# Patient Record
Sex: FEMALE | Race: WHITE | NOT HISPANIC OR LATINO | Employment: FULL TIME | URBAN - METROPOLITAN AREA
[De-identification: names, ages, dates, MRNs, and addresses within clinical notes are randomized per-mention and may not be internally consistent; named-entity substitution may affect disease eponyms.]

---

## 2017-01-12 ENCOUNTER — ALLSCRIPTS OFFICE VISIT (OUTPATIENT)
Dept: OTHER | Facility: OTHER | Age: 37
End: 2017-01-12

## 2017-02-16 ENCOUNTER — TRANSCRIBE ORDERS (OUTPATIENT)
Dept: SLEEP CENTER | Facility: CLINIC | Age: 37
End: 2017-02-16

## 2017-02-16 DIAGNOSIS — G47.33 OSA (OBSTRUCTIVE SLEEP APNEA): Primary | ICD-10-CM

## 2017-02-17 ENCOUNTER — TRANSCRIBE ORDERS (OUTPATIENT)
Dept: ADMINISTRATIVE | Facility: HOSPITAL | Age: 37
End: 2017-02-17

## 2017-02-17 DIAGNOSIS — J31.0 CHRONIC RHINITIS: Primary | ICD-10-CM

## 2017-02-17 DIAGNOSIS — J32.9 UNSPECIFIED SINUSITIS (CHRONIC): ICD-10-CM

## 2017-02-24 ENCOUNTER — HOSPITAL ENCOUNTER (OUTPATIENT)
Dept: CT IMAGING | Facility: HOSPITAL | Age: 37
Discharge: HOME | End: 2017-02-24
Attending: OTOLARYNGOLOGY
Payer: COMMERCIAL

## 2017-02-24 DIAGNOSIS — J31.0 CHRONIC RHINITIS: ICD-10-CM

## 2017-02-24 DIAGNOSIS — J32.9 UNSPECIFIED SINUSITIS (CHRONIC): ICD-10-CM

## 2017-02-24 PROCEDURE — 70486 CT MAXILLOFACIAL W/O DYE: CPT

## 2017-02-26 ENCOUNTER — ANESTHESIA EVENT (OUTPATIENT)
Dept: GASTROENTEROLOGY | Facility: HOSPITAL | Age: 37
End: 2017-02-26
Payer: COMMERCIAL

## 2017-02-27 ENCOUNTER — ANESTHESIA (OUTPATIENT)
Dept: GASTROENTEROLOGY | Facility: HOSPITAL | Age: 37
End: 2017-02-27
Payer: COMMERCIAL

## 2017-02-27 ENCOUNTER — HOSPITAL ENCOUNTER (OUTPATIENT)
Facility: HOSPITAL | Age: 37
Setting detail: OUTPATIENT SURGERY
Discharge: HOME | End: 2017-02-27
Attending: INTERNAL MEDICINE | Admitting: INTERNAL MEDICINE
Payer: COMMERCIAL

## 2017-02-27 ENCOUNTER — GENERIC CONVERSION - ENCOUNTER (OUTPATIENT)
Dept: OTHER | Facility: OTHER | Age: 37
End: 2017-02-27

## 2017-02-27 VITALS
OXYGEN SATURATION: 99 % | HEIGHT: 61 IN | BODY MASS INDEX: 50.03 KG/M2 | RESPIRATION RATE: 18 BRPM | WEIGHT: 265 LBS | TEMPERATURE: 98.4 F | HEART RATE: 77 BPM | DIASTOLIC BLOOD PRESSURE: 85 MMHG | SYSTOLIC BLOOD PRESSURE: 137 MMHG

## 2017-02-27 DIAGNOSIS — R19.8 ALTERNATING CONSTIPATION AND DIARRHEA: ICD-10-CM

## 2017-02-27 PROCEDURE — 88305 TISSUE EXAM BY PATHOLOGIST: CPT | Performed by: INTERNAL MEDICINE

## 2017-02-27 RX ORDER — PROPOFOL 10 MG/ML
INJECTION, EMULSION INTRAVENOUS AS NEEDED
Status: DISCONTINUED | OUTPATIENT
Start: 2017-02-27 | End: 2017-02-27 | Stop reason: SURG

## 2017-02-27 RX ORDER — PROPOFOL 10 MG/ML
INJECTION, EMULSION INTRAVENOUS CONTINUOUS PRN
Status: DISCONTINUED | OUTPATIENT
Start: 2017-02-27 | End: 2017-02-27 | Stop reason: SURG

## 2017-02-27 RX ORDER — SODIUM CHLORIDE 9 MG/ML
100 INJECTION, SOLUTION INTRAVENOUS CONTINUOUS
Status: DISCONTINUED | OUTPATIENT
Start: 2017-02-27 | End: 2017-02-27 | Stop reason: HOSPADM

## 2017-02-27 RX ORDER — LIDOCAINE HYDROCHLORIDE 10 MG/ML
INJECTION, SOLUTION INFILTRATION; PERINEURAL AS NEEDED
Status: DISCONTINUED | OUTPATIENT
Start: 2017-02-27 | End: 2017-02-27 | Stop reason: SURG

## 2017-02-27 RX ADMIN — LIDOCAINE HYDROCHLORIDE 50 MG: 10 INJECTION, SOLUTION INFILTRATION; PERINEURAL at 11:34

## 2017-02-27 RX ADMIN — SODIUM CHLORIDE 100 ML/HR: 0.9 INJECTION, SOLUTION INTRAVENOUS at 11:09

## 2017-02-27 RX ADMIN — PROPOFOL 150 MCG/KG/MIN: 10 INJECTION, EMULSION INTRAVENOUS at 11:34

## 2017-02-27 RX ADMIN — PROPOFOL 50 MG: 10 INJECTION, EMULSION INTRAVENOUS at 11:36

## 2017-02-27 RX ADMIN — PROPOFOL 100 MG: 10 INJECTION, EMULSION INTRAVENOUS at 11:33

## 2017-03-01 ENCOUNTER — GENERIC CONVERSION - ENCOUNTER (OUTPATIENT)
Dept: OTHER | Facility: OTHER | Age: 37
End: 2017-03-01

## 2017-06-01 ENCOUNTER — ALLSCRIPTS OFFICE VISIT (OUTPATIENT)
Dept: OTHER | Facility: OTHER | Age: 37
End: 2017-06-01

## 2017-06-15 ENCOUNTER — ALLSCRIPTS OFFICE VISIT (OUTPATIENT)
Dept: OTHER | Facility: OTHER | Age: 37
End: 2017-06-15

## 2017-08-14 ENCOUNTER — ALLSCRIPTS OFFICE VISIT (OUTPATIENT)
Dept: OTHER | Facility: OTHER | Age: 37
End: 2017-08-14

## 2017-08-18 ENCOUNTER — TRANSCRIBE ORDERS (OUTPATIENT)
Dept: ADMINISTRATIVE | Facility: HOSPITAL | Age: 37
End: 2017-08-18

## 2017-08-18 ENCOUNTER — ALLSCRIPTS OFFICE VISIT (OUTPATIENT)
Dept: OTHER | Facility: OTHER | Age: 37
End: 2017-08-18

## 2017-08-18 DIAGNOSIS — M25.472 SWOLLEN ANKLES: Primary | ICD-10-CM

## 2017-08-18 DIAGNOSIS — M25.571 PAIN IN RIGHT ANKLE: ICD-10-CM

## 2017-08-18 DIAGNOSIS — M25.471 SWOLLEN ANKLES: Primary | ICD-10-CM

## 2017-08-18 DIAGNOSIS — M25.571 RIGHT ANKLE PAIN, UNSPECIFIED CHRONICITY: ICD-10-CM

## 2017-08-18 DIAGNOSIS — Z98.890 OTHER SPECIFIED POSTPROCEDURAL STATES: ICD-10-CM

## 2017-08-18 DIAGNOSIS — M25.471 EFFUSION OF RIGHT ANKLE: ICD-10-CM

## 2017-08-18 DIAGNOSIS — R53.83 OTHER FATIGUE: ICD-10-CM

## 2017-08-23 ENCOUNTER — HOSPITAL ENCOUNTER (OUTPATIENT)
Dept: MRI IMAGING | Facility: HOSPITAL | Age: 37
Discharge: HOME | End: 2017-08-23
Payer: COMMERCIAL

## 2017-08-23 DIAGNOSIS — M25.571 PAIN IN RIGHT ANKLE: ICD-10-CM

## 2017-08-23 DIAGNOSIS — M25.471 EFFUSION OF RIGHT ANKLE: ICD-10-CM

## 2017-08-23 PROCEDURE — 73721 MRI JNT OF LWR EXTRE W/O DYE: CPT

## 2017-08-24 ENCOUNTER — GENERIC CONVERSION - ENCOUNTER (OUTPATIENT)
Dept: OTHER | Facility: OTHER | Age: 37
End: 2017-08-24

## 2017-08-29 ENCOUNTER — ALLSCRIPTS OFFICE VISIT (OUTPATIENT)
Dept: OTHER | Facility: OTHER | Age: 37
End: 2017-08-29

## 2017-08-29 LAB — HBA1C MFR BLD HPLC: 6.4 %

## 2017-08-31 ENCOUNTER — ANESTHESIA EVENT (OUTPATIENT)
Dept: PERIOP | Facility: HOSPITAL | Age: 37
End: 2017-08-31
Payer: COMMERCIAL

## 2017-09-01 ENCOUNTER — ANESTHESIA (OUTPATIENT)
Dept: PERIOP | Facility: HOSPITAL | Age: 37
End: 2017-09-01
Payer: COMMERCIAL

## 2017-09-01 ENCOUNTER — HOSPITAL ENCOUNTER (OUTPATIENT)
Facility: HOSPITAL | Age: 37
Setting detail: OUTPATIENT SURGERY
Discharge: HOME | End: 2017-09-01
Attending: PODIATRIST | Admitting: PODIATRIST
Payer: COMMERCIAL

## 2017-09-01 ENCOUNTER — HOSPITAL ENCOUNTER (OUTPATIENT)
Dept: RADIOLOGY | Facility: HOSPITAL | Age: 37
Setting detail: OUTPATIENT SURGERY
Discharge: HOME | End: 2017-09-01
Payer: COMMERCIAL

## 2017-09-01 VITALS
SYSTOLIC BLOOD PRESSURE: 122 MMHG | WEIGHT: 248 LBS | OXYGEN SATURATION: 95 % | RESPIRATION RATE: 16 BRPM | HEIGHT: 61 IN | DIASTOLIC BLOOD PRESSURE: 69 MMHG | HEART RATE: 95 BPM | TEMPERATURE: 97.7 F | BODY MASS INDEX: 46.82 KG/M2

## 2017-09-01 DIAGNOSIS — M25.371 OTHER INSTABILITY, RIGHT ANKLE: ICD-10-CM

## 2017-09-01 DIAGNOSIS — R26.2 AMBULATORY DYSFUNCTION: Primary | ICD-10-CM

## 2017-09-01 LAB
EXT PREGNANCY TEST URINE: NEGATIVE
GLUCOSE SERPL-MCNC: 129 MG/DL (ref 65–140)

## 2017-09-01 PROCEDURE — 73600 X-RAY EXAM OF ANKLE: CPT

## 2017-09-01 PROCEDURE — C1713 ANCHOR/SCREW BN/BN,TIS/BN: HCPCS | Performed by: PODIATRIST

## 2017-09-01 PROCEDURE — 81025 URINE PREGNANCY TEST: CPT | Performed by: ANESTHESIOLOGY

## 2017-09-01 PROCEDURE — 82948 REAGENT STRIP/BLOOD GLUCOSE: CPT

## 2017-09-01 DEVICE — KIT REPAIR LIGAMENT AUG INTERNALBRACE: Type: IMPLANTABLE DEVICE | Site: ANKLE | Status: FUNCTIONAL

## 2017-09-01 DEVICE — ANCHOR SUT 4.75 X 19.1MM CLS EYELET SWIVELOCK C: Type: IMPLANTABLE DEVICE | Site: ANKLE | Status: FUNCTIONAL

## 2017-09-01 DEVICE — ANCHOR SUT MINI 2.4 X 8.5MM DISP: Type: IMPLANTABLE DEVICE | Site: ANKLE | Status: FUNCTIONAL

## 2017-09-01 RX ORDER — OXYCODONE HYDROCHLORIDE 5 MG/1
5 TABLET ORAL EVERY 4 HOURS PRN
Status: DISCONTINUED | OUTPATIENT
Start: 2017-09-01 | End: 2017-09-02 | Stop reason: HOSPADM

## 2017-09-01 RX ORDER — CLINDAMYCIN PHOSPHATE 900 MG/50ML
900 INJECTION INTRAVENOUS ONCE
Status: COMPLETED | OUTPATIENT
Start: 2017-09-01 | End: 2017-09-01

## 2017-09-01 RX ORDER — PROPOFOL 10 MG/ML
INJECTION, EMULSION INTRAVENOUS AS NEEDED
Status: DISCONTINUED | OUTPATIENT
Start: 2017-09-01 | End: 2017-09-01 | Stop reason: SURG

## 2017-09-01 RX ORDER — ROCURONIUM BROMIDE 10 MG/ML
INJECTION, SOLUTION INTRAVENOUS AS NEEDED
Status: DISCONTINUED | OUTPATIENT
Start: 2017-09-01 | End: 2017-09-01 | Stop reason: SURG

## 2017-09-01 RX ORDER — SODIUM CHLORIDE 9 MG/ML
125 INJECTION, SOLUTION INTRAVENOUS CONTINUOUS
Status: DISCONTINUED | OUTPATIENT
Start: 2017-09-01 | End: 2017-09-02 | Stop reason: HOSPADM

## 2017-09-01 RX ORDER — MIDAZOLAM HYDROCHLORIDE 1 MG/ML
INJECTION INTRAMUSCULAR; INTRAVENOUS AS NEEDED
Status: DISCONTINUED | OUTPATIENT
Start: 2017-09-01 | End: 2017-09-01 | Stop reason: SURG

## 2017-09-01 RX ORDER — FENTANYL CITRATE 50 UG/ML
INJECTION, SOLUTION INTRAMUSCULAR; INTRAVENOUS AS NEEDED
Status: DISCONTINUED | OUTPATIENT
Start: 2017-09-01 | End: 2017-09-01 | Stop reason: SURG

## 2017-09-01 RX ORDER — WARFARIN SODIUM 2.5 MG/1
2.5 TABLET ORAL
Qty: 7 TABLET | Refills: 0 | Status: SHIPPED | OUTPATIENT
Start: 2017-09-01 | End: 2018-05-24

## 2017-09-01 RX ORDER — ROPIVACAINE HYDROCHLORIDE 5 MG/ML
INJECTION, SOLUTION EPIDURAL; INFILTRATION; PERINEURAL AS NEEDED
Status: DISCONTINUED | OUTPATIENT
Start: 2017-09-01 | End: 2017-09-01 | Stop reason: SURG

## 2017-09-01 RX ORDER — BUPIVACAINE HYDROCHLORIDE 5 MG/ML
INJECTION, SOLUTION PERINEURAL AS NEEDED
Status: DISCONTINUED | OUTPATIENT
Start: 2017-09-01 | End: 2017-09-01 | Stop reason: HOSPADM

## 2017-09-01 RX ORDER — DEXAMETHASONE SODIUM PHOSPHATE 4 MG/ML
INJECTION, SOLUTION INTRA-ARTICULAR; INTRALESIONAL; INTRAMUSCULAR; INTRAVENOUS; SOFT TISSUE AS NEEDED
Status: DISCONTINUED | OUTPATIENT
Start: 2017-09-01 | End: 2017-09-01 | Stop reason: HOSPADM

## 2017-09-01 RX ORDER — ONDANSETRON 2 MG/ML
INJECTION INTRAMUSCULAR; INTRAVENOUS AS NEEDED
Status: DISCONTINUED | OUTPATIENT
Start: 2017-09-01 | End: 2017-09-01 | Stop reason: SURG

## 2017-09-01 RX ORDER — LIDOCAINE HYDROCHLORIDE AND EPINEPHRINE 10; 10 MG/ML; UG/ML
INJECTION, SOLUTION INFILTRATION; PERINEURAL AS NEEDED
Status: DISCONTINUED | OUTPATIENT
Start: 2017-09-01 | End: 2017-09-01 | Stop reason: HOSPADM

## 2017-09-01 RX ORDER — GLYCOPYRROLATE 0.2 MG/ML
INJECTION INTRAMUSCULAR; INTRAVENOUS AS NEEDED
Status: DISCONTINUED | OUTPATIENT
Start: 2017-09-01 | End: 2017-09-01 | Stop reason: SURG

## 2017-09-01 RX ORDER — MAGNESIUM HYDROXIDE 1200 MG/15ML
LIQUID ORAL AS NEEDED
Status: DISCONTINUED | OUTPATIENT
Start: 2017-09-01 | End: 2017-09-01 | Stop reason: HOSPADM

## 2017-09-01 RX ORDER — ONDANSETRON 2 MG/ML
4 INJECTION INTRAMUSCULAR; INTRAVENOUS ONCE AS NEEDED
Status: DISCONTINUED | OUTPATIENT
Start: 2017-09-01 | End: 2017-09-01 | Stop reason: HOSPADM

## 2017-09-01 RX ORDER — ALBUTEROL SULFATE 90 UG/1
2 AEROSOL, METERED RESPIRATORY (INHALATION) EVERY 6 HOURS PRN
COMMUNITY
End: 2018-05-24

## 2017-09-01 RX ORDER — HYDROCODONE BITARTRATE AND ACETAMINOPHEN 5; 325 MG/1; MG/1
1 TABLET ORAL EVERY 6 HOURS PRN
Qty: 18 TABLET | Refills: 0 | Status: SHIPPED | OUTPATIENT
Start: 2017-09-01 | End: 2017-09-11

## 2017-09-01 RX ADMIN — PROPOFOL 200 MG: 10 INJECTION, EMULSION INTRAVENOUS at 14:09

## 2017-09-01 RX ADMIN — FENTANYL CITRATE 100 MCG: 50 INJECTION, SOLUTION INTRAMUSCULAR; INTRAVENOUS at 13:06

## 2017-09-01 RX ADMIN — ROCURONIUM BROMIDE 20 MG: 10 INJECTION, SOLUTION INTRAVENOUS at 15:55

## 2017-09-01 RX ADMIN — OXYCODONE HYDROCHLORIDE 5 MG: 5 TABLET ORAL at 20:09

## 2017-09-01 RX ADMIN — FENTANYL CITRATE 50 MCG: 50 INJECTION, SOLUTION INTRAMUSCULAR; INTRAVENOUS at 15:58

## 2017-09-01 RX ADMIN — LIDOCAINE HYDROCHLORIDE 40 MG: 20 INJECTION, SOLUTION INTRAVENOUS at 14:09

## 2017-09-01 RX ADMIN — HYDROMORPHONE HYDROCHLORIDE 0.5 MG: 1 INJECTION, SOLUTION INTRAMUSCULAR; INTRAVENOUS; SUBCUTANEOUS at 17:28

## 2017-09-01 RX ADMIN — ROCURONIUM BROMIDE 50 MG: 10 INJECTION, SOLUTION INTRAVENOUS at 14:09

## 2017-09-01 RX ADMIN — CLINDAMYCIN PHOSPHATE 900 MG: 18 INJECTION, SOLUTION INTRAMUSCULAR; INTRAVENOUS at 14:16

## 2017-09-01 RX ADMIN — SODIUM CHLORIDE: 0.9 INJECTION, SOLUTION INTRAVENOUS at 14:35

## 2017-09-01 RX ADMIN — HYDROMORPHONE HYDROCHLORIDE 0.5 MG: 1 INJECTION, SOLUTION INTRAMUSCULAR; INTRAVENOUS; SUBCUTANEOUS at 17:39

## 2017-09-01 RX ADMIN — GLYCOPYRROLATE 0.4 MG: 0.2 INJECTION, SOLUTION INTRAMUSCULAR; INTRAVENOUS at 16:15

## 2017-09-01 RX ADMIN — NEOSTIGMINE METHYLSULFATE 3 MG: 1 INJECTION, SOLUTION INTRAMUSCULAR; INTRAVENOUS; SUBCUTANEOUS at 16:15

## 2017-09-01 RX ADMIN — SODIUM CHLORIDE: 0.9 INJECTION, SOLUTION INTRAVENOUS at 16:36

## 2017-09-01 RX ADMIN — IBUPROFEN 800 MG: 200 TABLET, FILM COATED ORAL at 20:21

## 2017-09-01 RX ADMIN — HYDROMORPHONE HYDROCHLORIDE 0.5 MG: 1 INJECTION, SOLUTION INTRAMUSCULAR; INTRAVENOUS; SUBCUTANEOUS at 17:16

## 2017-09-01 RX ADMIN — FENTANYL CITRATE 50 MCG: 50 INJECTION, SOLUTION INTRAMUSCULAR; INTRAVENOUS at 15:28

## 2017-09-01 RX ADMIN — SODIUM CHLORIDE 125 ML/HR: 0.9 INJECTION, SOLUTION INTRAVENOUS at 12:33

## 2017-09-01 RX ADMIN — HYDROMORPHONE HYDROCHLORIDE 0.5 MG: 1 INJECTION, SOLUTION INTRAMUSCULAR; INTRAVENOUS; SUBCUTANEOUS at 16:56

## 2017-09-01 RX ADMIN — HYDROMORPHONE HYDROCHLORIDE 0.5 MG: 1 INJECTION, SOLUTION INTRAMUSCULAR; INTRAVENOUS; SUBCUTANEOUS at 17:53

## 2017-09-01 RX ADMIN — MIDAZOLAM HYDROCHLORIDE 4 MG: 1 INJECTION, SOLUTION INTRAMUSCULAR; INTRAVENOUS at 13:06

## 2017-09-01 RX ADMIN — ONDANSETRON HYDROCHLORIDE 8 MG: 2 INJECTION, SOLUTION INTRAVENOUS at 15:59

## 2017-09-01 RX ADMIN — ROPIVACAINE HYDROCHLORIDE 40 ML: 5 INJECTION, SOLUTION EPIDURAL; INFILTRATION; PERINEURAL at 13:06

## 2017-09-03 ENCOUNTER — HOSPITAL ENCOUNTER (EMERGENCY)
Facility: HOSPITAL | Age: 37
Discharge: HOME | End: 2017-09-03
Admitting: EMERGENCY MEDICINE
Payer: COMMERCIAL

## 2017-09-03 VITALS
WEIGHT: 248 LBS | BODY MASS INDEX: 46.82 KG/M2 | HEIGHT: 61 IN | HEART RATE: 75 BPM | OXYGEN SATURATION: 94 % | RESPIRATION RATE: 18 BRPM | TEMPERATURE: 97.5 F

## 2017-09-03 DIAGNOSIS — G89.18 ACUTE POSTOPERATIVE PAIN: Primary | ICD-10-CM

## 2017-09-03 PROCEDURE — 99283 EMERGENCY DEPT VISIT LOW MDM: CPT

## 2017-09-03 RX ORDER — HYDROCODONE BITARTRATE AND ACETAMINOPHEN 5; 325 MG/1; MG/1
2 TABLET ORAL EVERY 6 HOURS PRN
Status: DISCONTINUED | OUTPATIENT
Start: 2017-09-03 | End: 2017-09-03 | Stop reason: HOSPADM

## 2017-09-03 RX ORDER — OXYCODONE HYDROCHLORIDE 10 MG/1
10 TABLET ORAL EVERY 4 HOURS PRN
Qty: 20 TABLET | Refills: 0 | Status: SHIPPED | OUTPATIENT
Start: 2017-09-03 | End: 2017-09-13

## 2017-09-03 RX ADMIN — HYDROCODONE BITARTRATE AND ACETAMINOPHEN 2 TABLET: 5; 325 TABLET ORAL at 02:22

## 2017-09-05 ENCOUNTER — ALLSCRIPTS OFFICE VISIT (OUTPATIENT)
Dept: OTHER | Facility: OTHER | Age: 37
End: 2017-09-05

## 2017-09-07 ENCOUNTER — GENERIC CONVERSION - ENCOUNTER (OUTPATIENT)
Dept: OTHER | Facility: OTHER | Age: 37
End: 2017-09-07

## 2017-09-11 ENCOUNTER — GENERIC CONVERSION - ENCOUNTER (OUTPATIENT)
Dept: OTHER | Facility: OTHER | Age: 37
End: 2017-09-11

## 2017-09-18 DIAGNOSIS — Z98.890 OTHER SPECIFIED POSTPROCEDURAL STATES: ICD-10-CM

## 2017-09-28 ENCOUNTER — APPOINTMENT (OUTPATIENT)
Dept: PHYSICAL THERAPY | Facility: REHABILITATION | Age: 37
End: 2017-09-28
Payer: COMMERCIAL

## 2017-09-28 PROCEDURE — 97161 PT EVAL LOW COMPLEX 20 MIN: CPT

## 2017-09-28 PROCEDURE — G8978 MOBILITY CURRENT STATUS: HCPCS

## 2017-09-28 PROCEDURE — G8979 MOBILITY GOAL STATUS: HCPCS

## 2017-10-02 ENCOUNTER — GENERIC CONVERSION - ENCOUNTER (OUTPATIENT)
Dept: OTHER | Facility: OTHER | Age: 37
End: 2017-10-02

## 2017-10-03 ENCOUNTER — APPOINTMENT (OUTPATIENT)
Dept: PHYSICAL THERAPY | Facility: REHABILITATION | Age: 37
End: 2017-10-03
Payer: COMMERCIAL

## 2017-10-03 PROCEDURE — 97110 THERAPEUTIC EXERCISES: CPT

## 2017-10-05 ENCOUNTER — APPOINTMENT (OUTPATIENT)
Dept: PHYSICAL THERAPY | Facility: REHABILITATION | Age: 37
End: 2017-10-05
Payer: COMMERCIAL

## 2017-10-09 ENCOUNTER — APPOINTMENT (OUTPATIENT)
Dept: PHYSICAL THERAPY | Facility: REHABILITATION | Age: 37
End: 2017-10-09
Payer: COMMERCIAL

## 2017-10-09 PROCEDURE — 97140 MANUAL THERAPY 1/> REGIONS: CPT

## 2017-10-09 PROCEDURE — 97110 THERAPEUTIC EXERCISES: CPT

## 2017-10-12 ENCOUNTER — APPOINTMENT (OUTPATIENT)
Dept: PHYSICAL THERAPY | Facility: REHABILITATION | Age: 37
End: 2017-10-12
Payer: COMMERCIAL

## 2017-10-12 PROCEDURE — 97110 THERAPEUTIC EXERCISES: CPT

## 2017-10-12 PROCEDURE — 97140 MANUAL THERAPY 1/> REGIONS: CPT

## 2017-10-16 ENCOUNTER — APPOINTMENT (OUTPATIENT)
Dept: PHYSICAL THERAPY | Facility: REHABILITATION | Age: 37
End: 2017-10-16
Payer: COMMERCIAL

## 2017-10-16 PROCEDURE — 97140 MANUAL THERAPY 1/> REGIONS: CPT

## 2017-10-16 PROCEDURE — 97110 THERAPEUTIC EXERCISES: CPT

## 2017-10-19 ENCOUNTER — APPOINTMENT (OUTPATIENT)
Dept: PHYSICAL THERAPY | Facility: REHABILITATION | Age: 37
End: 2017-10-19
Payer: COMMERCIAL

## 2017-10-23 DIAGNOSIS — Z98.890 OTHER SPECIFIED POSTPROCEDURAL STATES: ICD-10-CM

## 2017-10-25 ENCOUNTER — GENERIC CONVERSION - ENCOUNTER (OUTPATIENT)
Dept: OTHER | Facility: OTHER | Age: 37
End: 2017-10-25

## 2017-10-26 ENCOUNTER — APPOINTMENT (OUTPATIENT)
Dept: PHYSICAL THERAPY | Facility: REHABILITATION | Age: 37
End: 2017-10-26
Payer: COMMERCIAL

## 2017-11-09 ENCOUNTER — GENERIC CONVERSION - ENCOUNTER (OUTPATIENT)
Dept: OTHER | Facility: OTHER | Age: 37
End: 2017-11-09

## 2017-11-27 DIAGNOSIS — R19.8 OTHER SPECIFIED SYMPTOMS AND SIGNS INVOLVING THE DIGESTIVE SYSTEM AND ABDOMEN: ICD-10-CM

## 2017-11-27 DIAGNOSIS — Z98.890 OTHER SPECIFIED POSTPROCEDURAL STATES: ICD-10-CM

## 2017-11-27 DIAGNOSIS — K58.2 MIXED IRRITABLE BOWEL SYNDROME: ICD-10-CM

## 2017-12-12 ENCOUNTER — ALLSCRIPTS OFFICE VISIT (OUTPATIENT)
Dept: OTHER | Facility: OTHER | Age: 37
End: 2017-12-12

## 2017-12-13 NOTE — PROGRESS NOTES
Assessment    1  Acute maxillary sinusitis (461 0) (J01 00)   2  Allergy to multiple antibiotics (V14 1) (Z88 1)    Plan  Acute maxillary sinusitis    · DrRx Zithromax Z-Nathan 250 MG #6 pill pack; take as directed  Allergy to multiple antibiotics    · Reid UGALDE  (Allergy/Immunology) Co-Management  *  Status: Hold For -Scheduling  Requested for: 68PHU6247  Care Summary provided  : Yes    Discussion/Summary    Tram Is here with acute maxillary sinusitis  I am going to treat her with a Z-Nathan  Unfortunately she has multiple antibiotic allergies and this is the only 1 that she has not reacted to  I have also recommended use of Mucinex during the day and increased water intake had a discussion about her multiple allergies to various antibiotics, and I have given her a referral to an allergist for further evaluation  She states that her mother told her she was allergic to penicillin originally, and we would like to confirm to allergies versus verbal history of potential allergies  She should call or recheck if symptoms are not resolving  We will plan follow-up visit in January  Possible side effects of new medications were reviewed with the patient/guardian today  The treatment plan was reviewed with the patient/guardian  The patient/guardian understands and agrees with the treatment plan      Chief Complaint  c/o Possible sinus infectionwith Flu shot      History of Present Illness  HPI: Sinus sx began 4 weeks ago and she went to urgent care  got Z-nathan and prednisone  there was some improvement but it did not clear have worsened past 2 weeks  congestion especially over maxillary regions and headache on left side  has right sided chest pain which is randomcoughof nasal congestiontaking anythingBP      Review of Systems   Constitutional: No fever, no chills, feels well, no tiredness, no recent weight gain or loss  ENT: sore throat, but-- as noted in HPI    Cardiovascular: no complaints of slow or fast heart rate, no chest pain, no palpitations, no leg claudication or lower extremity edema  Respiratory: as noted in HPI,-- no shortness of breath-- and-- no wheezing  Gastrointestinal: no complaints of abdominal pain, no constipation, no nausea or diarrhea, no vomiting, no bloody stools  Active Problems  1  Alternating constipation and diarrhea (787 99) (R19 8)   2  Anxiety (300 00) (F41 9)   3  Asthma, exercise induced (493 81) (J45 990)   4  Blood pressure elevated (401 9) (I10)   5  Colon cancer screening (V76 51) (Z12 11)   6  Depression (311) (F32 9)   7  Dizziness (780 4) (R42)   8  Fatigue (780 79) (R53 83)   9  Fibromyalgia (729 1) (M79 7)   10  Flu vaccine need (V04 81) (Z23)   11  GERD (gastroesophageal reflux disease) (530 81) (K21 9)   12  Headache, migraine (346 90) (G43 909)   13  History of allergy (V15 09) (Z88 9)   14  Hyperlipidemia (272 4) (E78 5)   15  Influenza vaccination administered during current admission (V04 81) (Z23)   16  Injury of right ankle, initial encounter (959 7) (S99 911A)   17  Irritable bowel syndrome with both constipation and diarrhea (564 1) (K58 2)   18  Lateral epicondylitis of right elbow (726 32) (M77 11)   19  Lipoma of abdominal wall (214 1) (D17 1)   20  Low back pain (724 2) (M54 5)   21  Lumbar facet arthropathy (721 3) (M46 96)   22  Lumbar radiculopathy (724 4) (M54 16)   23  Malignant tumor of cervix (180 9) (C53 9)   24  Morbid or severe obesity due to excess calories (278 01) (E66 01)   25  Myalgia (729 1) (M79 1)   26  Obstructive sleep apnea (327 23) (G47 33)   27  Paresthesias (782 0) (R20 2)   28  Plantar fasciitis (728 71) (M72 2)   29  Preop examination (V72 84) (Z01 818)   30  Right ankle pain (719 47) (M25 571)   31  Right ankle swelling (719 07) (M25 471)   32  Right knee pain (719 46) (M25 561)   33  S/P hysterectomy (V88 01) (Z90 710)   34  S/P surgical manipulation of ankle joint (V45 89) (Z98 890)   35   Sinusitis (473 9) (J32 9)   36  Sprain of right ankle, unspecified ligament, subsequent encounter (845 00) (S93 401D)   37  Thoracic back pain (724 1) (M54 6)   38  Tinea corporis (110 5) (B35 4)   39  Tobacco use (305 1) (Z72 0)   40  Type 2 diabetes mellitus (250 00) (E11 9)   41  Uncontrolled blood glucose (790 29) (R73 09)   42  Viral bronchitis (466 0) (J20 8)    Past Medical History  1  History of Abdominal pain, acute, left lower quadrant (789 04,338 19) (R10 32)   2  History of Acute diverticulitis of intestine (562 11) (K57 92)   3  History of Acute maxillary sinusitis (461 0) (J01 00)   4  History of Acute UTI (599 0) (N39 0)   5  Epilepsy And Recurrent Seizures (345 90)   6  History of Gestational Diabetes Mellitus (V12 21)   7  History of acute conjunctivitis (V12 49) (Z86 69)   8  History of acute sinusitis (V12 69) (Z87 09)   9  History of tinea corporis (V12 09) (Z86 19)    Family History  Mother    1  Family history of Anxiety (Symptom)   2  Family history of Depression   3  Family history of Ovarian Cancer (V16 41)  Brother    4  Family history of Epilepsy And Recurrent Seizures (V17 2)   5  Family history of Suicide Risk  Maternal Grandmother    6  Family history of Cancer  Other    7  Family history of cardiac disorder (V17 49) (Z82 49)   8  Family history of diabetes mellitus (V18 0) (Z83 3)   9  Family history of hypertension (V17 49) (Z82 49)   10  Family history of neuropathy (V17 2) (Z82 0)   11  Family history of thyroid disease (V18 19) (Z83 49)    Social History   · Being A Social Drinker   · Former smoker (V15 82) (N42 225)   · Tobacco use (305 1) (Z72 0)    Surgical History    1  History of Ankle Repair   2  History of Hysterectomy   3  History of Tonsillectomy   4  History of Tubal Ligation    Current Meds   1  Dicyclomine HCl - 20 MG Oral Tablet; TAKE 1 TABLET Every 8 hours PRN abdominal pain; Therapy: 04XQD5632 to (Evaluate:30Knm4814)  Requested for: 49BOY3631; Last Rx:01Jun2017 Ordered   2   DULoxetine HCl - 30 MG Oral Capsule Delayed Release Particles; take 1 capsule daily; Therapy: 38WLR9470 to (Evaluate:13Jan2018)  Requested for: 55BCN1664; Last Rx:51Wjy6374 Ordered   3  Hydrocodone-Acetaminophen 7 5-325 MG Oral Tablet; TAKE 1 TABLET TWICE DAILY AS NEEDED FOR SEVERE PAIN; Therapy: 70XXS5093 to (Evaluate:05Jan2018); Last Rx:14Wzh7551 Ordered   4  LORazepam 0 5 MG Oral Tablet; Take 1-2 tablets daily as needed for anxiety; Therapy: 38BGG1464 to (Evaluate:05Jan2018); Last Rx:84Cpr8962 Ordered   5  MetFORMIN HCl - 500 MG Oral Tablet; TAKE 1 TABLET EVERY 12 HOURS WITH FOOD; Therapy: 71Nlt7398 to (Evaluate:18Jun2017)  Requested for: 12Wco8665; Last Rx:96Bfn6275 Ordered   6  OneTouch Ultra 2 w/Device Kit; test up to three times daily or as directed by physician; Therapy: 07ZBY5991 to (Gene Kehr)  Requested for: 33XBN7103; Last Rx:18Yyy8355 Ordered   7  OneTouch Ultra Blue In [employer]; USE 1 STRIP 3 times daily; Therapy: 99TNV5828 to (Last Rx:25Mar2016)  Requested for: 25Mar2016 Ordered   8  OxyCODONE HCl - 10 MG Oral Tablet; Therapy: 75BDA1184 to Recorded   9  ProAir  (90 Base) MCG/ACT Inhalation Aerosol Solution; INHALE 2 PUFFS 3 TIMES DAILY AS NEEDED; Therapy: 10NVF0465 to (Last Rx:10Nov2016)  Requested for: 42MNV7749 Ordered   10  SUMAtriptan Succinate 50 MG Oral Tablet; take 1 tablet for migraine relief  may repeat  every 2 hours  max 200mg/day; Therapy: 96OFJ2463 to (KQPNWKXT:76DLU8024)  Requested for: 35UYA5139; Last  Rx:03Oct2017 Ordered   11  Warfarin Sodium 2 5 MG Oral Tablet; TAKE 2 TABLETS DAILY; Therapy: 55NMD4320 to (Irlanda Montano)  Requested for: 23Wqu3260; Last  Rx:27Irg8559 Ordered    Allergies  1  Amoxicillin TABS   2  Aspirin TABS   3  Ceclor CAPS   4  Doxycycline Hyclate CAPS   5  Erythromycin TABS   6   Penicillins    Vitals   Recorded: 43Ucp8238 04:23PM Recorded: 18Kmz0540 03:42PM   Temperature  98 7 F    Heart Rate  80    Respiration  16    Systolic 375 219    Diastolic 90 489    Patient Refused Weight  Yes Yes   O2 Saturation  98, RA        Physical Exam   Constitutional  General appearance: No acute distress, well appearing and well nourished  morbidly obese  Ears, Nose, Mouth, and Throat  External inspection of ears and nose: Normal    Otoscopic examination: Tympanic membranes translucent with normal light reflex  Canals patent without erythema  Nasal mucosa, septum, and turbinates: Abnormal  -- (congested bilaterally)  Oropharynx: Normal with no erythema, edema, exudate or lesions  Pulmonary  Respiratory effort: No increased work of breathing or signs of respiratory distress  Auscultation of lungs: Clear to auscultation  Cardiovascular  Auscultation of heart: Normal rate and rhythm, normal S1 and S2, without murmurs  Lymphatic  Palpation of lymph nodes in neck: No lymphadenopathy  Additional Exam:  tender max sinuses          Future Appointments    Date/Time Provider Specialty Site   12/18/2017 12:30 PM Owen Shelby MD Gastroenterology Adult [facility] 9   01/11/2018 09:00 AM Malena Colunga MD Family Medicine [facility] 19   Electronically signed by : Samuel Puga MD; Dec 12 2017  5:05PM EST                       (Author)

## 2017-12-18 ENCOUNTER — ALLSCRIPTS OFFICE VISIT (OUTPATIENT)
Dept: OTHER | Facility: OTHER | Age: 37
End: 2017-12-18

## 2017-12-19 NOTE — PROGRESS NOTES
Assessment  1  GERD (gastroesophageal reflux disease) (530 81) (K21 9)   2  Alternating constipation and diarrhea (787 99) (R19 8)   3  Irritable bowel syndrome with both constipation and diarrhea (564 1) (K58 2)    Plan  Alternating constipation and diarrhea, GERD (gastroesophageal reflux disease), Irritablebowel syndrome with both constipation and diarrhea    · Follow Up After Tests Complete Evaluation and Treatment  Follow-up  Status: Hold For -Scheduling  Requested for: 55MYW2448   Ordered; For: Alternating constipation and diarrhea, GERD (gastroesophageal reflux disease), Irritable bowel syndrome with both constipation and diarrhea; Ordered By: Benedict Linder Performed:  Due: 58FVI5491   · Follow-up visit in 2 months Evaluation and Treatment  Follow-up  Status: Hold For -Scheduling  Requested for: 41VKP3014   Ordered; For: Alternating constipation and diarrhea, GERD (gastroesophageal reflux disease), Irritable bowel syndrome with both constipation and diarrhea; Ordered By: Benedict Linder Performed:  Due: 69CDR5493   · Follow-up visit in 4 Months Evaluation and Treatment  Follow-up NM  Status: Hold For -Scheduling  Requested for: 78QKV8328   Ordered; For: Alternating constipation and diarrhea, GERD (gastroesophageal reflux disease), Irritable bowel syndrome with both constipation and diarrhea; Ordered By: Benedict Linder Performed:  Due: 22VMY0513  Alternating constipation and diarrhea, Irritable bowel syndrome with both constipationand diarrhea    · * XR ABDOMEN OBSTRUCTION SERIES; Status:Active; Requested for:91Ccm7189;    Perform:[facility]; Due:60Nkx8270; Ordered; For:Alternating constipation and diarrhea, Irritable bowel syndrome with both constipation and diarrhea; Ordered By:Armani Lara;  Irritable bowel syndrome with both constipation and diarrhea    · Hyoscyamine Sulfate 0 125 MG Sublingual Tablet Sublingual; PLACE 1 TABLETUNDER THE TONGUE  3 TIMES DAILY AS NEEDED   Rx By: Benedict Linder; Dispense: 20 Days ; #:60 Tablet Sublingual; Refill: 2;For: Irritable bowel syndrome with both constipation and diarrhea; ELIEZER = N; Verified Transmission to [id number] Last Updated By: System, [employer]; 12/18/2017 1:30:34 PM    Discussion/Summary  Discussion Summary:   She has multiple gastrointestinal complaints including reflux, lower quadrant pain, and alternating diarrhea and constipation  She most likely has constipation predominant IBS with occasional overflow diarrhea and fortunately this has improved  I have given her prescription for levsin to take as needed when she gets these colon spasms  I also suggested a low FODMAP diet and a course of align or VSL#3  I referred her for repeat obstruction series to make sure we are not missing recurrent constipation  If there is no improvement or if she develops increasing bleeding then we will consider repeat colonoscopy or CT Scan  She will continue lifestyle and dietary modification for her reflux symptoms  These may improve after her upcoming bariatric surgery  Chief Complaint  Chief Complaint Free Text Note Form: Pt being seen for follow up Abdominal pain, nausea  patient notices symptoms getting worse last 1-2 months   Chief Complaint Chronic Condition [facility]: Patient is here today for follow up of chronic conditions described in HPI  History of Present Illness  HPI: During her EGD and colonoscopy over the past year, she was found to have a small hiatal hernia and her colon notable for a hyperplastic polyp that was removed  She has continued to complain of intermittent abdominal pains and spasms which she attributes to her intestines  Her constipation is improved since the colonoscopy and after a course of miralax and now she no longer has to take MiraLAX, but she still has the occasional pain and occasional rectal bleeding  She denies any nausea, vomiting, and weight loss  History Reviewed:  The history was obtained today from the patient and I agree with the documented history  Review of Systems  Complete-Female GI Adult:  Constitutional: feeling poorly-- and-- feeling tired, but-- No fever, no chills, feels well, no tiredness, no recent weight gain or weight loss,-- no fever,-- no recent weight gain,-- no chills-- and-- no recent weight loss  Eyes: No complaints of eye pain, no red eyes, no eyesight problems, no discharge, no dry eyes, no itching of eyes  ENT: no complaints of earache, no loss of hearing, no nose bleeds, no nasal discharge, no sore throat, no hoarseness  Cardiovascular: No complaints of slow heart rate, no fast heart rate, no chest pain, no palpitations, no leg claudication, no lower extremity edema  Respiratory: shortness of breath, but-- No complaints of shortness of breath, no wheezing, no cough, no SOB on exertion, no orthopnea, no PND,-- no cough,-- no orthopnea,-- no wheezing,-- no shortness of breath during exertion-- and-- no PND  Gastrointestinal: abdominal pain,-- constipation,-- diarrhea-- and-- hemorrhoids, acid reflux, but-- no nausea,-- no vomiting-- and-- no blood in stools  Genitourinary: No complaints of dysuria, no incontinence, no pelvic pain, no dysmenorrhea, no vaginal discharge or bleeding  Musculoskeletal: No complaints of arthralgias, no myalgias, no joint swelling or stiffness, no limb pain or swelling  Integumentary: No complaints of skin rash or lesions, no itching, no skin wounds, no breast pain or lump  Neurological: No complaints of headache, no confusion, no convulsions, no numbness, no dizziness or fainting, no tingling, no limb weakness, no difficulty walking  Psychiatric: Not suicidal, no sleep disturbance, no anxiety or depression, no change in personality, no emotional problems  Endocrine: No complaints of proptosis, no hot flashes, no muscle weakness, no deepening of the voice, no feelings of weakness    Hematologic/Lymphatic: No complaints of swollen glands, no swollen glands in the neck, does not bleed easily, does not bruise easily  ROS Reviewed:   ROS reviewed  Active Problems  1  Acute maxillary sinusitis (461 0) (J01 00)   2  Allergy to multiple antibiotics (V14 1) (Z88 1)   3  Alternating constipation and diarrhea (787 99) (R19 8)   4  Anxiety (300 00) (F41 9)   5  Asthma, exercise induced (493 81) (J45 990)   6  Blood pressure elevated (401 9) (I10)   7  Colon cancer screening (V76 51) (Z12 11)   8  Depression (311) (F32 9)   9  Dizziness (780 4) (R42)   10  Fatigue (780 79) (R53 83)   11  Fibromyalgia (729 1) (M79 7)   12  Flu vaccine need (V04 81) (Z23)   13  GERD (gastroesophageal reflux disease) (530 81) (K21 9)   14  Headache, migraine (346 90) (G43 909)   15  History of allergy (V15 09) (Z88 9)   16  Hyperlipidemia (272 4) (E78 5)   17  Influenza vaccination administered during current admission (V04 81) (Z23)   18  Injury of right ankle, initial encounter (959 7) (S99 911A)   19  Irritable bowel syndrome with both constipation and diarrhea (564 1) (K58 2)   20  Lateral epicondylitis of right elbow (726 32) (M77 11)   21  Lipoma of abdominal wall (214 1) (D17 1)   22  Low back pain (724 2) (M54 5)   23  Lumbar facet arthropathy (721 3) (M46 96)   24  Lumbar radiculopathy (724 4) (M54 16)   25  Malignant tumor of cervix (180 9) (C53 9)   26  Morbid or severe obesity due to excess calories (278 01) (E66 01)   27  Myalgia (729 1) (M79 1)   28  Obstructive sleep apnea (327 23) (G47 33)   29  Paresthesias (782 0) (R20 2)   30  Plantar fasciitis (728 71) (M72 2)   31  Preop examination (V72 84) (Z01 818)   32  Right ankle pain (719 47) (M25 571)   33  Right ankle swelling (719 07) (M25 471)   34  Right knee pain (719 46) (M25 561)   35  S/P hysterectomy (V88 01) (Z90 710)   36  S/P surgical manipulation of ankle joint (V45 89) (Z98 890)   37  Sinusitis (473 9) (J32 9)   38  Sprain of right ankle, unspecified ligament, subsequent encounter (845 00) (S93 401D)   39   Thoracic back pain (724 1) (M54 6)   40  Tinea corporis (110 5) (B35 4)   41  Tobacco use (305 1) (Z72 0)   42  Type 2 diabetes mellitus (250 00) (E11 9)   43  Uncontrolled blood glucose (790 29) (R73 09)   44  Viral bronchitis (466 0) (J20 8)    Past Medical History  1  History of Abdominal pain, acute, left lower quadrant (789 04,338 19) (R10 32)   2  History of Acute diverticulitis of intestine (562 11) (K57 92)   3  History of Acute maxillary sinusitis (461 0) (J01 00)   4  History of Acute UTI (599 0) (N39 0)   5  Epilepsy And Recurrent Seizures (345 90)   6  History of Gestational Diabetes Mellitus (V12 21)   7  History of acute conjunctivitis (V12 49) (Z86 69)   8  History of acute sinusitis (V12 69) (Z87 09)   9  History of tinea corporis (V12 09) (Z86 19)  Active Problems And Past Medical History Reviewed: The active problems and past medical history were reviewed and updated today  Surgical History  1  History of Ankle Repair   2  History of Hysterectomy   3  History of Tonsillectomy   4  History of Tubal Ligation  Surgical History Reviewed: The surgical history was reviewed and updated today  Family History  Mother    1  Family history of Anxiety (Symptom)   2  Family history of Depression   3  Family history of Ovarian Cancer (V16 41)  Brother    4  Family history of Epilepsy And Recurrent Seizures (V17 2)   5  Family history of Suicide Risk  Maternal Grandmother    6  Family history of Cancer  Other    7  Family history of cardiac disorder (V17 49) (Z82 49)   8  Family history of diabetes mellitus (V18 0) (Z83 3)   9  Family history of hypertension (V17 49) (Z82 49)   10  Family history of neuropathy (V17 2) (Z82 0)   11  Family history of thyroid disease (V18 19) (Z83 49)  Family History Reviewed: The family history was reviewed and updated today  Social History   · Being A Social Drinker   · Former smoker (C70 88) (Y93 919)   · Tobacco use (305 1) (Z72 0)  Social History Reviewed:  The social history was reviewed and updated today  Current Meds   1  Dicyclomine HCl - 20 MG Oral Tablet; TAKE 1 TABLET Every 8 hours PRN abdominal pain; Therapy: 75IAZ0502 to (Evaluate:15Ifo7260)  Requested for: 91GEB6115; Last Rx:01Jun2017 Ordered   2  DULoxetine HCl - 30 MG Oral Capsule Delayed Release Particles; take 1 capsule daily; Therapy: 18KDY7602 to (Evaluate:13Jan2018)  Requested for: 50OIS9744; Last Rx:98Tqs2261 Ordered   3  Hydrocodone-Acetaminophen 7 5-325 MG Oral Tablet; TAKE 1 TABLET TWICE DAILY AS NEEDED FOR SEVERE PAIN; Therapy: 21CTP9253 to (Evaluate:05Jan2018); Last Rx:74Ukf7473 Ordered   4  LORazepam 0 5 MG Oral Tablet; Take 1-2 tablets daily as needed for anxiety; Therapy: 84MZJ0058 to (Evaluate:05Jan2018); Last Rx:37Voj5736 Ordered   5  MetFORMIN HCl - 500 MG Oral Tablet; TAKE 1 TABLET EVERY 12 HOURS WITH FOOD; Therapy: 67Itf2336 to (Evaluate:18Jun2017)  Requested for: 17Mzw1146; Last Rx:20Zld4444 Ordered   6  OneTouch Ultra 2 w/Device Kit; test up to three times daily or as directed by physician; Therapy: 02YKB9771 to [phone])  Requested for: 50YME1541; Last Rx:00Bde8757 Ordered   7  OneTouch Ultra Blue In [employer]; USE 1 STRIP 3 times daily; Therapy: 07FER5866 to (Last Rx:25Mar2016)  Requested for: 25Mar2016 Ordered   8  ProAir  (90 Base) MCG/ACT Inhalation Aerosol Solution; INHALE 2 PUFFS 3 TIMES DAILY AS NEEDED; Therapy: 75XTR2875 to (Last Rx:10Nov2016)  Requested for: 17ELX0316 Ordered   9  SUMAtriptan Succinate 50 MG Oral Tablet; take 1 tablet for migraine relief  may repeat every 2 hours  max 200mg/day; Therapy: 31MSC2486 to )  Requested for: 43DQZ3925; Last Rx:24Pta7350 Ordered  Medication List Reviewed: The medication list was reviewed and updated today  Allergies  1  Amoxicillin TABS   2  Aspirin TABS   3  Ceclor CAPS   4  Doxycycline Hyclate CAPS   5  Erythromycin TABS   6   Penicillins    Vitals  Vital Signs    Recorded: 48HOS2415 12:37PM Temperature 98 8 F   Heart Rate 303   Systolic 217, LUE, Sitting   Diastolic 96, LUE, Sitting   Height 5 ft 1 5 in   Weight 264 lb 6 0 oz   BMI Calculated 49 14   BSA Calculated 2 14   O2 Saturation 97     Physical Exam   Constitutional  General appearance: No acute distress, well appearing and well nourished  Eyes  Conjunctiva and lids: No swelling, erythema or discharge  Pupils and irises: Equal, round and reactive to light  Ears, Nose, Mouth, and Throat  External inspection of ears and nose: Normal    Nasal mucosa, septum, and turbinates: Normal without edema or erythema  Oropharynx: Normal with no erythema, edema, exudate or lesions  Pulmonary  Respiratory effort: No increased work of breathing or signs of respiratory distress  Auscultation of lungs: Clear to auscultation  Cardiovascular  Auscultation of heart: Normal rate and rhythm, normal S1 and S2, without murmurs  Examination of extremities for edema and/or varicosities: Normal    Abdomen  Abdomen: Abnormal  -- mild RLQ tenderness  Liver and spleen: No hepatomegaly or splenomegaly  Lymphatic  Palpation of lymph nodes in neck: No lymphadenopathy  Musculoskeletal  Gait and station: Normal    Digits and nails: Normal without clubbing or cyanosis  Inspection/palpation of joints, bones, and muscles: Normal    Skin  Skin and subcutaneous tissue: Normal without rashes or lesions  Psychiatric  Orientation to person, place, and time: Normal    Mood and affect: Normal          Health Management  Alternating constipation and diarrhea   COLONOSCOPY (GI, SURG); every 5 years; Last 89Glr2169; Next Due: 59Kew4112; Active  Health Maintenance   (1) THIN PREP PAP WITH IMAGING; every 1 year; Last 27Feb2017; Next Due: 21Yub6058;  Overdue    Future Appointments    Date/Time Provider Specialty Site   01/11/2018 09:00 AM Donny Martinez MD Family Medicine [address] FAMILY MEDICINE     Signatures   Electronically signed by : Suzi Leo MD; Dec 18 2017  1:32PM EST                       (Author)

## 2018-01-10 NOTE — RESULT NOTES
Verified Results  * MRI ANKLE/HEEL RIGHT  WO CONTRAST [id number] 06:14AM Agus Trujillo    Order Number: [id number]    - Patient Instructions: To schedule this appointment, please contact Central Scheduling at [phone]  Test Name Result Flag Reference   MRI ANKLE/HEEL RIGHT  WO CONTRAST (Report)     MRI RIGHT ANKLE - WITHOUT CONTRAST     INDICATION: M25 571: Pain in right ankle and joints of right foot   M25 471: Effusion, right ankle  History taken directly from the electronic ordering system  Right ankle pain anteriorly and laterally  Patient states she injured the right ankle on 8/12/2017  COMPARISON: None     TECHNIQUE:  MR sequences were obtained of the right ankle including: Localizers, coronal STIR, coronal T1, axial T2 fat sat, axial PD, sagittal T2 fat sat, sagittal T1 sequences were obtained  Images were acquired on a1 5 Amina Unit  Gadolinium was not   used  FINDINGS:     SUBCUTANEOUS TISSUES: Normal     JOINT EFFUSION: None  TENDONS:   Achilles tendon: Normal    Peroneus brevis and longus: Normal    Posterior tibialis, flexor digitorum longus, flexor hallucis longus: Normal    Anterior tibialis, extensor digitorum longus, extensor hallucis longus: Normal      LIGAMENTS:   Lateral collateral ligament complex: The anterior talofibular and calcaneofibular ligaments are torn, while the posterior talofibular ligament is intact  Distal tibiofibular syndesmosis: Normal    Medial collateral ligament complex: Normal      PLANTAR FASCIA: There is thickening of the plantar fascia origin without edema consistent with mild chronic plantar fasciitis  There is also small plantar calcaneal spur  ARTICULAR SURFACES: Intact  SINUS TARSI: Normal      Tarsal Tunnel: Unremarkable  BONE MARROW SIGNAL: Normal      MUSCULATURE: Normal        IMPRESSION:     Torn anterior talofibular and calcaneofibular ligaments  There are no additional acute injuries       Mild chronic plantar fasciitis         Workstation performed: [id number]     Signed by:   Penny Espitia MD   8/23/17

## 2018-01-10 NOTE — RESULT NOTES
Verified Results  * MRI LUMBAR SPINE WO CONTRAST [id number] 07:46AM [facility] Order Number: [id number]    - Patient Instructions: To schedule this appointment, please contact Central Scheduling at [phone]  Test Name Result Flag Reference   MRI LUMBAR SPINE [address] (Report)     This is a summary report  The complete report is available in the patient's medical record  If you cannot access the medical record, please contact the sending organization for a detailed fax or copy  MRI LUMBAR SPINE WITHOUT CONTRAST     INDICATION: Low back pain     COMPARISON: MR 3/19/2015     TECHNIQUE: Sagittal T1, sagittal T2, sagittal inversion recovery, axial T1 and axial T2, coronal T2       IMAGE QUALITY: Diagnostic     FINDINGS:     ALIGNMENT: Normal alignment of the lumbar spine  No compression fracture  No spondylolysis or spondylolisthesis  No scoliosis  MARROW SIGNAL: Normal marrow signal is identified within the visualized bony structures  No discrete marrow lesion  DISTAL CORD AND CONUS: Normal size and signal within the distal cord and conus  The conus ends at the L1-L2 level  PARASPINAL SOFT TISSUES: Paraspinal soft tissues are unremarkable  SACRUM: Normal signal within the sacrum  No evidence of insufficiency or stress fracture  LOWER THORACIC DISC SPACES: Normal disc height and signal  No disc herniation, canal stenosis or foraminal narrowing  LUMBAR DISC SPACES:        L1-L2: Normal      L2-L3: Normal      L3-L4: Normal      L4-L5: Normal      L5-S1: Minor bulge  IMPRESSION:     No compressive cord or root disease  Disc at the L5-S1 level is less than impressive than on prior study  No indication of nerve root compression  Workstation performed: [id number]     Signed by:    Arsenio Mandel MD   12/27/16

## 2018-01-12 VITALS — HEART RATE: 84 BPM | SYSTOLIC BLOOD PRESSURE: 122 MMHG | DIASTOLIC BLOOD PRESSURE: 84 MMHG

## 2018-01-12 VITALS
HEIGHT: 62 IN | DIASTOLIC BLOOD PRESSURE: 84 MMHG | WEIGHT: 259 LBS | BODY MASS INDEX: 47.66 KG/M2 | SYSTOLIC BLOOD PRESSURE: 132 MMHG

## 2018-01-12 NOTE — RESULT NOTES
Verified Results  * US ABDOMEN COMPLETE [id number] 07:14AM Suzie Garcia   TW Order Number: [id number]     Test Name Result Flag Reference   US ABDOMEN COMPLETE (Report)     ABDOMEN ULTRASOUND, COMPLETE     INDICATION: Intermittent left-sided abdominal pain x2 weeks  COMPARISON: None  TECHNIQUE:  Real-time ultrasound of the abdomen was performed with a curvilinear transducer with both volumetric sweeps and still imaging techniques  FINDINGS:     PANCREAS: Visualized portions of the pancreas are within normal limits  AORTA AND IVC: Visualized portions are normal for patient age  LIVER:   Size: Mildly enlarged  The liver measures 18 cm in the midclavicular line  Contour: Surface contour is smooth  Parenchyma: Increased parenchymal echogenicity compatible with fatty infiltration  No evidence of suspicious mass  The main portal vein is patent and hepatopetal       BILIARY:   The gallbladder is normal in caliber  No wall thickening or pericholecystic fluid  No stones or sludge identified  Sonographic Sree Rily sign is negative  No intrahepatic biliary dilatation  CBD measures 3 mm  No choledocholithiasis  KIDNEY:    Right kidney measures 11 7 cm  Within normal limits  Left kidney measures 12 1 cm  Within normal limits  SPLEEN:    Measures 11 cm  Within normal limits  ASCITES: None  IMPRESSION:     Mild hepatomegaly with fatty infiltration         Workstation performed: [id number]     Signed by:   Prasad Olivarez MD   3/31/16

## 2018-01-13 VITALS
HEIGHT: 62 IN | WEIGHT: 263.13 LBS | RESPIRATION RATE: 16 BRPM | DIASTOLIC BLOOD PRESSURE: 70 MMHG | BODY MASS INDEX: 48.42 KG/M2 | SYSTOLIC BLOOD PRESSURE: 122 MMHG | HEART RATE: 105 BPM | OXYGEN SATURATION: 98 %

## 2018-01-13 VITALS
HEART RATE: 97 BPM | OXYGEN SATURATION: 97 % | BODY MASS INDEX: 48.76 KG/M2 | WEIGHT: 265 LBS | DIASTOLIC BLOOD PRESSURE: 70 MMHG | SYSTOLIC BLOOD PRESSURE: 112 MMHG | HEIGHT: 62 IN | RESPIRATION RATE: 18 BRPM | TEMPERATURE: 97.8 F

## 2018-01-14 VITALS
HEART RATE: 80 BPM | HEIGHT: 62 IN | BODY MASS INDEX: 48.21 KG/M2 | RESPIRATION RATE: 18 BRPM | WEIGHT: 262 LBS | DIASTOLIC BLOOD PRESSURE: 80 MMHG | SYSTOLIC BLOOD PRESSURE: 144 MMHG

## 2018-01-14 VITALS
OXYGEN SATURATION: 97 % | HEIGHT: 62 IN | HEART RATE: 80 BPM | TEMPERATURE: 97.8 F | WEIGHT: 264.25 LBS | BODY MASS INDEX: 48.63 KG/M2

## 2018-01-14 VITALS
HEIGHT: 62 IN | SYSTOLIC BLOOD PRESSURE: 120 MMHG | WEIGHT: 259 LBS | RESPIRATION RATE: 18 BRPM | DIASTOLIC BLOOD PRESSURE: 70 MMHG | HEART RATE: 87 BPM | BODY MASS INDEX: 47.66 KG/M2

## 2018-01-14 NOTE — MISCELLANEOUS
Message  GI Reminder Recall Tiara Learn:   Date: 10/02/2017   Dear Judi Yoon:     Review of our records shows you are due for the following: Follow Up Visit  Please call the following office to schedule your appointment:   [address] [place] [address], [address] [place] [address], [address] [place] [address] [phone]  We look forward to hearing from you!      Sincerely,     [facility]      Signatures   Electronically signed by : Leonidas Carroll, ; Oct  2 2017 11:36AM EST                       (Author)

## 2018-01-14 NOTE — RESULT NOTES
Verified Results  COLONOSCOPY [id number] 04:06PM Carmell Common     Test Name Result Flag Reference   Colonoscopy 09/07/2016        Summary / No summary entered :      No summary entered   Documents attached :      EPIC OP NOTE - Carmell Common; Enc: [id number] - Appointment -      Carmell Common - (Gastroenterology Adult) (Result Document)  (1) TISSUE EXAM [id number] 03:16PM Carmell Common     Test Name Result Flag Reference   LAB AP CASE REPORT (Report)     Surgical Pathology Report             Case: [id number]                   Authorizing Provider: María Patel MD      Collected:      09/07/2016 1516        Ordering Location:   [address] [place] [address]   Received:      09/08/2016 [facility] Endoscopy                               Pathologist:      Cecile Banks MD                             Specimens:  A) - Duodenum, R/o celiac                                       B) - Stomach, R/o H pylori   LAB AP FINAL DIAGNOSIS (Report)     A  Duodenum, biopsy:    - Superficial small intestinal/duodenal mucosa without any histologic   abnormality  B  Gastric biopsy:    - Focal minimal chronic inactive inflammation     - Negative for Helicobacter pylori organisms (immunohistochemical stain   with appropriate positive control)  - Negative for intestinal metaplasia, dysplasia and malignancy  Electronically signed by Cecile Banks MD on 9/9/2016 at 9:09 AM   LAB AP NOTE      Interpretation performed at [facility]   LAB AP SURGICAL ADDITIONAL INFORMATION (Report)     These tests were developed and their performance characteristics   determined by [facility] or [address] [place] [address]  They may not be cleared or approved by the U S  Food and   Drug Administration  The FDA has determined that such clearance or   approval is not necessary  These tests are used for clinical purposes     They should not be regarded as investigational or for research  This   laboratory has been approved by [place] 88, designated as a high-complexity   laboratory and is qualified to perform these tests  LAB AP GROSS DESCRIPTION (Report)     A  The specimen is received in formalin, labeled with the patient's name   and hospital number, and is designated duodenum  The specimen consists   of 2 tan-pink soft tissue fragments measuring 0 3 cm and 0 5 cm in   greatest dimension  Entirely submitted  One cassette  B  The specimen is received in formalin, labeled with the patient's name   and hospital number, and is designated stomach  The specimen consists   of 2 tan-pink soft tissue fragments measuring 0 4 cm and 0 5 cm in   greatest dimension  Entirely submitted  One cassette  Note: The estimated total formalin fixation time based upon information   provided by the submitting clinician and the standard processing schedule   is 22 75 hours    MAS

## 2018-01-15 NOTE — RESULT NOTES
Verified Results  (LC) Vitamin D, 25-Hydroxy, Total [id number] 09:38AM Donny Martinez   A courtesy copy of this report has been sent to  the patient  Test Name Result Flag Reference   Vitamin D, 25-Hydroxy, Serum 27 ng/mL L    Reference Range:   All Ages: Target levels 30 - 100

## 2018-01-15 NOTE — RESULT NOTES
Verified Results  (Q) PAIN MANAGEMENT PROFILE 1 W/ CONFIRMATION, URINE [id number] 12:00AM Suzie Garcia     Test Name Result Flag Reference   Prescribed Drug 1 Hydrocodone     Creatinine 43 6 mg/dL  > or = 20 0   pH 7 18  4 5 - 9 0   Oxidant NEGATIVE mcg/mL  <200   Amphetamines NEGATIVE ng/mL  <500   medMATCH Amphetamines CONSISTENT     Barbiturates NEGATIVE ng/mL  <300   medMATCH Barbiturates CONSISTENT     Benzodiazepines   <100   NEGATIVE CONFIRMED ng/mL   Alphahydroxyalprazolam NEGATIVE ng/mL  <25   medMATCH aOH alprazolam CONSISTENT     Alphahydroxymidazolam NEGATIVE ng/mL  <50   medMATCH aOH midazolam CONSISTENT     Alphahydroxytriazolam NEGATIVE ng/mL  <50   medMATCH aOH triazolam CONSISTENT     Aminoclonazepam NEGATIVE ng/mL  <25   medMATCH Aminoclonazepam CONSISTENT     Hydroxyethylflurazepam NEGATIVE ng/mL  <50   medMATCH OH,Et flurazepam CONSISTENT     Lorazepam NEGATIVE ng/mL  <50   medMATCH Lorazepam CONSISTENT     Nordiazepam NEGATIVE ng/mL  <50   medMATCH Nordiazepam CONSISTENT     Oxazepam NEGATIVE ng/mL  <50   medMATCH Oxazepam CONSISTENT     Temazepam NEGATIVE ng/mL  <50   medMATCH Temazepam CONSISTENT     Marijuana Metabolite NEGATIVE ng/mL  <20   medMATCH Marijuana Metab CONSISTENT     Cocaine Metabolite NEGATIVE ng/mL  <150   medMATCH Cocaine Metab CONSISTENT     Methadone NEGATIVE ng/mL  <100   medMATCH Methadone CONSISTENT     Opiates NEGATIVE ng/mL  <100   medMATCH Opiates INCONSISTENT     Oxycodone NEGATIVE ng/mL  <100   medMATCH Oxycodone CONSISTENT     Phencyclidine NEGATIVE ng/mL  <25   medMATCH Phencyclidine CONSISTENT     medMATCH comments are:   - present when drug test results may be the result of      metabolism of one or more drugs or when results are      inconsistent with prescribed medication(s) listed  - may be blank when drug results are consistent with      prescribed medication(s) listed

## 2018-01-15 NOTE — RESULT NOTES
Message   Polyp was hyperplastic  Repeat colo in 5 years  Verified Results  COLONOSCOPY (GI, SURG) [id number] 01:30PM French Crespogrant     Test Name Result Flag Reference   Colonoscopy 02/27/2017        Summary / No summary entered :      No summary entered   Documents attached :      EPIC OP NOTE - French Norgrant; Enc: [id number] - Appointment -      French Zakgrant - (Gastroenterology Adult) (Additional Information      Document)  (1) TISSUE EXAM [id number] 11:44AM Frenchjacob Crespogrant     Test Name Result Flag Reference   LAB AP CASE REPORT (Report)     Surgical Pathology Report             Case: [id number]                   Authorizing Provider: Kahlil Ceja MD      Collected:      02/27/2017 1144        Ordering Location:   [address] [place] [address]   Received:      02/27/2017 [facility] Endoscopy                               Pathologist:      Bere Wheeler DO                               Specimen:  Polyp, Colorectal, Sigmoid polyp   LAB AP FINAL DIAGNOSIS      A  Colon, sigmoid polyp, biopsy:  - Hyperplastic polyp  Interpretation performed at [facility], [place] [address]  Electronically signed by Bere Wheeler DO on 2/28/2017 at 1:31 PM   LAB AP SURGICAL ADDITIONAL INFORMATION (Report)     These tests were developed and their performance characteristics   determined by [facility] or [employer]  They may not be cleared or approved by the [place]  Food and   Drug Administration  The FDA has determined that such clearance or   approval is not necessary  These tests are used for clinical purposes  They should not be regarded as investigational or for research  This   laboratory has been approved by [place] 88, designated as a high-complexity   laboratory and is qualified to perform these tests  LAB AP GROSS DESCRIPTION (Report)     A   The specimen is received in formalin, labeled with the patient's name   and hospital number, and is designated sigmoid polyp  The specimen   consists of a single, rubbery, tan-brown tissue fragment measuring up to   0 1 cm in greatest dimension  Entirely submitted  One cassette  Note: The estimated total formalin fixation time based upon information   provided by the submitting clinician and the standard processing schedule   is 16 75 hours  SRS       Plan  PMH: Abdominal pain, acute, left lower quadrant, Alternating constipation and diarrhea    · COLONOSCOPY ; every 1 year;  Last [id number]; Status:Active

## 2018-01-16 NOTE — RESULT NOTES
Verified Results  Coumadin Flow Sheet [id number] 12:00AM Fahad Lugo     Test Name Result Flag Reference   INR 1 1     Current Dose 2 5mg

## 2018-01-16 NOTE — RESULT NOTES
Verified Results  * [facility] [id number] 12:54PM José CATALAN Order Number: [id number]     Test Name Result Flag Reference   US PELVIS COMPLETE (TRANSABDOMINAL AND TRANSVAGINAL) (Report)     PELVIC ULTRASOUND, COMPLETE     INDICATION: Left lower quadrant pain  COMPARISON: None  TECHNIQUE:  Transabdominal pelvic ultrasound was performed in sagittal and transverse planes with a curvilinear transducer  Additional transvaginal imaging was performed to better evaluate the endometrium and ovaries  Imaging included volumetric    sweeps as well as traditional still imaging technique  FINDINGS:     UTERUS:   The uterus is retroverted in position, measuring 9 1 x 4 1 x 5 0 cm  Contour and echotexture appear normal      The cervix shows no suspicious abnormality  ENDOMETRIUM:    Normal caliber of 4 mm  Small subcentimeter endometrial cyst is noted, endometrium is otherwise unremarkable  OVARIES/ADNEXA:   Right ovary: 3 6 x 2 5 x 2 9 cm  No suspicious right ovarian abnormality  Doppler flow within normal limits  Left ovary: 3 5 x 2 0 x 2 7 cm  No suspicious left ovarian abnormality  Doppler flow within normal limits  No suspicious adnexal mass or loculated collections  There is no free fluid  IMPRESSION:      Small endometrial cyst potentially related to history of endometrial ablation, otherwise unremarkable study  Workstation performed: [id number]     Signed by:    Grady Matthews MD   3/31/16

## 2018-01-23 VITALS
RESPIRATION RATE: 16 BRPM | DIASTOLIC BLOOD PRESSURE: 90 MMHG | HEART RATE: 80 BPM | TEMPERATURE: 98.7 F | SYSTOLIC BLOOD PRESSURE: 132 MMHG | OXYGEN SATURATION: 98 %

## 2018-01-23 VITALS
HEART RATE: 103 BPM | BODY MASS INDEX: 48.65 KG/M2 | DIASTOLIC BLOOD PRESSURE: 96 MMHG | TEMPERATURE: 98.8 F | WEIGHT: 264.38 LBS | HEIGHT: 62 IN | SYSTOLIC BLOOD PRESSURE: 136 MMHG | OXYGEN SATURATION: 97 %

## 2018-01-24 ENCOUNTER — OFFICE VISIT (OUTPATIENT)
Dept: FAMILY MEDICINE CLINIC | Facility: CLINIC | Age: 38
End: 2018-01-24
Payer: COMMERCIAL

## 2018-01-24 VITALS
WEIGHT: 265.4 LBS | OXYGEN SATURATION: 98 % | BODY MASS INDEX: 49.33 KG/M2 | SYSTOLIC BLOOD PRESSURE: 122 MMHG | HEART RATE: 79 BPM | DIASTOLIC BLOOD PRESSURE: 90 MMHG

## 2018-01-24 DIAGNOSIS — M79.7 FIBROMYALGIA: ICD-10-CM

## 2018-01-24 DIAGNOSIS — R53.82 CHRONIC FATIGUE: ICD-10-CM

## 2018-01-24 DIAGNOSIS — E11.9 TYPE 2 DIABETES MELLITUS WITHOUT COMPLICATION, WITHOUT LONG-TERM CURRENT USE OF INSULIN (HCC): Primary | ICD-10-CM

## 2018-01-24 DIAGNOSIS — E78.01 FAMILIAL HYPERCHOLESTEROLEMIA: ICD-10-CM

## 2018-01-24 PROBLEM — M72.2 PLANTAR FASCIITIS: Status: ACTIVE | Noted: 2018-01-24

## 2018-01-24 PROBLEM — IMO0001 ELEVATED BLOOD PRESSURE: Status: ACTIVE | Noted: 2018-01-24

## 2018-01-24 PROBLEM — D17.1 LIPOMA OF ABDOMINAL WALL: Status: ACTIVE | Noted: 2018-01-24

## 2018-01-24 PROBLEM — J45.990 ASTHMA, EXERCISE INDUCED: Status: ACTIVE | Noted: 2017-09-05

## 2018-01-24 PROBLEM — R19.8 ALTERNATING CONSTIPATION AND DIARRHEA: Status: ACTIVE | Noted: 2018-01-24

## 2018-01-24 PROBLEM — E66.01 MORBID OBESITY (HCC): Status: ACTIVE | Noted: 2018-01-24

## 2018-01-24 PROBLEM — M54.50 LOW BACK PAIN: Status: ACTIVE | Noted: 2018-01-24

## 2018-01-24 PROBLEM — N81.6 RECTOCELE: Status: ACTIVE | Noted: 2017-04-04

## 2018-01-24 PROBLEM — R53.83 FATIGUE: Status: ACTIVE | Noted: 2018-01-24

## 2018-01-24 PROBLEM — S99.911A INJURY OF RIGHT ANKLE: Status: ACTIVE | Noted: 2017-08-29

## 2018-01-24 PROBLEM — R20.2 PARESTHESIA: Status: ACTIVE | Noted: 2018-01-24

## 2018-01-24 PROBLEM — E78.5 HYPERLIPIDEMIA: Status: ACTIVE | Noted: 2018-01-24

## 2018-01-24 PROBLEM — K58.9 IRRITABLE BOWEL SYNDROME: Status: ACTIVE | Noted: 2018-01-24

## 2018-01-24 PROBLEM — F41.9 ANXIETY: Status: ACTIVE | Noted: 2018-01-24

## 2018-01-24 PROBLEM — M47.816 ARTHROPATHY OF LUMBAR FACET JOINT: Status: ACTIVE | Noted: 2018-01-24

## 2018-01-24 PROBLEM — Z90.710 HISTORY OF HYSTERECTOMY: Status: ACTIVE | Noted: 2018-01-24

## 2018-01-24 PROBLEM — J45.909 ASTHMA: Status: ACTIVE | Noted: 2018-01-24

## 2018-01-24 PROBLEM — M25.569 KNEE PAIN: Status: ACTIVE | Noted: 2018-01-24

## 2018-01-24 PROCEDURE — 99214 OFFICE O/P EST MOD 30 MIN: CPT | Performed by: FAMILY MEDICINE

## 2018-01-24 RX ORDER — WARFARIN SODIUM 2.5 MG/1
2 TABLET ORAL DAILY
COMMUNITY
Start: 2017-09-05 | End: 2018-02-26

## 2018-01-24 RX ORDER — LORAZEPAM 0.5 MG/1
TABLET ORAL
COMMUNITY
Start: 2015-11-02 | End: 2018-02-26

## 2018-01-24 RX ORDER — HYDROCODONE BITARTRATE AND ACETAMINOPHEN 7.5; 325 MG/1; MG/1
TABLET ORAL
COMMUNITY
Start: 2014-08-15 | End: 2018-07-24 | Stop reason: SDUPTHER

## 2018-01-24 RX ORDER — BLOOD-GLUCOSE METER
EACH MISCELLANEOUS
COMMUNITY
Start: 2014-12-05 | End: 2019-05-28

## 2018-01-24 RX ORDER — SUMATRIPTAN 50 MG/1
1 TABLET, FILM COATED ORAL
COMMUNITY
Start: 2014-10-30 | End: 2018-02-26

## 2018-01-24 RX ORDER — ALBUTEROL SULFATE 90 UG/1
2 AEROSOL, METERED RESPIRATORY (INHALATION) 3 TIMES DAILY PRN
COMMUNITY
Start: 2016-11-10 | End: 2018-05-24

## 2018-01-24 RX ORDER — DULOXETIN HYDROCHLORIDE 30 MG/1
1 CAPSULE, DELAYED RELEASE ORAL DAILY
COMMUNITY
Start: 2016-02-23 | End: 2018-02-26

## 2018-01-24 NOTE — PROGRESS NOTES
Assessment/Plan:    No problem-specific Assessment & Plan notes found for this encounter  Diagnoses and all orders for this visit:    Type 2 diabetes mellitus without complication, without long-term current use of insulin (HCC)    Fibromyalgia    Chronic fatigue    Familial hypercholesterolemia    Other orders  -     HYDROcodone-acetaminophen (NORCO) 7 5-325 mg per tablet; Take by mouth  -     hyoscyamine (LEVSIN/SL) 0 125 mg SL tablet; Place 1 tablet under the tongue 3 (three) times a day as needed  -     Blood Glucose Monitoring Suppl (ONE TOUCH ULTRA 2) w/Device KIT; by Does not apply route  -     glucose blood test strip; 1 Squirt by In Vitro route 3 (three) times a day  -     DULoxetine (CYMBALTA) 30 mg delayed release capsule; Take 1 capsule by mouth daily  -     LORazepam (ATIVAN) 0 5 mg tablet; Take by mouth  -     metFORMIN (GLUCOPHAGE) 500 mg tablet; Take 1 tablet by mouth every 12 (twelve) hours  -     albuterol (PROAIR HFA) 90 mcg/act inhaler; Inhale 2 puffs 3 (three) times a day as needed  -     SUMAtriptan (IMITREX) 50 mg tablet; Take 1 tablet by mouth  -     warfarin (COUMADIN) 2 5 mg tablet; Take 2 tablets by mouth daily      Tram  Is here for follow-up of diabetes as well as other problems  Her diabetes has been under good control  I have given her a lab slip to check prior to next visit  She has been having increased symptoms from  Fibromyalgia pains as well as increased fatigue  We will check some additional labs  I have also given her a new Rheumatology referral   Will plan follow-up visit in 3 months  Subjective:      Patient ID: Mason Martinez is a 42 y o  female  Tram Is here for follow-up of diabetes and other chronic problems  She has been having increased fatigue and difficulties with fibromyalgia pain    She would like a referral to a new rheumatologist as she has had at terrible time getting in with her rheumatologist   Her gynecologist has been examining her for ongoing pelvic pain, and she recommended checking labs  Dallas Driver had a hysterectomy in December 2016, so she is likely in early menopause although she does have her ovaries  Diabetes   Hypoglycemia symptoms include headaches  Pertinent negatives for hypoglycemia include no dizziness  Associated symptoms include fatigue  Pertinent negatives for diabetes include no chest pain, no polydipsia, no polyuria and no weakness  The following portions of the patient's history were reviewed and updated as appropriate: allergies, current medications, past family history, past medical history, past social history, past surgical history and problem list     Review of Systems   Constitutional: Positive for fatigue  Negative for activity change, chills and fever  HENT: Positive for congestion  Negative for ear pain and sinus pressure  Eyes: Negative for pain and visual disturbance  Respiratory: Negative  Negative for cough, chest tightness, shortness of breath and wheezing  Cardiovascular: Negative  Negative for chest pain, palpitations and leg swelling  Gastrointestinal: Positive for constipation and diarrhea  Negative for abdominal pain, blood in stool, nausea and vomiting  Endocrine: Negative  Negative for polydipsia and polyuria  Genitourinary: Negative  Negative for difficulty urinating, dysuria, frequency and urgency  Musculoskeletal: Positive for arthralgias  Negative for joint swelling and myalgias  Skin: Negative for rash  Neurological: Positive for headaches  Negative for dizziness, weakness and numbness  Hematological: Negative for adenopathy  Does not bruise/bleed easily  Psychiatric/Behavioral: Negative for dysphoric mood  Objective:     Physical Exam   Constitutional: She is oriented to person, place, and time  obese   Neck: Normal range of motion  Neck supple  No tracheal deviation present  No thyromegaly present     Cardiovascular: Normal rate and normal heart sounds  Pulmonary/Chest: Effort normal    Abdominal: Soft  Bowel sounds are normal    Lymphadenopathy:     She has no cervical adenopathy  Neurological: She is alert and oriented to person, place, and time  Skin: Skin is warm and dry  Nursing note and vitals reviewed

## 2018-01-24 NOTE — PROGRESS NOTES
Assessment   1  Encounter for preventive health examination (V70 0) (Z00 00)  2  Fibromyalgia (729 1) (M79 7)  3  Type 2 diabetes mellitus (250 00) (E11 9)  4  Fatigue (780 79) (R53 83)  5  Morbid or severe obesity due to excess calories (278 01) (E66 01)    Plan  Fatigue    · (1) TSH WITH FT4 REFLEX; Status:Active; Requested HQP:[id number];   Flu vaccine need    · Administered: Fluzone Quadrivalent Intramuscular Suspension  S/P surgical manipulation of ankle joint    · (1) PT WITH INR; Status:Active; Requested for:23Kpx2860;   Type 2 diabetes mellitus    · [facility]-Management  *  Status: Hold For - Scheduling   Requested for: 51Tsj3967  () Care Summary provided  : Yes   · *VB - Foot Exam; Status:Complete;   Done: 89NCJ8279 10:41AM   · Administered: Pneumovax 23 25 MCG/0 5ML Injection Injectable  Type 2 diabetes mellitus, Uncontrolled blood glucose    · Renew: OneTouch Ultra 2 w/Device Kit; test up to three times daily or as directed by  physician    Discussion/Summary  health maintenance visit Currently, she eats a healthy diet and has an inadequate exercise regimen  cervical cancer screening is not indicated Colorectal cancer screening: colorectal cancer screening is not indicated  Advice and education were given regarding weight loss  Trudi Storm is here for health maintenance visit  She was here week ago for preop prior to right ankle surgery  Her surgery went well other than some postop swelling and pain  Thankfully her pain has decreased at this point  She was just started on Coumadin and we will monitor her PT  Diabetes has been very stable with hemoglobin A1c of 6 4  She had a normal foot exam and we have given her referral for eye doctor  She was given flu shot and pneumococcal vaccine today  We have recommended Adacel vaccine because of her daughter's baby due in February  She will check with her insurance to see if this is covered    She will continue rheumatology follow-up for fibromyalgia  She has been trying to lose weight but unfortunately has been unable to perform any exercise due to recurrent ankle sprains and now ankle surgery  I urged her to do her best regarding healthy diabetic weight loss diet  We will plan regular diabetes follow-up in 3 or 4 months  Chief Complaint  physical and f/u ankle surgery needs order for pt/inr      History of Present Illness  HM, Adult Female: The patient is being seen for a health maintenance evaluation  The last health maintenance visit was few year(s) ago  General Health: The patient's health since the last visit is described as good  She has regular dental visits  She complains of vision problems  She denies hearing loss  Immunizations status: up to date  Lifestyle:  She consumes a diverse and healthy diet  She has weight concerns  She does not exercise regularly  She does not use tobacco    Screening:   HPI: August 12th she fell and caused severe ankle sprain  She has had multiple severe ankle sprains over the years  Surgery was Friday 9/1 to stabilize ankle  She has hardware in ankle with screws  She went home Friday with rx Vicodin 5/325 and she went to ER next day because she needed more pain control  initial wrapping was too tight so they re-wrapped in ER and gave her a few oxycodone which has helped  pain is better now but wrapping has come undone and it is quite swollen  She was started on Coumadin 2 5 mg daily  Her 25 yr old daughter is pregnant so she wants flu shot  Has IBS on dicyclomine but not working  she plans to return to GI after she is mobile  She has generalized anxiety  She uses lorazepam occasionally  She has needed a little bit more over the past week or 2 because of the ankle surgery and her daugs pregnancy    she has also decreased her medications for fibromyalgia and has discontinued the cyclobenzaprine and meloxicam       Review of Systems    Constitutional: No fever, no chills, feels well, no tiredness, no recent weight gain or weight loss  Eyes: needs new glasses old ones broke  ENT: no complaints of earache, no loss of hearing, no nose bleeds, no nasal discharge, no sore throat, no hoarseness  Cardiovascular: No complaints of slow heart rate, no fast heart rate, no chest pain, no palpitations, no leg claudication, no lower extremity edema  Respiratory: No complaints of shortness of breath, no wheezing, no cough, no SOB on exertion, no orthopnea, no PND  Gastrointestinal: IBS, but as noted in HPI  Genitourinary: No complaints of dysuria, no incontinence, no pelvic pain, no dysmenorrhea, no vaginal discharge or bleeding  Active Problems   1  Alternating constipation and diarrhea (787 99) (R19 8)  2  Anxiety (300 00) (F41 9)  3  Blood pressure elevated (401 9) (I10)  4  Colon cancer screening (V76 51) (Z12 11)  5  Depression (311) (F32 9)  6  Dizziness (780 4) (R42)  7  Fatigue (780 79) (R53 83)  8  Fibromyalgia (729 1) (M79 7)  9  Flu vaccine need (V04 81) (Z23)  10  GERD (gastroesophageal reflux disease) (530 81) (K21 9)  11  Headache, migraine (346 90) (G43 909)  12  History of allergy (V15 09) (Z88 9)  13  Hyperlipidemia (272 4) (E78 5)  14  Influenza vaccination administered during current admission (V04 81) (Z23)  15  Injury of right ankle, initial encounter (959 7) (S99 911A)  16  Irritable bowel syndrome with both constipation and diarrhea (564 1) (K58 2)  17  Lateral epicondylitis of right elbow (726 32) (M77 11)  18  Lipoma of abdominal wall (214 1) (D17 1)  19  Low back pain (724 2) (M54 5)  20  Lumbar facet arthropathy (721 3) (M12 88)  21  Lumbar radiculopathy (724 4) (M54 16)  22  Malignant tumor of cervix (180 9) (C53 9)  23  Morbid or severe obesity due to excess calories (278 01) (E66 01)  24  Myalgia (729 1) (M79 1)  25  Obstructive sleep apnea (327 23) (G47 33)  26  Paresthesias (782 0) (R20 2)  27  Plantar fasciitis (728 71) (M72 2)  28  Preop examination (V72 84) (Z01 818)  29  Right ankle pain (719 47) (M25 571)  30  Right ankle swelling (719 07) (M25 471)  31  Right knee pain (719 46) (M25 561)  32  S/P hysterectomy (V88 01) (Z90 710)  33  Sinusitis (473 9) (J32 9)  34  Sprain of right ankle, unspecified ligament, subsequent encounter (845 00) (S93 401D)  35  Thoracic back pain (724 1) (M54 6)  36  Tinea corporis (110 5) (B35 4)  37  Tobacco use (305 1) (Z72 0)  38  Type 2 diabetes mellitus (250 00) (E11 9)  39  Viral bronchitis (466 0) (J20 8)    Past Medical History    · History of Abdominal pain, acute, left lower quadrant (832 36,334 78) (R10 32)   · History of Acute diverticulitis of intestine (562 11) (K57 92)   · History of Acute maxillary sinusitis (461 0) (J01 00)   · History of Acute UTI (599 0) (N39 0)   · Epilepsy And Recurrent Seizures (345 90)   · History of Gestational Diabetes Mellitus (V12 21)   · History of acute conjunctivitis (V12 49) (Z86 69)   · History of acute sinusitis (V12 69) (Z87 09)   · History of tinea corporis (V12 09) (Z86 19)    Surgical History    · History of Hysterectomy   · History of Tonsillectomy   · History of Tubal Ligation    Family History  Mother    · Family history of Anxiety (Symptom)   · Family history of Depression   · Family history of Ovarian Cancer (V16 41)  Brother    · Family history of Epilepsy And Recurrent Seizures (V17 2)   · Family history of Suicide Risk  Maternal Grandmother    · Family history of Cancer  Other    · Family history of cardiac disorder (V17 49) (Z82 49)   · Family history of diabetes mellitus (V18 0) (Z83 3)   · Family history of hypertension (V17 49) (Z82 49)   · Family history of neuropathy (V17 2) (Z82 0)   · Family history of thyroid disease (V18 19) (Z80 46)    Social History    · Being A Social Drinker   · Former smoker (V15 82) (F28 177)   · Tobacco use (305 1) (Z72 0)    Current Meds  1  Coumadin 2 5 MG Oral Tablet; Take 1 tablet daily; Therapy: 92IDP7668 to Recorded  2   Dicyclomine HCl - 20 MG Oral Tablet; TAKE 1 TABLET Every 8 hours PRN abdominal   pain; Therapy: 47OPC6076 to (Evaluate:30Aug2017)  Requested for: 44AUI3297; Last   Rx:01Jun2017 Ordered  3  DULoxetine HCl - 30 MG Oral Capsule Delayed Release Particles; take 1 capsule daily; Therapy: 94MOA6250 to (Evaluate:13Jan2018)  Requested for: 83QEM9084; Last   Rx:73Tnz9675 Ordered  4  Hydrocodone-Acetaminophen 7 5-325 MG Oral Tablet; TAKE 1 TABLET TWICE DAILY   AS NEEDED FOR SEVERE PAIN;   Therapy: 52YWW3442 to (Evaluate:10Jun2017); Last Rx:24Mbl9267 Ordered  5  LORazepam 0 5 MG Oral Tablet; Take 1-2 tablets daily as needed for anxiety; Therapy: 03UOC0462 to (Evaluate:02Jun2017); Last CV:29EBZ5902 Ordered  6  MetFORMIN HCl - 500 MG Oral Tablet; TAKE 1 TABLET EVERY 12 HOURS WITH FOOD; Therapy: 37Fqe3508 to (Evaluate:18Jun2017)  Requested for: 61Jzl4346; Last   Rx:48Mpe8113 Ordered  7  OneTouch Ultra 2 w/Device Kit; Therapy: 75UWQ3661 to (Evaluate:11Mar2015) Recorded  8  OneTouch Ultra Blue In [employer]; USE 1 STRIP 3 times daily; Therapy: 65VYP3215 to (Last Rx:25Mar2016)  Requested for: 25Mar2016 Ordered  9  OxyCODONE HCl - 10 MG Oral Tablet; Therapy: 30QBI3220 to Recorded  10  ProAir  (90 Base) MCG/ACT Inhalation Aerosol Solution; INHALE 2 PUFFS 3    TIMES DAILY AS NEEDED; Therapy: 34DYO7047 to (Last Rx:10Nov2016)  Requested for: 30WQM1639 Ordered  11  SUMAtriptan Succinate 50 MG Oral Tablet; TAKE 1 TABLET FOR MIGRAINE RELIEF     MAY REPEAT EVERY 2 HOURS  MAX 200MG/DAY; Therapy: 98LWY7309 to (Evaluate:22Jun2016)  Requested for: 56Tfq3616; Last    Rx:88Url8313 Ordered    Allergies   1  Amoxicillin TABS  2  Aspirin TABS  3  Ceclor CAPS  4  Doxycycline Hyclate CAPS  5  Erythromycin TABS  6   Penicillins    Vitals   Recorded: 67HVQ1374 29:51SB   Systolic 656   Diastolic 84   Height 5 ft 1 5 in   Weight 259 lb    BMI Calculated 48 15   BSA Calculated 2 12     Physical Exam    Socks and shoes removed, Right Foot Findings: normal foot, no swelling, no erythema  The right toes were normal    The sensory exam showed normal vibratory sensation at the level of the toes on the right  Normal tactile sensation with monofilament testing throughout the right foot  Socks and shoes removed, Left Foot Findings: normal foot, no swelling, no erythema  The left toes were normal    The sensory exam showed normal vibratory sensation at the level of the toes on the left  Normal tactile sensation with monofilament testing throughout the left foot  Capillary refills findings on the right were1  normal in the toes1   Pulses:1    2+ in the posterior tibialis on the right1    2+ in the dorsalis pedis on the right1   Capillary refills findings on the left were1  normal in the toes1   Pulses:1    2+ in the posterior tibialis on the left1    2+ in the dorsalis pedis on the left1         1 Amended By: Shasha Miller; Sep 05 2017 2:10 PM EST    Results/Data  Coumadin Flow Sheet 94SBF6695 10:58AM Shasha Miller     Test Name Result Flag Reference   Recheck INR 64RME0695     Current Dose 2 5mg qd       *VB - Foot Exam 29SSF8385 10:41AM Shasha Miller     Test Name Result Flag Reference   FOOT EXAM [id number]         Health Management  Alternating constipation and diarrhea   COLONOSCOPY (GI, SURG); every 5 years; Last 27Feb2017; Next Due: 65Kkk1006; Active  Health Maintenance   (1) THIN PREP PAP WITH IMAGING; every 1 year; Last 27Feb2017; Next Due:  52Zfq7348;  Overdue    Future Appointments    Date/Time Provider Specialty Site   09/08/2017 02:30 PM Thony Segovia DO Orthopedic Surgery [address]     Signatures   Electronically signed by : Deedee Schirmer, MD; Sep  5 2017  2:11PM EST                       (Author)

## 2018-01-31 DIAGNOSIS — M79.7 FIBROMYALGIA: Primary | ICD-10-CM

## 2018-02-13 NOTE — MISCELLANEOUS
Provider Comments  Provider Comments:   Dear Gregorio Mccray,    You missed your appointment with Jeramy Parrish PA-C on 11/08/2017; please contact our office to reschedule at [phone]  We understand that many situations arise that occasionally prevents patients from keeping scheduled appointments  It is the policy of [facility] that patients notify us 24 hours in advance if unable to keep a scheduled appointment  Missed appointments jeopardize strong physician-patient relationships  The appointment you missed could have easily been made available to another patient if you had contacted us to cancel  We like to accommodate all of our patients, but when patients miss an appointment it prevents us from being able to help everyone  In the future, we request at least 24 hours' notice of cancellation so we can make your appointment available to someone else in need       Sincerely,    The Physicians and Staff of [facility]        Signatures   Electronically signed by : Cierra Powell, ; Nov 9 2017  9:04AM EST                       (Author)

## 2018-02-26 ENCOUNTER — TRANSCRIBE ORDERS (OUTPATIENT)
Dept: ADMINISTRATIVE | Facility: HOSPITAL | Age: 38
End: 2018-02-26

## 2018-02-26 ENCOUNTER — OFFICE VISIT (OUTPATIENT)
Dept: GASTROENTEROLOGY | Facility: MEDICAL CENTER | Age: 38
End: 2018-02-26
Payer: COMMERCIAL

## 2018-02-26 ENCOUNTER — APPOINTMENT (OUTPATIENT)
Dept: RADIOLOGY | Facility: MEDICAL CENTER | Age: 38
End: 2018-02-26
Attending: INTERNAL MEDICINE
Payer: COMMERCIAL

## 2018-02-26 VITALS
DIASTOLIC BLOOD PRESSURE: 86 MMHG | TEMPERATURE: 98 F | HEART RATE: 102 BPM | BODY MASS INDEX: 50.41 KG/M2 | WEIGHT: 267 LBS | SYSTOLIC BLOOD PRESSURE: 128 MMHG | HEIGHT: 61 IN

## 2018-02-26 DIAGNOSIS — K58.2 IRRITABLE BOWEL SYNDROME WITH BOTH CONSTIPATION AND DIARRHEA: Primary | ICD-10-CM

## 2018-02-26 DIAGNOSIS — K58.2 IRRITABLE BOWEL SYNDROME WITH BOTH CONSTIPATION AND DIARRHEA: ICD-10-CM

## 2018-02-26 DIAGNOSIS — N81.6 RECTOCELE: ICD-10-CM

## 2018-02-26 PROBLEM — R19.8 ALTERNATING CONSTIPATION AND DIARRHEA: Status: RESOLVED | Noted: 2018-01-24 | Resolved: 2018-02-26

## 2018-02-26 PROCEDURE — 99214 OFFICE O/P EST MOD 30 MIN: CPT | Performed by: INTERNAL MEDICINE

## 2018-02-26 PROCEDURE — 74022 RADEX COMPL AQT ABD SERIES: CPT

## 2018-02-26 RX ORDER — NABUMETONE 500 MG/1
500 TABLET, FILM COATED ORAL 2 TIMES DAILY
COMMUNITY
End: 2020-06-19

## 2018-02-26 RX ORDER — PREGABALIN 150 MG/1
50 CAPSULE ORAL 3 TIMES DAILY
COMMUNITY
End: 2018-05-24

## 2018-02-26 RX ORDER — MELATONIN
1000 DAILY
COMMUNITY
End: 2019-05-28

## 2018-02-26 NOTE — LETTER
February 26, 2018     Derick Manzano MD  [id number]   [address] [facility]    Patient: Rio Block   YOB: 1980   Date of Visit: 2/26/2018       Dear Dr Muir Ahr: Thank you for referring Hilary Mathur to me for evaluation  Below are my notes for this consultation  If you have questions, please do not hesitate to call me  I look forward to following your patient along with you  Sincerely,        Jeanine Ramsey MD        CC: MD Jeanine Vargas MD  2/26/2018 11:21 AM  Sign at close encounter  [facility] - Outpatient Follow-up Note  Rio Block 42 y o  female MRN: [id number]  Encounter: [id number]          ASSESSMENT AND PLAN:      1  Irritable bowel syndrome with both constipation and diarrhea  She most likely has irritable bowel syndrome with alternating diarrhea and constipation  I will check an obstruction series to see if her current symptoms are due to constipation with overflow diarrhea or if her colon is empty and she simply has a flare of diarrhea symptoms from her IBS  In the short term I have asked her to take Imodium as needed until we get the obstruction series to confirm her diagnosis  - XR abdomen obstruction series; Future    2  Rectocele  Her rectocele is likely contributing to her altered bowel habits  I will refer her to Dr Dennise Tavares to discuss this with her and discussed the benefits and risks of her different management options  - Ambulatory referral to General Surgery; Future    ______________________________________________________________________    SUBJECTIVE:  She presents for follow-up of her irritable bowel syndrome with alternating diarrhea and constipation, and her rectocele  She was diagnosed with a rectocele by her gynecologist and she was told this could be contributing to her altered bowel habits    She had been in her usual state of health up until about a week ago when she developed worsening of her abdominal pain and diarrhea  She had not followed up for the planned obstruction series as an outpatient  She says that she continues to have abdominal pain that is worse just before she is about to have a bowel movement  She has been having loose stools with about three bowel movements per day and says this feels just like her previous flares  She denies any upper GI symptoms like reflux, vomiting, and denies any bleeding or weight loss  She complains of chronic joint pains due to her fibromyalgia  REVIEW OF SYSTEMS IS OTHERWISE NEGATIVE  Historical Information   Past Medical History:   Diagnosis Date    Acid reflux     Anxiety     Asthma     Depression     [facility]     Pre    Diverticulitis     Epileptic seizures ([facility] )     Medication related    Gestational diabetes mellitus     Lateral epicondylitis     Malignant tumor of cervix ([facility] )     Obstructive sleep apnea     CPAP    Paresthesias     Plantar fasciitis     Tinea corporis      Past Surgical History:   Procedure Laterality Date    ARTHROSCOPY ANKLE Right 9/1/2017    Procedure: ANKLE ARTHOSCOPY WITH EXTENSIVE SYNOVECTOMY, OPEN ARTHOTOY LATERAL ANKLE WITH ANKLE STABILIZATION, BROSTOM REPAIR, INTERNAL BRACE AUGMENTATION, PERONEAL TENDON SYNOVECTOMY;  Surgeon: Jayce Rosales DPM;  Location: [facility];  Service: Podiatry    COLONOSCOPY      HYSTERECTOMY      AK COLONOSCOPY FLX DX W/COLLJ SPEC WHEN PFRMD N/A 2/27/2017    Procedure: COLONOSCOPY;  Surgeon: Christy Mills MD;  [facility]: [facility]; Service: Gastroenterology    AK ESOPHAGOGASTRODUODENOSCOPY TRANSORAL DIAGNOSTIC N/A 9/7/2016    Procedure: EGD AND COLONOSCOPY;  Surgeon: Christy Mills MD;  [facility];   Service: Gastroenterology    TONSILLECTOMY      TUBAL LIGATION       Social History   History   Alcohol Use    Yes     Comment: occasionally     History   Drug Use No     History   Smoking Status    Former Smoker    Quit date: 9/7/2013   Smokeless Tobacco    Never Used     Family History   Problem Relation Age of Onset    Anxiety disorder Mother     Depression Mother     Ovarian cancer Mother     Seizures Brother      Epilepsy    Other Brother      Suicide risk    Cancer Maternal Grandmother     Other Other      cardiac disorder    Diabetes Other     Hypertension Other     Neuropathy Other     Thyroid disease Other        Meds/Allergies       Current Outpatient Prescriptions:     albuterol (PROAIR HFA) 90 mcg/act inhaler    albuterol (PROVENTIL HFA,VENTOLIN HFA) 90 mcg/act inhaler    Blood Glucose Monitoring Suppl (ONE TOUCH ULTRA 2) w/Device KIT    cholecalciferol (VITAMIN D3) 1,000 units tablet    glucose blood test strip    HYDROcodone-acetaminophen (NORCO) 7 5-325 mg per tablet    hyoscyamine (LEVSIN/SL) 0 125 mg SL tablet    LORazepam (ATIVAN) 0 5 mg tablet    nabumetone (RELAFEN) 500 mg tablet    pregabalin (LYRICA) 150 mg capsule    SUMAtriptan (IMITREX) 50 mg tablet    metFORMIN (GLUCOPHAGE) 500 mg tablet    warfarin (COUMADIN) 2 5 mg tablet    Allergies   Allergen Reactions    Aspirin Anaphylaxis    Bee Venom Swelling and Wheezing    Penicillins Anaphylaxis and Rash     " Made asthma worse"    Ceclor [Cefaclor] Itching    Lactose GI Intolerance    Pollen Extract Other (See Comments)     Rhinits,     cockroaches    Amoxicillin Rash    Doxycycline Rash    Erythromycin Rash           Objective     Blood pressure 128/86, pulse 102, temperature 98 °F (36 7 °C), temperature source Tympanic, height 5' 1" (1 549 m), weight 121 kg (267 lb)  PHYSICAL EXAM:      General Appearance:   Alert, cooperative, no distress   HEENT:   Normocephalic, atraumatic, anicteric      Neck:  Supple, symmetrical, trachea midline   Lungs:   Clear to auscultation bilaterally; no rales, rhonchi or wheezing; respirations unlabored    Heart[de-identified]   Regular rate and rhythm; no murmur, rub, or gallop     Abdomen:   Soft, lower abdominal tenderness L>R, non-distended; normal bowel sounds; no masses, no organomegaly    Genitalia:   Deferred    Rectal:   Deferred    Extremities:  No cyanosis, clubbing or edema    Pulses:  2+ and symmetric    Skin:  No jaundice, rashes, or lesions    Lymph nodes:  No palpable cervical lymphadenopathy        Lab Results:   No visits with results within 1 Day(s) from this visit  Latest known visit with results is:   Admission on 09/01/2017, Discharged on 09/01/2017   Component Date Value    EXT Preg Test, Ur 09/01/2017 Negative     POC Glucose 09/01/2017 129          Radiology Results:   No results found

## 2018-02-26 NOTE — PROGRESS NOTES
[facility] - Outpatient Follow-up Note  Taylor Subramanian 42 y o  female MRN: [id number]  Encounter: [id number]          ASSESSMENT AND PLAN:      1  Irritable bowel syndrome with both constipation and diarrhea  She most likely has irritable bowel syndrome with alternating diarrhea and constipation  I will check an obstruction series to see if her current symptoms are due to constipation with overflow diarrhea or if her colon is empty and she simply has a flare of diarrhea symptoms from her IBS  In the short term I have asked her to take Imodium as needed until we get the obstruction series to confirm her diagnosis  - XR abdomen obstruction series; Future    2  Rectocele  Her rectocele is likely contributing to her altered bowel habits  I will refer her to Dr Deedee Schilling to discuss this with her and discussed the benefits and risks of her different management options  - Ambulatory referral to General Surgery; Future    ______________________________________________________________________    SUBJECTIVE:  She presents for follow-up of her irritable bowel syndrome with alternating diarrhea and constipation, and her rectocele  She was diagnosed with a rectocele by her gynecologist and she was told this could be contributing to her altered bowel habits  She had been in her usual state of health up until about a week ago when she developed worsening of her abdominal pain and diarrhea  She had not followed up for the planned obstruction series as an outpatient  She says that she continues to have abdominal pain that is worse just before she is about to have a bowel movement  She has been having loose stools with about three bowel movements per day and says this feels just like her previous flares  She denies any upper GI symptoms like reflux, vomiting, and denies any bleeding or weight loss  She complains of chronic joint pains due to her fibromyalgia        REVIEW OF SYSTEMS IS OTHERWISE NEGATIVE  Historical Information   Past Medical History:   Diagnosis Date    Acid reflux     Anxiety     Asthma     Depression     Diabetes mellitus ([facility] )     Pre    Diverticulitis     Epileptic seizures ([facility] )     Medication related    Gestational diabetes mellitus     Lateral epicondylitis     [facility]     Obstructive sleep apnea     CPAP    Paresthesias     Plantar fasciitis     Tinea corporis      Past Surgical History:   Procedure Laterality Date    ARTHROSCOPY ANKLE Right 9/1/2017    Procedure: ANKLE ARTHOSCOPY WITH EXTENSIVE SYNOVECTOMY, OPEN ARTHOTOY LATERAL ANKLE WITH ANKLE STABILIZATION, BROSTOM REPAIR, INTERNAL BRACE AUGMENTATION, PERONEAL TENDON SYNOVECTOMY;  Surgeon: Fina Abbott DPM;  Location: [facility];  Service: Podiatry    COLONOSCOPY      HYSTERECTOMY      RI COLONOSCOPY FLX DX W/COLLJ SPEC WHEN PFRMD N/A 2/27/2017    Procedure: COLONOSCOPY;  Surgeon: Ambrosio Ward MD;  Location: [facility]; Service: Gastroenterology    RI ESOPHAGOGASTRODUODENOSCOPY TRANSORAL DIAGNOSTIC N/A 9/7/2016    Procedure: EGD AND COLONOSCOPY;  Surgeon: Ambrosio Ward MD;  [facility];   Service: Gastroenterology    TONSILLECTOMY      TUBAL LIGATION       Social History   History   Alcohol Use    Yes     Comment: occasionally     History   Drug Use No     History   Smoking Status    Former Smoker    Quit date: 9/7/2013   Smokeless Tobacco    Never Used     Family History   Problem Relation Age of Onset    Anxiety disorder Mother     Depression Mother     Ovarian cancer Mother     Seizures Brother      Epilepsy    Other Brother      Suicide risk    Cancer Maternal Grandmother     Other Other      cardiac disorder    Diabetes Other     Hypertension Other     Neuropathy Other     Thyroid disease Other        Meds/Allergies       Current Outpatient Prescriptions:     albuterol (PROAIR HFA) 90 mcg/act inhaler    albuterol (PROVENTIL HFA,VENTOLIN HFA) 90 mcg/act inhaler    Blood Glucose Monitoring Suppl (ONE TOUCH ULTRA 2) w/Device KIT    cholecalciferol (VITAMIN D3) 1,000 units tablet    glucose blood test strip    HYDROcodone-acetaminophen (NORCO) 7 5-325 mg per tablet    hyoscyamine (LEVSIN/SL) 0 125 mg SL tablet    LORazepam (ATIVAN) 0 5 mg tablet    nabumetone (RELAFEN) 500 mg tablet    pregabalin (LYRICA) 150 mg capsule    SUMAtriptan (IMITREX) 50 mg tablet    metFORMIN (GLUCOPHAGE) 500 mg tablet    warfarin (COUMADIN) 2 5 mg tablet    Allergies   Allergen Reactions    Aspirin Anaphylaxis    Bee Venom Swelling and Wheezing    Penicillins Anaphylaxis and Rash     " Made asthma worse"    Ceclor [Cefaclor] Itching    Lactose GI Intolerance    Pollen Extract Other (See Comments)     Rhinits,     cockroaches    Amoxicillin Rash    Doxycycline Rash    Erythromycin Rash           Objective     Blood pressure 128/86, pulse 102, temperature 98 °F (36 7 °C), temperature source Tympanic, height 5' 1" (1 549 m), weight 121 kg (267 lb)  PHYSICAL EXAM:      General Appearance:   Alert, cooperative, no distress   HEENT:   Normocephalic, atraumatic, anicteric      Neck:  Supple, symmetrical, trachea midline   Lungs:   Clear to auscultation bilaterally; no rales, rhonchi or wheezing; respirations unlabored    Heart[de-identified]   Regular rate and rhythm; no murmur, rub, or gallop  Abdomen:   Soft, lower abdominal tenderness L>R, non-distended; normal bowel sounds; no masses, no organomegaly    Genitalia:   Deferred    Rectal:   Deferred    Extremities:  No cyanosis, clubbing or edema    Pulses:  2+ and symmetric    Skin:  No jaundice, rashes, or lesions    Lymph nodes:  No palpable cervical lymphadenopathy        Lab Results:   No visits with results within 1 Day(s) from this visit     Latest known visit with results is:   Admission on 09/01/2017, Discharged on 09/01/2017   Component Date Value    EXT Preg Test, Ur 09/01/2017 Negative     POC Glucose 09/01/2017 129          Radiology Results:   No results found

## 2018-02-26 NOTE — LETTER
February 26, 2018     Park Luna MD  [id number]    Patient: Isabel Chowdary   YOB: 1980   Date of Visit: 2/26/2018       Dear Dr Rai Lemon: Thank you for referring Rika Gonzales to me for evaluation  Below are my notes for this consultation  If you have questions, please do not hesitate to call me  I look forward to following your patient along with you           Sincerely,        Sue Kidd MD        CC: No Recipients

## 2018-02-27 ENCOUNTER — TELEPHONE (OUTPATIENT)
Dept: GASTROENTEROLOGY | Facility: CLINIC | Age: 38
End: 2018-02-27

## 2018-02-27 NOTE — TELEPHONE ENCOUNTER
Called and spoke with patient, informed her that once we receive the results we will relay them to her

## 2018-02-27 NOTE — TELEPHONE ENCOUNTER
Dr Huong Spear patient called to check on results for abdomen series done 2-26-18---would lie a call back once they are final

## 2018-03-02 NOTE — PROGRESS NOTES
The results were negative - no obstruction or constipation  My medical assistant will call her to let her know the results

## 2018-04-16 DIAGNOSIS — N81.6 RECTOCELE: Primary | ICD-10-CM

## 2018-05-17 ENCOUNTER — LAB (OUTPATIENT)
Dept: LAB | Facility: MEDICAL CENTER | Age: 38
End: 2018-05-17
Payer: OTHER MISCELLANEOUS

## 2018-05-17 ENCOUNTER — TRANSCRIBE ORDERS (OUTPATIENT)
Dept: ADMINISTRATIVE | Facility: HOSPITAL | Age: 38
End: 2018-05-17

## 2018-05-17 ENCOUNTER — APPOINTMENT (OUTPATIENT)
Dept: URGENT CARE | Facility: MEDICAL CENTER | Age: 38
End: 2018-05-17
Payer: OTHER MISCELLANEOUS

## 2018-05-17 DIAGNOSIS — M79.7 FIBROMYALGIA: ICD-10-CM

## 2018-05-17 DIAGNOSIS — R26.2 AMBULATORY DYSFUNCTION: ICD-10-CM

## 2018-05-17 LAB
ALBUMIN SERPL BCP-MCNC: 4 G/DL (ref 3.5–5)
ALP SERPL-CCNC: 152 U/L (ref 46–116)
ALT SERPL W P-5'-P-CCNC: 62 U/L (ref 12–78)
ANION GAP SERPL CALCULATED.3IONS-SCNC: 8 MMOL/L (ref 4–13)
AST SERPL W P-5'-P-CCNC: 27 U/L (ref 5–45)
BASOPHILS # BLD AUTO: 0.02 THOUSANDS/ΜL (ref 0–0.1)
BASOPHILS NFR BLD AUTO: 0 % (ref 0–1)
BILIRUB SERPL-MCNC: 0.47 MG/DL (ref 0.2–1)
BUN SERPL-MCNC: 13 MG/DL (ref 5–25)
CALCIUM SERPL-MCNC: 8.4 MG/DL (ref 8.3–10.1)
CHLORIDE SERPL-SCNC: 104 MMOL/L (ref 100–108)
CHOLEST SERPL-MCNC: 128 MG/DL (ref 50–200)
CO2 SERPL-SCNC: 26 MMOL/L (ref 21–32)
CREAT SERPL-MCNC: 0.67 MG/DL (ref 0.6–1.3)
CREAT UR-MCNC: 203 MG/DL
EOSINOPHIL # BLD AUTO: 0.1 THOUSAND/ΜL (ref 0–0.61)
EOSINOPHIL NFR BLD AUTO: 1 % (ref 0–6)
ERYTHROCYTE [DISTWIDTH] IN BLOOD BY AUTOMATED COUNT: 12.4 % (ref 11.6–15.1)
ERYTHROCYTE [SEDIMENTATION RATE] IN BLOOD: 4 MM/HOUR (ref 0–20)
EST. AVERAGE GLUCOSE BLD GHB EST-MCNC: 146 MG/DL
GFR SERPL CREATININE-BSD FRML MDRD: 111 ML/MIN/1.73SQ M
GLUCOSE P FAST SERPL-MCNC: 135 MG/DL (ref 65–99)
HBA1C MFR BLD: 6.7 % (ref 4.2–6.3)
HCT VFR BLD AUTO: 43 % (ref 34.8–46.1)
HDLC SERPL-MCNC: 33 MG/DL (ref 40–60)
HGB BLD-MCNC: 14.4 G/DL (ref 11.5–15.4)
IMM GRANULOCYTES # BLD AUTO: 0.04 THOUSAND/UL (ref 0–0.2)
IMM GRANULOCYTES NFR BLD AUTO: 0 % (ref 0–2)
LDLC SERPL CALC-MCNC: 64 MG/DL (ref 0–100)
LYMPHOCYTES # BLD AUTO: 2.43 THOUSANDS/ΜL (ref 0.6–4.47)
LYMPHOCYTES NFR BLD AUTO: 24 % (ref 14–44)
MCH RBC QN AUTO: 30.6 PG (ref 26.8–34.3)
MCHC RBC AUTO-ENTMCNC: 33.5 G/DL (ref 31.4–37.4)
MCV RBC AUTO: 91 FL (ref 82–98)
MICROALBUMIN UR-MCNC: 32 MG/L (ref 0–20)
MICROALBUMIN/CREAT 24H UR: 16 MG/G CREATININE (ref 0–30)
MONOCYTES # BLD AUTO: 0.82 THOUSAND/ΜL (ref 0.17–1.22)
MONOCYTES NFR BLD AUTO: 8 % (ref 4–12)
NEUTROPHILS # BLD AUTO: 6.54 THOUSANDS/ΜL (ref 1.85–7.62)
NEUTS SEG NFR BLD AUTO: 66 % (ref 43–75)
NONHDLC SERPL-MCNC: 95 MG/DL
NRBC BLD AUTO-RTO: 0 /100 WBCS
PLATELET # BLD AUTO: 245 THOUSANDS/UL (ref 149–390)
PMV BLD AUTO: 10.3 FL (ref 8.9–12.7)
POTASSIUM SERPL-SCNC: 4 MMOL/L (ref 3.5–5.3)
PROT SERPL-MCNC: 7.1 G/DL (ref 6.4–8.2)
RBC # BLD AUTO: 4.71 MILLION/UL (ref 3.81–5.12)
SODIUM SERPL-SCNC: 138 MMOL/L (ref 136–145)
TRIGL SERPL-MCNC: 153 MG/DL
TSH SERPL DL<=0.05 MIU/L-ACNC: 1.65 UIU/ML (ref 0.36–3.74)
WBC # BLD AUTO: 9.95 THOUSAND/UL (ref 4.31–10.16)

## 2018-05-17 PROCEDURE — 80053 COMPREHEN METABOLIC PANEL: CPT

## 2018-05-17 PROCEDURE — 86430 RHEUMATOID FACTOR TEST QUAL: CPT

## 2018-05-17 PROCEDURE — 99284 EMERGENCY DEPT VISIT MOD MDM: CPT | Performed by: PHYSICIAN ASSISTANT

## 2018-05-17 PROCEDURE — 85652 RBC SED RATE AUTOMATED: CPT

## 2018-05-17 PROCEDURE — 83036 HEMOGLOBIN GLYCOSYLATED A1C: CPT

## 2018-05-17 PROCEDURE — 82043 UR ALBUMIN QUANTITATIVE: CPT

## 2018-05-17 PROCEDURE — G0383 LEV 4 HOSP TYPE B ED VISIT: HCPCS | Performed by: PHYSICIAN ASSISTANT

## 2018-05-17 PROCEDURE — 85025 COMPLETE CBC W/AUTO DIFF WBC: CPT

## 2018-05-17 PROCEDURE — 36415 COLL VENOUS BLD VENIPUNCTURE: CPT

## 2018-05-17 PROCEDURE — 86038 ANTINUCLEAR ANTIBODIES: CPT

## 2018-05-17 PROCEDURE — 80061 LIPID PANEL: CPT

## 2018-05-17 PROCEDURE — 82570 ASSAY OF URINE CREATININE: CPT

## 2018-05-17 PROCEDURE — 84443 ASSAY THYROID STIM HORMONE: CPT

## 2018-05-17 PROCEDURE — 3061F NEG MICROALBUMINURIA REV: CPT | Performed by: FAMILY MEDICINE

## 2018-05-18 ENCOUNTER — TRANSCRIBE ORDERS (OUTPATIENT)
Dept: ADMINISTRATIVE | Facility: HOSPITAL | Age: 38
End: 2018-05-18

## 2018-05-18 DIAGNOSIS — R10.13 EPIGASTRIC PAIN: Primary | ICD-10-CM

## 2018-05-18 LAB
RHEUMATOID FACT SER QL LA: NEGATIVE
RYE IGE QN: NEGATIVE

## 2018-05-21 ENCOUNTER — APPOINTMENT (OUTPATIENT)
Dept: URGENT CARE | Facility: MEDICAL CENTER | Age: 38
End: 2018-05-21
Payer: OTHER MISCELLANEOUS

## 2018-05-21 PROCEDURE — 99213 OFFICE O/P EST LOW 20 MIN: CPT

## 2018-05-24 ENCOUNTER — OFFICE VISIT (OUTPATIENT)
Dept: FAMILY MEDICINE CLINIC | Facility: CLINIC | Age: 38
End: 2018-05-24
Payer: COMMERCIAL

## 2018-05-24 VITALS
HEIGHT: 61 IN | DIASTOLIC BLOOD PRESSURE: 80 MMHG | SYSTOLIC BLOOD PRESSURE: 140 MMHG | BODY MASS INDEX: 49.24 KG/M2 | WEIGHT: 260.8 LBS

## 2018-05-24 DIAGNOSIS — Z91.030 BEE STING ALLERGY: ICD-10-CM

## 2018-05-24 DIAGNOSIS — F41.9 ANXIETY: ICD-10-CM

## 2018-05-24 DIAGNOSIS — E66.01 MORBID OBESITY (HCC): ICD-10-CM

## 2018-05-24 DIAGNOSIS — E78.01 FAMILIAL HYPERCHOLESTEROLEMIA: ICD-10-CM

## 2018-05-24 DIAGNOSIS — E11.9 TYPE 2 DIABETES MELLITUS WITHOUT COMPLICATION, WITHOUT LONG-TERM CURRENT USE OF INSULIN (HCC): Primary | ICD-10-CM

## 2018-05-24 DIAGNOSIS — J45.20 MILD INTERMITTENT ASTHMA WITHOUT COMPLICATION: ICD-10-CM

## 2018-05-24 PROBLEM — M22.40 CHONDROMALACIA PATELLAE: Status: ACTIVE | Noted: 2018-05-24

## 2018-05-24 PROCEDURE — 99214 OFFICE O/P EST MOD 30 MIN: CPT | Performed by: FAMILY MEDICINE

## 2018-05-24 RX ORDER — NORTRIPTYLINE HYDROCHLORIDE 10 MG/1
CAPSULE ORAL
Refills: 3 | COMMUNITY
Start: 2018-05-12 | End: 2018-09-19 | Stop reason: HOSPADM

## 2018-05-24 RX ORDER — EPINEPHRINE 0.3 MG/.3ML
0.3 INJECTION SUBCUTANEOUS ONCE
Qty: 0.3 ML | Refills: 0 | Status: SHIPPED | OUTPATIENT
Start: 2018-05-24 | End: 2019-07-09 | Stop reason: SDUPTHER

## 2018-05-24 RX ORDER — BUSPIRONE HYDROCHLORIDE 15 MG/1
7.5 TABLET ORAL 2 TIMES DAILY
Refills: 3 | COMMUNITY
Start: 2018-04-30 | End: 2018-05-24 | Stop reason: SDUPTHER

## 2018-05-24 RX ORDER — CARISOPRODOL 350 MG/1
350 TABLET ORAL 3 TIMES DAILY PRN
Refills: 2 | COMMUNITY
Start: 2018-05-08 | End: 2019-03-07

## 2018-05-24 RX ORDER — ATORVASTATIN CALCIUM 20 MG/1
20 TABLET, FILM COATED ORAL DAILY
Qty: 30 TABLET | Refills: 5 | Status: SHIPPED | OUTPATIENT
Start: 2018-05-24 | End: 2019-06-10 | Stop reason: SDUPTHER

## 2018-05-24 RX ORDER — ALBUTEROL SULFATE 90 UG/1
2 AEROSOL, METERED RESPIRATORY (INHALATION) EVERY 6 HOURS PRN
Qty: 1 INHALER | Refills: 0 | Status: SHIPPED | OUTPATIENT
Start: 2018-05-24 | End: 2019-05-28 | Stop reason: SDUPTHER

## 2018-05-24 RX ORDER — BUSPIRONE HYDROCHLORIDE 15 MG/1
TABLET ORAL
Qty: 30 TABLET | Refills: 5 | Status: SHIPPED | OUTPATIENT
Start: 2018-05-24 | End: 2019-05-28

## 2018-05-24 RX ORDER — RIZATRIPTAN BENZOATE 10 MG/1
TABLET ORAL
COMMUNITY
End: 2018-09-19 | Stop reason: HOSPADM

## 2018-05-24 RX ORDER — ATORVASTATIN CALCIUM 20 MG/1
TABLET, FILM COATED ORAL
Refills: 3 | COMMUNITY
Start: 2018-04-30 | End: 2018-05-24 | Stop reason: SDUPTHER

## 2018-05-24 RX ORDER — DULOXETIN HYDROCHLORIDE 30 MG/1
CAPSULE, DELAYED RELEASE ORAL
Refills: 1 | COMMUNITY
Start: 2018-05-08 | End: 2021-02-16

## 2018-05-24 NOTE — PROGRESS NOTES
Assessment/Plan:    Type 2 diabetes mellitus without complication (HCC)    Diabetes has been under good control with recent hemoglobin A1c of 6 7  She will continue her metformin and walking for exercise  She would like to check her sugars more frequently and I will refill her test strips  Anxiety    Symptoms are under excellent control on BuSpar  I have refilled her prescription  Morbid obesity ([address] )    She is considering bariatric surgery  We discussed healthy Mediterranean diet recommendations  Hyperlipidemia    Lipids are under good control on atorvastatin 20 mg daily  Diagnoses and all orders for this visit:    Type 2 diabetes mellitus without complication, without long-term current use of insulin ([facility])    Anxiety  -     busPIRone (BUSPAR) 15 mg tablet; Take 1/2 pill po bid    Morbid obesity (HCC)    Familial hypercholesterolemia  -     atorvastatin (LIPITOR) 20 mg tablet; Take 1 tablet (20 mg total) by mouth daily    Mild intermittent asthma without complication  -     albuterol (PROVENTIL HFA,VENTOLIN HFA) 90 mcg/act inhaler; Inhale 2 puffs every 6 (six) hours as needed for wheezing    Bee sting allergy  -     EPINEPHrine (EPIPEN) 0 3 mg/0 3 mL SOAJ; Inject 0 3 mL (0 3 mg total) into the shoulder, thigh, or buttocks once for 1 dose    Other orders  -     DULoxetine (CYMBALTA) 30 mg delayed release capsule; TAKE 1 CAPSULE (30 MG TOTAL) BY MOUTH EVERY MORNING  -     carisoprodol (SOMA) 350 mg tablet; Take 350 mg by mouth 3 (three) times a day as needed  -     Discontinue: atorvastatin (LIPITOR) 20 mg tablet; TAKE 1 TABLET (20 MG TOTAL) BY MOUTH NIGHTLY  -     Discontinue: busPIRone (BUSPAR) 15 mg tablet; Take 7 5 mg by mouth 2 (two) times a day  -     nortriptyline (PAMELOR) 10 mg capsule; TAKE 2 CAPSULES (20 MG ) AT BEDTIME  -     rizatriptan (MAXALT) 10 MG tablet; rizatriptan 10 mg tablet          Subjective:      Patient ID: Gelacio Lawrence is a 42 y o  female      She is here for follow up   She went to [facility] because her husbands insurance had changed temporarily  She was having major difficulties with anxiety and panic attacks  She was started on BuSpar and symptoms have been under very good control  In fact she has several left over lorazepam from May of 2017  She has struggled with weight loss  She has tried to follow various types of diets including vegan diet  Unfortunately she only loses a few lb and then cannot maintain weight loss  She is exercising in the form of walking  Walking is the only exercise she can tolerate with fibromyalgia  She has IBS and rectocoele  She will be going for studies to see if she qualifies for surgical repair          The following portions of the patient's history were reviewed and updated as appropriate: allergies, current medications, past family history, past medical history, past social history, past surgical history and problem list     Review of Systems   Constitutional: Negative for activity change, chills, fatigue and fever  HENT: Negative for congestion, ear pain, sinus pressure and sore throat  Eyes: Negative for pain and visual disturbance  Respiratory: Negative for cough, chest tightness, shortness of breath and wheezing  Cardiovascular: Negative for chest pain, palpitations and leg swelling  Gastrointestinal: Positive for constipation and diarrhea  Negative for abdominal pain, blood in stool, nausea and vomiting  IBS    Endocrine: Negative for polydipsia and polyuria  Genitourinary: Negative for difficulty urinating, dysuria, frequency and urgency  Musculoskeletal: Positive for arthralgias  Negative for joint swelling and myalgias  Skin: Negative for rash  Neurological: Negative for dizziness, weakness, numbness and headaches  Hematological: Negative for adenopathy  Does not bruise/bleed easily  Psychiatric/Behavioral: Negative for dysphoric mood  The patient is not nervous/anxious  Objective:      /80 (BP Location: Left arm, Patient Position: Sitting, Cuff Size: Large)   Ht 5' 1" (1 549 m)   Wt 118 kg (260 lb 12 8 oz)   BMI 49 28 kg/m²          Physical Exam   Constitutional: She is oriented to person, place, and time  She appears well-developed and well-nourished  Morbidly obese   HENT:   Head: Normocephalic and atraumatic  Right Ear: External ear normal    Left Ear: External ear normal    Neck: Normal range of motion  Neck supple  No thyromegaly present  Cardiovascular: Normal rate, regular rhythm and normal heart sounds  Pulmonary/Chest: Effort normal and breath sounds normal    Musculoskeletal: She exhibits no edema or deformity  Lymphadenopathy:     She has no cervical adenopathy  Neurological: She is alert and oriented to person, place, and time  Psychiatric: She has a normal mood and affect  Her behavior is normal  Judgment and thought content normal    Nursing note and vitals reviewed

## 2018-05-24 NOTE — ASSESSMENT & PLAN NOTE
Diabetes has been under good control with recent hemoglobin A1c of 6 7  She will continue her metformin and walking for exercise  She would like to check her sugars more frequently and I will refill her test strips

## 2018-05-24 NOTE — PATIENT INSTRUCTIONS
Type 2 diabetes mellitus without complication (HCC)    Diabetes has been under good control with recent hemoglobin A1c of 6 7  She will continue her metformin and walking for exercise  She would like to check her sugars more frequently and I will refill her test strips  Anxiety    Symptoms are under excellent control on BuSpar  I have refilled her prescription  Morbid obesity ([address] )    She is considering bariatric surgery  We discussed healthy Mediterranean diet recommendations  Hyperlipidemia    Lipids are under good control on atorvastatin 20 mg daily

## 2018-05-28 DIAGNOSIS — E11.9 TYPE 2 DIABETES MELLITUS WITHOUT COMPLICATION, WITHOUT LONG-TERM CURRENT USE OF INSULIN (HCC): Primary | ICD-10-CM

## 2018-05-30 ENCOUNTER — TELEPHONE (OUTPATIENT)
Dept: FAMILY MEDICINE CLINIC | Facility: CLINIC | Age: 38
End: 2018-05-30

## 2018-05-30 ENCOUNTER — APPOINTMENT (OUTPATIENT)
Dept: URGENT CARE | Facility: MEDICAL CENTER | Age: 38
End: 2018-05-30
Payer: OTHER MISCELLANEOUS

## 2018-05-30 PROCEDURE — 99213 OFFICE O/P EST LOW 20 MIN: CPT | Performed by: PHYSICIAN ASSISTANT

## 2018-05-30 NOTE — TELEPHONE ENCOUNTER
Prior Auth for epi Pen was denied  The generic is covered on her formulary, however when I went to call that one in, they said that they are having an issues with both generic and brand names  They are out of stock and there is a national shortage so they have no idea when they will be able to prescribe this for the patient  How would you like to proceed?

## 2018-05-31 ENCOUNTER — HOSPITAL ENCOUNTER (OUTPATIENT)
Dept: RADIOLOGY | Facility: HOSPITAL | Age: 38
Discharge: HOME | End: 2018-05-31
Attending: COLON & RECTAL SURGERY
Payer: COMMERCIAL

## 2018-05-31 DIAGNOSIS — R10.13 EPIGASTRIC PAIN: ICD-10-CM

## 2018-05-31 PROCEDURE — 74280 X-RAY XM COLON 2CNTRST STD: CPT

## 2018-05-31 RX ADMIN — IOHEXOL 200 ML: 350 INJECTION, SOLUTION INTRAVENOUS at 08:05

## 2018-05-31 RX ADMIN — IOTHALAMATE MEGLUMINE 250 ML: 172 INJECTION URETERAL at 10:37

## 2018-06-15 ENCOUNTER — TRANSITIONAL CARE MANAGEMENT (OUTPATIENT)
Dept: FAMILY MEDICINE CLINIC | Facility: CLINIC | Age: 38
End: 2018-06-15

## 2018-06-19 ENCOUNTER — OFFICE VISIT (OUTPATIENT)
Dept: FAMILY MEDICINE CLINIC | Facility: CLINIC | Age: 38
End: 2018-06-19

## 2018-06-19 VITALS
SYSTOLIC BLOOD PRESSURE: 140 MMHG | WEIGHT: 257.2 LBS | BODY MASS INDEX: 48.56 KG/M2 | HEIGHT: 61 IN | DIASTOLIC BLOOD PRESSURE: 100 MMHG

## 2018-06-19 DIAGNOSIS — T50.902A INTENTIONAL OVERDOSE OF DRUG IN TABLET FORM (HCC): Primary | ICD-10-CM

## 2018-06-19 DIAGNOSIS — R00.2 PALPITATIONS: ICD-10-CM

## 2018-06-19 DIAGNOSIS — F33.1 MODERATE EPISODE OF RECURRENT MAJOR DEPRESSIVE DISORDER (HCC): ICD-10-CM

## 2018-06-19 PROBLEM — E78.2 MIXED HYPERLIPIDEMIA: Status: ACTIVE | Noted: 2018-01-24

## 2018-06-19 PROBLEM — D72.829 LEUKOCYTOSIS: Status: ACTIVE | Noted: 2018-06-12

## 2018-06-19 PROCEDURE — 99496 TRANSJ CARE MGMT HIGH F2F 7D: CPT | Performed by: FAMILY MEDICINE

## 2018-06-19 RX ORDER — DULOXETIN HYDROCHLORIDE 20 MG/1
20 CAPSULE, DELAYED RELEASE ORAL DAILY
COMMUNITY
End: 2020-01-08

## 2018-06-19 NOTE — ASSESSMENT & PLAN NOTE
She is feeling much better since her discharge from the hospital   She will continue with the new dose of Cymbalta and will be seeing a counselor next week  She has very good support systems at home with her  and son
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
## 2018-06-19 NOTE — ASSESSMENT & PLAN NOTE
She repeatedly assures me that her intention was not to kill herself, but just to get some sleep  Ambien was discontinued and she will use hydroxyzine prn sleep

## 2018-06-19 NOTE — ASSESSMENT & PLAN NOTE
In general her blood pressure has been control up until very recently  There is a positive family history of hypertension  We will bring her back for blood pressure recheck in a few weeks and will consider medication at that time

## 2018-06-22 ENCOUNTER — HOSPITAL ENCOUNTER (OUTPATIENT)
Dept: NON INVASIVE DIAGNOSTICS | Facility: HOSPITAL | Age: 38
Discharge: HOME | End: 2018-06-22
Attending: FAMILY MEDICINE
Payer: COMMERCIAL

## 2018-06-22 DIAGNOSIS — R00.2 PALPITATIONS: ICD-10-CM

## 2018-06-22 PROCEDURE — 93225 XTRNL ECG REC<48 HRS REC: CPT

## 2018-06-22 PROCEDURE — 93226 XTRNL ECG REC<48 HR SCAN A/R: CPT

## 2018-06-25 ENCOUNTER — CLINICAL SUPPORT (OUTPATIENT)
Dept: FAMILY MEDICINE CLINIC | Facility: CLINIC | Age: 38
End: 2018-06-25

## 2018-06-25 ENCOUNTER — APPOINTMENT (OUTPATIENT)
Dept: URGENT CARE | Facility: MEDICAL CENTER | Age: 38
End: 2018-06-25
Payer: OTHER MISCELLANEOUS

## 2018-06-25 ENCOUNTER — APPOINTMENT (OUTPATIENT)
Dept: RADIOLOGY | Facility: MEDICAL CENTER | Age: 38
End: 2018-06-25
Payer: OTHER MISCELLANEOUS

## 2018-06-25 VITALS — DIASTOLIC BLOOD PRESSURE: 86 MMHG | SYSTOLIC BLOOD PRESSURE: 140 MMHG

## 2018-06-25 DIAGNOSIS — M54.5 ACUTE LOW BACK PAIN, UNSPECIFIED BACK PAIN LATERALITY, WITH SCIATICA PRESENCE UNSPECIFIED: ICD-10-CM

## 2018-06-25 DIAGNOSIS — I10 HYPERTENSION, UNSPECIFIED TYPE: Primary | ICD-10-CM

## 2018-06-25 DIAGNOSIS — M54.5 ACUTE LOW BACK PAIN, UNSPECIFIED BACK PAIN LATERALITY, WITH SCIATICA PRESENCE UNSPECIFIED: Primary | ICD-10-CM

## 2018-06-25 PROCEDURE — 72110 X-RAY EXAM L-2 SPINE 4/>VWS: CPT

## 2018-06-25 PROCEDURE — 72072 X-RAY EXAM THORAC SPINE 3VWS: CPT

## 2018-06-25 PROCEDURE — 99213 OFFICE O/P EST LOW 20 MIN: CPT

## 2018-06-25 NOTE — PROGRESS NOTES
Pt came to office today for a f/u nurse visit on her bp  I took bp at first and it was 142/88 after letting the patient wait 5-7 minutes I took it again and got 140/86   Once I notified you I let pt know we will be in touch to discuss starting a low dose med if she is possible

## 2018-06-27 NOTE — PROGRESS NOTES
I would like her to start very low dose medication, hydrochlorothiazide 12 5 mg daily if this is OK with her

## 2018-06-28 DIAGNOSIS — R03.0 ELEVATED BLOOD PRESSURE READING: Primary | ICD-10-CM

## 2018-06-28 PROCEDURE — 93227 XTRNL ECG REC<48 HR R&I: CPT | Performed by: INTERNAL MEDICINE

## 2018-06-28 RX ORDER — HYDROCHLOROTHIAZIDE 12.5 MG/1
12.5 TABLET ORAL DAILY
Qty: 30 TABLET | Refills: 0 | Status: SHIPPED | OUTPATIENT
Start: 2018-06-28 | End: 2018-07-25 | Stop reason: SDUPTHER

## 2018-07-02 ENCOUNTER — APPOINTMENT (OUTPATIENT)
Dept: URGENT CARE | Facility: MEDICAL CENTER | Age: 38
End: 2018-07-02
Payer: OTHER MISCELLANEOUS

## 2018-07-02 PROCEDURE — 99213 OFFICE O/P EST LOW 20 MIN: CPT

## 2018-07-09 ENCOUNTER — APPOINTMENT (OUTPATIENT)
Dept: URGENT CARE | Facility: MEDICAL CENTER | Age: 38
End: 2018-07-09
Payer: OTHER MISCELLANEOUS

## 2018-07-09 PROCEDURE — 99213 OFFICE O/P EST LOW 20 MIN: CPT

## 2018-07-19 ENCOUNTER — OFFICE VISIT (OUTPATIENT)
Dept: FAMILY MEDICINE CLINIC | Facility: CLINIC | Age: 38
End: 2018-07-19
Payer: COMMERCIAL

## 2018-07-19 VITALS
HEART RATE: 71 BPM | WEIGHT: 256 LBS | OXYGEN SATURATION: 97 % | HEIGHT: 61 IN | BODY MASS INDEX: 48.33 KG/M2 | SYSTOLIC BLOOD PRESSURE: 130 MMHG | DIASTOLIC BLOOD PRESSURE: 90 MMHG

## 2018-07-19 DIAGNOSIS — Z12.31 ENCOUNTER FOR MAMMOGRAM TO ESTABLISH BASELINE MAMMOGRAM: ICD-10-CM

## 2018-07-19 DIAGNOSIS — G43.009 MIGRAINE WITHOUT AURA AND WITHOUT STATUS MIGRAINOSUS, NOT INTRACTABLE: Primary | ICD-10-CM

## 2018-07-19 PROBLEM — I10 ESSENTIAL HYPERTENSION: Status: ACTIVE | Noted: 2018-01-24

## 2018-07-19 PROCEDURE — 99213 OFFICE O/P EST LOW 20 MIN: CPT | Performed by: FAMILY MEDICINE

## 2018-07-19 RX ORDER — TRAZODONE HYDROCHLORIDE 50 MG/1
TABLET ORAL
COMMUNITY
Start: 2018-07-16 | End: 2019-05-28

## 2018-07-19 NOTE — ASSESSMENT & PLAN NOTE
I recommended she continue with hydrochlorothiazide 12 5 mg daily  She will be coming off of the nortriptyline and blood pressure may be decreasing as a result  She will monitor it at home and if numbers dropped to low, she may stop the hydrochlorothiazide  I recommended that she bring her home blood pressure cuff in to the office so we can correlated with ours at her next visit

## 2018-07-19 NOTE — PROGRESS NOTES
Assessment/Plan:    Essential hypertension  I recommended she continue with hydrochlorothiazide 12 5 mg daily  She will be coming off of the nortriptyline and blood pressure may be decreasing as a result  She will monitor it at home and if numbers dropped to low, she may stop the hydrochlorothiazide  I recommended that she bring her home blood pressure cuff in to the office so we can correlated with ours at her next visit  Migraine without aura  She will be scheduling with coordinator for Botox injections as discussed above  Diagnoses and all orders for this visit:    Migraine without aura and without status migrainosus, not intractable    Encounter for mammogram to establish baseline mammogram  -     Mammo screening bilateral w 3d & cad; Future    Other orders  -     traZODone (DESYREL) 50 mg tablet;           Subjective:      Patient ID: Viktoria Whitman is a 42 y o  female  She is here for follow-up on depression, elevated blood pressure and heart palpitations  She saw her neurologist yesterday for follow-up on migraine headaches  Since her neurologist felt the nortriptyline could be causing her elevated blood pressure and palpitations, she is tapering off of this  She will take 10 mg daily for the next week and then discontinue it  She will start taking trazodone  mg at bedtime instead  She will be seeing a headache specialist with the plan to get Botox injections for her migraines  She still gets 20-25 migraine headaches per month  Her diet is very limited because of IBS symptoms plus diabetes plus the rectocele  Also she tends to get more increased fibromyalgia symptoms with certain foods    Generalized hyperhidrosis past 6 months  Thyroid has been normal          The following portions of the patient's history were reviewed and updated as appropriate: allergies, current medications, past family history, past medical history, past social history, past surgical history and problem list     Review of Systems   Constitutional: Negative for chills and fever  Respiratory: Negative for cough, chest tightness and shortness of breath  Cardiovascular: Positive for palpitations  Gastrointestinal: Positive for abdominal pain and constipation  Genitourinary: Negative for difficulty urinating, dysuria and frequency  Neurological: Positive for headaches  Psychiatric/Behavioral: The patient is not nervous/anxious  Objective:      /90 (BP Location: Left arm, Patient Position: Sitting, Cuff Size: Standard)   Pulse 71   Ht 5' 1" (1 549 m)   Wt 116 kg (256 lb)   SpO2 97%   BMI 48 37 kg/m²          Physical Exam   Constitutional: She appears well-developed and well-nourished  Morbid obesity   Cardiovascular: Normal rate, regular rhythm and normal heart sounds  Pulmonary/Chest: Effort normal and breath sounds normal    Abdominal: Soft  Bowel sounds are normal  There is no tenderness  There is no guarding  Nursing note and vitals reviewed

## 2018-07-24 ENCOUNTER — TELEPHONE (OUTPATIENT)
Dept: FAMILY MEDICINE CLINIC | Facility: CLINIC | Age: 38
End: 2018-07-24

## 2018-07-24 DIAGNOSIS — R51.9 NONINTRACTABLE HEADACHE, UNSPECIFIED CHRONICITY PATTERN, UNSPECIFIED HEADACHE TYPE: Primary | ICD-10-CM

## 2018-07-24 DIAGNOSIS — E11.9 DIABETES MELLITUS WITHOUT COMPLICATION (HCC): ICD-10-CM

## 2018-07-24 RX ORDER — HYDROCODONE BITARTRATE AND ACETAMINOPHEN 5; 325 MG/1; MG/1
1 TABLET ORAL EVERY 6 HOURS PRN
Qty: 30 TABLET | Refills: 0 | Status: SHIPPED | OUTPATIENT
Start: 2018-07-24

## 2018-07-24 NOTE — TELEPHONE ENCOUNTER
Carito checked PDMP  PDMP Failed! Carito left copy on your desk regarding pts medication request as noted at end of message below

## 2018-07-24 NOTE — TELEPHONE ENCOUNTER
----- Message from Ernesto Hedrick sent at 7/24/2018 12:39 AM EDT -----  Regarding: Referral Request  Contact: [phone]  Morning Dr Dori Meeks  I was wondering if I could get referred to Dr Perlita Jones the endocrinologist for my diabetes? I he will have me see his nutritionist which would be great to pin point a diet I would be able to follow being diabetic, have milk allergy, having the FODMAP diet for gastric reasons, and Fibro needs as well  It's frustrating what I can and can't eat or shouldn't eat  Also would like an eye exam from a diabetes specialist too  So could I have a referral to Dr Allison Murphy? The neurologist suggested seeing a specialist since I have diabetes  Finally may I have a refill on Hydrocodone 7 5/325 please  I realize after our visit I have 2 pills left (which I don't use often but when I need them I don't want to be out)     Thank you     David Burroughs

## 2018-07-25 DIAGNOSIS — R03.0 ELEVATED BLOOD PRESSURE READING: ICD-10-CM

## 2018-07-25 NOTE — TELEPHONE ENCOUNTER
Aníbal Ortega MD   you at (6:13 PM)      I will fill her Vicodin   Could you please do referrals for [facility] endocine and eye doctor thx (Routing comment)

## 2018-07-26 RX ORDER — HYDROCHLOROTHIAZIDE 12.5 MG/1
TABLET ORAL
Qty: 30 TABLET | Refills: 0 | Status: SHIPPED | OUTPATIENT
Start: 2018-07-26 | End: 2018-08-23 | Stop reason: SDUPTHER

## 2018-08-22 DIAGNOSIS — E11.9 TYPE 2 DIABETES MELLITUS WITHOUT COMPLICATION, WITHOUT LONG-TERM CURRENT USE OF INSULIN (HCC): ICD-10-CM

## 2018-08-23 DIAGNOSIS — R03.0 ELEVATED BLOOD PRESSURE READING: ICD-10-CM

## 2018-08-23 RX ORDER — HYDROCHLOROTHIAZIDE 12.5 MG/1
TABLET ORAL
Qty: 30 TABLET | Refills: 0 | Status: SHIPPED | OUTPATIENT
Start: 2018-08-23 | End: 2018-09-28 | Stop reason: SDUPTHER

## 2018-09-19 ENCOUNTER — OFFICE VISIT (OUTPATIENT)
Dept: URGENT CARE | Facility: MEDICAL CENTER | Age: 38
End: 2018-09-19
Payer: COMMERCIAL

## 2018-09-19 VITALS
HEIGHT: 61 IN | HEART RATE: 100 BPM | BODY MASS INDEX: 48.71 KG/M2 | OXYGEN SATURATION: 97 % | DIASTOLIC BLOOD PRESSURE: 84 MMHG | RESPIRATION RATE: 18 BRPM | TEMPERATURE: 99.6 F | SYSTOLIC BLOOD PRESSURE: 137 MMHG | WEIGHT: 258 LBS

## 2018-09-19 DIAGNOSIS — M26.609 TMJ DYSFUNCTION: Primary | ICD-10-CM

## 2018-09-19 PROCEDURE — S9083 URGENT CARE CENTER GLOBAL: HCPCS | Performed by: PHYSICIAN ASSISTANT

## 2018-09-19 PROCEDURE — G0383 LEV 4 HOSP TYPE B ED VISIT: HCPCS | Performed by: PHYSICIAN ASSISTANT

## 2018-09-19 RX ORDER — CEFUROXIME AXETIL 250 MG/1
6 TABLET ORAL AS NEEDED
COMMUNITY
Start: 2018-09-06 | End: 2020-04-09 | Stop reason: SDUPTHER

## 2018-09-19 NOTE — PATIENT INSTRUCTIONS
TMJ pain:  Advised patient to continue with her her anti-inflammatories, heat, soft foods if her symptoms persist follow up with her dentist    Temporomandibular Disorder   WHAT YOU NEED TO KNOW:   Temporomandibular disorder is a condition that causes pain in your jaw  The disorder affects the joint between your temporal bone and your mandible (jawbone)  The muscles and nerves around the joint are also affected  DISCHARGE INSTRUCTIONS:   Medicines:   · Pain medicine: You may be given a prescription medicine to decrease pain  Do not wait until the pain is severe before you take this medicine  · NSAIDs:  These medicines decrease swelling and pain  You can buy NSAIDs without a doctor's order  Ask your healthcare provider which medicine is right for you, and how much to take  Take as directed  NSAIDs can cause stomach bleeding or kidney problems if not taken correctly  · Muscle relaxers  help decrease pain and muscle spasms  · Take your medicine as directed  Contact your healthcare provider if you think your medicine is not helping or if you have side effects  Tell him of her if you are allergic to any medicine  Keep a list of the medicines, vitamins, and herbs you take  Include the amounts, and when and why you take them  Bring the list or the pill bottles to follow-up visits  Carry your medicine list with you in case of an emergency  Follow up with your healthcare provider as directed:  Write down your questions so you remember to ask them during your visits  Manage your symptoms:   · Eat soft foods: Your healthcare provider may suggest that you eat only soft foods for several days  A dietitian may work with you to find foods that are easier to bite, chew, or swallow  Examples are soup, applesauce, cottage cheese, pudding, yogurt, and soft fruits  · Use jaw supporting devices:  Splints may be used to support your jaw or keep your jaw from moving   You may need to wear a mouth guard to keep you from clenching or grinding your teeth while you are sleeping  · Use ice and heat:  Ice helps decrease swelling and pain  Ice may also help prevent tissue damage  Use an ice pack, or put crushed ice in a plastic bag  Cover it with a towel and place it on your jaw for 15 to 20 minutes every hour or as directed  After the first 24 to 48 hours, use heat to decrease pain, swelling, and muscle spasms  Apply heat on the area for 20 to 30 minutes every 2 hours for as many days as directed  Use a heating pad, moist warm compress, or a hot water bottle  · Go to physical therapy:  A physical therapist teaches you exercises to help improve movement and strength, and to decrease pain in your jaw  A speech therapist may help you with swallowing and speech exercises  Contact your healthcare provider if:   · You have a fever  · Your splint or mouth guard is loose  · You have questions or concerns about your condition or care  Return to the emergency department if:   · You have nausea, are vomiting, or cannot keep liquids down  · You have pain that does not go away even after you take your pain medicine  · You have problems breathing, talking, drinking, eating, or swallowing  · Your splint or mouth guard gets damaged or broken  © 2017 [facility] Information is for End User's use only and may not be sold, redistributed or otherwise used for commercial purposes  All illustrations and images included in CareNotes® are the copyrighted property of [employer] D [employer] M [employer]  or Dariel Bernard  The above information is an  only  It is not intended as medical advice for individual conditions or treatments  Talk to your doctor, nurse or pharmacist before following any medical regimen to see if it is safe and effective for you

## 2018-09-19 NOTE — PROGRESS NOTES
[facility]        NAME: Na Murrell is a 34 y o  female  : 1980    MRN: [id number]  DATE: 2018  TIME: 3:31 PM    Assessment and Plan   TMJ dysfunction [M26 609]  1  TMJ dysfunction           Patient Instructions     Follow up with PCP in 3-5 days  Proceed to  ER if symptoms worsen  Chief Complaint     Chief Complaint   Patient presents with    Jaw Pain     Patient relates with left jaw pain since last week  She is unsure if she has a left ear infection  Denies cough  Denies congestion  + post nasal drip  Felt hot  Denies chest pain  Denies SOB  History of Present Illness       15-year-old female presents with left-sided ear and jaw pain  She states approximately a week ago she was eating kettle chips and developed pain in her left jaw and ear  She states it subsided until last night she was eating beef stew and again developed the same pain  She states now the pain is constant she is feeling into her left ear and jaw area  Patient does take anti-inflammatories and muscle relaxers on a regular basis  She feels this may have helped her symptoms somewhat  She denies any fever, chills, sweats or recent upper respiratory symptoms  She denies any dental problems or pain  Review of Systems   Review of Systems   Constitutional: Negative  HENT: Positive for ear pain  Negative for congestion, dental problem, ear discharge, facial swelling, hearing loss, mouth sores, nosebleeds, postnasal drip, rhinorrhea, sinus pain, sinus pressure, sneezing, sore throat, tinnitus, trouble swallowing and voice change  Eyes: Negative  Respiratory: Negative  Gastrointestinal: Negative  Neurological: Negative for headaches           Current Medications       Current Outpatient Prescriptions:     albuterol (PROVENTIL HFA,VENTOLIN HFA) 90 mcg/act inhaler, Inhale 2 puffs every 6 (six) hours as needed for wheezing, Disp: 1 Inhaler, Rfl: 0    atorvastatin (LIPITOR) 20 mg tablet, Take 1 tablet (20 mg total) by mouth daily, Disp: 30 tablet, Rfl: 5    Blood Glucose Monitoring Suppl (ONE TOUCH ULTRA 2) w/Device KIT, by Does not apply route, Disp: , Rfl:     busPIRone (BUSPAR) 15 mg tablet, Take 1/2 pill po bid, Disp: 30 tablet, Rfl: 5    carisoprodol (SOMA) 350 mg tablet, Take 350 mg by mouth 3 (three) times a day as needed, Disp: , Rfl: 2    cholecalciferol (VITAMIN D3) 1,000 units tablet, Take 1,000 Units by mouth daily, Disp: , Rfl:     DULoxetine (CYMBALTA) 20 mg capsule, Take 20 mg by mouth daily, Disp: , Rfl:     DULoxetine (CYMBALTA) 30 mg delayed release capsule, TAKE 1 CAPSULE (30 MG TOTAL) BY MOUTH EVERY MORNING , Disp: , Rfl: 1    glucose blood (ONETOUCH VERIO) test strip, Test 3 times daily for uncontrolled blood sugar, Disp: 100 each, Rfl: 5    hydrochlorothiazide (HYDRODIURIL) 12 5 mg tablet, TAKE 1 TABLET BY MOUTH EVERY DAY, Disp: 30 tablet, Rfl: 0    HYDROcodone-acetaminophen (NORCO) 5-325 mg per tablet, Take 1 tablet by mouth every 6 (six) hours as needed for pain Max Daily Amount: 4 tablets, Disp: 30 tablet, Rfl: 0    LORazepam (ATIVAN) 0 5 mg tablet, Take 0 5 mg by mouth every 6 (six) hours as needed for anxiety  , Disp: , Rfl:     metFORMIN (GLUCOPHAGE) 500 mg tablet, TAKE 1 TABLET EVERY 12 HOURS WITH FOOD , Disp: 60 tablet, Rfl: 2    nabumetone (RELAFEN) 500 mg tablet, Take 500 mg by mouth 2 (two) times a day, Disp: , Rfl:     SUMAtriptan Succinate 6 MG/0 5ML SOAJ, Inject 6 mg under the skin, Disp: , Rfl:     traZODone (DESYREL) 50 mg tablet, , Disp: , Rfl:     EPINEPHrine (EPIPEN) 0 3 mg/0 3 mL SOAJ, Inject 0 3 mL (0 3 mg total) into the shoulder, thigh, or buttocks once for 1 dose, Disp: 0 3 mL, Rfl: 0    Current Allergies     Allergies as of 09/19/2018 - Reviewed 09/19/2018   Allergen Reaction Noted    Aspirin Anaphylaxis 05/02/2013    Bee venom Swelling and Wheezing 01/12/2015    Penicillins Anaphylaxis and Rash 05/02/2013    Ceclor [cefaclor] Itching 11/01/2012    Lactose GI Intolerance 01/15/2016    Pollen extract Other (See Comments) 01/15/2016    Amoxicillin Rash 05/02/2013    Doxycycline Rash 05/02/2013    Erythromycin Rash 05/02/2013            The following portions of the patient's history were reviewed and updated as appropriate: allergies, current medications, past family history, past medical history, past social history, past surgical history and problem list     Objective   /84   Pulse 100   Temp 99 6 °F (37 6 °C) (Tympanic)   Resp 18   Ht 5' 1" (1 549 m)   Wt 117 kg (258 lb)   SpO2 97%   BMI 48 75 kg/m²        Physical Exam     Physical Exam   Constitutional: Vital signs are normal  She appears well-developed and well-nourished  No distress  HENT:   Head: Normocephalic and atraumatic  Right Ear: Hearing, tympanic membrane, external ear and ear canal normal    Left Ear: Hearing, tympanic membrane, external ear and ear canal normal    Nose: Nose normal  No mucosal edema or rhinorrhea  Right sinus exhibits no maxillary sinus tenderness and no frontal sinus tenderness  Left sinus exhibits no maxillary sinus tenderness and no frontal sinus tenderness  Mouth/Throat: Uvula is midline, oropharynx is clear and moist and mucous membranes are normal  No oropharyngeal exudate, posterior oropharyngeal edema, posterior oropharyngeal erythema or tonsillar abscesses  Patient has tenderness over her left TMJ joint no clicking or grinding is noted  Eyes: Conjunctivae and lids are normal    Cardiovascular: Normal rate, regular rhythm and normal heart sounds  No murmur heard  Pulmonary/Chest: Effort normal and breath sounds normal  No respiratory distress  She has no decreased breath sounds  She has no wheezes  She has no rhonchi  She has no rales  Lymphadenopathy:        Head (right side): No submandibular and no tonsillar adenopathy present  Head (left side): No submandibular and no tonsillar adenopathy present  Skin: No rash noted  Nursing note and vitals reviewed

## 2018-09-20 ENCOUNTER — OFFICE VISIT (OUTPATIENT)
Dept: ENDOCRINOLOGY | Facility: CLINIC | Age: 38
End: 2018-09-20
Payer: COMMERCIAL

## 2018-09-20 VITALS
HEIGHT: 61 IN | DIASTOLIC BLOOD PRESSURE: 92 MMHG | HEART RATE: 100 BPM | WEIGHT: 259.4 LBS | SYSTOLIC BLOOD PRESSURE: 130 MMHG | BODY MASS INDEX: 48.97 KG/M2

## 2018-09-20 DIAGNOSIS — I10 ESSENTIAL HYPERTENSION: ICD-10-CM

## 2018-09-20 DIAGNOSIS — E11.9 TYPE 2 DIABETES MELLITUS WITHOUT COMPLICATION, WITHOUT LONG-TERM CURRENT USE OF INSULIN (HCC): Primary | ICD-10-CM

## 2018-09-20 DIAGNOSIS — E55.9 VITAMIN D DEFICIENCY: ICD-10-CM

## 2018-09-20 DIAGNOSIS — G47.33 OBSTRUCTIVE SLEEP APNEA SYNDROME: ICD-10-CM

## 2018-09-20 DIAGNOSIS — E78.2 MIXED HYPERLIPIDEMIA: ICD-10-CM

## 2018-09-20 DIAGNOSIS — E66.01 MORBID OBESITY (HCC): ICD-10-CM

## 2018-09-20 DIAGNOSIS — E11.9 DIABETES MELLITUS WITHOUT COMPLICATION (HCC): ICD-10-CM

## 2018-09-20 DIAGNOSIS — R23.2 FLUSHING: ICD-10-CM

## 2018-09-20 LAB — 25(OH)D3 SERPL-MCNC: 29.9 NG/ML (ref 30–100)

## 2018-09-20 PROCEDURE — 99244 OFF/OP CNSLTJ NEW/EST MOD 40: CPT | Performed by: INTERNAL MEDICINE

## 2018-09-20 PROCEDURE — 83036 HEMOGLOBIN GLYCOSYLATED A1C: CPT | Performed by: INTERNAL MEDICINE

## 2018-09-20 PROCEDURE — 82306 VITAMIN D 25 HYDROXY: CPT | Performed by: INTERNAL MEDICINE

## 2018-09-20 PROCEDURE — 36415 COLL VENOUS BLD VENIPUNCTURE: CPT | Performed by: INTERNAL MEDICINE

## 2018-09-20 RX ORDER — FLASH GLUCOSE SENSOR
KIT MISCELLANEOUS
Qty: 9 EACH | Refills: 0 | Status: SHIPPED | OUTPATIENT
Start: 2018-09-20 | End: 2019-05-28 | Stop reason: SDUPTHER

## 2018-09-20 RX ORDER — FLASH GLUCOSE SENSOR
1 KIT MISCELLANEOUS DAILY
Qty: 1 DEVICE | Refills: 0 | Status: SHIPPED | OUTPATIENT
Start: 2018-09-20 | End: 2019-05-28 | Stop reason: SDUPTHER

## 2018-09-20 NOTE — PROGRESS NOTES
Janette Daley 34 y o  female MRN: [id number]    Encounter: [id number]      Assessment/Plan     Assessment: This is a 34y o -year-old female with diabetes with hyperglycemia, vitamin D deficiency, hypertension and hyperlipidemia  She has obstructive sleep apnea and flushing  Plan:  1  Type 2 diabetes appears to be well controlled based on hemoglobin A1c  Have asked her to start checking her blood sugars at least daily  She would like to use Luque system which I have prescribed  I referred her to diabetes self-management training and medical nutrition therapy  Check hemoglobin A1c and CMP  I have prescribed 30 min of exercise per day  She will continue on metformin although this may be causing a side effect of sweating after eating  I referred her to Optometry for an eye exam     2   Vitamin-D deficiency-she is on replacement  I have asked her to bring me her previous vitamin-D level  Check vitamin-D level now  3   Hypertension-the blood pressure is slightly elevated  We will need to monitor this and make adjustments to her regimen if necessary  4   Hyperlipidemia-continue statin  5   Obstructive sleep apnea-she was referred to Dr Kiana Denise for evaluation and treatment  6   For the flushing, I have ordered five HIAA  If this is normal, we will consider stopping the metformin to see if the sweating improved  7   Morbid obesity-I have referred her to [facility] nutrition [facility] a weight management program here in the office  CC: Diabetes    History of Present Illness     HPI:  42-year-old woman with type 2 diabetes for about two years who is currently on metformin  She does complain of sweating while eating  She denies any hypoglycemia  She does not check her blood sugar  She denies any polyuria, polydipsia or nocturia  She denies any neuropathy, nephropathy, heart attack, stroke, claudication and retinopathy  Her mother has diabetes      She states that she has flushing along with sweating when she is eating  She is known to have vitamin-D deficiency for which she was on prescription replacement for six weeks  She has been tested for sleep apnea in the past and has tried CPAP but has not used it on a consistent basis  Review of Systems   Constitutional: Positive for fatigue  Negative for chills and fever  Respiratory: Negative for shortness of breath  Cardiovascular: Negative for chest pain  Gastrointestinal: Negative for constipation, diarrhea, nausea and vomiting  Endocrine: Negative for polydipsia and polyuria  Neurological: Negative for numbness  All other systems reviewed and are negative  Historical Information   Past Medical History:   Diagnosis Date    Acid reflux     Anxiety     Asthma     Depression     Diabetes mellitus ([facility] )     Pre    Diverticulitis     Epileptic seizures ([facility] )     Medication related    Gestational diabetes mellitus     Lateral epicondylitis     Malignant tumor of cervix ([facility] )     Obstructive sleep apnea     CPAP    Paresthesias     Plantar fasciitis     Tinea corporis      Past Surgical History:   Procedure Laterality Date    ARTHROSCOPY ANKLE Right 9/1/2017    Procedure: ANKLE ARTHOSCOPY WITH EXTENSIVE SYNOVECTOMY, OPEN ARTHOTOY LATERAL ANKLE WITH ANKLE STABILIZATION, BROSTOM REPAIR, INTERNAL BRACE AUGMENTATION, PERONEAL TENDON SYNOVECTOMY;  Surgeon: Edemlira Vieyra DPM;  Location: [facility];  Service: Podiatry    COLONOSCOPY      HYSTERECTOMY      IN COLONOSCOPY FLX DX W/COLLJ SPEC WHEN PFRMD N/A 2/27/2017    Procedure: COLONOSCOPY;  Surgeon: Heather Blanco MD;  Location: [facility]; Service: Gastroenterology    IN ESOPHAGOGASTRODUODENOSCOPY TRANSORAL DIAGNOSTIC N/A 9/7/2016    Procedure: EGD AND COLONOSCOPY;  Surgeon: Heather Blanco MD;  [facility];   Service: Gastroenterology    TONSILLECTOMY      TUBAL LIGATION       Social History   History   Alcohol Use    Yes     Comment: occasionally History   Drug Use No     History   Smoking Status    Former Smoker    Quit date: 9/7/2013   Smokeless Tobacco    Never Used     Family History:   Family History   Problem Relation Age of Onset    Anxiety disorder Mother     Depression Mother     Ovarian cancer Mother     Seizures Brother         Epilepsy    Other Brother         Suicide risk    Cancer Maternal Grandmother     Other Other         cardiac disorder    Diabetes Other     Hypertension Other     Neuropathy Other     Thyroid disease Other        Meds/Allergies   Current Outpatient Prescriptions   Medication Sig Dispense Refill    albuterol (PROVENTIL HFA,VENTOLIN HFA) 90 mcg/act inhaler Inhale 2 puffs every 6 (six) hours as needed for wheezing 1 Inhaler 0    atorvastatin (LIPITOR) 20 mg tablet Take 1 tablet (20 mg total) by mouth daily 30 tablet 5    Blood Glucose Monitoring Suppl (ONE TOUCH ULTRA 2) w/Device KIT by Does not apply route      busPIRone (BUSPAR) 15 mg tablet Take 1/2 pill po bid 30 tablet 5    carisoprodol (SOMA) 350 mg tablet Take 350 mg by mouth 3 (three) times a day as needed  2    cholecalciferol (VITAMIN D3) 1,000 units tablet Take 1,000 Units by mouth daily      DULoxetine (CYMBALTA) 20 mg capsule Take 20 mg by mouth daily        DULoxetine (CYMBALTA) 30 mg delayed release capsule TAKE 1 CAPSULE (30 MG TOTAL) BY MOUTH EVERY MORNING  1    glucose blood (ONETOUCH VERIO) test strip Test 3 times daily for uncontrolled blood sugar 100 each 5    hydrochlorothiazide (HYDRODIURIL) 12 5 mg tablet TAKE 1 TABLET BY MOUTH EVERY DAY 30 tablet 0    HYDROcodone-acetaminophen (NORCO) 5-325 mg per tablet Take 1 tablet by mouth every 6 (six) hours as needed for pain Max Daily Amount: 4 tablets 30 tablet 0    LORazepam (ATIVAN) 0 5 mg tablet Take 0 5 mg by mouth every 6 (six) hours as needed for anxiety   metFORMIN (GLUCOPHAGE) 500 mg tablet TAKE 1 TABLET EVERY 12 HOURS WITH FOOD   60 tablet 2    nabumetone (RELAFEN) 500 mg tablet Take 500 mg by mouth 2 (two) times a day      SUMAtriptan Succinate 6 MG/0 5ML SOAJ Inject 6 mg under the skin      traZODone (DESYREL) 50 mg tablet        EPINEPHrine (EPIPEN) 0 3 mg/0 3 mL SOAJ Inject 0 3 mL (0 3 mg total) into the shoulder, thigh, or buttocks once for 1 dose 0 3 mL 0     No current facility-administered medications for this visit  Allergies   Allergen Reactions    Aspirin Anaphylaxis    Bee Venom Swelling and Wheezing    Penicillins Anaphylaxis and Rash     " Made asthma worse"    Ceclor [Cefaclor] Itching    Lactose GI Intolerance    Pollen Extract Other (See Comments)     Rhinits,     cockroaches    Amoxicillin Rash    Doxycycline Rash    Erythromycin Rash       Objective   Vitals: Blood pressure 130/92, pulse 100, height 5' 1" (1 549 m), weight 118 kg (259 lb 6 4 oz)  Physical Exam   Constitutional: She is oriented to person, place, and time  She appears well-developed and well-nourished  No distress  HENT:   Head: Normocephalic and atraumatic  Mouth/Throat: Oropharynx is clear and moist and mucous membranes are normal  No oropharyngeal exudate  Eyes: Conjunctivae, EOM and lids are normal  Right eye exhibits no discharge  Left eye exhibits no discharge  No scleral icterus  Neck: Neck supple  No thyromegaly present  Neck circumference is 18 in  Cardiovascular: Normal rate, regular rhythm and normal heart sounds  Exam reveals no gallop and no friction rub  Pulses are no weak pulses  No murmur heard  Pulses:       Dorsalis pedis pulses are 1+ on the right side, and 1+ on the left side  Pulmonary/Chest: Effort normal and breath sounds normal  No respiratory distress  She has no wheezes  Abdominal: Soft  Bowel sounds are normal  She exhibits no distension  There is no tenderness  Musculoskeletal: Normal range of motion  She exhibits no edema, tenderness or deformity     Feet:   Right Foot:   Skin Integrity: Negative for ulcer, skin breakdown, erythema, warmth, callus or dry skin  Lymphadenopathy:        Head (right side): No occipital adenopathy present  Head (left side): No occipital adenopathy present  Right: No supraclavicular adenopathy present  Left: No supraclavicular adenopathy present  Neurological: She is alert and oriented to person, place, and time  No cranial nerve deficit  Skin: Skin is warm and intact  No rash noted  She is not diaphoretic  No erythema  Psychiatric: She has a normal mood and affect  Vitals reviewed  Patient's shoes and socks removed  Right Foot/Ankle   Right Foot Inspection  Skin Exam: skin normal and skin intact no dry skin, no warmth, no callus, no erythema, no maceration, no abnormal color, no pre-ulcer, no ulcer and no callus                            Sensory   Vibration: intact    Monofilament testing: intact  Vascular    The right DP pulse is 1+  Left Foot/Ankle  Left Foot Inspection                                           Sensory   Vibration: intact    Monofilament: intact  Vascular    The left DP pulse is 1+  Assign Risk Category:  No deformity present; No loss of protective sensation; No weak pulses       Risk: 0      The history was obtained from the review of the chart, patient      Lab Results:   Lab Results   Component Value Date/Time    Hemoglobin A1C 6 7 (H) 05/17/2018 11:40 AM    WBC 9 95 05/17/2018 11:40 AM    Hemoglobin 14 4 05/17/2018 11:40 AM    Hematocrit 43 0 05/17/2018 11:40 AM    MCV 91 05/17/2018 11:40 AM    Platelets 895 90/48/9533 11:40 AM    BUN 13 05/17/2018 11:40 AM    Sodium 138 05/17/2018 11:40 AM    Potassium 4 0 05/17/2018 11:40 AM    Chloride 104 05/17/2018 11:40 AM    CO2 26 05/17/2018 11:40 AM    Creatinine 0 67 05/17/2018 11:40 AM    AST 27 05/17/2018 11:40 AM    ALT 62 05/17/2018 11:40 AM    Albumin 4 0 05/17/2018 11:40 AM    HDL, Direct 33 (L) 05/17/2018 11:40 AM    Triglycerides 153 (H) 05/17/2018 11:40 AM           Imaging Studies: I have personally reviewed pertinent reports  Portions of the record may have been created with voice recognition software  Occasional wrong word or "sound a like" substitutions may have occurred due to the inherent limitations of voice recognition software  Read the chart carefully and recognize, using context, where substitutions have occurred

## 2018-09-20 NOTE — LETTER
September 20, 2018     Araseli Fregoso MD  [id number]   [address] [facility]    Patient: David Burroughs   YOB: 1980   Date of Visit: 9/20/2018       Dear Dr Dori Meeks: Thank you for referring David Burroughs to me for evaluation  Below are my notes for this consultation  If you have questions, please do not hesitate to call me  I look forward to following your patient along with you  Sincerely,        Carlota Lama MD        CC: No Recipients  Carlota Lama MD  9/20/2018  1:40 PM  Sign at close encounter   David Burroughs 34 y o  female MRN: [id number]    Encounter: [id number]      Assessment/Plan     Assessment: This is a 34y o -year-old female with diabetes with hyperglycemia, vitamin D deficiency, hypertension and hyperlipidemia  She has obstructive sleep apnea and flushing  Plan:  1  Type 2 diabetes appears to be well controlled based on hemoglobin A1c  Have asked her to start checking her blood sugars at least daily  She would like to use Luque system which I have prescribed  I referred her to diabetes self-management training and medical nutrition therapy  Check hemoglobin A1c and CMP  I have prescribed 30 min of exercise per day  She will continue on metformin although this may be causing a side effect of sweating after eating  I referred her to Optometry for an eye exam     2   Vitamin-D deficiency-she is on replacement  I have asked her to bring me her previous vitamin-D level  Check vitamin-D level now  3   Hypertension-the blood pressure is slightly elevated  We will need to monitor this and make adjustments to her regimen if necessary  4   Hyperlipidemia-continue statin  5   Obstructive sleep apnea-she was referred to Dr Mignon Hopson for evaluation and treatment  6   For the flushing, I have ordered five HIAA  If this is normal, we will consider stopping the metformin to see if the sweating improved      7   Morbid obesity-I have referred her to health and nutrition technology a weight management program here in the office  CC: Diabetes    History of Present Illness     HPI:  68-year-old woman with type 2 diabetes for about two years who is currently on metformin  She does complain of sweating while eating  She denies any hypoglycemia  She does not check her blood sugar  She denies any polyuria, polydipsia or nocturia  She denies any neuropathy, nephropathy, heart attack, stroke, claudication and retinopathy  Her mother has diabetes  She states that she has flushing along with sweating when she is eating  She is known to have vitamin-D deficiency for which she was on prescription replacement for six weeks  She has been tested for sleep apnea in the past and has tried CPAP but has not used it on a consistent basis  Review of Systems   Constitutional: Positive for fatigue  Negative for chills and fever  Respiratory: Negative for shortness of breath  Cardiovascular: Negative for chest pain  Gastrointestinal: Negative for constipation, diarrhea, nausea and vomiting  Endocrine: Negative for polydipsia and polyuria  Neurological: Negative for numbness  All other systems reviewed and are negative        Historical Information   Past Medical History:   Diagnosis Date    Acid reflux     Anxiety     Asthma     Depression     Diabetes mellitus [facility]     Pre    Diverticulitis     Epileptic seizures ([facility]     Medication related    Gestational diabetes mellitus     Lateral epicondylitis     Malignant tumor of cervix ([facility] [facility]     Obstructive sleep apnea     CPAP    Paresthesias     Plantar fasciitis     Tinea corporis      Past Surgical History:   Procedure Laterality Date    ARTHROSCOPY ANKLE Right 9/1/2017    Procedure: ANKLE ARTHOSCOPY WITH EXTENSIVE SYNOVECTOMY, OPEN ARTHOTOY LATERAL ANKLE WITH ANKLE STABILIZATION, BROSTOM REPAIR, INTERNAL BRACE AUGMENTATION, PERONEAL TENDON SYNOVECTOMY;  Surgeon: Abisai Esquivel DPM; [facility]: [facility];  Service: Podiatry    COLONOSCOPY      HYSTERECTOMY      ID COLONOSCOPY FLX DX W/COLLJ SPEC WHEN PFRMD N/A 2/27/2017    Procedure: COLONOSCOPY;  Surgeon: Brandon Wolff MD;  [facility]; Service: Gastroenterology    ID ESOPHAGOGASTRODUODENOSCOPY TRANSORAL DIAGNOSTIC N/A 9/7/2016    Procedure: EGD AND COLONOSCOPY;  Surgeon: Branodn Wolff MD;  [facility]; Service: Gastroenterology    TONSILLECTOMY      TUBAL LIGATION       Social History   History   Alcohol Use    Yes     Comment: occasionally     History   Drug Use No     History   Smoking Status    Former Smoker    Quit date: 9/7/2013   Smokeless Tobacco    Never Used     Family History:   Family History   Problem Relation Age of Onset    Anxiety disorder Mother     Depression Mother     Ovarian cancer Mother     Seizures Brother         Epilepsy    Other Brother         Suicide risk    Cancer Maternal Grandmother     Other Other         cardiac disorder    Diabetes Other     Hypertension Other     Neuropathy Other     Thyroid disease Other        Meds/Allergies   Current Outpatient Prescriptions   Medication Sig Dispense Refill    albuterol (PROVENTIL HFA,VENTOLIN HFA) 90 mcg/act inhaler Inhale 2 puffs every 6 (six) hours as needed for wheezing 1 Inhaler 0    atorvastatin (LIPITOR) 20 mg tablet Take 1 tablet (20 mg total) by mouth daily 30 tablet 5    Blood Glucose Monitoring Suppl (ONE TOUCH ULTRA 2) w/Device KIT by Does not apply route      busPIRone (BUSPAR) 15 mg tablet Take 1/2 pill po bid 30 tablet 5    carisoprodol (SOMA) 350 mg tablet Take 350 mg by mouth 3 (three) times a day as needed  2    cholecalciferol (VITAMIN D3) 1,000 units tablet Take 1,000 Units by mouth daily      DULoxetine (CYMBALTA) 20 mg capsule Take 20 mg by mouth daily        DULoxetine (CYMBALTA) 30 mg delayed release capsule TAKE 1 CAPSULE (30 MG TOTAL) BY MOUTH EVERY MORNING    1    glucose blood (ONETOUCH VERIO) test strip Test 3 times daily for uncontrolled blood sugar 100 each 5    hydrochlorothiazide (HYDRODIURIL) 12 5 mg tablet TAKE 1 TABLET BY MOUTH EVERY DAY 30 tablet 0    HYDROcodone-acetaminophen (NORCO) 5-325 mg per tablet Take 1 tablet by mouth every 6 (six) hours as needed for pain Max Daily Amount: 4 tablets 30 tablet 0    LORazepam (ATIVAN) 0 5 mg tablet Take 0 5 mg by mouth every 6 (six) hours as needed for anxiety   metFORMIN (GLUCOPHAGE) 500 mg tablet TAKE 1 TABLET EVERY 12 HOURS WITH FOOD  60 tablet 2    nabumetone (RELAFEN) 500 mg tablet Take 500 mg by mouth 2 (two) times a day      SUMAtriptan Succinate 6 MG/0 5ML SOAJ Inject 6 mg under the skin      traZODone (DESYREL) 50 mg tablet        EPINEPHrine (EPIPEN) 0 3 mg/0 3 mL SOAJ Inject 0 3 mL (0 3 mg total) into the shoulder, thigh, or buttocks once for 1 dose 0 3 mL 0     No current facility-administered medications for this visit  Allergies   Allergen Reactions    Aspirin Anaphylaxis    Bee Venom Swelling and Wheezing    Penicillins Anaphylaxis and Rash     " Made asthma worse"    Ceclor [Cefaclor] Itching    Lactose GI Intolerance    Pollen Extract Other (See Comments)     Rhinits,     cockroaches    Amoxicillin Rash    Doxycycline Rash    Erythromycin Rash       Objective   Vitals: Blood pressure 130/92, pulse 100, height 5' 1" (1 549 m), weight 118 kg (259 lb 6 4 oz)  Physical Exam   Constitutional: She is oriented to person, place, and time  She appears well-developed and well-nourished  No distress  HENT:   Head: Normocephalic and atraumatic  Mouth/Throat: Oropharynx is clear and moist and mucous membranes are normal  No oropharyngeal exudate  Eyes: Conjunctivae, EOM and lids are normal  Right eye exhibits no discharge  Left eye exhibits no discharge  No scleral icterus  Neck: Neck supple  No thyromegaly present  Neck circumference is 18 in  Cardiovascular: Normal rate, regular rhythm and normal heart sounds  Exam reveals no gallop and no friction rub  Pulses are no weak pulses  No murmur heard  Pulses:       Dorsalis pedis pulses are 1+ on the right side, and 1+ on the left side  Pulmonary/Chest: Effort normal and breath sounds normal  No respiratory distress  She has no wheezes  Abdominal: Soft  Bowel sounds are normal  She exhibits no distension  There is no tenderness  Musculoskeletal: Normal range of motion  She exhibits no edema, tenderness or deformity  Feet:   Right Foot:   Skin Integrity: Negative for ulcer, skin breakdown, erythema, warmth, callus or dry skin  Lymphadenopathy:        Head (right side): No occipital adenopathy present  Head (left side): No occipital adenopathy present  Right: No supraclavicular adenopathy present  Left: No supraclavicular adenopathy present  Neurological: She is alert and oriented to person, place, and time  No cranial nerve deficit  Skin: Skin is warm and intact  No rash noted  She is not diaphoretic  No erythema  Psychiatric: She has a normal mood and affect  Vitals reviewed  Patient's shoes and socks removed  Right Foot/Ankle   Right Foot Inspection  Skin Exam: skin normal and skin intact no dry skin, no warmth, no callus, no erythema, no maceration, no abnormal color, no pre-ulcer, no ulcer and no callus                            Sensory   Vibration: intact    Monofilament testing: intact  Vascular    The right DP pulse is 1+  Left Foot/Ankle  Left Foot Inspection                                           Sensory   Vibration: intact    Monofilament: intact  Vascular    The left DP pulse is 1+  Assign Risk Category:  No deformity present; No loss of protective sensation; No weak pulses       Risk: 0      The history was obtained from the review of the chart, patient      Lab Results:   Lab Results   Component Value Date/Time    Hemoglobin A1C 6 7 (H) 05/17/2018 11:40 AM    WBC 9 95 05/17/2018 11:40 AM    Hemoglobin 14 4 05/17/2018 11:40 AM    Hematocrit 43 0 05/17/2018 11:40 AM    MCV 91 05/17/2018 11:40 AM    Platelets 906 25/78/8534 11:40 AM    BUN 13 05/17/2018 11:40 AM    Sodium 138 05/17/2018 11:40 AM    Potassium 4 0 05/17/2018 11:40 AM    Chloride 104 05/17/2018 11:40 AM    CO2 26 05/17/2018 11:40 AM    Creatinine 0 67 05/17/2018 11:40 AM    AST 27 05/17/2018 11:40 AM    ALT 62 05/17/2018 11:40 AM    Albumin 4 0 05/17/2018 11:40 AM    HDL, Direct 33 (L) 05/17/2018 11:40 AM    Triglycerides 153 (H) 05/17/2018 11:40 AM           Imaging Studies: I have personally reviewed pertinent reports  Portions of the record may have been created with voice recognition software  Occasional wrong word or "sound a like" substitutions may have occurred due to the inherent limitations of voice recognition software  Read the chart carefully and recognize, using context, where substitutions have occurred

## 2018-09-21 LAB
EST. AVERAGE GLUCOSE BLD GHB EST-MCNC: 146 MG/DL
HBA1C MFR BLD: 6.7 % (ref 4.2–6.3)

## 2018-09-24 ENCOUNTER — TELEPHONE (OUTPATIENT)
Dept: ENDOCRINOLOGY | Facility: CLINIC | Age: 38
End: 2018-09-24

## 2018-09-24 NOTE — TELEPHONE ENCOUNTER
----- Message from Cary Zamora MD sent at 9/24/2018  7:43 AM EDT -----  Diabetes remains under good control  Vitamin-D level is still low  Increase vitamin-D replacement to 5000 units per day      Sent to my chart

## 2018-09-24 NOTE — PROGRESS NOTES
Diabetes remains under good control  Vitamin-D level is still low  Increase vitamin-D replacement to 5000 units per day      Sent to my chart

## 2018-09-24 NOTE — TELEPHONE ENCOUNTER
----- Message from Vanda Morgan MD sent at 9/24/2018  7:43 AM EDT -----  Diabetes remains under good control  Vitamin-D level is still low  Increase vitamin-D replacement to 5000 units per day      Sent to my chart

## 2018-09-28 DIAGNOSIS — R03.0 ELEVATED BLOOD PRESSURE READING: ICD-10-CM

## 2018-09-28 RX ORDER — HYDROCHLOROTHIAZIDE 12.5 MG/1
TABLET ORAL
Qty: 30 TABLET | Refills: 0 | Status: SHIPPED | OUTPATIENT
Start: 2018-09-28 | End: 2018-10-26 | Stop reason: SDUPTHER

## 2018-10-26 DIAGNOSIS — R03.0 ELEVATED BLOOD PRESSURE READING: ICD-10-CM

## 2018-10-26 RX ORDER — HYDROCHLOROTHIAZIDE 12.5 MG/1
TABLET ORAL
Qty: 30 TABLET | Refills: 0 | Status: SHIPPED | OUTPATIENT
Start: 2018-10-26 | End: 2018-11-26 | Stop reason: SDUPTHER

## 2018-10-26 NOTE — TELEPHONE ENCOUNTER
[facility] pharmacy contacted us to inform us there is a backorder on buspirone HCL 15 mg  They are requesting a substitute      Please advise on how to proceed

## 2018-11-01 ENCOUNTER — VBI (OUTPATIENT)
Dept: FAMILY MEDICINE CLINIC | Facility: CLINIC | Age: 38
End: 2018-11-01

## 2018-11-05 NOTE — TELEPHONE ENCOUNTER
Kenisha Chacko    ED Visit Information     Ed visit date:10/20/2018  Diagnosis Description: NONINFECTIVE GASTROENTERITIS AND COLITIS, UNSPECIFIED  In Network? No  Discharge status: Home  Discharged with meds ?  NA  Number of ED visits to date: 1  ED Severity:4     Outreach Information    Outreach successful: Yes 2  Date letter mailed:11/5/18  Date Finalized:11/5/18        Value Base Outreach    Outreach type:  3 Day Outreach  Emergent necessity warranted by diagnosis:  No  [facility] PCP:  Yes  Practice Contacted Patient ?:  No  Pt had ED follow up with pcp/staff ?:  No    11/01/2018 02:10 PM Phone (Libby Peterson (Self) [phone] (H)   Left Message - Att x1     11/02/2018 10:16 AM Phone (Libby Peterson (Self) [phone] (H)   Left Message - att x2

## 2018-11-26 DIAGNOSIS — E11.9 TYPE 2 DIABETES MELLITUS WITHOUT COMPLICATION, WITHOUT LONG-TERM CURRENT USE OF INSULIN (HCC): ICD-10-CM

## 2018-11-26 DIAGNOSIS — R03.0 ELEVATED BLOOD PRESSURE READING: ICD-10-CM

## 2018-11-26 RX ORDER — HYDROCHLOROTHIAZIDE 12.5 MG/1
12.5 TABLET ORAL DAILY
Qty: 30 TABLET | Refills: 3 | Status: SHIPPED | OUTPATIENT
Start: 2018-11-26 | End: 2019-03-07

## 2018-12-05 DIAGNOSIS — E11.9 TYPE 2 DIABETES MELLITUS WITHOUT COMPLICATION, WITHOUT LONG-TERM CURRENT USE OF INSULIN (HCC): ICD-10-CM

## 2018-12-13 DIAGNOSIS — F41.9 ANXIETY: Primary | ICD-10-CM

## 2018-12-13 NOTE — TELEPHONE ENCOUNTER
She could have a prescription for low-dose Xanax 0 25 milligrams  Unfortunately Xanax is quite different from buspirone  She could use Xanax 1 tablet up to b i d  p r n  anxiety

## 2018-12-14 RX ORDER — ALPRAZOLAM 0.25 MG/1
0.25 TABLET ORAL 2 TIMES DAILY PRN
Qty: 20 TABLET | Refills: 0 | Status: SHIPPED | OUTPATIENT
Start: 2018-12-14 | End: 2019-05-28

## 2019-02-26 ENCOUNTER — LAB REQUISITION (OUTPATIENT)
Dept: LAB | Facility: HOSPITAL | Age: 39
End: 2019-02-26
Payer: COMMERCIAL

## 2019-02-26 DIAGNOSIS — B34.9 VIRAL INFECTION: ICD-10-CM

## 2019-02-26 PROCEDURE — 87631 RESP VIRUS 3-5 TARGETS: CPT | Performed by: FAMILY MEDICINE

## 2019-02-27 LAB
FLUAV AG SPEC QL: NOT DETECTED
FLUBV AG SPEC QL: NOT DETECTED
RSV B RNA SPEC QL NAA+PROBE: NOT DETECTED

## 2019-03-07 ENCOUNTER — OFFICE VISIT (OUTPATIENT)
Dept: GASTROENTEROLOGY | Facility: MEDICAL CENTER | Age: 39
End: 2019-03-07
Payer: COMMERCIAL

## 2019-03-07 VITALS
TEMPERATURE: 98.6 F | HEIGHT: 61 IN | BODY MASS INDEX: 47.95 KG/M2 | WEIGHT: 254 LBS | SYSTOLIC BLOOD PRESSURE: 122 MMHG | DIASTOLIC BLOOD PRESSURE: 72 MMHG | HEART RATE: 96 BPM

## 2019-03-07 DIAGNOSIS — N81.6 RECTOCELE: Primary | ICD-10-CM

## 2019-03-07 DIAGNOSIS — K58.2 IRRITABLE BOWEL SYNDROME WITH BOTH CONSTIPATION AND DIARRHEA: ICD-10-CM

## 2019-03-07 PROCEDURE — 99214 OFFICE O/P EST MOD 30 MIN: CPT | Performed by: PHYSICIAN ASSISTANT

## 2019-03-07 RX ORDER — BACLOFEN 10 MG/1
10 TABLET ORAL DAILY
COMMUNITY
Start: 2019-02-08 | End: 2020-06-19

## 2019-03-07 RX ORDER — CLONIDINE HYDROCHLORIDE 0.1 MG/1
TABLET ORAL
COMMUNITY
Start: 2019-02-18 | End: 2019-05-28

## 2019-03-07 RX ORDER — METOPROLOL SUCCINATE 50 MG/1
50 TABLET, EXTENDED RELEASE ORAL DAILY
COMMUNITY
Start: 2018-12-05 | End: 2021-02-16 | Stop reason: ALTCHOICE

## 2019-03-07 NOTE — PROGRESS NOTES
Assessment/Plan:     Diagnoses and all orders for this visit:        Irritable bowel syndrome with both constipation and diarrhea  She continues with abdominal cramping, bloating and alternating bowel habits  She has tried fiber, probiotic and MiraLax in the past without good control of her symptoms  She would like to get checked her celiac which is reasonable  I told her she needed to be on gluten for the test   We also had a long conversation about the low FODMAP diet and even if she did not have celiac that avoiding gluten may help control her symptoms  I did recommend a food journal as well to identify specific triggers  I am hoping once these triggers are identified this will also help improve her bowel habits   -     IgA; Future  -     Tissue transglutaminase, IgA; Future    Rectocele  She has a history of a rectocele and previously saw Dr Bibi Greer however she states that she would like a referral to another provider for 2nd opinion  I recommended [facility] and we will help her set up an appointment  -     Ambulatory referral to Colorectal Surgery; Future    Will see her back in 4 months or sooner if necessary  Subjective:      Patient ID: Delmon Closs is a 34 y o  female  HPI     This is a follow-up for IBS with alternating diarrhea and constipation  Patient states she continues to have symptoms  She believes that gluten and dairy or potential triggers as these cause bloating, cramping and diarrhea  She is concerned about celiac and has never been tested  She states her bowels alternate from day-to-day  She will typically have 3-4 bowel movements in a week and 1 can be very hard and she has to strain where is the next time she goes she could have watery diarrhea  She was ordered an obstruction series in the past to rule out overflow as a source but this did not show an excessive amount of stool    She has tried multiple medications including MiraLax, fiber, probiotics but states in of these have seem to help  This is further complicated by or rectocele as well as hemorrhoids  She previously saw a colorectal surgeon but nothing was done  Her last colonoscopy was in February 2017 which showed a hyperplastic polyp in the sigmoid  She had endoscopy in September 2016 which showed a small sliding hiatal hernia but was otherwise normal  Biopsy negative for H pylori      Patient Active Problem List   Diagnosis    Anxiety    Asthma    Asthma, exercise induced    Essential hypertension    Fatigue    Fibromyalgia    Mixed hyperlipidemia    Injury of right ankle    Irritable bowel syndrome    Lipoma of abdominal wall    Low back pain    Arthropathy of lumbar facet joint    Mild intermittent asthma    Morbid obesity ([address] )    Other convulsions    Paresthesia    Plantar fasciitis    Rectocele    Knee pain    History of hysterectomy    Seasonal allergies    Type 2 diabetes mellitus without complication (HCC)    Chondromalacia patellae    Obstructive sleep apnea syndrome    Intentional overdose of drug in tablet form (HCC)    Leukocytosis    Moderate episode of recurrent major depressive disorder (HCC)    Palpitations    Migraine without aura     Allergies   Allergen Reactions    Aspirin Anaphylaxis    Bee Venom Swelling and Wheezing    Penicillins Anaphylaxis and Rash     " Made asthma worse"    Ceclor [Cefaclor] Itching    Lactose GI Intolerance    Pollen Extract Other (See Comments)     Rhinits,     cockroaches    Amoxicillin Rash    Doxycycline Rash    Erythromycin Rash     Current Outpatient Medications on File Prior to Visit   Medication Sig    albuterol (PROVENTIL HFA,VENTOLIN HFA) 90 mcg/act inhaler Inhale 2 puffs every 6 (six) hours as needed for wheezing    ALPRAZolam (XANAX) 0 25 mg tablet Take 1 tablet (0 25 mg total) by mouth 2 (two) times a day as needed for anxiety    atorvastatin (LIPITOR) 20 mg tablet Take 1 tablet (20 mg total) by mouth daily  Blood Glucose Monitoring Suppl (ONE TOUCH ULTRA 2) w/Device KIT by Does not apply route    busPIRone (BUSPAR) 15 mg tablet Take 1/2 pill po bid    cholecalciferol (VITAMIN D3) 1,000 units tablet Take 1,000 Units by mouth daily    Continuous Blood Gluc  (FREESTYLE SRINIVASAN READER) JUDY 1 kit by Does not apply route daily    Continuous Blood Gluc Sensor (FREESTYLE SRINIVASAN SENSOR SYSTEM) MISC Change every 10 days    DULoxetine (CYMBALTA) 20 mg capsule Take 20 mg by mouth daily      DULoxetine (CYMBALTA) 30 mg delayed release capsule TAKE 1 CAPSULE (30 MG TOTAL) BY MOUTH EVERY MORNING   glucose blood (ONETOUCH VERIO) test strip Test 3 times daily for uncontrolled blood sugar    HYDROcodone-acetaminophen (NORCO) 5-325 mg per tablet Take 1 tablet by mouth every 6 (six) hours as needed for pain Max Daily Amount: 4 tablets    LORazepam (ATIVAN) 0 5 mg tablet Take 0 5 mg by mouth every 6 (six) hours as needed for anxiety   metFORMIN (GLUCOPHAGE) 500 mg tablet Take 1 tablet (500 mg total) by mouth every 12 (twelve) hours Take with food    metoprolol succinate (TOPROL-XL) 50 mg 24 hr tablet Take 50 mg by mouth daily    nabumetone (RELAFEN) 500 mg tablet Take 500 mg by mouth 2 (two) times a day    SUMAtriptan Succinate 6 MG/0 5ML SOAJ Inject 6 mg under the skin    traZODone (DESYREL) 50 mg tablet      baclofen 10 mg tablet 3 (three) times a day    cloNIDine (CATAPRES) 0 1 mg tablet     EPINEPHrine (EPIPEN) 0 3 mg/0 3 mL SOAJ Inject 0 3 mL (0 3 mg total) into the shoulder, thigh, or buttocks once for 1 dose    [DISCONTINUED] carisoprodol (SOMA) 350 mg tablet Take 350 mg by mouth 3 (three) times a day as needed    [DISCONTINUED] hydrochlorothiazide (HYDRODIURIL) 12 5 mg tablet Take 1 tablet (12 5 mg total) by mouth daily (Patient not taking: Reported on 3/7/2019)     No current facility-administered medications on file prior to visit        Family History   Problem Relation Age of Onset    Anxiety disorder Mother     Depression Mother     Ovarian cancer Mother     Seizures Brother         Epilepsy    Other Brother         Suicide risk    Cancer Maternal Grandmother     Other Other         cardiac disorder    Diabetes Other     Hypertension Other     Neuropathy Other     Thyroid disease Other      Past Medical History:   Diagnosis Date    Acid reflux     Anxiety     Asthma     Depression     Diabetes mellitus ([facility]     Pre    Diverticulitis     Epileptic seizures ([facility] )     Medication related    Gestational diabetes mellitus     Lateral epicondylitis     Malignant tumor of cervix ([facility] )     Obstructive sleep apnea     CPAP    Paresthesias     Plantar fasciitis     Tinea corporis      Social History     Socioeconomic History    Marital status: /Civil Union     Spouse name: None    Number of children: None    Years of education: None    Highest education level: None   Occupational History    None   Social Needs    Financial resource strain: None    Food insecurity:     Worry: None     Inability: None    Transportation needs:     Medical: None     Non-medical: None   Tobacco Use    Smoking status: Former Smoker     Last attempt to quit: 2013     Years since quittin 4    Smokeless tobacco: Never Used   Substance and Sexual Activity    Alcohol use: Yes     Comment: occasionally    Drug use: No    Sexual activity: None   Lifestyle    Physical activity:     Days per week: None     Minutes per session: None    Stress: None   Relationships    Social connections:     Talks on phone: None     Gets together: None     Attends Spiritism service: None     Active member of club or organization: None     Attends meetings of clubs or organizations: None     Relationship status: None    Intimate partner violence:     Fear of current or ex partner: None     Emotionally abused: None     Physically abused: None     Forced sexual activity: None   Other Topics Concern    None Social History Narrative    None     Past Surgical History:   Procedure Laterality Date    ARTHROSCOPY ANKLE Right 9/1/2017    Procedure: ANKLE ARTHOSCOPY WITH EXTENSIVE SYNOVECTOMY, OPEN ARTHOTOY LATERAL ANKLE WITH ANKLE STABILIZATION, BROSTOM REPAIR, INTERNAL BRACE AUGMENTATION, PERONEAL TENDON SYNOVECTOMY;  Surgeon: Vivian Novoa DPM;  Location[facility];  Service: Podiatry    COLONOSCOPY      HYSTERECTOMY      [place] COLONOSCOPY FLX DX W/COLLJ SPEC WHEN PFRMD N/A 2/27/2017    Procedure: COLONOSCOPY;  Surgeon: Lynette Obrien MD;  [facility]; Service: Gastroenterology    NJ ESOPHAGOGASTRODUODENOSCOPY TRANSORAL DIAGNOSTIC N/A 9/7/2016    Procedure: EGD AND COLONOSCOPY;  Surgeon: Lynette Obrien MD;  [facility]; Service: Gastroenterology    TONSILLECTOMY      TUBAL LIGATION           Review of Systems   All other systems reviewed and are negative  Objective:      /72 (BP Location: Left arm, Patient Position: Sitting, Cuff Size: Adult)   Pulse 96   Temp 98 6 °F (37 °C) (Tympanic)   Ht 5' 1" (1 549 m)   Wt 115 kg (254 lb)   BMI 47 99 kg/m²          Physical Exam   Constitutional: She is oriented to person, place, and time  She appears well-developed and well-nourished  Obese   HENT:   Head: Normocephalic and atraumatic  Eyes: Conjunctivae and EOM are normal    Neck: Normal range of motion  Cardiovascular: Normal rate and regular rhythm  Pulmonary/Chest: Effort normal and breath sounds normal    Abdominal: Soft  Bowel sounds are normal  She exhibits no distension  There is no tenderness  Musculoskeletal: Normal range of motion  Neurological: She is alert and oriented to person, place, and time  Skin: Skin is warm and dry  Psychiatric: She has a normal mood and affect   Her behavior is normal

## 2019-03-07 NOTE — PATIENT INSTRUCTIONS
The patient is scheduled with Dr Sweetie Corona on March 19, in the [place] office   Clarice Wright )

## 2019-05-21 ENCOUNTER — TELEPHONE (OUTPATIENT)
Dept: GASTROENTEROLOGY | Facility: CLINIC | Age: 39
End: 2019-05-21

## 2019-05-28 ENCOUNTER — OFFICE VISIT (OUTPATIENT)
Dept: FAMILY MEDICINE CLINIC | Facility: CLINIC | Age: 39
End: 2019-05-28
Payer: COMMERCIAL

## 2019-05-28 VITALS
BODY MASS INDEX: 50.98 KG/M2 | HEIGHT: 61 IN | OXYGEN SATURATION: 97 % | TEMPERATURE: 98.8 F | WEIGHT: 270 LBS | HEART RATE: 81 BPM | DIASTOLIC BLOOD PRESSURE: 88 MMHG | SYSTOLIC BLOOD PRESSURE: 122 MMHG

## 2019-05-28 DIAGNOSIS — I10 ESSENTIAL HYPERTENSION: Primary | ICD-10-CM

## 2019-05-28 DIAGNOSIS — R10.13 EPIGASTRIC PAIN: ICD-10-CM

## 2019-05-28 DIAGNOSIS — E11.9 TYPE 2 DIABETES MELLITUS WITHOUT COMPLICATION, WITHOUT LONG-TERM CURRENT USE OF INSULIN (HCC): ICD-10-CM

## 2019-05-28 DIAGNOSIS — G43.009 MIGRAINE WITHOUT AURA AND WITHOUT STATUS MIGRAINOSUS, NOT INTRACTABLE: ICD-10-CM

## 2019-05-28 DIAGNOSIS — E66.01 MORBID OBESITY (HCC): ICD-10-CM

## 2019-05-28 DIAGNOSIS — J45.20 MILD INTERMITTENT ASTHMA WITHOUT COMPLICATION: ICD-10-CM

## 2019-05-28 DIAGNOSIS — G47.33 OBSTRUCTIVE SLEEP APNEA SYNDROME: ICD-10-CM

## 2019-05-28 LAB — SL AMB POCT HEMOGLOBIN AIC: 7.4 (ref ?–6.5)

## 2019-05-28 PROCEDURE — 3074F SYST BP LT 130 MM HG: CPT | Performed by: FAMILY MEDICINE

## 2019-05-28 PROCEDURE — 3008F BODY MASS INDEX DOCD: CPT | Performed by: FAMILY MEDICINE

## 2019-05-28 PROCEDURE — 99214 OFFICE O/P EST MOD 30 MIN: CPT | Performed by: FAMILY MEDICINE

## 2019-05-28 PROCEDURE — 3045F PR MOST RECENT HEMOGLOBIN A1C LEVEL 7.0-9.0%: CPT | Performed by: FAMILY MEDICINE

## 2019-05-28 PROCEDURE — 3079F DIAST BP 80-89 MM HG: CPT | Performed by: FAMILY MEDICINE

## 2019-05-28 PROCEDURE — 83036 HEMOGLOBIN GLYCOSYLATED A1C: CPT | Performed by: FAMILY MEDICINE

## 2019-05-28 RX ORDER — ALBUTEROL SULFATE 90 UG/1
AEROSOL, METERED RESPIRATORY (INHALATION)
COMMUNITY
Start: 2018-05-24 | End: 2019-05-28

## 2019-05-28 RX ORDER — METHYLPHENIDATE HYDROCHLORIDE 10 MG/1
10 TABLET ORAL DAILY PRN
Refills: 0 | COMMUNITY
Start: 2019-05-24

## 2019-05-28 RX ORDER — METHOCARBAMOL 100 MG/ML
INJECTION, SOLUTION INTRAMUSCULAR; INTRAVENOUS
COMMUNITY
End: 2019-08-29

## 2019-05-28 RX ORDER — FLASH GLUCOSE SENSOR
1 KIT MISCELLANEOUS DAILY
Qty: 1 DEVICE | Refills: 0 | Status: SHIPPED | OUTPATIENT
Start: 2019-05-28 | End: 2019-07-24 | Stop reason: DRUGHIGH

## 2019-05-28 RX ORDER — CLONIDINE HYDROCHLORIDE 0.2 MG/1
TABLET ORAL
Refills: 0 | COMMUNITY
Start: 2019-04-25 | End: 2020-01-08

## 2019-05-28 RX ORDER — ALBUTEROL SULFATE 90 UG/1
2 AEROSOL, METERED RESPIRATORY (INHALATION) EVERY 6 HOURS PRN
Qty: 1 INHALER | Refills: 0 | Status: SHIPPED | OUTPATIENT
Start: 2019-05-28 | End: 2019-06-22 | Stop reason: SDUPTHER

## 2019-05-28 RX ORDER — METHYLPHENIDATE HYDROCHLORIDE 20 MG/1
20 TABLET ORAL 2 TIMES DAILY
Refills: 0 | COMMUNITY
Start: 2019-05-14

## 2019-05-28 RX ORDER — BUPROPION HYDROCHLORIDE 100 MG/1
100 TABLET, EXTENDED RELEASE ORAL 2 TIMES DAILY
COMMUNITY
Start: 2019-03-14 | End: 2019-07-09

## 2019-05-28 RX ORDER — RIZATRIPTAN BENZOATE 10 MG/1
TABLET ORAL ONCE AS NEEDED
COMMUNITY
End: 2020-04-09 | Stop reason: SDUPTHER

## 2019-05-28 RX ORDER — PREGABALIN 150 MG/1
150 CAPSULE ORAL 2 TIMES DAILY
COMMUNITY
Start: 2019-02-28 | End: 2022-02-17 | Stop reason: SDUPTHER

## 2019-05-28 RX ORDER — FLASH GLUCOSE SENSOR
KIT MISCELLANEOUS
Qty: 9 EACH | Refills: 0 | Status: SHIPPED | OUTPATIENT
Start: 2019-05-28 | End: 2019-07-09

## 2019-05-29 DIAGNOSIS — J45.20 MILD INTERMITTENT ASTHMA WITHOUT COMPLICATION: Primary | ICD-10-CM

## 2019-06-06 ENCOUNTER — OFFICE VISIT (OUTPATIENT)
Dept: URGENT CARE | Facility: MEDICAL CENTER | Age: 39
End: 2019-06-06
Payer: COMMERCIAL

## 2019-06-06 VITALS
HEART RATE: 74 BPM | RESPIRATION RATE: 18 BRPM | OXYGEN SATURATION: 96 % | HEIGHT: 61 IN | BODY MASS INDEX: 50.98 KG/M2 | DIASTOLIC BLOOD PRESSURE: 50 MMHG | SYSTOLIC BLOOD PRESSURE: 122 MMHG | TEMPERATURE: 99.8 F | WEIGHT: 270 LBS

## 2019-06-06 DIAGNOSIS — N39.0 URINARY TRACT INFECTION WITH HEMATURIA, SITE UNSPECIFIED: ICD-10-CM

## 2019-06-06 DIAGNOSIS — R30.0 DYSURIA: Primary | ICD-10-CM

## 2019-06-06 DIAGNOSIS — R31.9 URINARY TRACT INFECTION WITH HEMATURIA, SITE UNSPECIFIED: ICD-10-CM

## 2019-06-06 LAB
SL AMB  POCT GLUCOSE, UA: ABNORMAL
SL AMB LEUKOCYTE ESTERASE,UA: ABNORMAL
SL AMB POCT BILIRUBIN,UA: ABNORMAL
SL AMB POCT BLOOD,UA: ABNORMAL
SL AMB POCT CLARITY,UA: CLEAR
SL AMB POCT COLOR,UA: ABNORMAL
SL AMB POCT KETONES,UA: ABNORMAL
SL AMB POCT NITRITE,UA: POSITIVE
SL AMB POCT PH,UA: 5
SL AMB POCT SPECIFIC GRAVITY,UA: 1.01
SL AMB POCT URINE PROTEIN: ABNORMAL
SL AMB POCT UROBILINOGEN: 0.2

## 2019-06-06 PROCEDURE — 87186 SC STD MICRODIL/AGAR DIL: CPT | Performed by: NURSE PRACTITIONER

## 2019-06-06 PROCEDURE — 81002 URINALYSIS NONAUTO W/O SCOPE: CPT | Performed by: NURSE PRACTITIONER

## 2019-06-06 PROCEDURE — G0383 LEV 4 HOSP TYPE B ED VISIT: HCPCS | Performed by: NURSE PRACTITIONER

## 2019-06-06 PROCEDURE — S9083 URGENT CARE CENTER GLOBAL: HCPCS | Performed by: NURSE PRACTITIONER

## 2019-06-06 PROCEDURE — 87077 CULTURE AEROBIC IDENTIFY: CPT | Performed by: NURSE PRACTITIONER

## 2019-06-06 PROCEDURE — 87086 URINE CULTURE/COLONY COUNT: CPT | Performed by: NURSE PRACTITIONER

## 2019-06-06 RX ORDER — NITROFURANTOIN 25; 75 MG/1; MG/1
100 CAPSULE ORAL 2 TIMES DAILY
Qty: 14 CAPSULE | Refills: 0 | Status: SHIPPED | OUTPATIENT
Start: 2019-06-06 | End: 2019-06-13

## 2019-06-07 ENCOUNTER — TELEPHONE (OUTPATIENT)
Dept: URGENT CARE | Facility: MEDICAL CENTER | Age: 39
End: 2019-06-07

## 2019-06-08 LAB — BACTERIA UR CULT: ABNORMAL

## 2019-06-10 DIAGNOSIS — E78.01 FAMILIAL HYPERCHOLESTEROLEMIA: ICD-10-CM

## 2019-06-10 RX ORDER — ATORVASTATIN CALCIUM 20 MG/1
TABLET, FILM COATED ORAL
Qty: 30 TABLET | Refills: 5 | Status: SHIPPED | OUTPATIENT
Start: 2019-06-10 | End: 2019-12-01 | Stop reason: SDUPTHER

## 2019-06-11 ENCOUNTER — TELEPHONE (OUTPATIENT)
Dept: FAMILY MEDICINE CLINIC | Facility: CLINIC | Age: 39
End: 2019-06-11

## 2019-06-19 DIAGNOSIS — E11.9 TYPE 2 DIABETES MELLITUS WITHOUT COMPLICATION, WITHOUT LONG-TERM CURRENT USE OF INSULIN (HCC): ICD-10-CM

## 2019-06-22 DIAGNOSIS — J45.20 MILD INTERMITTENT ASTHMA WITHOUT COMPLICATION: ICD-10-CM

## 2019-06-23 RX ORDER — ALBUTEROL SULFATE 90 UG/1
AEROSOL, METERED RESPIRATORY (INHALATION)
Qty: 18 INHALER | Refills: 0 | Status: SHIPPED | OUTPATIENT
Start: 2019-06-23 | End: 2019-07-09 | Stop reason: SDUPTHER

## 2019-07-09 ENCOUNTER — OFFICE VISIT (OUTPATIENT)
Dept: FAMILY MEDICINE CLINIC | Facility: CLINIC | Age: 39
End: 2019-07-09
Payer: COMMERCIAL

## 2019-07-09 VITALS
HEART RATE: 91 BPM | TEMPERATURE: 98 F | HEIGHT: 62 IN | BODY MASS INDEX: 48.91 KG/M2 | WEIGHT: 265.8 LBS | DIASTOLIC BLOOD PRESSURE: 90 MMHG | SYSTOLIC BLOOD PRESSURE: 118 MMHG | OXYGEN SATURATION: 94 %

## 2019-07-09 DIAGNOSIS — E11.9 TYPE 2 DIABETES MELLITUS WITHOUT COMPLICATION, WITHOUT LONG-TERM CURRENT USE OF INSULIN (HCC): ICD-10-CM

## 2019-07-09 DIAGNOSIS — K58.2 IRRITABLE BOWEL SYNDROME WITH BOTH CONSTIPATION AND DIARRHEA: ICD-10-CM

## 2019-07-09 DIAGNOSIS — E66.01 MORBID OBESITY (HCC): ICD-10-CM

## 2019-07-09 DIAGNOSIS — Z00.00 ANNUAL PHYSICAL EXAM: ICD-10-CM

## 2019-07-09 DIAGNOSIS — Z00.00 ENCOUNTER FOR HEALTH MAINTENANCE EXAMINATION: Primary | ICD-10-CM

## 2019-07-09 DIAGNOSIS — Z12.39 SCREENING FOR BREAST CANCER: ICD-10-CM

## 2019-07-09 DIAGNOSIS — M47.816 ARTHROPATHY OF LUMBAR FACET JOINT: ICD-10-CM

## 2019-07-09 DIAGNOSIS — J45.20 MILD INTERMITTENT ASTHMA WITHOUT COMPLICATION: ICD-10-CM

## 2019-07-09 DIAGNOSIS — Z91.030 BEE STING ALLERGY: ICD-10-CM

## 2019-07-09 DIAGNOSIS — G43.009 MIGRAINE WITHOUT AURA AND WITHOUT STATUS MIGRAINOSUS, NOT INTRACTABLE: ICD-10-CM

## 2019-07-09 DIAGNOSIS — Z12.4 CERVICAL CANCER SCREENING: ICD-10-CM

## 2019-07-09 DIAGNOSIS — E78.2 MIXED HYPERLIPIDEMIA: ICD-10-CM

## 2019-07-09 PROCEDURE — 99396 PREV VISIT EST AGE 40-64: CPT | Performed by: FAMILY MEDICINE

## 2019-07-09 RX ORDER — ALBUTEROL SULFATE 90 UG/1
2 AEROSOL, METERED RESPIRATORY (INHALATION) 4 TIMES DAILY
Qty: 18 INHALER | Refills: 2 | Status: SHIPPED | OUTPATIENT
Start: 2019-07-09 | End: 2019-10-02 | Stop reason: SDUPTHER

## 2019-07-09 RX ORDER — BUPROPION HYDROCHLORIDE 150 MG/1
150 TABLET, EXTENDED RELEASE ORAL 2 TIMES DAILY
Refills: 0 | COMMUNITY
Start: 2019-06-17 | End: 2022-02-17 | Stop reason: SDUPTHER

## 2019-07-09 RX ORDER — EPINEPHRINE 0.3 MG/.3ML
0.3 INJECTION SUBCUTANEOUS ONCE
Qty: 0.3 ML | Refills: 0 | Status: SHIPPED | OUTPATIENT
Start: 2019-07-09 | End: 2021-02-16 | Stop reason: SDUPTHER

## 2019-07-09 RX ORDER — TRAZODONE HYDROCHLORIDE 100 MG/1
100 TABLET ORAL
Refills: 2 | COMMUNITY
Start: 2019-06-10 | End: 2019-08-29

## 2019-07-09 NOTE — ASSESSMENT & PLAN NOTE
She had epidural at pain management which was very helpful  She will repeat turning later this week for follow-up

## 2019-07-09 NOTE — ASSESSMENT & PLAN NOTE
Thankfully her migraines have been much less frequent recently  She gets a true migraine about every 2 or 3 weeks  She gets tension headaches frequently but she does not take any medication for these

## 2019-07-09 NOTE — PROGRESS NOTES
ADULT ANNUAL PHYSICAL  [facility] - [facility]    NAME: Lois Núñez  AGE: 34 y o  SEX: female  : 1980     DATE: 2019     Assessment and Plan:     Problem List Items Addressed This Visit        Digestive    Irritable bowel syndrome     She has had lots of troubles with IBS symptoms and diarrhea alternating with constipation  She also gets abdominal cramping and bloating  She thinks she may have gluten sensitivity as well  She had seen GI few months ago and they did not order celiac testing because she had avoided wheat products  Recently she started eating gluten containing foods again so we will order the celiac testing  Endocrine    Type 2 diabetes mellitus without complication ([facility] )     Lab Results   Component Value Date    HGBA1C 7 4 (A) 2019      Recently she obtained the freestyle Luque system which she will use to manage diabetes  No results for input(s): POCGLU in the last 72 hours  Blood Sugar Average: Last 72 hrs:              Cardiovascular and Mediastinum    Migraine without aura     Thankfully her migraines have been much less frequent recently  She gets a true migraine about every 2 or 3 weeks  She gets tension headaches frequently but she does not take any medication for these  Relevant Medications    buPROPion (WELLBUTRIN SR) 150 mg 12 hr tablet    traZODone (DESYREL) 100 mg tablet       Musculoskeletal and Integument    Arthropathy of lumbar facet joint     She had epidural at pain management which was very helpful  She will repeat turning later this week for follow-up  Other    Morbid obesity ([facility] )     She has applied for a different healthcare insurance and ideally would like to have gastric bypass surgery    If she cannot get this covered, she would like to meet with the dietitian and the medical weight management team            Other Visit Diagnoses     Cervical cancer screening    - Primary          Immunizations and preventive care screenings were discussed with patient today  Appropriate education was printed on patient's after visit summary  Counseling:  · BMI Counseling: Body mass index is 48 63 kg/m²  Discussed the patient's BMI with her  The BMI is above average  BMI counseling and education was provided to the patient  Nutrition recommendations include reducing portion sizes and decreasing overall calorie intake  Exercise recommendations include exercising 3-5 times per week  No follow-ups on file  Chief Complaint:     Chief Complaint   Patient presents with    Annual Exam      History of Present Illness:     Adult Annual Physical   Patient here for a comprehensive physical exam  The patient reports problems - abd cramping possible gluten intolerance  Diet and Physical Activity  · Diet/Nutrition: poor diet and limited junk food  · Exercise: no formal exercise  Depression Screening  PHQ-9 Depression Screening    PHQ-9:    Frequency of the following problems over the past two weeks:       Little interest or pleasure in doing things:  0 - not at all  Feeling down, depressed, or hopeless:  1 - several days  PHQ-2 Score:  1       General Health  · Sleep: sleeps well and gets 7-8 hours of sleep on average  · Hearing: normal - bilateral   · Vision: goes for regular eye exams and wears glasses  · Dental: regular dental visits and brushes teeth twice daily  /GYN Health  · Patient is: premenopausal  · Last menstrual period: hysterectomy 2016  · Contraceptive method: none  Review of Systems:     Review of Systems   Constitutional: Positive for fatigue  Negative for activity change, chills and fever  HENT: Negative for congestion, ear pain, sinus pressure and sore throat  Eyes: Negative for pain and visual disturbance  Respiratory: Negative for cough, chest tightness, shortness of breath and wheezing      Cardiovascular: Negative for chest pain, palpitations and leg swelling  Gastrointestinal: Negative for abdominal pain, blood in stool, constipation, diarrhea, nausea and vomiting  Endocrine: Negative for polydipsia and polyuria  Genitourinary: Negative for difficulty urinating, dysuria, frequency and urgency  Musculoskeletal: Positive for back pain  Negative for arthralgias, joint swelling and myalgias  Skin: Negative for rash  Neurological: Negative for dizziness, weakness, numbness and headaches  Hematological: Negative for adenopathy  Does not bruise/bleed easily  Psychiatric/Behavioral: Negative for dysphoric mood  The patient is not nervous/anxious  Past Medical History:     Past Medical History:   Diagnosis Date    Acid reflux     Anxiety     Asthma     Depression     Diabetes mellitus ([facility] )     Pre    Diverticulitis     Epileptic seizures ([place] [address] )     Medication related    Gestational diabetes mellitus     Hypertension     Lateral epicondylitis     Malignant tumor of cervix ([facility] )     Obstructive sleep apnea     CPAP    Paresthesias     Plantar fasciitis     Rectocele     Tinea corporis       Past Surgical History:     Past Surgical History:   Procedure Laterality Date    ARTHROSCOPY ANKLE Right 9/1/2017    Procedure: ANKLE ARTHOSCOPY WITH EXTENSIVE SYNOVECTOMY, OPEN ARTHOTOY LATERAL ANKLE WITH ANKLE STABILIZATION, BROSTOM REPAIR, INTERNAL BRACE AUGMENTATION, PERONEAL TENDON SYNOVECTOMY;  Surgeon: Baldomero Cruz DPM;  Location: [facility];  Service: Podiatry    COLONOSCOPY      HYSTERECTOMY      CA COLONOSCOPY FLX DX W/COLLJ SPEC WHEN PFRMD N/A 2/27/2017    Procedure: COLONOSCOPY;  Surgeon: Jeff Jimenez MD;  Location: [facility]; Service: Gastroenterology    CA ESOPHAGOGASTRODUODENOSCOPY TRANSORAL DIAGNOSTIC N/A 9/7/2016    Procedure: EGD AND COLONOSCOPY;  Surgeon: Jeff Jimenez MD;  [facility];   Service: Gastroenterology    TONSILLECTOMY      TUBAL LIGATION        Social History:     Social History Socioeconomic History    Marital status: /Civil Union     Spouse name: None    Number of children: None    Years of education: None    Highest education level: None   Occupational History    None   Social Needs    Financial resource strain: None    Food insecurity:     Worry: None     Inability: None    Transportation needs:     Medical: None     Non-medical: None   Tobacco Use    Smoking status: Former Smoker     Last attempt to quit: 2013     Years since quittin 8    Smokeless tobacco: Never Used   Substance and Sexual Activity    Alcohol use: Yes     Frequency: Monthly or less     Comment: occasionally, maybe 1-2 times a month    Drug use: No    Sexual activity: None   Lifestyle    Physical activity:     Days per week: None     Minutes per session: None    Stress: None   Relationships    Social connections:     Talks on phone: None     Gets together: None     Attends Christian service: None     Active member of club or organization: None     Attends meetings of clubs or organizations: None     Relationship status: None    Intimate partner violence:     Fear of current or ex partner: None     Emotionally abused: None     Physically abused: None     Forced sexual activity: None   Other Topics Concern    None   Social History Narrative    None      Family History:     Family History   Problem Relation Age of Onset    Anxiety disorder Mother     Depression Mother     Ovarian cancer Mother     Cancer Mother     Seizures Brother         Epilepsy    Other Brother         Suicide risk    Cancer Maternal Grandmother     Breast cancer Maternal Grandmother     Other Other         cardiac disorder    Diabetes Other     Hypertension Other     Neuropathy Other     Thyroid disease Other     Breast cancer Paternal Grandmother       Current Medications:     Current Outpatient Medications   Medication Sig Dispense Refill    albuterol (PROVENTIL HFA,VENTOLIN HFA) 90 mcg/act inhaler TAKE 2 PUFFS BY MOUTH EVERY 6 HOURS AS NEEDED FOR WHEEZE 18 Inhaler 0    atorvastatin (LIPITOR) 20 mg tablet TAKE 1 TABLET BY MOUTH EVERY DAY 30 tablet 5    baclofen 10 mg tablet 3 (three) times a day      Botulinum Toxin Type A (BOTOX) 200 units SOLR Inject 155 units into face and neck IM every 90 days      buPROPion (WELLBUTRIN SR) 150 mg 12 hr tablet Take 150 mg by mouth 2 (two) times a day  0    cloNIDine (CATAPRES) 0 2 mg tablet TAKE 1 TABLET BY MOUTH EVERY DAY AT NIGHT  0    Continuous Blood Gluc  (FREESTYLE SRINIVASAN READER) JUDY 1 kit by Does not apply route daily 1 Device 0    DULoxetine (CYMBALTA) 20 mg capsule Take 20 mg by mouth daily        DULoxetine (CYMBALTA) 30 mg delayed release capsule TAKE 1 CAPSULE (30 MG TOTAL) BY MOUTH EVERY MORNING  1    fluticasone-salmeterol (ADVAIR DISKUS) 250-50 mcg/dose inhaler Inhale 1 puff 2 (two) times a day Rinse mouth after use   1 each 5    HYDROcodone-acetaminophen (NORCO) 5-325 mg per tablet Take 1 tablet by mouth every 6 (six) hours as needed for pain Max Daily Amount: 4 tablets 30 tablet 0    LORazepam (ATIVAN) 0 5 mg tablet Take 0 5 mg by mouth 2 (two) times a day as needed for anxiety       metFORMIN (GLUCOPHAGE) 500 mg tablet TAKE 1 TABLET (500 MG TOTAL) BY MOUTH EVERY 12 (TWELVE) HOURS TAKE WITH FOOD 60 tablet 5    methocarbamol (ROBAXIN) 1000 mg/10 mL Robaxin      methylphenidate (RITALIN) 10 mg tablet Take 10 mg by mouth 2 (two) times a day   0    methylphenidate (RITALIN) 20 MG tablet Take 20 mg by mouth every morning   0    metoprolol succinate (TOPROL-XL) 50 mg 24 hr tablet Take 50 mg by mouth daily      nabumetone (RELAFEN) 500 mg tablet Take 500 mg by mouth 2 (two) times a day      pregabalin (LYRICA) 150 mg capsule Take 150 mg by mouth 2 (two) times a day      rizatriptan (MAXALT) 10 MG tablet rizatriptan 10 mg tablet      SUMAtriptan Succinate 6 MG/0 5ML SOAJ Inject 6 mg under the skin      EPINEPHrine (EPIPEN) 0 3 mg/0 3 mL SOAJ Inject 0 3 mL (0 3 mg total) into the shoulder, thigh, or buttocks once for 1 dose 0 3 mL 0    traZODone (DESYREL) 100 mg tablet Take 100 mg by mouth daily at bedtime  2     No current facility-administered medications for this visit  Allergies: Allergies   Allergen Reactions    Aspirin Anaphylaxis    Bee Venom Swelling and Wheezing    Penicillins Anaphylaxis and Rash     " Made asthma worse"    Ceclor [Cefaclor] Itching    Lactose GI Intolerance    Pollen Extract Other (See Comments)     Rhinits,     cockroaches    Amoxicillin Rash    Doxycycline Rash    Erythromycin Rash      Physical Exam:     /90 (BP Location: Right arm, Patient Position: Sitting, Cuff Size: Standard)   Pulse 91   Temp 98 °F (36 7 °C) (Temporal)   Ht 5' 1 99" (1 575 m)   Wt 121 kg (265 lb 12 8 oz)   SpO2 94%   BMI 48 63 kg/m²     Physical Exam   Constitutional: She is oriented to person, place, and time  She appears well-developed and well-nourished  No distress  Morbidly obese   HENT:   Head: Normocephalic and atraumatic  Right Ear: External ear normal    Left Ear: External ear normal    Nose: Nose normal    Mouth/Throat: Oropharynx is clear and moist    Eyes: Pupils are equal, round, and reactive to light  Conjunctivae and EOM are normal  No scleral icterus  Neck: Normal range of motion  Neck supple  No thyromegaly present  Cardiovascular: Normal rate, regular rhythm, normal heart sounds and intact distal pulses  No murmur heard  Pulmonary/Chest: Effort normal and breath sounds normal  No respiratory distress  She has no wheezes  She has no rales  Abdominal: Soft  Bowel sounds are normal  She exhibits no mass  There is no tenderness  Musculoskeletal: She exhibits no tenderness or deformity  Lymphadenopathy:     She has no cervical adenopathy  Neurological: She is alert and oriented to person, place, and time  She has normal reflexes  She displays normal reflexes   No cranial nerve deficit or sensory deficit  She exhibits normal muscle tone  Skin: Skin is warm and dry  No rash noted  No erythema  Psychiatric: She has a normal mood and affect  Her behavior is normal    Nursing note and vitals reviewed        MD Joaquin Molina [id number]

## 2019-07-09 NOTE — PATIENT INSTRUCTIONS

## 2019-07-09 NOTE — ASSESSMENT & PLAN NOTE
She has had lots of troubles with IBS symptoms and diarrhea alternating with constipation  She also gets abdominal cramping and bloating  She thinks she may have gluten sensitivity as well  She had seen GI few months ago and they did not order celiac testing because she had avoided wheat products  Recently she started eating gluten containing foods again so we will order the celiac testing

## 2019-07-09 NOTE — ASSESSMENT & PLAN NOTE
She has applied for a different healthcare insurance and ideally would like to have gastric bypass surgery    If she cannot get this covered, she would like to meet with the dietitian and the medical weight management team

## 2019-07-09 NOTE — ASSESSMENT & PLAN NOTE
Lab Results   Component Value Date    HGBA1C 7 4 (A) 05/28/2019      Recently she obtained the freestyle Luque system which she will use to manage diabetes  No results for input(s): POCGLU in the last 72 hours      Blood Sugar Average: Last 72 hrs:

## 2019-07-11 ENCOUNTER — APPOINTMENT (OUTPATIENT)
Dept: LAB | Facility: MEDICAL CENTER | Age: 39
End: 2019-07-11
Attending: FAMILY MEDICINE
Payer: COMMERCIAL

## 2019-07-11 DIAGNOSIS — E11.9 TYPE 2 DIABETES MELLITUS WITHOUT COMPLICATION, WITHOUT LONG-TERM CURRENT USE OF INSULIN (HCC): ICD-10-CM

## 2019-07-11 DIAGNOSIS — K58.2 IRRITABLE BOWEL SYNDROME WITH BOTH CONSTIPATION AND DIARRHEA: ICD-10-CM

## 2019-07-11 DIAGNOSIS — E78.2 MIXED HYPERLIPIDEMIA: ICD-10-CM

## 2019-07-11 LAB
ALBUMIN SERPL BCP-MCNC: 4.2 G/DL (ref 3.5–5)
ALP SERPL-CCNC: 145 U/L (ref 46–116)
ALT SERPL W P-5'-P-CCNC: 82 U/L (ref 12–78)
ANION GAP SERPL CALCULATED.3IONS-SCNC: 7 MMOL/L (ref 4–13)
AST SERPL W P-5'-P-CCNC: 36 U/L (ref 5–45)
BILIRUB SERPL-MCNC: 0.49 MG/DL (ref 0.2–1)
BUN SERPL-MCNC: 12 MG/DL (ref 5–25)
CALCIUM SERPL-MCNC: 9.3 MG/DL (ref 8.3–10.1)
CHLORIDE SERPL-SCNC: 104 MMOL/L (ref 100–108)
CHOLEST SERPL-MCNC: 148 MG/DL (ref 50–200)
CO2 SERPL-SCNC: 26 MMOL/L (ref 21–32)
CREAT SERPL-MCNC: 0.71 MG/DL (ref 0.6–1.3)
GFR SERPL CREATININE-BSD FRML MDRD: 107 ML/MIN/1.73SQ M
GLUCOSE SERPL-MCNC: 167 MG/DL (ref 65–140)
HDLC SERPL-MCNC: 28 MG/DL (ref 40–60)
LDLC SERPL CALC-MCNC: 51 MG/DL (ref 0–100)
NONHDLC SERPL-MCNC: 120 MG/DL
POTASSIUM SERPL-SCNC: 4 MMOL/L (ref 3.5–5.3)
PROT SERPL-MCNC: 7.3 G/DL (ref 6.4–8.2)
SODIUM SERPL-SCNC: 137 MMOL/L (ref 136–145)
TRIGL SERPL-MCNC: 346 MG/DL

## 2019-07-11 PROCEDURE — 86255 FLUORESCENT ANTIBODY SCREEN: CPT

## 2019-07-11 PROCEDURE — 36415 COLL VENOUS BLD VENIPUNCTURE: CPT

## 2019-07-11 PROCEDURE — 82784 ASSAY IGA/IGD/IGG/IGM EACH: CPT

## 2019-07-11 PROCEDURE — 83516 IMMUNOASSAY NONANTIBODY: CPT

## 2019-07-11 PROCEDURE — 80053 COMPREHEN METABOLIC PANEL: CPT

## 2019-07-11 PROCEDURE — 80061 LIPID PANEL: CPT

## 2019-07-13 LAB
ENDOMYSIUM IGA SER QL: NEGATIVE
GLIADIN PEPTIDE IGA SER-ACNC: 2 UNITS (ref 0–19)
GLIADIN PEPTIDE IGG SER-ACNC: 2 UNITS (ref 0–19)
IGA SERPL-MCNC: 119 MG/DL (ref 87–352)
TTG IGA SER-ACNC: <2 U/ML (ref 0–3)
TTG IGG SER-ACNC: <2 U/ML (ref 0–5)

## 2019-07-24 DIAGNOSIS — E11.9 TYPE 2 DIABETES MELLITUS WITHOUT COMPLICATION, WITHOUT LONG-TERM CURRENT USE OF INSULIN (HCC): Primary | ICD-10-CM

## 2019-07-24 RX ORDER — FLASH GLUCOSE SCANNING READER
1 EACH MISCELLANEOUS
Qty: 2 DEVICE | Refills: 3 | Status: SHIPPED | OUTPATIENT
Start: 2019-07-24 | End: 2020-01-08

## 2019-08-29 ENCOUNTER — OFFICE VISIT (OUTPATIENT)
Dept: FAMILY MEDICINE CLINIC | Facility: CLINIC | Age: 39
End: 2019-08-29
Payer: COMMERCIAL

## 2019-08-29 VITALS
BODY MASS INDEX: 49.03 KG/M2 | WEIGHT: 268 LBS | SYSTOLIC BLOOD PRESSURE: 110 MMHG | HEART RATE: 73 BPM | TEMPERATURE: 97.5 F | DIASTOLIC BLOOD PRESSURE: 76 MMHG | OXYGEN SATURATION: 98 %

## 2019-08-29 DIAGNOSIS — N64.4 BREAST TENDERNESS IN FEMALE: Primary | ICD-10-CM

## 2019-08-29 PROCEDURE — 99213 OFFICE O/P EST LOW 20 MIN: CPT | Performed by: FAMILY MEDICINE

## 2019-08-29 NOTE — PROGRESS NOTES
Assessment/Plan:    Tram developed burning and pain in the left breast a couple weeks ago  Just this morning she noticed a lump  I was unable to detect this on my exam today  I am going to have her schedule a diagnostic mammogram instead of screening mammogram      Diagnoses and all orders for this visit:    Breast tenderness in female  -     Mammo diagnostic bilateral w cad; Future          Subjective:      Patient ID: Wilson Singh is a 34 y o  female  She developed a burning pain sensation in the left breast few weeks ago  This has persisted but has been intermittent  Earlier today she was doing self exam and discovered a lump at the same area  The lump is firm and nontender  She is scheduled for mammogram but not for a few weeks  She has not had a mammogram in the past   She had hysterectomy a few years ago  She denies any cyclical breast tenderness  The following portions of the patient's history were reviewed and updated as appropriate: allergies, current medications, past family history, past medical history, past social history, past surgical history and problem list     Review of Systems   Constitutional: Negative for activity change, chills and fever  Respiratory: Negative for shortness of breath  Cardiovascular: Negative for chest pain  Psychiatric/Behavioral: The patient is nervous/anxious  Objective:      /76 (BP Location: Left arm, Patient Position: Sitting, Cuff Size: Large)   Pulse 73   Temp 97 5 °F (36 4 °C) (Tympanic)   Wt 122 kg (268 lb)   SpO2 98%   BMI 49 03 kg/m²          Physical Exam   Constitutional: She appears well-developed and well-nourished  HENT:   Head: Normocephalic and atraumatic  Cardiovascular: Normal rate and regular rhythm  Pulmonary/Chest: Effort normal and breath sounds normal    Normal breast exam with no palpable masses, tenderness or nipple discharge  No axillary adenopathy  Skin: Skin is warm and dry     Nursing note and vitals reviewed

## 2019-08-30 ENCOUNTER — HOSPITAL ENCOUNTER (OUTPATIENT)
Dept: MAMMOGRAPHY | Facility: CLINIC | Age: 39
Discharge: HOME | End: 2019-08-30
Payer: COMMERCIAL

## 2019-08-30 DIAGNOSIS — N64.4 BREAST TENDERNESS IN FEMALE: ICD-10-CM

## 2019-08-30 DIAGNOSIS — N64.4 BREAST PAIN: Primary | ICD-10-CM

## 2019-08-30 PROCEDURE — 77066 DX MAMMO INCL CAD BI: CPT

## 2019-09-11 ENCOUNTER — CLINICAL SUPPORT (OUTPATIENT)
Dept: BARIATRICS | Facility: CLINIC | Age: 39
End: 2019-09-11

## 2019-09-11 VITALS — WEIGHT: 267.5 LBS | BODY MASS INDEX: 49.23 KG/M2 | HEIGHT: 62 IN

## 2019-09-11 DIAGNOSIS — E66.01 MORBID OBESITY (HCC): ICD-10-CM

## 2019-09-11 PROCEDURE — RECHECK: Performed by: DIETITIAN, REGISTERED

## 2019-09-11 NOTE — PROGRESS NOTES
SW met with patient for initial behavioral health evaluation  SW discussed requirements for the surgery program with patient and her ineligibility for the surgery program at this time due to her recent mental health inpatient admission in June 2018  TESSIE discussed with the patient the possibility of eligibility of evaluation for the surgery program in June of 2020  Patient was provided with information about [facility] and SW encouraged patient to make an appointment to meet with [facility]  Patient made and appointment to meet with the [facility] physician      Suresh Hardin LCSW  Reviewed by PATIENCE Sears, LSW

## 2019-09-17 ENCOUNTER — OFFICE VISIT (OUTPATIENT)
Dept: BARIATRICS | Facility: CLINIC | Age: 39
End: 2019-09-17
Payer: COMMERCIAL

## 2019-09-17 VITALS
HEIGHT: 62 IN | WEIGHT: 266.2 LBS | DIASTOLIC BLOOD PRESSURE: 80 MMHG | RESPIRATION RATE: 16 BRPM | SYSTOLIC BLOOD PRESSURE: 130 MMHG | TEMPERATURE: 98.2 F | BODY MASS INDEX: 48.99 KG/M2 | HEART RATE: 77 BPM

## 2019-09-17 DIAGNOSIS — M79.7 FIBROMYALGIA: ICD-10-CM

## 2019-09-17 DIAGNOSIS — E66.01 MORBID OBESITY (HCC): Primary | ICD-10-CM

## 2019-09-17 DIAGNOSIS — E11.9 TYPE 2 DIABETES MELLITUS WITHOUT COMPLICATION, WITHOUT LONG-TERM CURRENT USE OF INSULIN (HCC): ICD-10-CM

## 2019-09-17 DIAGNOSIS — J45.20 MILD INTERMITTENT ASTHMA WITHOUT COMPLICATION: ICD-10-CM

## 2019-09-17 DIAGNOSIS — G43.009 MIGRAINE WITHOUT AURA AND WITHOUT STATUS MIGRAINOSUS, NOT INTRACTABLE: ICD-10-CM

## 2019-09-17 DIAGNOSIS — I10 ESSENTIAL HYPERTENSION: ICD-10-CM

## 2019-09-17 DIAGNOSIS — G47.33 OBSTRUCTIVE SLEEP APNEA SYNDROME: ICD-10-CM

## 2019-09-17 DIAGNOSIS — F33.1 MODERATE EPISODE OF RECURRENT MAJOR DEPRESSIVE DISORDER (HCC): ICD-10-CM

## 2019-09-17 PROBLEM — R00.2 PALPITATIONS: Status: RESOLVED | Noted: 2018-06-19 | Resolved: 2019-09-17

## 2019-09-17 PROCEDURE — 99243 OFF/OP CNSLTJ NEW/EST LOW 30: CPT | Performed by: FAMILY MEDICINE

## 2019-09-17 RX ORDER — TRAZODONE HYDROCHLORIDE 100 MG/1
150 TABLET ORAL
Refills: 3 | COMMUNITY
Start: 2019-09-09 | End: 2021-02-16

## 2019-09-17 NOTE — PROGRESS NOTES
Assessment/Plan:      Diagnoses and all orders for this visit:    Morbid obesity ([facility] )  -     Ambulatory referral to Sleep Medicine; Future    Moderate episode of recurrent major depressive disorder [facility] )  -     Ambulatory referral to Sleep Medicine; Future    Fibromyalgia  -     Ambulatory referral to Sleep Medicine; Future    Essential hypertension  -     Ambulatory referral to Sleep Medicine; Future    Obstructive sleep apnea syndrome  -     Ambulatory referral to Sleep Medicine; Future    Type 2 diabetes mellitus without complication, without long-term current use of insulin ([facility] )  -     Ambulatory referral to Sleep Medicine; Future    Migraine without aura and without status migrainosus, not intractable  -     Ambulatory referral to Sleep Medicine; Future    Mild intermittent asthma without complication  -     Ambulatory referral to Sleep Medicine; Future    Other orders  -     traZODone (DESYREL) 100 mg tablet; TAKE 1 TABLET BY MOUTH EVERY DAY AT NIGHT      -Discussed options of HealthyCORE-Intensive Lifestyle Intervention Program, Very Low Calorie Diet-VLCD and Conservative Program and the role of weight loss medications   -Initial weight loss goal of 5-10% weight loss for improved health  -Screening labs  -Patient is considering in pursuing [facility]-Intensive Lifestyle Intervention Program vs  Very Low Calorie Diet-VLCD  - we discussed briefly weight loss medicines, at the moment she plans to change insurances int he next month or so  This might change meds coverage  Will assess again in the future  Possible good options are [facility]        Products provided, if she tolerates the products (she has hx of IBS) she would proceed with VLCD    Labs ordered: no Stable CMP on 7/11/19  HTN meds addressed: yes, she can cont with metoprolol (takes for migraines)  DM2 meds addressed: yes, can cont with metformin  VLCD time restriction based on BMI: no    Goals:  Food log (ie ) www myfitnesspal com,sparkpeople  com,loseit com,calorieking  com,etc  baritastic  No sugary beverages  At least 64oz of water daily  Increase physical activity by 10 minutes daily  Gradually increase physical activity to a goal of 5 days per week for 30 minutes of MODERATE intensity PLUS 2 days per week of FULL BODY resistance training  45 minute visit, >50% face-to-face time spent counseling patient on nonsurgical interventions for the treatment of excess weight  Discussed in detail nonsurgical options including intensive lifestyle intervention program, very low-calorie diet program and conservative program   Discussed the role of weight loss medications  Counseled patient on diet behavior and exercise modification for weight loss  Follow up in approximately 2 weeks with Non-Surgical Dietician  Subjective:   Chief Complaint   Patient presents with    Consult     mw consult        Patient ID: John Montenegro  is a 37 y o  female with excess weight/obesity here to pursue weight management  Past Medical History:   Diagnosis Date    Acid reflux     Anxiety     Asthma     Depression     Diabetes mellitus ([facility] )     Pre    Diverticulitis     Epileptic seizures ([facility] )     Medication related    Gestational diabetes mellitus     Hypertension     Lateral epicondylitis     Malignant tumor of cervix ([facility] )     between [id number]    Obstructive sleep apnea     CPAP    Paresthesias     Plantar fasciitis     Rectocele     Tinea corporis        HPI:  Pt was interested in surgery but due to a previous psychiatric hospitalization June 2018 she cant pursue surgery until June 2020  She was transferred into out [facility] in the meantime for weight loss  Obesity/Excess Weight:  Severity: class 3  Onset:  Over 15 years ago   Modifiers: Diet and Exercise  Contributing factors:  Insufficient Physical Activity and Medications  Associated symptoms: comorbid conditions, decreased exercise capacity, decreased self esteem, depression and inability to do certain activities    Goals:  150lbs  Hydration:  Alcohol: once or twice a month, smoke- no  Exercise: no  Sleep:   STOP bang:+LISA but not using CPAP due to issues with mask    A  full time  Lives with  and youngest son (13 y/o)    The following portions of the patient's history were reviewed and updated as appropriate: allergies, current medications, past family history, past medical history, past social history, past surgical history and problem list     Review of Systems   Constitutional: Negative for activity change and appetite change  Respiratory: Negative  Cardiovascular: Negative  Gastrointestinal: Negative  Genitourinary: Negative  Musculoskeletal: Positive for myalgias  Negative for arthralgias  Skin: Negative for rash  Psychiatric/Behavioral: Negative  Objective:    /80 (BP Location: Left arm, Patient Position: Sitting, Cuff Size: Adult)   Pulse 77   Temp 98 2 °F (36 8 °C) (Tympanic)   Resp 16   Ht 5' 2 1" (1 577 m)   Wt 121 kg (266 lb 3 2 oz)   BMI 48 53 kg/m²     Physical Exam     Constitutional   General appearance: Abnormal   well developed and morbidly obese  Eyes No conjunctival pallor  Ears, Nose, Mouth, and Throat Oral mucosa moist    Pulmonary   Respiratory effort: No increased work of breathing or signs of respiratory distress  Auscultation of lungs: Clear to auscultation, equal breath sounds bilaterally, no wheezes, no rales, no rhonci  Cardiovascular   Auscultation of heart: Normal rate and rhythm, normal S1 and S2, without murmurs  Examination of extremities for edema and/or varicosities: Normal   no edema  Abdomen   Abdomen: Abnormal   The abdomen was obese  Bowel sounds were normal  The abdomen was soft and nontender     Musculoskeletal   Gait and station: Normal     Psychiatric   Orientation to person, place and time: Normal     Affect: appropriate

## 2019-09-19 ENCOUNTER — OFFICE VISIT (OUTPATIENT)
Dept: GASTROENTEROLOGY | Facility: MEDICAL CENTER | Age: 39
End: 2019-09-19
Payer: COMMERCIAL

## 2019-09-19 VITALS
TEMPERATURE: 97.7 F | HEART RATE: 80 BPM | HEIGHT: 62 IN | DIASTOLIC BLOOD PRESSURE: 60 MMHG | SYSTOLIC BLOOD PRESSURE: 120 MMHG | WEIGHT: 267 LBS | BODY MASS INDEX: 49.13 KG/M2

## 2019-09-19 DIAGNOSIS — K58.2 IRRITABLE BOWEL SYNDROME WITH BOTH CONSTIPATION AND DIARRHEA: ICD-10-CM

## 2019-09-19 DIAGNOSIS — K21.9 GASTROESOPHAGEAL REFLUX DISEASE WITHOUT ESOPHAGITIS: Primary | ICD-10-CM

## 2019-09-19 PROCEDURE — 99214 OFFICE O/P EST MOD 30 MIN: CPT | Performed by: PHYSICIAN ASSISTANT

## 2019-09-19 RX ORDER — DICYCLOMINE HCL 20 MG
20 TABLET ORAL EVERY 6 HOURS
Qty: 120 TABLET | Refills: 3 | Status: SHIPPED | OUTPATIENT
Start: 2019-09-19 | End: 2020-01-09

## 2019-09-19 RX ORDER — OMEPRAZOLE 40 MG/1
40 CAPSULE, DELAYED RELEASE ORAL DAILY
Qty: 30 CAPSULE | Refills: 3 | Status: SHIPPED | OUTPATIENT
Start: 2019-09-19 | End: 2020-01-09

## 2019-09-19 NOTE — PROGRESS NOTES
Assessment/Plan:     Diagnoses and all orders for this visit:    Gastroesophageal reflux disease without esophagitis  She continues with his GERD symptoms  She is taking omeprazole 20 milligrams daily without good relief  Would recommend increasing to 40 milligrams per day  I recommend to continue with dietary modification and weight loss  She is seeing a nutritionist shortly is ultimately going to have a gastric bypass surgery  -     omeprazole (PriLOSEC) 40 MG capsule; Take 1 capsule (40 mg total) by mouth daily    Irritable bowel syndrome with both constipation and diarrhea  She continues with abdominal cramping however her bowels have normalized  Would recommend Bentyl on an as-needed basis to help was spasming  Again would recommend following the low FODMAP diet and identifying specific trigger foods, as well as reducing stress  -     dicyclomine (BENTYL) 20 mg tablet; Take 1 tablet (20 mg total) by mouth every 6 (six) hours    Will see her back in 3 months or sooner if necessary  Subjective:      Patient ID: Na Murrell is a 37 y o  female  HPI     This is a follow-up for GERD as well as irritable bowel syndrome  She states she takes omeprazole over-the-counter and continues to have heartburn and reflux symptoms  She states the food comes up into the back of her throat but she does not vomit  She complains of intermittent abdominal pain  She describes this as a spasm  She has not tried any medication for this  She she states she typically has 1 formed bowel movement per day  Her pain does not seem to resolve after the bowel movement  She states she has been trying to follow the low FODMAP diet and has definitely been able to identify specific triggers that bother her including gluten and dairy  Her last endoscopy was in September 2016 which showed a small sliding hiatal hernia was otherwise normal   Biopsies negative for H pylori    Her last colonoscopy was in February 2017 which showed a hyperplastic polyp in the sigmoid  Patient Active Problem List   Diagnosis    Anxiety    Asthma    Essential hypertension    Fatigue    Fibromyalgia    Mixed hyperlipidemia    Injury of right ankle    Irritable bowel syndrome    Lipoma of abdominal wall    Low back pain    Arthropathy of lumbar facet joint    Morbid obesity ([address] )    Other convulsions    Paresthesia    Plantar fasciitis    Rectocele    Knee pain    History of hysterectomy    Seasonal allergies    Type 2 diabetes mellitus without complication (HCC)    Chondromalacia patellae    Obstructive sleep apnea syndrome    Intentional overdose of drug in tablet form (HCC)    Leukocytosis    Moderate episode of recurrent major depressive disorder (HCC)    Migraine without aura    GERD (gastroesophageal reflux disease)     Allergies   Allergen Reactions    Aspirin Anaphylaxis    Bee Venom Swelling and Wheezing    Penicillins Anaphylaxis and Rash    Ceclor [Cefaclor] Itching    Lactose GI Intolerance    Pollen Extract     Amoxicillin Rash    Doxycycline Rash    Erythromycin Rash     Current Outpatient Medications on File Prior to Visit   Medication Sig    albuterol (PROVENTIL HFA,VENTOLIN HFA) 90 mcg/act inhaler Inhale 2 puffs 4 (four) times a day    atorvastatin (LIPITOR) 20 mg tablet TAKE 1 TABLET BY MOUTH EVERY DAY    baclofen 10 mg tablet 3 (three) times a day    buPROPion (WELLBUTRIN SR) 150 mg 12 hr tablet Take 150 mg by mouth 2 (two) times a day    Continuous Blood Gluc  (LED Light SenseE 14 DAY READER) JUDY 1 Device by Does not apply route every 14 (fourteen) days    DULoxetine (CYMBALTA) 20 mg capsule Take 20 mg by mouth daily      DULoxetine (CYMBALTA) 30 mg delayed release capsule TAKE 1 CAPSULE (30 MG TOTAL) BY MOUTH EVERY MORNING   fluticasone-salmeterol (ADVAIR DISKUS) 250-50 mcg/dose inhaler Inhale 1 puff 2 (two) times a day Rinse mouth after use      HYDROcodone-acetaminophen ([facility]) 5-325 mg per tablet Take 1 tablet by mouth every 6 (six) hours as needed for pain Max Daily Amount: 4 tablets    LORazepam (ATIVAN) 0 5 mg tablet Take 0 5 mg by mouth 2 (two) times a day as needed for anxiety     metFORMIN (GLUCOPHAGE) 500 mg tablet TAKE 1 TABLET (500 MG TOTAL) BY MOUTH EVERY 12 (TWELVE) HOURS TAKE WITH FOOD    methylphenidate (RITALIN) 10 mg tablet Take 10 mg by mouth 2 (two) times a day     methylphenidate (RITALIN) 20 MG tablet Take 20 mg by mouth every morning     metoprolol succinate (TOPROL-XL) 50 mg 24 hr tablet Take 50 mg by mouth daily    nabumetone (RELAFEN) 500 mg tablet Take 500 mg by mouth 2 (two) times a day    pregabalin (LYRICA) 150 mg capsule Take 150 mg by mouth 2 (two) times a day    rizatriptan (MAXALT) 10 MG tablet rizatriptan 10 mg tablet    SUMAtriptan Succinate 6 MG/0 5ML SOAJ Inject 6 mg under the skin    traZODone (DESYREL) 100 mg tablet 150 mg daily at bedtime     Botulinum Toxin Type A (BOTOX) 200 units SOLR Inject 155 units into face and neck IM every 90 days    cloNIDine (CATAPRES) 0 2 mg tablet TAKE 1 TABLET BY MOUTH EVERY DAY AT NIGHT    EPINEPHrine (EPIPEN) 0 3 mg/0 3 mL SOAJ Inject 0 3 mL (0 3 mg total) into a muscle once for 1 dose     No current facility-administered medications on file prior to visit        Family History   Problem Relation Age of Onset    Anxiety disorder Mother     Depression Mother     Cancer Mother     Endometrial cancer Mother         under afe 48    Seizures Brother         Epilepsy    Other Brother         Suicide risk    Cancer Maternal Grandmother 48        lymphatic cancer    Other Other         cardiac disorder    Diabetes Other     Hypertension Other     Neuropathy Other     Thyroid disease Other     Breast cancer Paternal Grandmother 80    No Known Problems Father     No Known Problems Daughter     Cancer Maternal Grandfather     No Known Problems Paternal Grandfather     No Known Problems Daughter    Kenzie Caraballo No Known Problems Son     No Known Problems Brother     Leukemia Cousin     Cancer Cousin         lympatic     Past Medical History:   Diagnosis Date    Acid reflux     Anxiety     Asthma     Depression     Diabetes mellitus ([facility] )     Pre    Diverticulitis     Epileptic seizures ([facility] )     Medication related    Gestational diabetes mellitus     Hypertension     Lateral epicondylitis     Malignant tumor of cervix ([facility] )     between 2860-0420    Obstructive sleep apnea     CPAP    Paresthesias     Plantar fasciitis     Rectocele     Tinea corporis      Social History     Socioeconomic History    Marital status: /Civil Union     Spouse name: None    Number of children: None    Years of education: None    Highest education level: None   Occupational History    None   Social Needs    Financial resource strain: None    Food insecurity:     Worry: None     Inability: None    Transportation needs:     Medical: None     Non-medical: None   Tobacco Use    Smoking status: Former Smoker     Last attempt to quit: 2013     Years since quittin 0    Smokeless tobacco: Never Used   Substance and Sexual Activity    Alcohol use: Yes     Frequency: Monthly or less     Comment: occasionally, maybe 1-2 times a month    Drug use: No    Sexual activity: None   Lifestyle    Physical activity:     Days per week: None     Minutes per session: None    Stress: None   Relationships    Social connections:     Talks on phone: None     Gets together: None     Attends Mandaen service: None     Active member of club or organization: None     Attends meetings of clubs or organizations: None     Relationship status: None    Intimate partner violence:     Fear of current or ex partner: None     Emotionally abused: None     Physically abused: None     Forced sexual activity: None   Other Topics Concern    None   Social History Narrative    None     Past Surgical History:   Procedure Laterality Date    ARTHROSCOPY ANKLE Right 9/1/2017    Procedure: ANKLE ARTHOSCOPY WITH EXTENSIVE SYNOVECTOMY, OPEN ARTHOTOY LATERAL ANKLE WITH ANKLE STABILIZATION, BROSTOM REPAIR, INTERNAL BRACE AUGMENTATION, PERONEAL TENDON SYNOVECTOMY;  Surgeon: Concha Sampson DPM;  Location: [facility];  Service: Podiatry    COLONOSCOPY      HYSTERECTOMY  2015    WA COLONOSCOPY FLX DX W/COLLJ SPEC WHEN PFRMD N/A 2/27/2017    Procedure: COLONOSCOPY;  Surgeon: Felipa Augustine MD;  [facility]: [facility]; Service: Gastroenterology    WA ESOPHAGOGASTRODUODENOSCOPY TRANSORAL DIAGNOSTIC N/A 9/7/2016    Procedure: EGD AND COLONOSCOPY;  Surgeon: Felipa Augustine MD;  [facility]; Service: Gastroenterology    TONSILLECTOMY      TUBAL LIGATION           Review of Systems   All other systems reviewed and are negative  Objective:      /60 (BP Location: Left arm, Patient Position: Sitting, Cuff Size: Adult)   Pulse 80   Temp 97 7 °F (36 5 °C) (Tympanic)   Ht 5' 2" (1 575 m)   Wt 121 kg (267 lb)   BMI 48 83 kg/m²          Physical Exam   Constitutional: She is oriented to person, place, and time  She appears well-developed and well-nourished  HENT:   Head: Normocephalic and atraumatic  Eyes: Conjunctivae and EOM are normal    Neck: Normal range of motion  Cardiovascular: Normal rate and regular rhythm  Pulmonary/Chest: Effort normal and breath sounds normal    Abdominal: Soft  Bowel sounds are normal  She exhibits no distension  There is no tenderness  Musculoskeletal: Normal range of motion  Neurological: She is alert and oriented to person, place, and time  Skin: Skin is warm and dry  Psychiatric: She has a normal mood and affect   Her behavior is normal

## 2019-10-02 DIAGNOSIS — J45.20 MILD INTERMITTENT ASTHMA WITHOUT COMPLICATION: ICD-10-CM

## 2019-10-02 RX ORDER — ALBUTEROL SULFATE 90 UG/1
AEROSOL, METERED RESPIRATORY (INHALATION)
Qty: 18 INHALER | Refills: 2 | Status: SHIPPED | OUTPATIENT
Start: 2019-10-02 | End: 2019-12-12 | Stop reason: SDUPTHER

## 2019-11-19 ENCOUNTER — TELEPHONE (OUTPATIENT)
Dept: FAMILY MEDICINE CLINIC | Facility: CLINIC | Age: 39
End: 2019-11-19

## 2019-11-19 NOTE — TELEPHONE ENCOUNTER
Pt called asking for a letter of recommendation from you for her bariatric surgery program  It is one of the requirements

## 2019-11-19 NOTE — TELEPHONE ENCOUNTER
Please provide the following letter of recommendation for Mathew Dey in support of bariatric surgery  Mathew Dey has been under my care for the past 3 and a half years  She suffers from morbid obesity, type 2 diabetes, sleep apnea, hypertension, low back pain and GERD  Unfortunately attempts at non-surgical weight loss have been unsuccessful  I recommend her for bariatric surgery as weight loss would provide tremendous health benefits

## 2019-11-29 ENCOUNTER — TELEPHONE (OUTPATIENT)
Dept: FAMILY MEDICINE CLINIC | Facility: CLINIC | Age: 39
End: 2019-11-29

## 2019-11-29 DIAGNOSIS — G47.33 OBSTRUCTIVE SLEEP APNEA SYNDROME: Primary | ICD-10-CM

## 2019-11-29 DIAGNOSIS — I10 ESSENTIAL HYPERTENSION: ICD-10-CM

## 2019-11-29 NOTE — TELEPHONE ENCOUNTER
Pt was referred to Sleep Medicine by Weight Management  Need referral entered by PCP for her [facility] ins    Order entered

## 2019-12-01 DIAGNOSIS — J45.20 MILD INTERMITTENT ASTHMA WITHOUT COMPLICATION: ICD-10-CM

## 2019-12-01 DIAGNOSIS — E78.01 FAMILIAL HYPERCHOLESTEROLEMIA: ICD-10-CM

## 2019-12-01 RX ORDER — ATORVASTATIN CALCIUM 20 MG/1
TABLET, FILM COATED ORAL
Qty: 30 TABLET | Refills: 5 | Status: SHIPPED | OUTPATIENT
Start: 2019-12-01 | End: 2020-04-03

## 2019-12-12 DIAGNOSIS — J45.20 MILD INTERMITTENT ASTHMA WITHOUT COMPLICATION: ICD-10-CM

## 2019-12-12 RX ORDER — ALBUTEROL SULFATE 90 UG/1
AEROSOL, METERED RESPIRATORY (INHALATION)
Qty: 18 INHALER | Refills: 2 | Status: SHIPPED | OUTPATIENT
Start: 2019-12-12 | End: 2020-03-20

## 2019-12-31 DIAGNOSIS — E11.9 TYPE 2 DIABETES MELLITUS WITHOUT COMPLICATION, WITHOUT LONG-TERM CURRENT USE OF INSULIN (HCC): ICD-10-CM

## 2020-01-08 ENCOUNTER — OFFICE VISIT (OUTPATIENT)
Dept: FAMILY MEDICINE CLINIC | Facility: CLINIC | Age: 40
End: 2020-01-08
Payer: COMMERCIAL

## 2020-01-08 VITALS
HEART RATE: 101 BPM | BODY MASS INDEX: 49.06 KG/M2 | HEIGHT: 62 IN | TEMPERATURE: 98.3 F | SYSTOLIC BLOOD PRESSURE: 120 MMHG | OXYGEN SATURATION: 96 % | WEIGHT: 266.6 LBS | DIASTOLIC BLOOD PRESSURE: 78 MMHG

## 2020-01-08 DIAGNOSIS — E11.9 TYPE 2 DIABETES MELLITUS WITHOUT COMPLICATION, WITHOUT LONG-TERM CURRENT USE OF INSULIN (HCC): Primary | ICD-10-CM

## 2020-01-08 DIAGNOSIS — Z11.4 SCREENING FOR HIV (HUMAN IMMUNODEFICIENCY VIRUS): ICD-10-CM

## 2020-01-08 DIAGNOSIS — E66.01 MORBID OBESITY (HCC): ICD-10-CM

## 2020-01-08 DIAGNOSIS — K21.9 GASTROESOPHAGEAL REFLUX DISEASE WITHOUT ESOPHAGITIS: ICD-10-CM

## 2020-01-08 DIAGNOSIS — J45.20 MILD INTERMITTENT ASTHMA WITHOUT COMPLICATION: ICD-10-CM

## 2020-01-08 DIAGNOSIS — E78.2 MIXED HYPERLIPIDEMIA: ICD-10-CM

## 2020-01-08 DIAGNOSIS — R00.1 BRADYCARDIA: ICD-10-CM

## 2020-01-08 LAB — SL AMB POCT HEMOGLOBIN AIC: 7.2 (ref ?–6.5)

## 2020-01-08 PROCEDURE — 83036 HEMOGLOBIN GLYCOSYLATED A1C: CPT | Performed by: FAMILY MEDICINE

## 2020-01-08 PROCEDURE — 99214 OFFICE O/P EST MOD 30 MIN: CPT | Performed by: FAMILY MEDICINE

## 2020-01-08 NOTE — PROGRESS NOTES
Diabetic Foot Exam    Patient's shoes and socks removed  Right Foot/Ankle   Right Foot Inspection  Skin Exam: skin normal and skin intact no dry skin, no warmth, no callus, no erythema, no maceration, no abnormal color, no pre-ulcer, no ulcer and no callus                            Sensory       Monofilament testing: intact  Vascular  Capillary refills: < 3 seconds  The right DP pulse is 2+  The right PT pulse is 1+  Right Toe  - Comprehensive Exam  Ecchymosis: none  Arch: normal  Hammertoes: absent  Claw Toes: absent  Swelling: none   Tenderness: none         Left Foot/Ankle  Left Foot Inspection  Skin Exam: skin normal and skin intactno dry skin, no warmth, no erythema, no maceration, normal color, no pre-ulcer, no ulcer and no callus                                         Sensory       Monofilament: intact  Vascular  Capillary refills: < 3 seconds  The left DP pulse is 2+  The left PT pulse is 1+  Left Toe  - Comprehensive Exam  Ecchymosis: none  Arch: normal  Hammertoes: absent  Claw toes: absent  Swelling: none   Tenderness: none       Assign Risk Category:  No deformity present; No loss of protective sensation;  No weak pulses       Risk: 0

## 2020-01-08 NOTE — ASSESSMENT & PLAN NOTE
She is noted episodes of low heart rate in the 50s associated with a brief feeling of dizziness  I recommended keeping a little more detailed symptom diary and making sure she stays hydrated with water  It is unclear if this symptom started when she started her SGLT2 inhibitor Steglatro    This is being changed to Invokana by endocrine due to cost

## 2020-01-08 NOTE — PROGRESS NOTES
Assessment/Plan:    Type 2 diabetes mellitus without complication (HCC)    Lab Results   Component Value Date    HGBA1C 7 4 (A) 05/28/2019      Today's hemoglobin A1c is improved at 7 2  She is enrolled in 3 months program for bariatric surgery and she is really trying to follow a healthy diet  GERD (gastroesophageal reflux disease)  Stable on omeprazole    Bradycardia     She is noted episodes of low heart rate in the 50s associated with a brief feeling of dizziness  I recommended keeping a little more detailed symptom diary and making sure she stays hydrated with water  It is unclear if this symptom started when she started her SGLT2 inhibitor Steglatro  This is being changed to Invokana by endocrine due to cost    Morbid obesity ([facility] )   She is enrolled in the bariatric program and anticipate surgery later in the spring  Diagnoses and all orders for this visit:    Type 2 diabetes mellitus without complication, without long-term current use of insulin (HCC)    Gastroesophageal reflux disease without esophagitis    Mild intermittent asthma without complication    Bradycardia    Morbid obesity (HCC)          Subjective:      Patient ID: Sehila Maldonado is a 37 y o  female  She is here for follow-up and notes low HR  She has episodes of dizziness and notes HR is under 55  Usually the symptom passes quickly  She denies any risk of dehydration and states she drinks plenty of water  These episodes have been occurring over the past 2 months  This is also when she started [facility] from her endocrine  Formulary changed and she will be starting Invokana instead  No CP, SOB or palp  No  Headaches, but brief dizziness when these episodes occur  No fainting      She recently started the 3 month program for bariatric surgery  She is hoping to have the surgery in late April        She states her asthma is pretty stable but she does notice left  Thoracic back pain after she smokes medical marijuana  The following portions of the patient's history were reviewed and updated as appropriate: allergies, current medications, past family history, past medical history, past social history, past surgical history and problem list     Review of Systems   Constitutional: Negative for activity change, chills, fatigue and fever  HENT: Negative for congestion, ear pain, sinus pressure and sore throat  Eyes: Negative for pain and visual disturbance  Respiratory: Negative for cough, chest tightness, shortness of breath and wheezing  Cardiovascular: Negative for chest pain, palpitations and leg swelling  Gastrointestinal: Negative for abdominal pain, blood in stool, constipation, diarrhea, nausea and vomiting  Endocrine: Negative for polydipsia and polyuria  Genitourinary: Negative for difficulty urinating, dysuria, frequency and urgency  Musculoskeletal: Positive for back pain  Negative for arthralgias, joint swelling and myalgias  Skin: Negative for rash  Neurological: Positive for dizziness  Negative for weakness, numbness and headaches  Hematological: Negative for adenopathy  Does not bruise/bleed easily  Psychiatric/Behavioral: Negative for dysphoric mood  The patient is not nervous/anxious  Objective:      /78   Pulse 101   Temp 98 3 °F (36 8 °C) (Tympanic)   Ht 5' 1 9" (1 572 m)   Wt 121 kg (266 lb 9 6 oz)   SpO2 96%   BMI 48 92 kg/m²          Physical Exam   Constitutional: She is oriented to person, place, and time  She appears well-developed and well-nourished  obesity   HENT:   Head: Normocephalic and atraumatic  Right Ear: External ear normal    Left Ear: External ear normal    Neck: Normal range of motion  Neck supple  Cardiovascular: Normal rate, regular rhythm, normal heart sounds and intact distal pulses  Pulmonary/Chest: Effort normal and breath sounds normal    Neurological: She is alert and oriented to person, place, and time     Skin: Skin is warm and dry  Capillary refill takes less than 2 seconds  Nursing note and vitals reviewed

## 2020-01-08 NOTE — LETTER
January 8, 2020     Patient: Sheila Maldonado   YOB: 1980   Date of Visit: 1/8/2020       To Whom it May Concern:    Sheila Maldonado is under my professional care  She was seen in my office on 1/8/2020  I highly recommend Sheila Maldonado for the bariatric surgery program due to her current health issues with type 2 diabetes, asthma, and hypertension  If you have any questions or concerns, please don't hesitate to call           Sincerely,          Aicha Lim MD

## 2020-01-08 NOTE — ASSESSMENT & PLAN NOTE
Lab Results   Component Value Date    HGBA1C 7 4 (A) 05/28/2019      Today's hemoglobin A1c is improved at 7 2  She is enrolled in 3 months program for bariatric surgery and she is really trying to follow a healthy diet

## 2020-01-09 DIAGNOSIS — K21.9 GASTROESOPHAGEAL REFLUX DISEASE WITHOUT ESOPHAGITIS: ICD-10-CM

## 2020-01-09 DIAGNOSIS — K58.2 IRRITABLE BOWEL SYNDROME WITH BOTH CONSTIPATION AND DIARRHEA: ICD-10-CM

## 2020-01-09 RX ORDER — OMEPRAZOLE 40 MG/1
CAPSULE, DELAYED RELEASE ORAL
Qty: 30 CAPSULE | Refills: 5 | Status: SHIPPED | OUTPATIENT
Start: 2020-01-09 | End: 2021-02-16

## 2020-01-09 RX ORDER — DICYCLOMINE HCL 20 MG
20 TABLET ORAL EVERY 6 HOURS
Qty: 120 TABLET | Refills: 5 | Status: SHIPPED | OUTPATIENT
Start: 2020-01-09 | End: 2020-07-17 | Stop reason: SDUPTHER

## 2020-01-16 ENCOUNTER — OFFICE VISIT (OUTPATIENT)
Dept: FAMILY MEDICINE CLINIC | Facility: CLINIC | Age: 40
End: 2020-01-16
Payer: COMMERCIAL

## 2020-01-16 VITALS
HEART RATE: 78 BPM | RESPIRATION RATE: 24 BRPM | BODY MASS INDEX: 49.3 KG/M2 | SYSTOLIC BLOOD PRESSURE: 124 MMHG | DIASTOLIC BLOOD PRESSURE: 82 MMHG | HEIGHT: 62 IN | TEMPERATURE: 98.2 F | OXYGEN SATURATION: 96 % | WEIGHT: 267.9 LBS

## 2020-01-16 DIAGNOSIS — E11.9 TYPE 2 DIABETES MELLITUS WITHOUT COMPLICATION, WITHOUT LONG-TERM CURRENT USE OF INSULIN (HCC): Primary | ICD-10-CM

## 2020-01-16 DIAGNOSIS — J01.00 ACUTE MAXILLARY SINUSITIS, RECURRENCE NOT SPECIFIED: ICD-10-CM

## 2020-01-16 DIAGNOSIS — I10 ESSENTIAL HYPERTENSION: ICD-10-CM

## 2020-01-16 DIAGNOSIS — E66.01 MORBID OBESITY (HCC): ICD-10-CM

## 2020-01-16 PROCEDURE — 3008F BODY MASS INDEX DOCD: CPT | Performed by: INTERNAL MEDICINE

## 2020-01-16 PROCEDURE — 3079F DIAST BP 80-89 MM HG: CPT | Performed by: INTERNAL MEDICINE

## 2020-01-16 PROCEDURE — 99214 OFFICE O/P EST MOD 30 MIN: CPT | Performed by: INTERNAL MEDICINE

## 2020-01-16 PROCEDURE — 3074F SYST BP LT 130 MM HG: CPT | Performed by: INTERNAL MEDICINE

## 2020-01-16 PROCEDURE — 1036F TOBACCO NON-USER: CPT | Performed by: INTERNAL MEDICINE

## 2020-01-16 RX ORDER — AZITHROMYCIN 250 MG/1
TABLET, FILM COATED ORAL
Qty: 6 TABLET | Refills: 0 | Status: SHIPPED | OUTPATIENT
Start: 2020-01-16 | End: 2020-01-21

## 2020-01-16 NOTE — PROGRESS NOTES
Assessment/Plan:       Diagnoses and all orders for this visit:    Type 2 diabetes mellitus without complication, without long-term current use of insulin (HCC)  -     Ertugliflozin L-PyroglutamicAc (STEGLATRO) 5 MG TABS; Take 5 mg by mouth daily    Essential hypertension  -     azithromycin (ZITHROMAX) 250 mg tablet; Take 2 tablets (500 mg total) by mouth daily for 1 day, THEN 1 tablet (250 mg total) daily for 4 days  Morbid obesity ([facility] )  BMI Counseling: Body mass index is 49 16 kg/m²  The BMI is above normal  Nutrition recommendations include moderation in carbohydrate intake   -She was following with weight management as well  Acute maxillary sinusitis, recurrence not specified    -Discussed symptoms started on Tuesday and this could be viral in nature  I would like her to continue symptomatic treatment and monitor  Has a follow up scheduled with Dr Peterson Closs in April  Subjective:      Patient ID: Alysa Banks is a 37 y o  female  Halina Bound is here today for a sick visit  Symptoms started on Tuesday  Sick contacts include her son and  with similar symptoms  See below  URI    This is a new problem  The current episode started in the past 7 days  The problem has been unchanged  There has been no fever  Associated symptoms include congestion, coughing, ear pain, headaches, rhinorrhea, sinus pain and a sore throat  Pertinent negatives include no abdominal pain, diarrhea, nausea, plugged ear sensation or vomiting  She has tried decongestant and sleep (Dayquil ) for the symptoms  The treatment provided no relief  The following portions of the patient's history were reviewed and updated as appropriate: allergies, current medications, past family history, past medical history, past social history, past surgical history and problem list     Review of Systems   HENT: Positive for congestion, ear pain, rhinorrhea, sinus pain and sore throat  Respiratory: Positive for cough  Gastrointestinal: Negative for abdominal pain, diarrhea, nausea and vomiting  Neurological: Positive for headaches  Objective:      /82   Pulse 78   Temp 98 2 °F (36 8 °C)   Resp (!) 24   Ht 5' 1 9" (1 572 m)   Wt 122 kg (267 lb 14 4 oz)   SpO2 96%   BMI 49 16 kg/m²          Physical Exam   Constitutional: She is oriented to person, place, and time  She appears well-developed and well-nourished  No distress  HENT:   Head: Normocephalic and atraumatic  Nose: Rhinorrhea present  Right sinus exhibits maxillary sinus tenderness  Mouth/Throat: Oropharynx is clear and moist    Middle ear effusion   Eyes: Conjunctivae and EOM are normal  Right eye exhibits no discharge  Left eye exhibits no discharge  No scleral icterus  Neck: Normal range of motion  Cardiovascular: Normal rate, regular rhythm and normal heart sounds  No murmur heard  Pulmonary/Chest: Effort normal and breath sounds normal  No respiratory distress  She has no wheezes  Musculoskeletal: Normal range of motion  She exhibits no edema  Lymphadenopathy:     She has cervical adenopathy  Neurological: She is alert and oriented to person, place, and time  Skin: Skin is warm and dry  She is not diaphoretic  No erythema  Psychiatric: She has a normal mood and affect  Her speech is normal and behavior is normal  Judgment and thought content normal    Vitals reviewed

## 2020-01-22 LAB
CREAT ?TM UR-SCNC: 127 UMOL/L
EXT MICROALBUMIN URINE RANDOM: 5.7
HBA1C MFR BLD HPLC: 7.1 %
MICROALBUMIN/CREAT UR: 44.9 MG/G{CREAT}

## 2020-01-22 PROCEDURE — 3060F POS MICROALBUMINURIA REV: CPT | Performed by: INTERNAL MEDICINE

## 2020-01-28 DIAGNOSIS — E11.9 TYPE 2 DIABETES MELLITUS WITHOUT COMPLICATION, WITHOUT LONG-TERM CURRENT USE OF INSULIN (HCC): ICD-10-CM

## 2020-03-08 DIAGNOSIS — E11.9 TYPE 2 DIABETES MELLITUS WITHOUT COMPLICATION, WITHOUT LONG-TERM CURRENT USE OF INSULIN (HCC): ICD-10-CM

## 2020-03-20 DIAGNOSIS — J45.20 MILD INTERMITTENT ASTHMA WITHOUT COMPLICATION: ICD-10-CM

## 2020-03-20 RX ORDER — ALBUTEROL SULFATE 90 UG/1
AEROSOL, METERED RESPIRATORY (INHALATION)
Qty: 18 INHALER | Refills: 2 | Status: SHIPPED | OUTPATIENT
Start: 2020-03-20 | End: 2021-02-16 | Stop reason: SDUPTHER

## 2020-04-03 DIAGNOSIS — E11.9 TYPE 2 DIABETES MELLITUS WITHOUT COMPLICATION, WITHOUT LONG-TERM CURRENT USE OF INSULIN (HCC): ICD-10-CM

## 2020-04-03 DIAGNOSIS — E78.01 FAMILIAL HYPERCHOLESTEROLEMIA: ICD-10-CM

## 2020-04-03 RX ORDER — ATORVASTATIN CALCIUM 20 MG/1
TABLET, FILM COATED ORAL
Qty: 30 TABLET | Refills: 5 | Status: SHIPPED | OUTPATIENT
Start: 2020-04-03 | End: 2020-08-21

## 2020-04-09 ENCOUNTER — TELEMEDICINE (OUTPATIENT)
Dept: FAMILY MEDICINE CLINIC | Facility: CLINIC | Age: 40
End: 2020-04-09
Payer: COMMERCIAL

## 2020-04-09 VITALS — BODY MASS INDEX: 47.34 KG/M2 | HEART RATE: 78 BPM | WEIGHT: 258 LBS

## 2020-04-09 DIAGNOSIS — E66.01 MORBID OBESITY (HCC): Primary | ICD-10-CM

## 2020-04-09 DIAGNOSIS — E11.9 TYPE 2 DIABETES MELLITUS WITHOUT COMPLICATION, WITHOUT LONG-TERM CURRENT USE OF INSULIN (HCC): ICD-10-CM

## 2020-04-09 DIAGNOSIS — J45.20 MILD INTERMITTENT ASTHMA WITHOUT COMPLICATION: ICD-10-CM

## 2020-04-09 DIAGNOSIS — G43.009 MIGRAINE WITHOUT AURA AND WITHOUT STATUS MIGRAINOSUS, NOT INTRACTABLE: ICD-10-CM

## 2020-04-09 DIAGNOSIS — M79.7 FIBROMYALGIA: ICD-10-CM

## 2020-04-09 DIAGNOSIS — F41.9 ANXIETY: ICD-10-CM

## 2020-04-09 PROCEDURE — 99214 OFFICE O/P EST MOD 30 MIN: CPT | Performed by: FAMILY MEDICINE

## 2020-04-09 RX ORDER — RIZATRIPTAN BENZOATE 10 MG/1
10 TABLET ORAL ONCE AS NEEDED
Qty: 9 TABLET | Refills: 1 | Status: SHIPPED | OUTPATIENT
Start: 2020-04-09 | End: 2020-09-28

## 2020-04-09 RX ORDER — CEFUROXIME AXETIL 250 MG/1
6 TABLET ORAL AS NEEDED
Qty: 9 CARTRIDGE | Refills: 1 | Status: SHIPPED | OUTPATIENT
Start: 2020-04-09

## 2020-06-19 ENCOUNTER — OFFICE VISIT (OUTPATIENT)
Dept: FAMILY MEDICINE CLINIC | Facility: CLINIC | Age: 40
End: 2020-06-19
Payer: COMMERCIAL

## 2020-06-19 VITALS
DIASTOLIC BLOOD PRESSURE: 78 MMHG | HEART RATE: 78 BPM | SYSTOLIC BLOOD PRESSURE: 120 MMHG | WEIGHT: 255.8 LBS | HEIGHT: 62 IN | TEMPERATURE: 97.5 F | OXYGEN SATURATION: 97 % | BODY MASS INDEX: 47.07 KG/M2

## 2020-06-19 DIAGNOSIS — G44.209 MUSCLE TENSION HEADACHE: Primary | ICD-10-CM

## 2020-06-19 PROCEDURE — 3074F SYST BP LT 130 MM HG: CPT | Performed by: NURSE PRACTITIONER

## 2020-06-19 PROCEDURE — 1036F TOBACCO NON-USER: CPT | Performed by: NURSE PRACTITIONER

## 2020-06-19 PROCEDURE — 99214 OFFICE O/P EST MOD 30 MIN: CPT | Performed by: NURSE PRACTITIONER

## 2020-06-19 PROCEDURE — 3051F HG A1C>EQUAL 7.0%<8.0%: CPT | Performed by: NURSE PRACTITIONER

## 2020-06-19 PROCEDURE — 3078F DIAST BP <80 MM HG: CPT | Performed by: NURSE PRACTITIONER

## 2020-06-19 PROCEDURE — 96372 THER/PROPH/DIAG INJ SC/IM: CPT | Performed by: NURSE PRACTITIONER

## 2020-06-19 PROCEDURE — 3008F BODY MASS INDEX DOCD: CPT | Performed by: NURSE PRACTITIONER

## 2020-06-19 RX ORDER — KETOROLAC TROMETHAMINE 30 MG/ML
30 INJECTION, SOLUTION INTRAMUSCULAR; INTRAVENOUS ONCE
Status: COMPLETED | OUTPATIENT
Start: 2020-06-19 | End: 2020-06-19

## 2020-06-19 RX ORDER — METHOCARBAMOL 500 MG/1
500 TABLET, FILM COATED ORAL 3 TIMES DAILY PRN
Qty: 21 TABLET | Refills: 0 | Status: SHIPPED | OUTPATIENT
Start: 2020-06-19

## 2020-06-19 RX ADMIN — KETOROLAC TROMETHAMINE 30 MG: 30 INJECTION, SOLUTION INTRAMUSCULAR; INTRAVENOUS at 11:34

## 2020-06-25 DIAGNOSIS — E11.9 TYPE 2 DIABETES MELLITUS WITHOUT COMPLICATION, WITHOUT LONG-TERM CURRENT USE OF INSULIN (HCC): ICD-10-CM

## 2020-07-17 DIAGNOSIS — K58.2 IRRITABLE BOWEL SYNDROME WITH BOTH CONSTIPATION AND DIARRHEA: ICD-10-CM

## 2020-07-17 RX ORDER — DICYCLOMINE HCL 20 MG
20 TABLET ORAL EVERY 6 HOURS
Qty: 120 TABLET | Refills: 5 | Status: SHIPPED | OUTPATIENT
Start: 2020-07-17 | End: 2021-01-08

## 2020-08-15 ENCOUNTER — TRANSCRIBE ORDERS (OUTPATIENT)
Dept: ADMINISTRATIVE | Facility: HOSPITAL | Age: 40
End: 2020-08-15

## 2020-08-15 DIAGNOSIS — E66.01 MORBID OBESITY (HCC): Primary | ICD-10-CM

## 2020-08-15 DIAGNOSIS — Z98.84 STATUS POST BARIATRIC SURGERY: ICD-10-CM

## 2020-08-18 LAB — HBA1C MFR BLD HPLC: 6 %

## 2020-08-18 PROCEDURE — 3044F HG A1C LEVEL LT 7.0%: CPT | Performed by: NURSE PRACTITIONER

## 2020-08-21 DIAGNOSIS — E78.01 FAMILIAL HYPERCHOLESTEROLEMIA: ICD-10-CM

## 2020-08-21 RX ORDER — ATORVASTATIN CALCIUM 20 MG/1
TABLET, FILM COATED ORAL
Qty: 90 TABLET | Refills: 1 | Status: SHIPPED | OUTPATIENT
Start: 2020-08-21 | End: 2020-10-15 | Stop reason: ALTCHOICE

## 2020-09-25 DIAGNOSIS — G43.009 MIGRAINE WITHOUT AURA AND WITHOUT STATUS MIGRAINOSUS, NOT INTRACTABLE: ICD-10-CM

## 2020-09-28 RX ORDER — RIZATRIPTAN BENZOATE 10 MG/1
10 TABLET ORAL ONCE AS NEEDED
Qty: 9 TABLET | Refills: 1 | Status: SHIPPED | OUTPATIENT
Start: 2020-09-28

## 2020-10-14 DIAGNOSIS — E11.9 TYPE 2 DIABETES MELLITUS WITHOUT COMPLICATION, WITHOUT LONG-TERM CURRENT USE OF INSULIN (HCC): ICD-10-CM

## 2020-10-15 ENCOUNTER — OFFICE VISIT (OUTPATIENT)
Dept: FAMILY MEDICINE CLINIC | Facility: CLINIC | Age: 40
End: 2020-10-15
Payer: COMMERCIAL

## 2020-10-15 VITALS
HEART RATE: 89 BPM | SYSTOLIC BLOOD PRESSURE: 122 MMHG | HEIGHT: 62 IN | BODY MASS INDEX: 37.13 KG/M2 | RESPIRATION RATE: 20 BRPM | WEIGHT: 201.8 LBS | TEMPERATURE: 97.6 F | DIASTOLIC BLOOD PRESSURE: 76 MMHG | OXYGEN SATURATION: 98 %

## 2020-10-15 DIAGNOSIS — E78.2 MIXED HYPERLIPIDEMIA: ICD-10-CM

## 2020-10-15 DIAGNOSIS — F41.9 ANXIETY: ICD-10-CM

## 2020-10-15 DIAGNOSIS — Z23 NEED FOR VACCINATION: ICD-10-CM

## 2020-10-15 DIAGNOSIS — J45.20 MILD INTERMITTENT ASTHMA WITHOUT COMPLICATION: ICD-10-CM

## 2020-10-15 DIAGNOSIS — Z12.31 ENCOUNTER FOR SCREENING MAMMOGRAM FOR BREAST CANCER: ICD-10-CM

## 2020-10-15 DIAGNOSIS — E11.9 TYPE 2 DIABETES MELLITUS WITHOUT COMPLICATION, WITHOUT LONG-TERM CURRENT USE OF INSULIN (HCC): Primary | ICD-10-CM

## 2020-10-15 LAB — SL AMB POCT HEMOGLOBIN AIC: 5.7 (ref ?–6.5)

## 2020-10-15 PROCEDURE — 3725F SCREEN DEPRESSION PERFORMED: CPT | Performed by: FAMILY MEDICINE

## 2020-10-15 PROCEDURE — 3074F SYST BP LT 130 MM HG: CPT | Performed by: FAMILY MEDICINE

## 2020-10-15 PROCEDURE — 1036F TOBACCO NON-USER: CPT | Performed by: FAMILY MEDICINE

## 2020-10-15 PROCEDURE — 99214 OFFICE O/P EST MOD 30 MIN: CPT | Performed by: FAMILY MEDICINE

## 2020-10-15 PROCEDURE — 3044F HG A1C LEVEL LT 7.0%: CPT | Performed by: FAMILY MEDICINE

## 2020-10-15 PROCEDURE — 3078F DIAST BP <80 MM HG: CPT | Performed by: FAMILY MEDICINE

## 2020-10-15 PROCEDURE — 90686 IIV4 VACC NO PRSV 0.5 ML IM: CPT

## 2020-10-15 PROCEDURE — 83036 HEMOGLOBIN GLYCOSYLATED A1C: CPT | Performed by: FAMILY MEDICINE

## 2020-10-15 PROCEDURE — 90471 IMMUNIZATION ADMIN: CPT

## 2020-10-15 RX ORDER — PANTOPRAZOLE SODIUM 40 MG/1
TABLET, DELAYED RELEASE ORAL
COMMUNITY
Start: 2020-09-28

## 2020-10-15 RX ORDER — LANCETS 30 GAUGE
EACH MISCELLANEOUS
COMMUNITY
End: 2021-02-16

## 2020-10-15 RX ORDER — ROSUVASTATIN CALCIUM 10 MG/1
10 TABLET, COATED ORAL DAILY
Qty: 90 TABLET | Refills: 1 | Status: SHIPPED | OUTPATIENT
Start: 2020-10-15 | End: 2021-02-16 | Stop reason: ALTCHOICE

## 2020-10-15 RX ORDER — DICYCLOMINE HCL 20 MG
TABLET ORAL
COMMUNITY
End: 2020-10-15 | Stop reason: CLARIF

## 2020-10-15 RX ORDER — OXYCODONE HYDROCHLORIDE AND ACETAMINOPHEN 5; 325 MG/1; MG/1
1 TABLET ORAL EVERY 6 HOURS PRN
COMMUNITY
End: 2021-03-19 | Stop reason: ALTCHOICE

## 2020-10-15 RX ORDER — UBIDECARENONE 75 MG
CAPSULE ORAL
COMMUNITY

## 2020-10-28 LAB — HBA1C MFR BLD HPLC: 6.1 %

## 2020-11-23 ENCOUNTER — OFFICE VISIT (OUTPATIENT)
Dept: FAMILY MEDICINE CLINIC | Facility: CLINIC | Age: 40
End: 2020-11-23
Payer: COMMERCIAL

## 2020-11-23 VITALS
WEIGHT: 188.6 LBS | HEIGHT: 62 IN | OXYGEN SATURATION: 98 % | BODY MASS INDEX: 34.71 KG/M2 | RESPIRATION RATE: 20 BRPM | SYSTOLIC BLOOD PRESSURE: 122 MMHG | DIASTOLIC BLOOD PRESSURE: 74 MMHG | TEMPERATURE: 97.3 F | HEART RATE: 84 BPM

## 2020-11-23 DIAGNOSIS — K43.9 ABDOMINAL WALL HERNIA: Primary | ICD-10-CM

## 2020-11-23 PROCEDURE — 99213 OFFICE O/P EST LOW 20 MIN: CPT | Performed by: INTERNAL MEDICINE

## 2020-11-23 PROCEDURE — 3074F SYST BP LT 130 MM HG: CPT | Performed by: INTERNAL MEDICINE

## 2020-11-23 PROCEDURE — 3008F BODY MASS INDEX DOCD: CPT | Performed by: INTERNAL MEDICINE

## 2020-11-23 PROCEDURE — 1036F TOBACCO NON-USER: CPT | Performed by: INTERNAL MEDICINE

## 2020-11-23 PROCEDURE — 3078F DIAST BP <80 MM HG: CPT | Performed by: INTERNAL MEDICINE

## 2020-12-30 ENCOUNTER — TELEMEDICINE (OUTPATIENT)
Dept: FAMILY MEDICINE CLINIC | Facility: CLINIC | Age: 40
End: 2020-12-30
Payer: COMMERCIAL

## 2020-12-30 DIAGNOSIS — B34.9 VIRAL INFECTION, UNSPECIFIED: ICD-10-CM

## 2020-12-30 DIAGNOSIS — J01.01 ACUTE RECURRENT MAXILLARY SINUSITIS: Primary | ICD-10-CM

## 2020-12-30 PROCEDURE — G2012 BRIEF CHECK IN BY MD/QHP: HCPCS | Performed by: INTERNAL MEDICINE

## 2020-12-30 RX ORDER — AZITHROMYCIN 250 MG/1
TABLET, FILM COATED ORAL
Qty: 6 TABLET | Refills: 0 | Status: SHIPPED | OUTPATIENT
Start: 2020-12-30 | End: 2021-01-04

## 2021-01-08 ENCOUNTER — OFFICE VISIT (OUTPATIENT)
Dept: GASTROENTEROLOGY | Facility: MEDICAL CENTER | Age: 41
End: 2021-01-08
Payer: COMMERCIAL

## 2021-01-08 VITALS
HEART RATE: 80 BPM | DIASTOLIC BLOOD PRESSURE: 74 MMHG | BODY MASS INDEX: 33.49 KG/M2 | HEIGHT: 62 IN | SYSTOLIC BLOOD PRESSURE: 112 MMHG | WEIGHT: 182 LBS

## 2021-01-08 DIAGNOSIS — R10.32 LEFT LOWER QUADRANT ABDOMINAL PAIN: Primary | ICD-10-CM

## 2021-01-08 DIAGNOSIS — K58.2 IRRITABLE BOWEL SYNDROME WITH BOTH CONSTIPATION AND DIARRHEA: ICD-10-CM

## 2021-01-08 PROCEDURE — 3074F SYST BP LT 130 MM HG: CPT | Performed by: PHYSICIAN ASSISTANT

## 2021-01-08 PROCEDURE — 99214 OFFICE O/P EST MOD 30 MIN: CPT | Performed by: PHYSICIAN ASSISTANT

## 2021-01-08 PROCEDURE — 3078F DIAST BP <80 MM HG: CPT | Performed by: PHYSICIAN ASSISTANT

## 2021-01-08 RX ORDER — HYOSCYAMINE SULFATE 0.125 MG
0.12 TABLET ORAL EVERY 4 HOURS PRN
Qty: 50 TABLET | Refills: 2 | Status: SHIPPED | OUTPATIENT
Start: 2021-01-08 | End: 2021-02-16 | Stop reason: ALTCHOICE

## 2021-01-08 RX ORDER — HYOSCYAMINE SULFATE 0.125 MG
0.12 TABLET ORAL EVERY 4 HOURS PRN
Qty: 30 TABLET | Refills: 0 | Status: SHIPPED | OUTPATIENT
Start: 2021-01-08 | End: 2021-01-08 | Stop reason: SDUPTHER

## 2021-01-08 NOTE — PROGRESS NOTES
[facility] - Outpatient Follow-up Note  Tennille Amaya 41 y o  female MRN: [id number]  Encounter: [id number]          ASSESSMENT AND PLAN:      1  Left lower quadrant abdominal pain  2  Irritable bowel syndrome with both constipation and diarrhea:  She continues to complain of intermittent sharp left lower quadrant abdominal pain as well as alternating bowel habits between diarrhea and constipation     She has tried Bentyl without relief  This is most likely secondary to irritable bowel syndrome however could consider inguinal hernia or scar tissue from previous gyn surgeries  - hyoscyamine (Levsin) 0 125 MG tablet; Take 1 tablet (0 125 mg total) by mouth every 4 (four) hours as needed for cramping  Dispense: 50 tablet; Refill: 2  - US groin/inguinal area; Future  -try incorporating flaxseed into her diet for increased fiber  -recommended daily probiotic and fiber supplementation  -follow-up in 3 months      ______________________________________________________________________    SUBJECTIVE:  Tennille Amaya is a 33-year-old female who is here for follow-up of irritable bowel syndrome with alternating bowel habits as well as left lower quadrant abdominal pain  She states that she has lower abdominal cramping bilaterally but sometimes gets a sharp left lower quadrant pain when she has to have a bowel movement  And also reports that sometimes it comes randomly  She denies any associated symptoms such as diarrhea, melena, hematochezia, nausea or vomiting  She also admits to alternating bowel habits  She recently had a gastric bypass and is restricting her sugar and trying to eat high-protein but has difficulty with multiple food suggestions  She has tried probiotics and fiber supplementation in the past and does not think that these have helped  Her last colonoscopy was in February of 2017 with hyperplastic polyp in the sigmoid colon    Her last EGD was in September of 2016 with small sliding hiatal hernia but otherwise normal   Biopsies negative for H pylori  REVIEW OF SYSTEMS IS OTHERWISE NEGATIVE  Historical Information   Past Medical History:   Diagnosis Date    Acid reflux     Anxiety     Asthma     Depression     [facility] mellitus ([facility] )     Pre    Diverticulitis     Epileptic seizures ([facility] )     Medication related    Gestational diabetes mellitus     Hypertension     Lateral epicondylitis     Malignant tumor of cervix ([facility] )     between 8961-2435    Obstructive sleep apnea     CPAP    Paresthesias     Plantar fasciitis     Rectocele     Tinea corporis      Past Surgical History:   Procedure Laterality Date    ARTHROSCOPY ANKLE Right 2017    Procedure: ANKLE ARTHOSCOPY WITH EXTENSIVE SYNOVECTOMY, OPEN ARTHOTOY LATERAL ANKLE WITH ANKLE STABILIZATION, BROSTOM REPAIR, INTERNAL BRACE AUGMENTATION, PERONEAL TENDON SYNOVECTOMY;  Surgeon: Heather Francisco DPM;  [facility];  Service: Podiatry    COLONOSCOPY  2017    HYSTERECTOMY      PA COLONOSCOPY FLX DX W/COLLJ SPEC WHEN PFRMD N/A 2017    Procedure: COLONOSCOPY;  Surgeon: Julien Hardy MD;  [facility]; Service: Gastroenterology    PA ESOPHAGOGASTRODUODENOSCOPY TRANSORAL DIAGNOSTIC N/A 2016    Procedure: EGD AND COLONOSCOPY;  Surgeon: Julien Hardy MD;  [facility];   Service: Gastroenterology    TONSILLECTOMY      TUBAL LIGATION      UPPER GASTROINTESTINAL ENDOSCOPY       Social History   Social History     Substance and Sexual Activity   Alcohol Use Yes    Frequency: Monthly or less    Comment: occasionally, maybe 1-2 times a month     Social History     Substance and Sexual Activity   Drug Use No     Social History     Tobacco Use   Smoking Status Former Smoker    Quit date: 2013    Years since quittin 3   Smokeless Tobacco Never Used     Family History   Problem Relation Age of Onset    Anxiety disorder Mother     Depression Mother     Cancer Mother     Endometrial cancer Mother         under afe 48    Seizures Brother         Epilepsy    Other Brother         Suicide risk    Cancer Maternal Grandmother 48        lymphatic cancer    Other Other         cardiac disorder    Diabetes Other     Hypertension Other     Neuropathy Other     Thyroid disease Other     Breast cancer Paternal Grandmother 80    No Known Problems Father     No Known Problems Daughter     Cancer Maternal Grandfather     No Known Problems Paternal Grandfather     No Known Problems Daughter     No Known Problems Son     No Known Problems Brother     Leukemia Cousin     Cancer Cousin         lympatic       Meds/Allergies       Current Outpatient Medications:     albuterol (PROVENTIL HFA,VENTOLIN HFA) 90 mcg/act inhaler    Ascorbic Acid, Vitamin C, (VITAMIN C) 100 MG tablet    Botulinum Toxin Type A (BOTOX) 200 units SOLR    buPROPion (WELLBUTRIN SR) 150 mg 12 hr tablet    cyanocobalamin (VITAMIN B-12) 100 mcg tablet    DULoxetine (CYMBALTA) 30 mg delayed release capsule    FERROUS SULFATE-VITAMIN C PO    Galcanezumab-gnlm 120 MG/ML SOAJ    HYDROcodone-acetaminophen (NORCO) 5-325 mg per tablet    Lancets MISC    LORazepam (ATIVAN) 0 5 mg tablet    metFORMIN (GLUCOPHAGE) 500 mg tablet    methocarbamol (ROBAXIN) 500 mg tablet    methylphenidate (RITALIN) 10 mg tablet    methylphenidate (RITALIN) 20 MG tablet    omeprazole (PriLOSEC) 40 MG capsule    oxyCODONE-acetaminophen (PERCOCET) 5-325 mg per tablet    pantoprazole (PROTONIX) 40 mg tablet    pregabalin (LYRICA) 150 mg capsule    rizatriptan (MAXALT) 10 MG tablet    rosuvastatin (CRESTOR) 10 MG tablet    SUMAtriptan Succinate 6 MG/0 5ML SOAJ    traZODone (DESYREL) 100 mg tablet    WIXELA INHUB 100-50 MCG/DOSE inhaler    EPINEPHrine (EPIPEN) 0 3 mg/0 3 mL SOAJ    hyoscyamine (Levsin) 0 125 MG tablet    metoprolol succinate (TOPROL-XL) 50 mg 24 hr tablet    Allergies   Allergen Reactions    Aspirin Anaphylaxis    Bee Venom Swelling and Wheezing    Penicillins Anaphylaxis and Rash    Bee Pollen     Ceclor [Cefaclor] Itching    Lactose GI Intolerance    Pollen Extract     Amoxicillin Rash    Doxycycline Rash    Erythromycin Rash           Objective     Blood pressure 112/74, pulse 80, height 5' 2" (1 575 m), weight 82 6 kg (182 lb)  Body mass index is 33 29 kg/m²  PHYSICAL EXAM:      General Appearance:   Alert, cooperative, no distress   HEENT:   Normocephalic, atraumatic, anicteric      Neck:  Supple, symmetrical, trachea midline   Lungs:   Clear to auscultation bilaterally; no rales, rhonchi or wheezing; respirations unlabored    Heart[de-identified]   Regular rate and rhythm; no murmur, rub, or gallop  Abdomen:   Soft, non-tender, non-distended; normal bowel sounds; no masses, no organomegaly    Genitalia:   Deferred    Rectal:   Deferred    Extremities:  No cyanosis, clubbing or edema    Pulses:  2+ and symmetric    Skin:  No jaundice, rashes, or lesions    Lymph nodes:  No palpable cervical lymphadenopathy        Lab Results:   No visits with results within 1 Day(s) from this visit  Latest known visit with results is:   Orders Only on 10/28/2020   Component Date Value    Hemoglobin A1C 10/28/2020 6 1          Radiology Results:   No results found

## 2021-01-12 ENCOUNTER — DOCUMENTATION (OUTPATIENT)
Dept: GASTROENTEROLOGY | Facility: MEDICAL CENTER | Age: 41
End: 2021-01-12

## 2021-01-20 ENCOUNTER — TELEMEDICINE (OUTPATIENT)
Dept: FAMILY MEDICINE CLINIC | Facility: CLINIC | Age: 41
End: 2021-01-20
Payer: COMMERCIAL

## 2021-01-20 VITALS — HEIGHT: 62 IN | BODY MASS INDEX: 34.6 KG/M2 | WEIGHT: 188 LBS

## 2021-01-20 DIAGNOSIS — J01.00 ACUTE MAXILLARY SINUSITIS, RECURRENCE NOT SPECIFIED: Primary | ICD-10-CM

## 2021-01-20 PROCEDURE — 3008F BODY MASS INDEX DOCD: CPT | Performed by: FAMILY MEDICINE

## 2021-01-20 PROCEDURE — 1036F TOBACCO NON-USER: CPT | Performed by: FAMILY MEDICINE

## 2021-01-20 PROCEDURE — 99213 OFFICE O/P EST LOW 20 MIN: CPT | Performed by: FAMILY MEDICINE

## 2021-01-20 RX ORDER — SULFAMETHOXAZOLE AND TRIMETHOPRIM 800; 160 MG/1; MG/1
1 TABLET ORAL EVERY 12 HOURS SCHEDULED
Qty: 20 TABLET | Refills: 0 | Status: SHIPPED | OUTPATIENT
Start: 2021-01-20 | End: 2021-01-30

## 2021-01-20 NOTE — PROGRESS NOTES
Virtual Regular Visit      Assessment/Plan:    She appears to have a right maxillary sinusitis  Will treat with Bactrim DS b i d  For a 10 day course  Also recommended increased hydration with water and use of plain Mucinex  She should contact us if symptoms are not improving  Problem List Items Addressed This Visit     None      Visit Diagnoses     Acute maxillary sinusitis, recurrence not specified    -  Primary               Reason for visit is   Chief Complaint   Patient presents with    Nasal Congestion        Encounter provider Rigo Puckett MD    Provider located at [address] [place] [address] Dr Brenner [id number]      Recent Visits  No visits were found meeting these conditions  Showing recent visits within past 7 days and meeting all other requirements     Today's Visits  Date Type Provider Dept   01/20/21 Rosana Uribe MD [facility]   Showing today's visits and meeting all other requirements     Future Appointments  No visits were found meeting these conditions  Showing future appointments within next 150 days and meeting all other requirements        The patient was identified by name and date of birth  Shantel Ruth was informed that this is a telemedicine visit and that the visit is being conducted through [employer] and patient was informed that this is not a secure, HIPAA-compliant platform  She agrees to proceed     My office door was closed  No one else was in the room  She acknowledged consent and understanding of privacy and security of the video platform  The patient has agreed to participate and understands they can discontinue the visit at any time  Patient is aware this is a billable service  Subjective  Adam Ferreira is a 41 y o  female    She c/o sinus congestion for 1 week  She notes pain over right face and temple  Facial headache not helped by Tylenol  No fever or chills    No ST or cough  No diarrhea, N or V  No viral symptoms  No one else at home is suffering from viral symptoms  Past Medical History:   Diagnosis Date    Acid reflux     Anxiety     Asthma     Depression     Diabetes mellitus ([facility] )     Pre    Diverticulitis     Epileptic seizures ([facility] )     Medication related    Gestational diabetes mellitus     Hypertension     Lateral epicondylitis     Malignant tumor of cervix ([facility] )     between 6617-3075    Obstructive sleep apnea     CPAP    Paresthesias     Plantar fasciitis     Rectocele     Tinea corporis        Past Surgical History:   Procedure Laterality Date    ARTHROSCOPY ANKLE Right 9/1/2017    Procedure: ANKLE ARTHOSCOPY WITH EXTENSIVE SYNOVECTOMY, OPEN ARTHOTOY LATERAL ANKLE WITH ANKLE STABILIZATION, BROSTOM REPAIR, INTERNAL BRACE AUGMENTATION, PERONEAL TENDON SYNOVECTOMY;  Surgeon: Fransisco Sever, DPM;  [facility];  Service: Podiatry    COLONOSCOPY  02/27/2017    HYSTERECTOMY  2015    DE COLONOSCOPY FLX DX W/COLLJ SPEC WHEN PFRMD N/A 2/27/2017    Procedure: COLONOSCOPY;  Surgeon: Trev Vaz MD;  [facility]; Service: Gastroenterology    DE ESOPHAGOGASTRODUODENOSCOPY TRANSORAL DIAGNOSTIC N/A 9/7/2016    Procedure: EGD AND COLONOSCOPY;  Surgeon: Trev Vaz MD;  [facility];   Service: Gastroenterology    TONSILLECTOMY      TUBAL LIGATION      UPPER GASTROINTESTINAL ENDOSCOPY  2016       Current Outpatient Medications   Medication Sig Dispense Refill    albuterol (PROVENTIL HFA,VENTOLIN HFA) 90 mcg/act inhaler TAKE 2 PUFFS BY MOUTH 4 TIMES A DAY 18 Inhaler 2    Ascorbic Acid, Vitamin C, (VITAMIN C) 100 MG tablet Take 1 capsule daily      Botulinum Toxin Type A (BOTOX) 200 units SOLR Inject 155 units into face and neck IM every 90 days      buPROPion (WELLBUTRIN SR) 150 mg 12 hr tablet Take 150 mg by mouth 2 (two) times a day  0    cyanocobalamin (VITAMIN B-12) 100 mcg tablet Take 1 tablet daily      DULoxetine (CYMBALTA) 30 mg delayed release capsule TAKE 1 CAPSULE (30 MG TOTAL) BY MOUTH EVERY MORNING  1    EPINEPHrine (EPIPEN) 0 3 mg/0 3 mL SOAJ Inject 0 3 mL (0 3 mg total) into a muscle once for 1 dose 0 3 mL 0    FERROUS SULFATE-VITAMIN C PO Take 1 tablet once daily      Galcanezumab-gnlm 120 MG/ML SOAJ Inject 2 injector pens (240 milligrams) month one as a loading dose then, inject 1 injector pen (120 milligrams) monthly thereafter        HYDROcodone-acetaminophen (NORCO) 5-325 mg per tablet Take 1 tablet by mouth every 6 (six) hours as needed for pain Max Daily Amount: 4 tablets 30 tablet 0    hyoscyamine (Levsin) 0 125 MG tablet Take 1 tablet (0 125 mg total) by mouth every 4 (four) hours as needed for cramping 50 tablet 2    Lancets MISC Use 1 daily to check POC glucose      LORazepam (ATIVAN) 0 5 mg tablet Take 0 5 mg by mouth 2 (two) times a day as needed for anxiety       metFORMIN (GLUCOPHAGE) 500 mg tablet TAKE 1 TABLET (500 MG TOTAL) BY MOUTH EVERY 12 (TWELVE) HOURS (TAKE WITH FOOD) 180 tablet 0    methocarbamol (ROBAXIN) 500 mg tablet Take 1 tablet (500 mg total) by mouth 3 (three) times a day as needed for muscle spasms 21 tablet 0    methylphenidate (RITALIN) 10 mg tablet Take 10 mg by mouth daily as needed   0    methylphenidate (RITALIN) 20 MG tablet Take 20 mg by mouth 2 (two) times a day   0    metoprolol succinate (TOPROL-XL) 50 mg 24 hr tablet Take 50 mg by mouth daily      omeprazole (PriLOSEC) 40 MG capsule TAKE 1 CAPSULE BY MOUTH EVERY DAY 30 capsule 5    oxyCODONE-acetaminophen (PERCOCET) 5-325 mg per tablet Take 1 tablet by mouth every 6 (six) hours as needed      pantoprazole (PROTONIX) 40 mg tablet TAKE 1 TABLET BY MOUTH TWICE A DAY 30 MINUTES BEFORE BREAKFAST & DINNER      pregabalin (LYRICA) 150 mg capsule Take 150 mg by mouth 2 (two) times a day      rizatriptan (MAXALT) 10 MG tablet TAKE 1 TABLET (10 MG TOTAL) BY MOUTH ONCE AS NEEDED FOR MIGRAINE 9 tablet 1    rosuvastatin (CRESTOR) 10 MG tablet Take 1 tablet (10 mg total) by mouth daily 90 tablet 1    SUMAtriptan Succinate 6 MG/0 5ML SOAJ Inject 0 5 mL (6 mg total) under the skin as needed (headache) 9 Cartridge 1    traZODone (DESYREL) 100 mg tablet 150 mg daily at bedtime   3    WIXELA INHUB 100-50 MCG/DOSE inhaler INHALE 1 PUFF 2 (TWO) TIMES A DAY RINSE MOUTH AFTER USE  1 Inhaler 5     No current facility-administered medications for this visit  Allergies   Allergen Reactions    Aspirin Anaphylaxis    Bee Venom Swelling and Wheezing    Penicillins Anaphylaxis and Rash    Bee Pollen     Ceclor [Cefaclor] Itching    Lactose GI Intolerance    Pollen Extract     Amoxicillin Rash    Doxycycline Rash    Erythromycin Rash       Review of Systems   Constitutional: Positive for fatigue  Negative for chills and fever  HENT: Positive for congestion and sinus pressure  Negative for ear pain and sore throat  Respiratory: Negative for cough  Cardiovascular: Negative for chest pain  Gastrointestinal: Negative for abdominal pain, diarrhea, nausea and vomiting  Neurological: Positive for headaches  Video Exam    Vitals:    01/20/21 1543   Weight: 85 3 kg (188 lb)   Height: 5' 2" (1 575 m)       Physical Exam  Vitals signs and nursing note reviewed  Constitutional:       Appearance: She is obese  HENT:      Head: Normocephalic and atraumatic  Pulmonary:      Effort: No respiratory distress  Neurological:      Mental Status: She is alert  Psychiatric:         Mood and Affect: Mood normal          Behavior: Behavior normal           I spent 12 minutes directly with the patient during this visit      Joaquin Mccoy 794 acknowledges that she has consented to an online visit or consultation   She understands that the online visit is based solely on information provided by her, and that, in the absence of a face-to-face physical evaluation by the physician, the diagnosis she receives is both limited and provisional in terms of accuracy and completeness  This is not intended to replace a full medical face-to-face evaluation by the physician  Radha Cee understands and accepts these terms

## 2021-01-21 ENCOUNTER — TELEPHONE (OUTPATIENT)
Dept: GASTROENTEROLOGY | Facility: AMBULARY SURGERY CENTER | Age: 41
End: 2021-01-21

## 2021-01-21 DIAGNOSIS — R10.32 LEFT LOWER QUADRANT ABDOMINAL PAIN: Primary | ICD-10-CM

## 2021-01-21 NOTE — TELEPHONE ENCOUNTER
Patients GI provider:  Dr Wale Bailon    Number to return call: [phone]    Reason for call: Pt calling stated she has an ultrasound scheduled tomorrow  1/22/21 but script was ordered for wrong area  Patient would like a new corrected script for the right area to be scanned       Scheduled procedure/appointment date if applicable: Apt/procedure 4/30/21

## 2021-01-21 NOTE — TELEPHONE ENCOUNTER
Klever Napoles,    Yes you are correct  I wanted to rule out an inguinal hernia which is the test I ordered  We could do an entire u/s of the abdomen, however, this will not show if she has a hernia or not   It is a specific ultrasound that they do

## 2021-01-21 NOTE — TELEPHONE ENCOUNTER
I placed an order for complete abdominal U/S and the patient can also obtain this in addition to the U/S Garett ordered to rule out inguinal hernia, this needs to be scheduled so she should check with radiology if they can accomidate both tomorrow as scheduled or if she needs to reschedule

## 2021-01-21 NOTE — TELEPHONE ENCOUNTER
Can you order the abdomen us in addition to the inguinal one?  She was very adamant that she wanted abdominal us done

## 2021-01-21 NOTE — TELEPHONE ENCOUNTER
Patient of Juanjo Henderson, [place], last seen 1/8    History of LLQ pain, IBS    Called and spoke with patient  She states that the [address] we ordered for groin/inguinal area is incorrect and it should be ordered as limited abdomen US  I explained that Zelda Pace wanted a US of the groin/inguinal area to rule out inguinal hernia but patient insists that she she needs ultrasound of her whole abdomen   Please advise

## 2021-01-22 ENCOUNTER — HOSPITAL ENCOUNTER (OUTPATIENT)
Dept: ULTRASOUND IMAGING | Facility: HOSPITAL | Age: 41
Discharge: HOME | End: 2021-01-22
Attending: PHYSICIAN ASSISTANT
Payer: COMMERCIAL

## 2021-01-22 ENCOUNTER — HOSPITAL ENCOUNTER (OUTPATIENT)
Dept: MAMMOGRAPHY | Facility: CLINIC | Age: 41
Discharge: HOME | End: 2021-01-22
Payer: COMMERCIAL

## 2021-01-22 VITALS — WEIGHT: 188 LBS | HEIGHT: 62 IN | BODY MASS INDEX: 34.6 KG/M2

## 2021-01-22 DIAGNOSIS — Z88.9 MULTIPLE DRUG ALLERGIES: Primary | ICD-10-CM

## 2021-01-22 DIAGNOSIS — R10.32 LEFT LOWER QUADRANT ABDOMINAL PAIN: ICD-10-CM

## 2021-01-22 DIAGNOSIS — Z12.31 ENCOUNTER FOR SCREENING MAMMOGRAM FOR BREAST CANCER: ICD-10-CM

## 2021-01-22 PROCEDURE — 76705 ECHO EXAM OF ABDOMEN: CPT

## 2021-01-22 PROCEDURE — 77067 SCR MAMMO BI INCL CAD: CPT

## 2021-01-22 PROCEDURE — 77063 BREAST TOMOSYNTHESIS BI: CPT

## 2021-01-24 ENCOUNTER — HOSPITAL ENCOUNTER (OUTPATIENT)
Dept: ULTRASOUND IMAGING | Facility: HOSPITAL | Age: 41
Discharge: HOME | End: 2021-01-24
Payer: COMMERCIAL

## 2021-01-24 DIAGNOSIS — R10.32 LEFT LOWER QUADRANT ABDOMINAL PAIN: ICD-10-CM

## 2021-01-24 PROCEDURE — 76700 US EXAM ABDOM COMPLETE: CPT

## 2021-01-28 DIAGNOSIS — Z23 ENCOUNTER FOR IMMUNIZATION: ICD-10-CM

## 2021-02-04 ENCOUNTER — IMMUNIZATIONS (OUTPATIENT)
Dept: FAMILY MEDICINE CLINIC | Facility: HOSPITAL | Age: 41
End: 2021-02-04

## 2021-02-04 DIAGNOSIS — Z23 ENCOUNTER FOR IMMUNIZATION: Primary | ICD-10-CM

## 2021-02-04 PROCEDURE — 91301 SARS-COV-2 / COVID-19 MRNA VACCINE (MODERNA) 100 MCG: CPT

## 2021-02-04 PROCEDURE — 0011A SARS-COV-2 / COVID-19 MRNA VACCINE (MODERNA) 100 MCG: CPT

## 2021-02-16 ENCOUNTER — OFFICE VISIT (OUTPATIENT)
Dept: FAMILY MEDICINE CLINIC | Facility: CLINIC | Age: 41
End: 2021-02-16
Payer: COMMERCIAL

## 2021-02-16 VITALS
SYSTOLIC BLOOD PRESSURE: 122 MMHG | OXYGEN SATURATION: 97 % | DIASTOLIC BLOOD PRESSURE: 80 MMHG | HEART RATE: 65 BPM | RESPIRATION RATE: 12 BRPM | WEIGHT: 177 LBS | BODY MASS INDEX: 32.57 KG/M2 | HEIGHT: 62 IN

## 2021-02-16 DIAGNOSIS — Z00.00 ANNUAL PHYSICAL EXAM: Primary | ICD-10-CM

## 2021-02-16 DIAGNOSIS — E66.09 CLASS 1 OBESITY DUE TO EXCESS CALORIES WITHOUT SERIOUS COMORBIDITY WITH BODY MASS INDEX (BMI) OF 32.0 TO 32.9 IN ADULT: ICD-10-CM

## 2021-02-16 DIAGNOSIS — G43.009 MIGRAINE WITHOUT AURA AND WITHOUT STATUS MIGRAINOSUS, NOT INTRACTABLE: ICD-10-CM

## 2021-02-16 DIAGNOSIS — Z91.030 BEE STING ALLERGY: ICD-10-CM

## 2021-02-16 DIAGNOSIS — J45.20 MILD INTERMITTENT ASTHMA WITHOUT COMPLICATION: ICD-10-CM

## 2021-02-16 PROBLEM — I10 ESSENTIAL HYPERTENSION: Status: RESOLVED | Noted: 2018-01-24 | Resolved: 2021-02-16

## 2021-02-16 PROBLEM — E66.811 CLASS 1 OBESITY DUE TO EXCESS CALORIES WITHOUT SERIOUS COMORBIDITY WITH BODY MASS INDEX (BMI) OF 32.0 TO 32.9 IN ADULT: Status: ACTIVE | Noted: 2018-01-24

## 2021-02-16 PROBLEM — E66.01 MORBID OBESITY (HCC): Status: RESOLVED | Noted: 2018-01-24 | Resolved: 2021-02-16

## 2021-02-16 PROBLEM — E11.9 TYPE 2 DIABETES MELLITUS WITHOUT COMPLICATION (HCC): Status: RESOLVED | Noted: 2018-01-24 | Resolved: 2021-02-16

## 2021-02-16 PROCEDURE — 99396 PREV VISIT EST AGE 40-64: CPT | Performed by: FAMILY MEDICINE

## 2021-02-16 RX ORDER — EPINEPHRINE 0.3 MG/.3ML
0.3 INJECTION SUBCUTANEOUS ONCE
Qty: 0.3 ML | Refills: 0 | Status: SHIPPED | OUTPATIENT
Start: 2021-02-16 | End: 2022-07-13

## 2021-02-16 RX ORDER — OXYCODONE HYDROCHLORIDE AND ACETAMINOPHEN 325; 5 MG/5ML; MG/5ML
SOLUTION ORAL
COMMUNITY
Start: 2020-12-02

## 2021-02-16 RX ORDER — HYDROXYCHLOROQUINE SULFATE 200 MG/1
200 TABLET, FILM COATED ORAL 2 TIMES DAILY
COMMUNITY
Start: 2020-12-17 | End: 2022-02-17 | Stop reason: ALTCHOICE

## 2021-02-16 RX ORDER — NYSTATIN 100000 [USP'U]/G
POWDER TOPICAL
COMMUNITY
Start: 2021-01-26

## 2021-02-16 RX ORDER — ALBUTEROL SULFATE 90 UG/1
AEROSOL, METERED RESPIRATORY (INHALATION)
Qty: 18 INHALER | Refills: 1 | Status: SHIPPED | OUTPATIENT
Start: 2021-02-16 | End: 2022-04-19

## 2021-02-16 RX ORDER — BACLOFEN 10 MG/1
10 TABLET ORAL 2 TIMES DAILY PRN
COMMUNITY
Start: 2020-12-29

## 2021-02-16 RX ORDER — FLASH GLUCOSE SENSOR
KIT MISCELLANEOUS
COMMUNITY
Start: 2021-01-26 | End: 2022-01-27 | Stop reason: SDUPTHER

## 2021-02-16 NOTE — PATIENT INSTRUCTIONS

## 2021-02-16 NOTE — PROGRESS NOTES
ADULT ANNUAL [facility]    NAME: Veronica Lam  AGE: 41 y o  SEX: female  : 1980     DATE: 2021     Assessment and Plan:     Problem List Items Addressed This Visit        Respiratory    Asthma     Asthma has been very stable  She has not needed albuterol rescue for a long time  Gave new one because hers          Relevant Medications    albuterol (PROVENTIL HFA,VENTOLIN HFA) 90 mcg/act inhaler       Cardiovascular and Mediastinum    Migraine without aura     She will continue Neurology follow-up  I did recommend supplementing with magnesium, vitamin B2 and melatonin as headache prevention  Relevant Medications    oxyCODONE-acetaminophen (ROXICET) 5-325 mg/5 mL solution    baclofen 10 mg tablet       Other    Class 1 obesity due to excess calories without serious comorbidity with body mass index (BMI) of 32 0 to 32 9 in adult       She has lost about 100 lb since her bariatric surgery in July  Urged her to keep up with healthy diet and increased exercise  Other Visit Diagnoses     Annual physical exam    -  Primary    BMI 32 0-32 9,adult        Bee sting allergy        Relevant Medications    EPINEPHrine (EPIPEN) 0 3 mg/0 3 mL SOAJ          Immunizations and preventive care screenings were discussed with patient today  Appropriate education was printed on patient's after visit summary  Counseling:  Dental Health: discussed importance of regular tooth brushing, flossing, and dental visits  · Exercise: the importance of regular exercise/physical activity was discussed  Recommend exercise 3-5 times per week for at least 30 minutes  BMI Counseling: Body mass index is 32 37 kg/m²  The BMI is above normal  Nutrition recommendations include decreasing portion sizes and encouraging healthy choices of fruits and vegetables  Exercise recommendations include moderate physical activity 150 minutes/week   No pharmacotherapy was ordered  No follow-ups on file  Chief Complaint:     Chief Complaint   Patient presents with    Physical Exam      History of Present Illness:     Adult Annual Physical   Patient here for a comprehensive physical exam  The patient reports problems - headaches  Sinusitis cleared but still with right forehead headaches different from migraines  Diet and Physical Activity  · Diet/Nutrition: well balanced diet, limited junk food, consuming 3-5 servings of fruits/vegetables daily and she has lost over 100 lbs since bariatric surgery  · Exercise: no formal exercise  Depression Screening  PHQ-9 Depression Screening    PHQ-9:   Frequency of the following problems over the past two weeks:           General Health  · Sleep: sleeps well and gets 7-8 hours of sleep on average  · Hearing: normal - bilateral   · Vision: no vision problems, most recent eye exam <1 year ago and wears contacts  · Dental: regular dental visits, no dental visits for >1 year, brushes teeth twice daily and flosses teeth occasionally  /GYN Health  · Patient is: postmenopausal  · Last menstrual period: 2016  · Contraceptive method: none  Review of Systems:     Review of Systems   Constitutional: Negative for activity change, chills, fatigue and fever  HENT: Negative for congestion, ear pain, sinus pressure and sore throat  Eyes: Negative for pain and visual disturbance  Respiratory: Negative for cough, chest tightness, shortness of breath and wheezing  Cardiovascular: Negative for chest pain, palpitations and leg swelling  Gastrointestinal: Negative for abdominal pain, blood in stool, constipation, diarrhea, nausea and vomiting  Endocrine: Negative for polydipsia and polyuria  Genitourinary: Negative for difficulty urinating, dysuria, frequency and urgency  Musculoskeletal: Negative for arthralgias, joint swelling and myalgias  Skin: Negative for rash     Neurological: Positive for light-headedness and headaches  Negative for dizziness, weakness and numbness  Hematological: Negative for adenopathy  Does not bruise/bleed easily  Psychiatric/Behavioral: Negative for dysphoric mood  The patient is not nervous/anxious  Past Medical History:     Past Medical History:   Diagnosis Date    Acid reflux     Anxiety     Asthma     Depression     Diabetes mellitus ([facility] )     Pre    Diverticulitis     Epileptic seizures ([facility] )     Medication related    Gestational diabetes mellitus     Hypertension     Lateral epicondylitis     Malignant tumor of cervix ([facility] )     between 9880-8674    Obstructive sleep apnea     CPAP    Paresthesias     Plantar fasciitis     Rectocele     Tinea corporis       Past Surgical History:     Past Surgical History:   Procedure Laterality Date    ARTHROSCOPY ANKLE Right 9/1/2017    Procedure: ANKLE ARTHOSCOPY WITH EXTENSIVE SYNOVECTOMY, OPEN ARTHOTOY LATERAL ANKLE WITH ANKLE STABILIZATION, BROSTOM REPAIR, INTERNAL BRACE AUGMENTATION, PERONEAL TENDON SYNOVECTOMY;  Surgeon: Derek Solorzano DPM;  Location: [facility];  Service: Podiatry    COLONOSCOPY  02/27/2017    HYSTERECTOMY  2015    RI COLONOSCOPY FLX DX W/COLLJ SPEC WHEN PFRMD N/A 2/27/2017    Procedure: COLONOSCOPY;  Surgeon: Domonique Mosqueda MD;  [facility]; Service: Gastroenterology    RI ESOPHAGOGASTRODUODENOSCOPY TRANSORAL DIAGNOSTIC N/A 9/7/2016    Procedure: EGD AND COLONOSCOPY;  Surgeon: Domonique Mosqueda MD;  [facility];   Service: Gastroenterology    TONSILLECTOMY      TUBAL LIGATION      UPPER GASTROINTESTINAL ENDOSCOPY  2016      Social History:        Social History     Socioeconomic History    Marital status: /Civil Union     Spouse name: None    Number of children: None    Years of education: None    Highest education level: None   Occupational History    None   Social Needs    Financial resource strain: None    Food insecurity     Worry: None     Inability: None    Transportation needs     Medical: None     Non-medical: None   Tobacco Use    Smoking status: Former Smoker     Quit date: 2013     Years since quittin 4    Smokeless tobacco: Never Used   Substance and Sexual Activity    Alcohol use: Yes     Frequency: Monthly or less     Comment: occasionally, maybe 1-2 times a month    Drug use: No    Sexual activity: Yes     Partners: Male   Lifestyle    Physical activity     Days per week: None     Minutes per session: None    Stress: None   Relationships    Social connections     Talks on phone: None     Gets together: None     Attends Sikh service: None     Active member of club or organization: None     Attends meetings of clubs or organizations: None     Relationship status: None    Intimate partner violence     Fear of current or ex partner: None     Emotionally abused: None     Physically abused: None     Forced sexual activity: None   Other Topics Concern    None   Social History Narrative    None      Family History:     Family History   Problem Relation Age of Onset    Anxiety disorder Mother     Depression Mother     Cancer Mother     Endometrial cancer Mother         under afe 48    Seizures Brother         Epilepsy    Other Brother         Suicide risk    Cancer Maternal Grandmother 50        lymphatic cancer    Other Other         cardiac disorder    Diabetes Other     Hypertension Other     Neuropathy Other     Thyroid disease Other     Breast cancer Paternal Grandmother 80    No Known Problems Father     No Known Problems Daughter     Cancer Maternal Grandfather     No Known Problems Paternal Grandfather     No Known Problems Daughter     No Known Problems Son     No Known Problems Brother     Leukemia Cousin     Cancer Cousin         lympatic      Current Medications:     Current Outpatient Medications   Medication Sig Dispense Refill    albuterol (PROVENTIL HFA,VENTOLIN HFA) 90 mcg/act inhaler 2 puffs up to qid prn wheezing 18 Inhaler 1    Ascorbic Acid, Vitamin C, (VITAMIN C) 100 MG tablet Take 1 capsule daily      baclofen 10 mg tablet Take 10 mg by mouth 2 (two) times a day as needed      Botulinum Toxin Type A (BOTOX) 200 units SOLR Inject 155 units into face and neck IM every 90 days      buPROPion (WELLBUTRIN SR) 150 mg 12 hr tablet Take 150 mg by mouth 2 (two) times a day  0    Continuous Blood Gluc Sensor (FreeStyle Larry 14 Day Sensor) MISC USE 1 SENSOR EVERY 14 DAYS TO CHECK SUGAR LEVELS      cyanocobalamin (VITAMIN B-12) 100 mcg tablet Take 1 tablet daily      EPINEPHrine (EPIPEN) 0 3 mg/0 3 mL SOAJ Inject 0 3 mL (0 3 mg total) into a muscle once for 1 dose 0 3 mL 0    FERROUS SULFATE-VITAMIN C PO Take 1 tablet once daily      HYDROcodone-acetaminophen (NORCO) 5-325 mg per tablet Take 1 tablet by mouth every 6 (six) hours as needed for pain Max Daily Amount: 4 tablets 30 tablet 0    hydroxychloroquine (PLAQUENIL) 200 mg tablet Take 200 mg by mouth 2 (two) times a day      LORazepam (ATIVAN) 0 5 mg tablet Take 0 5 mg by mouth 2 (two) times a day as needed for anxiety       methocarbamol (ROBAXIN) 500 mg tablet Take 1 tablet (500 mg total) by mouth 3 (three) times a day as needed for muscle spasms 21 tablet 0    methylphenidate (RITALIN) 10 mg tablet Take 10 mg by mouth daily as needed   0    methylphenidate (RITALIN) 20 MG tablet Take 20 mg by mouth 2 (two) times a day   0    nystatin (MYCOSTATIN) powder APPLY TOPICALLY TO AFFECTED AREAS TWICE A DAY      oxyCODONE-acetaminophen (PERCOCET) 5-325 mg per tablet Take 1 tablet by mouth every 6 (six) hours as needed      oxyCODONE-acetaminophen (ROXICET) 5-325 mg/5 mL solution TAKE 1 TABLET BY MOUTH EVERY 6 HOURS AS NEEDED FOR SEVERE PAIN (PAIN SCORE 7 10)   MAX DAILY  4 TABS      pantoprazole (PROTONIX) 40 mg tablet TAKE 1 TABLET BY MOUTH TWICE A DAY 30 MINUTES BEFORE BREAKFAST & DINNER      pregabalin (LYRICA) 150 mg capsule Take 150 mg by mouth 2 (two) times a day      rizatriptan (MAXALT) 10 MG tablet TAKE 1 TABLET (10 MG TOTAL) BY MOUTH ONCE AS NEEDED FOR MIGRAINE 9 tablet 1    SUMAtriptan Succinate 6 MG/0 5ML SOAJ Inject 0 5 mL (6 mg total) under the skin as needed (headache) 9 Cartridge 1    Galcanezumab-gnlm 120 MG/ML SOAJ Inject 2 injector pens (240 milligrams) month one as a loading dose then, inject 1 injector pen (120 milligrams) monthly thereafter  No current facility-administered medications for this visit  Allergies: Allergies   Allergen Reactions    Aspirin Anaphylaxis    Bee Venom Swelling and Wheezing    Penicillins Anaphylaxis and Rash    Bee Pollen     Ceclor [Cefaclor] Itching    Lactose GI Intolerance    Pollen Extract     Amoxicillin Rash    Doxycycline Rash    Erythromycin Rash      Physical Exam:     /80 (BP Location: Left arm, Patient Position: Sitting, Cuff Size: Standard)   Pulse 65   Resp 12   Ht 5' 2" (1 575 m)   Wt 80 3 kg (177 lb)   SpO2 97%   BMI 32 37 kg/m²     Physical Exam  Vitals signs and nursing note reviewed  Constitutional:       Appearance: She is well-developed  She is obese  HENT:      Head: Normocephalic and atraumatic  Right Ear: Tympanic membrane, ear canal and external ear normal       Left Ear: Tympanic membrane, ear canal and external ear normal       Mouth/Throat:      Mouth: Mucous membranes are moist    Eyes:      Extraocular Movements: Extraocular movements intact  Pupils: Pupils are equal, round, and reactive to light  Neck:      Musculoskeletal: Normal range of motion and neck supple  Thyroid: No thyromegaly  Vascular: No carotid bruit  Cardiovascular:      Rate and Rhythm: Normal rate and regular rhythm  Heart sounds: Normal heart sounds  No murmur  Pulmonary:      Effort: Pulmonary effort is normal  No respiratory distress  Breath sounds: Normal breath sounds     Abdominal:      General: Bowel sounds are normal  There is no distension  Palpations: Abdomen is soft  There is no mass  Musculoskeletal: Normal range of motion  Right lower leg: No edema  Left lower leg: No edema  Lymphadenopathy:      Cervical: No cervical adenopathy  Skin:     General: Skin is warm and dry  Findings: No erythema or rash  Neurological:      General: No focal deficit present  Mental Status: She is alert and oriented to person, place, and time  Cranial Nerves: No cranial nerve deficit        Deep Tendon Reflexes: Reflexes normal    Psychiatric:         Mood and Affect: Mood normal          Behavior: Behavior normal           MD Joaquin Doherty [facility] [id number]

## 2021-02-16 NOTE — ASSESSMENT & PLAN NOTE
Asthma has been very stable  She has not needed albuterol rescue for a long time    Gave new one because hers

## 2021-02-16 NOTE — ASSESSMENT & PLAN NOTE
She will continue Neurology follow-up  I did recommend supplementing with magnesium, vitamin B2 and melatonin as headache prevention

## 2021-02-16 NOTE — ASSESSMENT & PLAN NOTE
She has lost about 100 lb since her bariatric surgery in July  Urged her to keep up with healthy diet and increased exercise

## 2021-02-18 ENCOUNTER — TELEPHONE (OUTPATIENT)
Dept: FAMILY MEDICINE CLINIC | Facility: CLINIC | Age: 41
End: 2021-02-18

## 2021-03-01 ENCOUNTER — IMMUNIZATIONS (OUTPATIENT)
Dept: FAMILY MEDICINE CLINIC | Facility: HOSPITAL | Age: 41
End: 2021-03-01

## 2021-03-01 DIAGNOSIS — Z23 ENCOUNTER FOR IMMUNIZATION: Primary | ICD-10-CM

## 2021-03-01 PROCEDURE — 0012A SARS-COV-2 / COVID-19 MRNA VACCINE (MODERNA) 100 MCG: CPT

## 2021-03-01 PROCEDURE — 91301 SARS-COV-2 / COVID-19 MRNA VACCINE (MODERNA) 100 MCG: CPT

## 2021-03-19 ENCOUNTER — OFFICE VISIT (OUTPATIENT)
Dept: FAMILY MEDICINE CLINIC | Facility: CLINIC | Age: 41
End: 2021-03-19
Payer: COMMERCIAL

## 2021-03-19 VITALS
SYSTOLIC BLOOD PRESSURE: 110 MMHG | WEIGHT: 172 LBS | RESPIRATION RATE: 18 BRPM | BODY MASS INDEX: 31.65 KG/M2 | DIASTOLIC BLOOD PRESSURE: 80 MMHG | HEIGHT: 62 IN | HEART RATE: 96 BPM | OXYGEN SATURATION: 96 %

## 2021-03-19 DIAGNOSIS — H10.33 ACUTE CONJUNCTIVITIS OF BOTH EYES, UNSPECIFIED ACUTE CONJUNCTIVITIS TYPE: Primary | ICD-10-CM

## 2021-03-19 DIAGNOSIS — T78.40XS ALLERGY, SEQUELA: ICD-10-CM

## 2021-03-19 PROCEDURE — 99212 OFFICE O/P EST SF 10 MIN: CPT | Performed by: INTERNAL MEDICINE

## 2021-03-19 PROCEDURE — 3008F BODY MASS INDEX DOCD: CPT | Performed by: INTERNAL MEDICINE

## 2021-03-19 PROCEDURE — 1036F TOBACCO NON-USER: CPT | Performed by: INTERNAL MEDICINE

## 2021-03-19 RX ORDER — LORATADINE 10 MG/1
10 TABLET ORAL DAILY
Start: 2021-03-19

## 2021-03-19 RX ORDER — OFLOXACIN 3 MG/ML
1 SOLUTION/ DROPS OPHTHALMIC 4 TIMES DAILY
Qty: 5 ML | Refills: 0 | Status: SHIPPED | OUTPATIENT
Start: 2021-03-19 | End: 2021-03-24

## 2021-03-19 NOTE — PROGRESS NOTES
Assessment/Plan:      Diagnoses and all orders for this visit:    Acute conjunctivitis of both eyes, unspecified acute conjunctivitis type  -     ofloxacin (OCUFLOX) 0 3 % ophthalmic solution; Administer 1 drop to both eyes 4 (four) times a day for 5 days    Allergy, sequela  -     loratadine (CLARITIN) 10 mg tablet; Take 1 tablet (10 mg total) by mouth daily        Clinically looks like conjunctivitis  Will start on ofloxacin drops  Subjective:     Patient ID: Meño Natarajan is a 41 y o  female  Patient reported symptoms consistent with conjunctivitis, she said that she woke up today with the closed eyes, it was some purulent discharge from there  She denies any changes in her vision  Review of Systems   Eyes: Positive for discharge and itching  Negative for photophobia, pain, redness and visual disturbance  Objective:     Physical Exam  Constitutional:       General: She is not in acute distress  Appearance: She is not toxic-appearing  Eyes:      General: No scleral icterus  Right eye: No discharge  Left eye: No discharge  Extraocular Movements: Extraocular movements intact  Conjunctiva/sclera: Conjunctivae normal       Pupils: Pupils are equal, round, and reactive to light  Neurological:      Mental Status: She is alert

## 2021-11-23 ENCOUNTER — OFFICE VISIT (OUTPATIENT)
Dept: FAMILY MEDICINE CLINIC | Facility: CLINIC | Age: 41
End: 2021-11-23
Payer: COMMERCIAL

## 2021-11-23 VITALS
SYSTOLIC BLOOD PRESSURE: 126 MMHG | HEART RATE: 90 BPM | OXYGEN SATURATION: 98 % | DIASTOLIC BLOOD PRESSURE: 74 MMHG | TEMPERATURE: 99.4 F | WEIGHT: 168 LBS | HEIGHT: 62 IN | BODY MASS INDEX: 30.91 KG/M2 | RESPIRATION RATE: 20 BRPM

## 2021-11-23 DIAGNOSIS — L30.4 INTERTRIGO: ICD-10-CM

## 2021-11-23 DIAGNOSIS — H60.541 ECZEMA OF RIGHT EXTERNAL EAR: ICD-10-CM

## 2021-11-23 DIAGNOSIS — Z12.31 ENCOUNTER FOR SCREENING MAMMOGRAM FOR BREAST CANCER: Primary | ICD-10-CM

## 2021-11-23 DIAGNOSIS — Z23 NEED FOR INFLUENZA VACCINATION: ICD-10-CM

## 2021-11-23 PROCEDURE — 99213 OFFICE O/P EST LOW 20 MIN: CPT | Performed by: FAMILY MEDICINE

## 2021-11-23 PROCEDURE — 90686 IIV4 VACC NO PRSV 0.5 ML IM: CPT | Performed by: FAMILY MEDICINE

## 2021-11-23 RX ORDER — TRAZODONE HYDROCHLORIDE 100 MG/1
TABLET ORAL
COMMUNITY
Start: 2021-09-09

## 2021-11-23 RX ORDER — DULOXETIN HYDROCHLORIDE 30 MG/1
30 CAPSULE, DELAYED RELEASE ORAL DAILY
COMMUNITY
Start: 2021-07-20 | End: 2022-02-17 | Stop reason: DRUGHIGH

## 2021-11-23 RX ORDER — ASPIRIN 81 MG
100 TABLET, DELAYED RELEASE (ENTERIC COATED) ORAL 2 TIMES DAILY
COMMUNITY

## 2021-11-23 RX ORDER — SUCRALFATE 1 G/1
TABLET ORAL
COMMUNITY
Start: 2021-11-22

## 2021-11-23 RX ORDER — BIOTIN 1 MG
10000 TABLET ORAL
COMMUNITY

## 2021-11-23 RX ORDER — MOMETASONE FUROATE 1 MG/ML
SOLUTION TOPICAL DAILY
Qty: 60 ML | Refills: 0 | Status: SHIPPED | OUTPATIENT
Start: 2021-11-23 | End: 2022-01-07

## 2021-11-23 RX ORDER — FOLIC ACID 0.8 MG
400 TABLET ORAL
COMMUNITY

## 2021-12-08 ENCOUNTER — CLINICAL SUPPORT (OUTPATIENT)
Dept: FAMILY MEDICINE CLINIC | Facility: CLINIC | Age: 41
End: 2021-12-08
Payer: COMMERCIAL

## 2021-12-08 DIAGNOSIS — Z11.1 ENCOUNTER FOR PPD TEST: Primary | ICD-10-CM

## 2021-12-08 PROCEDURE — 86580 TB INTRADERMAL TEST: CPT | Performed by: FAMILY MEDICINE

## 2021-12-17 ENCOUNTER — CLINICAL SUPPORT (OUTPATIENT)
Dept: FAMILY MEDICINE CLINIC | Facility: CLINIC | Age: 41
End: 2021-12-17
Payer: COMMERCIAL

## 2021-12-17 VITALS — TEMPERATURE: 97.9 F

## 2021-12-17 DIAGNOSIS — Z11.1 ENCOUNTER FOR PPD TEST: Primary | ICD-10-CM

## 2021-12-17 PROCEDURE — 86580 TB INTRADERMAL TEST: CPT

## 2021-12-20 ENCOUNTER — CLINICAL SUPPORT (OUTPATIENT)
Dept: FAMILY MEDICINE CLINIC | Facility: CLINIC | Age: 41
End: 2021-12-20

## 2021-12-20 DIAGNOSIS — Z11.1 ENCOUNTER FOR PPD SKIN TEST READING: Primary | ICD-10-CM

## 2021-12-20 LAB
INDURATION: 0 MM
INDURATION: 0 MM
TB SKIN TEST: NEGATIVE
TB SKIN TEST: NEGATIVE

## 2021-12-22 ENCOUNTER — OFFICE VISIT (OUTPATIENT)
Dept: FAMILY MEDICINE CLINIC | Facility: CLINIC | Age: 41
End: 2021-12-22
Payer: COMMERCIAL

## 2021-12-22 VITALS
OXYGEN SATURATION: 97 % | DIASTOLIC BLOOD PRESSURE: 82 MMHG | WEIGHT: 166.5 LBS | SYSTOLIC BLOOD PRESSURE: 124 MMHG | TEMPERATURE: 98.1 F | HEART RATE: 100 BPM | RESPIRATION RATE: 20 BRPM | HEIGHT: 62 IN | BODY MASS INDEX: 30.64 KG/M2
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
## 2021-12-22 DIAGNOSIS — R21 RASH: ICD-10-CM

## 2021-12-22 PROCEDURE — 99213 OFFICE O/P EST LOW 20 MIN: CPT | Performed by: FAMILY MEDICINE

## 2021-12-22 RX ORDER — TRIAMCINOLONE ACETONIDE 1 MG/G
CREAM TOPICAL 2 TIMES DAILY
Qty: 30 G | Refills: 0 | Status: SHIPPED | OUTPATIENT
Start: 2021-12-22 | End: 2022-01-07

## 2021-12-30 ENCOUNTER — TELEMEDICINE (OUTPATIENT)
Dept: FAMILY MEDICINE CLINIC | Facility: CLINIC | Age: 41
End: 2021-12-30
Payer: COMMERCIAL

## 2021-12-30 DIAGNOSIS — U07.1 COVID-19 VIRUS INFECTION: Primary | ICD-10-CM

## 2021-12-30 PROCEDURE — G2012 BRIEF CHECK IN BY MD/QHP: HCPCS | Performed by: FAMILY MEDICINE

## 2022-01-03 ENCOUNTER — TELEPHONE (OUTPATIENT)
Dept: FAMILY MEDICINE CLINIC | Facility: CLINIC | Age: 42
End: 2022-01-03

## 2022-01-03 ENCOUNTER — PATIENT MESSAGE (OUTPATIENT)
Dept: FAMILY MEDICINE CLINIC | Facility: CLINIC | Age: 42
End: 2022-01-03

## 2022-01-03 DIAGNOSIS — Z86.16 PERSONAL HISTORY OF COVID-19: Primary | ICD-10-CM

## 2022-01-03 NOTE — TELEPHONE ENCOUNTER
----- Message from Leon Liu sent at 1/3/2022  6:00 PM EST -----  Regarding: Covid test  Hi Dr Mone Sullivan  My job knows that it might come back positive for months but I think their thinking isI took another home test the other day which came back negative  They said because the home tests arent as reliable as a professional test provided by a dr, they want a professional test to make sure if the results  I really dont know what else to say after this because I cant work without it       [facility] chart

## 2022-01-03 NOTE — TELEPHONE ENCOUNTER
I'm wondering if this is something we can use the rapid antigen tests that we have in office for  Please advise

## 2022-01-04 NOTE — TELEPHONE ENCOUNTER
From: Jaylen Boggs  To: Glendy Simon MD  Sent: 1/3/2022 10:31 AM EST  Subject: Covid test    Good morning all  I hope everyone had had a wonderful holiday season  Im writing this message to follow up on my phone call visit with Dr Giselle Silva, DO  I requested that visit with her to get a covid test to go back to work  She explained that the test may come back positive for months so we decided to write a return to work note instead  My 1 employer accepted that and the other still wants a professional covid test since I did the take home covid test which showed positive  I have explained what Dr Cheryl Rebollar explained and they said that was fine  They would just rather a professional test done with the return to work note  Would you please order me a test please? I appreciate your time and energy in reading and doing this for me  Have a wonderful week       Jaylen Boggs

## 2022-01-06 DIAGNOSIS — H60.541 ECZEMA OF RIGHT EXTERNAL EAR: ICD-10-CM
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
## 2022-01-07 RX ORDER — TRIAMCINOLONE ACETONIDE 1 MG/G
CREAM TOPICAL
Qty: 30 G | Refills: 0 | Status: SHIPPED | OUTPATIENT
Start: 2022-01-07 | End: 2022-05-11

## 2022-01-07 RX ORDER — MOMETASONE FUROATE 1 MG/ML
SOLUTION TOPICAL
Qty: 60 ML | Refills: 0 | Status: SHIPPED | OUTPATIENT
Start: 2022-01-07

## 2022-01-27 ENCOUNTER — TELEMEDICINE (OUTPATIENT)
Dept: FAMILY MEDICINE CLINIC | Facility: CLINIC | Age: 42
End: 2022-01-27
Payer: COMMERCIAL

## 2022-01-27 DIAGNOSIS — J01.90 ACUTE NON-RECURRENT SINUSITIS, UNSPECIFIED LOCATION: Primary | ICD-10-CM

## 2022-01-27 DIAGNOSIS — E16.2 HYPOGLYCEMIA: ICD-10-CM

## 2022-01-27 DIAGNOSIS — R56.9 SEIZURE (HCC): ICD-10-CM

## 2022-01-27 PROCEDURE — 99214 OFFICE O/P EST MOD 30 MIN: CPT | Performed by: FAMILY MEDICINE

## 2022-01-27 RX ORDER — FLASH GLUCOSE SENSOR
KIT MISCELLANEOUS
Qty: 1 EACH | Refills: 11 | Status: SHIPPED | OUTPATIENT
Start: 2022-01-27

## 2022-01-27 RX ORDER — SULFAMETHOXAZOLE AND TRIMETHOPRIM 800; 160 MG/1; MG/1
1 TABLET ORAL EVERY 12 HOURS SCHEDULED
Qty: 20 TABLET | Refills: 0 | Status: SHIPPED | OUTPATIENT
Start: 2022-01-27 | End: 2022-02-06

## 2022-01-27 NOTE — ASSESSMENT & PLAN NOTE
I have ordered her hemoglobin A1c to determine average blood sugar over the past 3 months  In addition I will order her freestyle sensor see if she can monitor blood sugar

## 2022-01-27 NOTE — PROGRESS NOTES
Virtual Regular Visit    Verification of patient location:    Patient is located in the following state in which I hold an active license [place]      Assessment/Plan:    Will treat sinusitis with Bactrim DS b i d  For 10 day course  She may continue with over-the-counter DayQuil along with increased hydration with water  She should contact me if her symptoms are not improving  Problem List Items Addressed This Visit        Endocrine    Hypoglycemia     I have ordered her hemoglobin A1c to determine average blood sugar over the past 3 months  In addition I will order her freestyle sensor see if she can monitor blood sugar  Relevant Medications    Continuous Blood Gluc Sensor (FreeStyle Larry 14 Day Sensor) MISC    Other Relevant Orders    HEMOGLOBIN A1C W/ EAG ESTIMATION       Other    Seizure ([facility] )     She had an ER visit last night for a seizure which was likely due to hypoglycemia after taking Ozempic  No more Ozempic and recommended discussion with her neurologist next week at her scheduled appointment  Other Visit Diagnoses     Acute non-recurrent sinusitis, unspecified location    -  Primary    Relevant Medications    sulfamethoxazole-trimethoprim (BACTRIM DS) 800-160 mg per tablet               Reason for visit is   Chief Complaint   Patient presents with    Virtual Regular Visit        Encounter provider Halina Park MD    Provider located at [address] [facility] [place] [id number] [phone]      Recent Visits  No visits were found meeting these conditions  Showing recent visits within past 7 days and meeting all other requirements  Today's Visits  Date Type Provider Dept   01/27/22 Ada Almaguer MD [place] [address] Primary Care   Showing today's visits and meeting all other requirements  Future Appointments  No visits were found meeting these conditions    Showing future appointments within next 150 days and meeting all other requirements       The patient was identified by name and date of birth  lBake Murray was informed that this is a telemedicine visit and that the visit is being conducted through Telephone  My office door was closed  No one else was in the room  She acknowledged consent and understanding of privacy and security of the video platform  The patient has agreed to participate and understands they can discontinue the visit at any time  Patient is aware this is a billable service  It was my intent to perform this visit via video technology but the patient was not able to do a video connection so the visit was completed via audio telephone only  Subjective  Gissel Maydalizeth Sanders is a 35 y o  female    She is having a virtual visit to discuss sinus symptoms which began just over a week ago  She complains of sinus congestion and postnasal drainage and cough  She has pressure across the forehead and under both eyes but worse on the left side  She is getting a little relief from [address], NyQuil  She had COVID to infection in December and her son tested positive recently that she has been negative  In addition she had a seizure yesterday which was witnessed by her   She was taken to [facility]    She thinks this was related to very low blood sugar  After her bariatric surgery she has been continuing to follow-up with weight management  Because she still is in the obese range, they gave her prescription for aseptic to help with weight loss  When she was seen at the hospital, her blood sugar was 63 she is concerned that aseptic make her hypoglycemic which brought on the seizure  The ER doctor is reporting her to the state and she is very concerned about losing her 's license  She is scheduled to see Neurology early next week  In addition she is interested in getting a [employer] to monitor blood sugars    She will pay for this out-of-pocket  Past Medical History:   Diagnosis Date    Acid reflux     Anxiety     Asthma     Depression     Diabetes mellitus ([facility] )     Pre    Diverticulitis     Epileptic seizures ([facility] )     Medication related    Gestational diabetes mellitus     Hypertension     Lateral epicondylitis     Malignant tumor of cervix ([facility] )     between 2745-4980    Obstructive sleep apnea     CPAP    Paresthesias     Plantar fasciitis     Rectocele     Tinea corporis        Past Surgical History:   Procedure Laterality Date    ARTHROSCOPY ANKLE Right 9/1/2017    Procedure: ANKLE ARTHOSCOPY WITH EXTENSIVE SYNOVECTOMY, OPEN ARTHOTOY LATERAL ANKLE WITH ANKLE STABILIZATION, BROSTOM REPAIR, INTERNAL BRACE AUGMENTATION, PERONEAL TENDON SYNOVECTOMY;  Surgeon: Farhana Ferrari DPM;  [facility];  Service: Podiatry    COLONOSCOPY  02/27/2017    GASTRIC BYPASS  07/2020    HEMORRHOID SURGERY  08/2021    HYSTERECTOMY  2015    [place] COLONOSCOPY FLX DX W/COLLJ SPEC WHEN PFRMD N/A 2/27/2017    Procedure: COLONOSCOPY;  Surgeon: Mel Dudley MD;  [facility]; Service: Gastroenterology    [place] ESOPHAGOGASTRODUODENOSCOPY TRANSORAL DIAGNOSTIC N/A 9/7/2016    Procedure: EGD AND COLONOSCOPY;  Surgeon: Mel Dudley MD;  [facility];   Service: Gastroenterology    TONSILLECTOMY      TUBAL LIGATION      UPPER GASTROINTESTINAL ENDOSCOPY  2016       Current Outpatient Medications   Medication Sig Dispense Refill    albuterol (PROVENTIL HFA,VENTOLIN HFA) 90 mcg/act inhaler 2 puffs up to qid prn wheezing 18 Inhaler 1    Ascorbic Acid 500 MG CAPS Take 500 mg by mouth (Patient not taking: Reported on 11/23/2021 )      Ascorbic Acid, Vitamin C, (VITAMIN C) 100 MG tablet Take 1 capsule daily      baclofen 10 mg tablet Take 10 mg by mouth 2 (two) times a day as needed      Biotin 1000 MCG tablet Take 10,000 mcg by mouth      Botulinum Toxin Type A (BOTOX) 200 units SOLR Inject 155 units into face and neck IM every 90 days      buPROPion (WELLBUTRIN SR) 150 mg 12 hr tablet Take 150 mg by mouth 2 (two) times a day  0    Continuous Blood Gluc Sensor (FreeStyle Larry 14 Day Sensor) MISC Use 1 sensor q 14 days to check sugar levels 1 each 11    cyanocobalamin (CVS Vitamin B-12) 1000 MCG tablet Take 1,000 mcg by mouth      cyanocobalamin (VITAMIN B-12) 100 mcg tablet Take 1 tablet daily      Docusate Sodium 100 MG capsule Take 100 mg by mouth 2 (two) times a day      DULoxetine (CYMBALTA) 30 mg delayed release capsule Take 30 mg by mouth daily      EPINEPHrine (EPIPEN) 0 3 mg/0 3 mL SOAJ Inject 0 3 mL (0 3 mg total) into a muscle once for 1 dose 0 3 mL 0    ERGOCALCIFEROL PO Take 2,400 mcg by mouth      FERROUS SULFATE-VITAMIN C PO Take 1 tablet once daily      Galcanezumab-gnlm 120 MG/ML SOAJ Inject 2 injector pens (240 milligrams) month one as a loading dose then, inject 1 injector pen (120 milligrams) monthly thereafter        HYDROcodone-acetaminophen (NORCO) 5-325 mg per tablet Take 1 tablet by mouth every 6 (six) hours as needed for pain Max Daily Amount: 4 tablets 30 tablet 0    hydroxychloroquine (PLAQUENIL) 200 mg tablet Take 200 mg by mouth 2 (two) times a day        loratadine (CLARITIN) 10 mg tablet Take 1 tablet (10 mg total) by mouth daily      LORazepam (ATIVAN) 0 5 mg tablet Take 0 5 mg by mouth 2 (two) times a day as needed for anxiety       Magnesium 500 MG CAPS Take 400 mg by mouth      methocarbamol (ROBAXIN) 500 mg tablet Take 1 tablet (500 mg total) by mouth 3 (three) times a day as needed for muscle spasms 21 tablet 0    methylphenidate (RITALIN) 10 mg tablet Take 10 mg by mouth daily as needed    0    methylphenidate (RITALIN) 20 MG tablet Take 20 mg by mouth 2 (two) times a day Pt reports taking tid   0    mometasone (ELOCON) 0 1 % lotion APPLY TO AFFECTED AREA TOPICALLY EVERY DAY 60 mL 0    Multiple Vitamin (MULTIVITAMIN ADULT PO) Take 1 tablet by mouth every morning      nystatin (MYCOSTATIN) powder APPLY TOPICALLY TO AFFECTED AREAS TWICE A DAY      oxyCODONE-acetaminophen (ROXICET) 5-325 mg/5 mL solution TAKE 1 TABLET BY MOUTH EVERY 6 HOURS AS NEEDED FOR SEVERE PAIN (PAIN SCORE 7 10)  MAX DAILY  4 TABS      pantoprazole (PROTONIX) 40 mg tablet TAKE 1 TABLET BY MOUTH TWICE A DAY 30 MINUTES BEFORE BREAKFAST & DINNER      pregabalin (LYRICA) 150 mg capsule Take 150 mg by mouth 2 (two) times a day      RIBOFLAVIN PO Take 400 mg by mouth      rizatriptan (MAXALT) 10 MG tablet TAKE 1 TABLET (10 MG TOTAL) BY MOUTH ONCE AS NEEDED FOR MIGRAINE 9 tablet 1    sucralfate (CARAFATE) 1 g tablet       sulfamethoxazole-trimethoprim (BACTRIM DS) 800-160 mg per tablet Take 1 tablet by mouth every 12 (twelve) hours for 10 days 20 tablet 0    SUMAtriptan Succinate 6 MG/0 5ML SOAJ Inject 0 5 mL (6 mg total) under the skin as needed (headache) 9 Cartridge 1    traZODone (DESYREL) 100 mg tablet       triamcinolone (KENALOG) 0 1 % cream APPLY TO AFFECTED AREA TWICE A DAY 30 g 0     No current facility-administered medications for this visit  Allergies   Allergen Reactions    Aspirin Anaphylaxis    Bee Venom Swelling and Wheezing    Penicillins Anaphylaxis and Rash    Bee Pollen     Ceclor [Cefaclor] Itching    Dust Mite Extract Other (See Comments)     congestion    Grass Pollen(K-O-R-T-Swt Osito) Other (See Comments)     congestion    Lactose - Food Allergy GI Intolerance    Other Other (See Comments)     congestion    Pollen Extract     Amoxicillin Rash    Doxycycline Rash    Erythromycin Rash    Nickel Rash       Review of Systems   Constitutional: Positive for chills and fatigue  Negative for fever  HENT: Positive for congestion and sinus pain  Respiratory: Positive for cough  Neurological: Positive for seizures and headaches  Negative for dizziness  Video Exam    There were no vitals filed for this visit      Physical Exam     I spent 26 minutes with patient today in which greater than 50% of the time was spent in counseling/coordination of care regarding management    VIRTUAL VISIT DISCLAIMER      Kenisha Chacko verbally agrees to participate in [employer]  Pt is aware that [employer] could be limited without vital signs or the ability to perform a full hands-on physical exam  Tram Garza understands she or the provider may request at any time to terminate the video visit and request the patient to seek care or treatment in person

## 2022-01-27 NOTE — ASSESSMENT & PLAN NOTE
She had an ER visit last night for a seizure which was likely due to hypoglycemia after taking Ozempic  No more Ozempic and recommended discussion with her neurologist next week at her scheduled appointment

## 2022-02-17 ENCOUNTER — OFFICE VISIT (OUTPATIENT)
Dept: FAMILY MEDICINE CLINIC | Facility: CLINIC | Age: 42
End: 2022-02-17
Payer: COMMERCIAL

## 2022-02-17 VITALS
OXYGEN SATURATION: 98 % | TEMPERATURE: 99.9 F | HEIGHT: 62 IN | RESPIRATION RATE: 18 BRPM | BODY MASS INDEX: 30.36 KG/M2 | SYSTOLIC BLOOD PRESSURE: 122 MMHG | WEIGHT: 165 LBS | DIASTOLIC BLOOD PRESSURE: 62 MMHG | HEART RATE: 86 BPM

## 2022-02-17 DIAGNOSIS — F33.42 RECURRENT MAJOR DEPRESSIVE DISORDER, IN FULL REMISSION (HCC): ICD-10-CM

## 2022-02-17 DIAGNOSIS — Z00.00 ANNUAL PHYSICAL EXAM: Primary | ICD-10-CM

## 2022-02-17 DIAGNOSIS — M79.7 FIBROMYALGIA: ICD-10-CM

## 2022-02-17 DIAGNOSIS — R56.9 SEIZURE (HCC): ICD-10-CM

## 2022-02-17 DIAGNOSIS — F41.9 ANXIETY: ICD-10-CM

## 2022-02-17 PROCEDURE — 99396 PREV VISIT EST AGE 40-64: CPT | Performed by: FAMILY MEDICINE

## 2022-02-17 RX ORDER — PREGABALIN 150 MG/1
150 CAPSULE ORAL 3 TIMES DAILY
Qty: 90 CAPSULE | Refills: 1
Start: 2022-02-17

## 2022-02-17 RX ORDER — DULOXETIN HYDROCHLORIDE 60 MG/1
60 CAPSULE, DELAYED RELEASE ORAL DAILY
Qty: 30 CAPSULE | Refills: 2
Start: 2022-02-17

## 2022-02-17 RX ORDER — DULOXETIN HYDROCHLORIDE 60 MG/1
CAPSULE, DELAYED RELEASE ORAL
COMMUNITY
Start: 2022-01-16 | End: 2022-02-17 | Stop reason: DRUGHIGH

## 2022-02-17 RX ORDER — BUPROPION HYDROCHLORIDE 150 MG/1
TABLET, EXTENDED RELEASE ORAL
Qty: 90 TABLET | Refills: 0
Start: 2022-02-17

## 2022-02-17 NOTE — PATIENT INSTRUCTIONS

## 2022-02-17 NOTE — ASSESSMENT & PLAN NOTE
She had a seizure January 26 due to hypoglycemia  She had been started on Ozempic and her blood sugar at the time she got to the hospital was 63  She was treated with IV hydration and worked up with EEG and MRI    Ozempic was discontinued and bupropion has been reduced from 150 mg b i d  to 150 mg daily by her psychiatrist   I will complete her paperwork to allow her to drive and she will cont follow-up with her neurologist and psychiatrist

## 2022-02-17 NOTE — ASSESSMENT & PLAN NOTE
Recent medication changes by her psychiatrist include reduction of Wellbutrin dose to 150 mg daily, increase the Cymbalta to 60 mg daily, and increase of Lyrica to 150 mg t i d

## 2022-02-17 NOTE — PROGRESS NOTES
ADULT ANNUAL [facility]    NAME: Kassy Williamson  AGE: 35 y o  SEX: female  : 1980     DATE: 2022     Assessment and Plan:     Problem List Items Addressed This Visit        Other    Anxiety      Recent medication changes by her psychiatrist include reduction of Wellbutrin dose to 150 mg daily, increase the Cymbalta to 60 mg daily, and increase of Lyrica to 150 mg t i d  Relevant Medications    buPROPion (WELLBUTRIN SR) 150 mg 12 hr tablet    Fibromyalgia    Relevant Medications    pregabalin (Lyrica) 150 mg capsule    Seizure ([facility] )     She had a seizure  due to hypoglycemia  She had been started on Ozempic and her blood sugar at the time she got to the hospital was 63  She was treated with IV hydration and worked up with EEG and MRI  Ozempic was discontinued and bupropion has been reduced from 150 mg b i d  to 150 mg daily by her psychiatrist   I will complete her paperwork to allow her to drive and she will cont follow-up with her neurologist and psychiatrist            Other Visit Diagnoses     Annual physical exam    -  Primary    Recurrent major depressive disorder, in full remission ([facility] )        Relevant Medications    buPROPion (WELLBUTRIN SR) 150 mg 12 hr tablet    DULoxetine (CYMBALTA) 60 mg delayed release capsule          Immunizations and preventive care screenings were discussed with patient today  Appropriate education was printed on patient's after visit summary  Counseling:  · Exercise: the importance of regular exercise/physical activity was discussed  Recommend exercise 3-5 times per week for at least 30 minutes  BMI Counseling: Body mass index is 30 18 kg/m²  The BMI is above normal  Nutrition recommendations include decreasing portion sizes and encouraging healthy choices of fruits and vegetables  Exercise recommendations include moderate physical activity 150 minutes/week   No pharmacotherapy was ordered  Rationale for BMI follow-up plan is due to patient being overweight or obese  No follow-ups on file  Chief Complaint:     Chief Complaint   Patient presents with    Physical Exam      History of Present Illness:     Adult Annual Physical   Patient here for a comprehensive physical exam  The patient reports problems - chronic problems with fibro, ADD but nothing acute  Diet and Physical Activity  · Diet/Nutrition: well balanced diet and consuming 3-5 servings of fruits/vegetables daily  · Exercise: walking  Depression Screening  PHQ-2/9 Depression Screening         General Health  · Sleep: sleeps well and gets 7-8 hours of sleep on average  · Hearing: normal - bilateral   · Vision: no vision problems, most recent eye exam <1 year ago and wears glasses  · Dental: regular dental visits, brushes teeth twice daily and flosses teeth occasionally  /GYN Health  · Patient is: s/p hyster but still has ovaries  · Last menstrual period: 2016  · Contraceptive method: none  Review of Systems:     Review of Systems   Constitutional: Negative for activity change, chills, fatigue and fever  HENT: Negative for congestion, ear pain, sinus pressure and sore throat  Eyes: Negative for pain and visual disturbance  Respiratory: Negative for cough, chest tightness, shortness of breath and wheezing  Cardiovascular: Negative for chest pain, palpitations and leg swelling  Gastrointestinal: Negative for abdominal pain, blood in stool, constipation, diarrhea, nausea and vomiting  Endocrine: Negative for polydipsia and polyuria  Genitourinary: Negative for difficulty urinating, dysuria, frequency and urgency  Musculoskeletal: Positive for arthralgias  Negative for joint swelling and myalgias  Skin: Negative for rash  Neurological: Positive for headaches  Negative for dizziness, weakness and numbness  Hematological: Negative for adenopathy   Does not bruise/bleed easily  Psychiatric/Behavioral: Negative for dysphoric mood  The patient is not nervous/anxious  Past Medical History:     Past Medical History:   Diagnosis Date    Acid reflux     Anxiety     Asthma     Depression     Diabetes mellitus ([facility] )     Pre    Diverticulitis     Epileptic seizures ([facility] )     Medication related    Gestational diabetes mellitus     Hypertension     Lateral epicondylitis     Malignant tumor of cervix ([facility] )     between 0485-6125    Obstructive sleep apnea     CPAP    Paresthesias     Plantar fasciitis     Rectocele     Tinea corporis       Past Surgical History:     Past Surgical History:   Procedure Laterality Date    ARTHROSCOPY ANKLE Right 2017    Procedure: ANKLE ARTHOSCOPY WITH EXTENSIVE SYNOVECTOMY, OPEN ARTHOTOY LATERAL ANKLE WITH ANKLE STABILIZATION, BROSTOM REPAIR, INTERNAL BRACE AUGMENTATION, PERONEAL TENDON SYNOVECTOMY;  Surgeon: Sandip Ybarra DPM;  [facility];  Service: Podiatry    COLONOSCOPY  2017    GASTRIC BYPASS  2020    HEMORRHOID SURGERY  2021    HYSTERECTOMY      [facility] COLONOSCOPY FLX DX W/COLLJ SPEC WHEN PFRMD N/A 2017    Procedure: COLONOSCOPY;  Surgeon: Remi Lanier MD;  [facility]; Service: Gastroenterology    AL ESOPHAGOGASTRODUODENOSCOPY TRANSORAL DIAGNOSTIC N/A 2016    Procedure: EGD AND COLONOSCOPY;  Surgeon: Remi Lanier MD;  [facility];   Service: Gastroenterology    TONSILLECTOMY      TUBAL LIGATION      UPPER GASTROINTESTINAL ENDOSCOPY        Social History:     Social History     Socioeconomic History    Marital status: /Civil Union     Spouse name: None    Number of children: None    Years of education: None    Highest education level: None   Occupational History    None   Tobacco Use    Smoking status: Former Smoker     Quit date: 2013     Years since quittin 4    Smokeless tobacco: Never Used   Substance and Sexual Activity  Alcohol use: Yes     Comment: occasionally, maybe 1-2 times a month    Drug use: No    Sexual activity: Yes     Partners: Male   Other Topics Concern    None   Social History Narrative    None     Social Determinants of Health     Financial Resource Strain: Not on file   Food Insecurity: Not on file   Transportation Needs: Not on file   Physical Activity: Not on file   Stress: Not on file   Social Connections: Not on file   Intimate Partner Violence: Not on file   Housing Stability: Not on file      Family History:     Family History   Problem Relation Age of Onset    Anxiety disorder Mother     Depression Mother     Cancer Mother     Endometrial cancer Mother         under afe 48    Seizures Brother         Epilepsy    Other Brother         Suicide risk    Cancer Maternal Grandmother 50        lymphatic cancer    Other Other         cardiac disorder    Diabetes Other     Hypertension Other     Neuropathy Other     Thyroid disease Other     Breast cancer Paternal Grandmother 80    No Known Problems Father     No Known Problems Daughter     Cancer Maternal Grandfather     No Known Problems Paternal Grandfather     No Known Problems Daughter     No Known Problems Son     No Known Problems Brother     Leukemia Cousin     Cancer Cousin         lympatic      Current Medications:     Current Outpatient Medications   Medication Sig Dispense Refill    albuterol (PROVENTIL HFA,VENTOLIN HFA) 90 mcg/act inhaler 2 puffs up to qid prn wheezing 18 Inhaler 1    Ascorbic Acid, Vitamin C, (VITAMIN C) 100 MG tablet Take 1 capsule daily      baclofen 10 mg tablet Take 10 mg by mouth 2 (two) times a day as needed      Biotin 1000 MCG tablet Take 10,000 mcg by mouth      Botulinum Toxin Type A (BOTOX) 200 units SOLR Inject 155 units into face and neck IM every 90 days      buPROPion (WELLBUTRIN SR) 150 mg 12 hr tablet 1 po daily 90 tablet 0    Continuous Blood Gluc Sensor (FreeStyle Larry 14 Day Sensor) MISC Use 1 sensor q 14 days to check sugar levels 1 each 11    cyanocobalamin (CVS Vitamin B-12) 1000 MCG tablet Take 1,000 mcg by mouth      cyanocobalamin (VITAMIN B-12) 100 mcg tablet Take 1 tablet daily      Docusate Sodium 100 MG capsule Take 100 mg by mouth 2 (two) times a day      ERGOCALCIFEROL PO Take 2,400 mcg by mouth      FERROUS SULFATE-VITAMIN C PO Take 1 tablet once daily      HYDROcodone-acetaminophen (NORCO) 5-325 mg per tablet Take 1 tablet by mouth every 6 (six) hours as needed for pain Max Daily Amount: 4 tablets 30 tablet 0    loratadine (CLARITIN) 10 mg tablet Take 1 tablet (10 mg total) by mouth daily      LORazepam (ATIVAN) 0 5 mg tablet Take 0 5 mg by mouth 2 (two) times a day as needed for anxiety       Magnesium 500 MG CAPS Take 400 mg by mouth      methocarbamol (ROBAXIN) 500 mg tablet Take 1 tablet (500 mg total) by mouth 3 (three) times a day as needed for muscle spasms 21 tablet 0    methylphenidate (RITALIN) 10 mg tablet Take 10 mg by mouth daily as needed    0    methylphenidate (RITALIN) 20 MG tablet Take 20 mg by mouth 2 (two) times a day Pt reports taking tid   0    Multiple Vitamin (MULTIVITAMIN ADULT PO) Take 1 tablet by mouth every morning      nystatin (MYCOSTATIN) powder APPLY TOPICALLY TO AFFECTED AREAS TWICE A DAY      oxyCODONE-acetaminophen (ROXICET) 5-325 mg/5 mL solution TAKE 1 TABLET BY MOUTH EVERY 6 HOURS AS NEEDED FOR SEVERE PAIN (PAIN SCORE 7 10)   MAX DAILY  4 TABS      pantoprazole (PROTONIX) 40 mg tablet TAKE 1 TABLET BY MOUTH TWICE A DAY 30 MINUTES BEFORE BREAKFAST & DINNER      pregabalin (Lyrica) 150 mg capsule Take 1 capsule (150 mg total) by mouth 3 (three) times a day 90 capsule 1    RIBOFLAVIN PO Take 400 mg by mouth      rizatriptan (MAXALT) 10 MG tablet TAKE 1 TABLET (10 MG TOTAL) BY MOUTH ONCE AS NEEDED FOR MIGRAINE 9 tablet 1    sucralfate (CARAFATE) 1 g tablet       SUMAtriptan Succinate 6 MG/0 5ML SOAJ Inject 0 5 mL (6 mg total) under the skin as needed (headache) 9 Cartridge 1    traZODone (DESYREL) 100 mg tablet       triamcinolone (KENALOG) 0 1 % cream APPLY TO AFFECTED AREA TWICE A DAY 30 g 0    Ascorbic Acid 500 MG CAPS Take 500 mg by mouth (Patient not taking: Reported on 11/23/2021 )      DULoxetine (CYMBALTA) 60 mg delayed release capsule Take 1 capsule (60 mg total) by mouth daily 30 capsule 2    EPINEPHrine (EPIPEN) 0 3 mg/0 3 mL SOAJ Inject 0 3 mL (0 3 mg total) into a muscle once for 1 dose 0 3 mL 0    mometasone (ELOCON) 0 1 % lotion APPLY TO AFFECTED AREA TOPICALLY EVERY DAY (Patient not taking: Reported on 2/17/2022) 60 mL 0     No current facility-administered medications for this visit  Allergies: Allergies   Allergen Reactions    Aspirin Anaphylaxis    Bee Venom Swelling and Wheezing    Penicillins Anaphylaxis and Rash    Bee Pollen     Ceclor [Cefaclor] Itching    Dust Mite Extract Other (See Comments)     congestion    Grass Pollen(K-O-R-T-Swt Osito) Other (See Comments)     congestion    Lactose - Food Allergy GI Intolerance    Other Other (See Comments)     congestion    Pollen Extract     Amoxicillin Rash    Doxycycline Rash    Erythromycin Rash    Nickel Rash      Physical Exam:     /62 (BP Location: Left arm, Patient Position: Sitting, Cuff Size: Large)   Pulse 86   Temp 99 9 °F (37 7 °C) (Tympanic)   Resp 18   Ht 5' 2" (1 575 m)   Wt 74 8 kg (165 lb)   SpO2 98%   BMI 30 18 kg/m²     Physical Exam  Vitals and nursing note reviewed  Constitutional:       Appearance: Normal appearance  HENT:      Head: Normocephalic and atraumatic  Right Ear: Tympanic membrane, ear canal and external ear normal       Left Ear: Tympanic membrane, ear canal and external ear normal       Mouth/Throat:      Mouth: Mucous membranes are moist    Eyes:      Extraocular Movements: Extraocular movements intact  Pupils: Pupils are equal, round, and reactive to light     Neck: Vascular: No carotid bruit  Cardiovascular:      Rate and Rhythm: Normal rate and regular rhythm  Pulses: Normal pulses  Heart sounds: Normal heart sounds  Pulmonary:      Effort: Pulmonary effort is normal       Breath sounds: Normal breath sounds  Abdominal:      General: Bowel sounds are normal       Palpations: There is no mass  Tenderness: There is no abdominal tenderness  Musculoskeletal:      Right lower leg: No edema  Left lower leg: No edema  Lymphadenopathy:      Cervical: No cervical adenopathy  Skin:     General: Skin is warm and dry  Neurological:      General: No focal deficit present  Mental Status: She is alert and oriented to person, place, and time     Psychiatric:         Mood and Affect: Mood normal          Behavior: Behavior normal           MD Joaquin Miramontes [id number]

## 2022-03-08 ENCOUNTER — OFFICE VISIT (OUTPATIENT)
Dept: FAMILY MEDICINE CLINIC | Facility: CLINIC | Age: 42
End: 2022-03-08
Payer: COMMERCIAL

## 2022-03-08 VITALS
HEART RATE: 81 BPM | BODY MASS INDEX: 30 KG/M2 | OXYGEN SATURATION: 99 % | SYSTOLIC BLOOD PRESSURE: 126 MMHG | WEIGHT: 163 LBS | TEMPERATURE: 99 F | HEIGHT: 62 IN | DIASTOLIC BLOOD PRESSURE: 80 MMHG | RESPIRATION RATE: 18 BRPM

## 2022-03-08 DIAGNOSIS — M79.604 PAIN OF RIGHT LOWER EXTREMITY: Primary | ICD-10-CM

## 2022-03-08 DIAGNOSIS — E83.51 HYPOCALCEMIA: ICD-10-CM

## 2022-03-08 DIAGNOSIS — R56.9 SEIZURE (HCC): ICD-10-CM

## 2022-03-08 DIAGNOSIS — G43.009 MIGRAINE WITHOUT AURA AND WITHOUT STATUS MIGRAINOSUS, NOT INTRACTABLE: ICD-10-CM

## 2022-03-08 DIAGNOSIS — E16.2 HYPOGLYCEMIA: ICD-10-CM

## 2022-03-08 LAB — SL AMB POCT HEMOGLOBIN AIC: 5 (ref ?–6.5)

## 2022-03-08 PROCEDURE — 83036 HEMOGLOBIN GLYCOSYLATED A1C: CPT | Performed by: FAMILY MEDICINE

## 2022-03-08 PROCEDURE — 99214 OFFICE O/P EST MOD 30 MIN: CPT | Performed by: FAMILY MEDICINE

## 2022-03-08 PROCEDURE — 3044F HG A1C LEVEL LT 7.0%: CPT | Performed by: FAMILY MEDICINE

## 2022-03-08 NOTE — PROGRESS NOTES
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    Migraine without aura    She has had major reduction migraine headaches since starting Botox injections every 3 months  Diagnoses and all orders for this visit:    Pain of right lower extremity  Comments: Will  obtained venous duplex ultrasound to rule out DVT  If this is normal would recommend physical therapy  Seizure ([address] )    Migraine without aura and without status migrainosus, not intractable          Subjective:      Patient ID: Francois Urena is a 35 y o  female  She is here for f/u  She c/o right calf pain for past 2 months along with low grade fevers  No injury  She has been stretching and exercising and the pain is getting worse instead of better  No SOB  No hx of DVTs  No hx of clot  No recent travel or immobilization  She needs forms completed for [employer]  Unfortunately she had a seizure from hypoglycemia back in January  This came from [address] which she was taking for weight loss  DVT has sent her additional forms regarding diabetes she does not have diabetes  The following portions of the patient's history were reviewed and updated as appropriate: allergies, current medications, past family history, past medical history, past social history, past surgical history and problem list     Review of Systems   Constitutional: Negative for activity change, chills, fatigue and fever  HENT: Negative for congestion, ear pain, sinus pressure and sore throat  Eyes: Negative for pain and visual disturbance  Respiratory: Negative for cough, chest tightness, shortness of breath and wheezing  Cardiovascular: Negative for chest pain, palpitations and leg swelling     Gastrointestinal: Negative for abdominal pain, blood in stool, constipation, diarrhea, nausea and vomiting  Endocrine: Negative for polydipsia and polyuria  Genitourinary: Negative for difficulty urinating, dysuria, frequency and urgency  Musculoskeletal: Negative for arthralgias, joint swelling and myalgias  Right calf pain   Skin: Negative for rash  Neurological: Negative for dizziness, weakness, numbness and headaches  Hematological: Negative for adenopathy  Does not bruise/bleed easily  Psychiatric/Behavioral: Negative for dysphoric mood  The patient is not nervous/anxious  Objective:      /80 (BP Location: Left arm, Patient Position: Sitting, Cuff Size: Large)   Pulse 81   Temp 99 °F (37 2 °C) (Tympanic)   Resp 18   Ht 5' 2" (1 575 m)   Wt 73 9 kg (163 lb)   SpO2 99%   BMI 29 81 kg/m²          Physical Exam  Vitals and nursing note reviewed  Constitutional:       Appearance: Normal appearance  HENT:      Head: Normocephalic and atraumatic  Cardiovascular:      Rate and Rhythm: Normal rate and regular rhythm  Pulses: Normal pulses  Heart sounds: Normal heart sounds  Pulmonary:      Effort: Pulmonary effort is normal       Breath sounds: Normal breath sounds  Musculoskeletal:         General: Tenderness present  No swelling or deformity  Right lower leg: No edema  Left lower leg: No edema  Comments: Right calf with an area of tenderness middle of the lower leg with deep palpation  There is no pain with dorsiflexion or plantar flexion  Neurological:      General: No focal deficit present  Mental Status: She is alert and oriented to person, place, and time     Psychiatric:         Mood and Affect: Mood normal          Behavior: Behavior normal

## 2022-03-09 NOTE — ASSESSMENT & PLAN NOTE
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

## 2022-04-19 DIAGNOSIS — J45.20 MILD INTERMITTENT ASTHMA WITHOUT COMPLICATION: ICD-10-CM

## 2022-04-19 RX ORDER — ALBUTEROL SULFATE 90 UG/1
AEROSOL, METERED RESPIRATORY (INHALATION)
Qty: 18 G | Refills: 1 | Status: SHIPPED | OUTPATIENT
Start: 2022-04-19

## 2022-04-22 ENCOUNTER — HOSPITAL ENCOUNTER (OUTPATIENT)
Dept: NON INVASIVE DIAGNOSTICS | Facility: HOSPITAL | Age: 42
Discharge: HOME | End: 2022-04-22
Attending: FAMILY MEDICINE
Payer: COMMERCIAL

## 2022-04-22 DIAGNOSIS — M79.604 PAIN OF RIGHT LOWER EXTREMITY: ICD-10-CM

## 2022-04-22 PROCEDURE — 93970 EXTREMITY STUDY: CPT | Performed by: SURGERY

## 2022-04-22 PROCEDURE — 93971 EXTREMITY STUDY: CPT
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
## 2022-05-11 RX ORDER — TRIAMCINOLONE ACETONIDE 1 MG/G
CREAM TOPICAL
Qty: 30 G | Refills: 0 | Status: SHIPPED | OUTPATIENT
Start: 2022-05-11

## 2022-05-16 ENCOUNTER — EVALUATION (OUTPATIENT)
Dept: PHYSICAL THERAPY | Facility: REHABILITATION | Age: 42
End: 2022-05-16
Payer: COMMERCIAL

## 2022-05-16 DIAGNOSIS — M79.661 PAIN OF RIGHT CALF: Primary | ICD-10-CM

## 2022-05-16 PROCEDURE — 97110 THERAPEUTIC EXERCISES: CPT | Performed by: PHYSICAL THERAPIST

## 2022-05-16 PROCEDURE — 97161 PT EVAL LOW COMPLEX 20 MIN: CPT | Performed by: PHYSICAL THERAPIST

## 2022-05-16 NOTE — PROGRESS NOTES
PT Evaluation     Today's date: 2022  Patient name: Raúl Arredondo  : 1980  MRN: [id number]  Referring provider: Sarah Brown MD  Dx:   Encounter Diagnosis     ICD-10-CM    1  Pain of right calf  M79 661 Ambulatory Referral to Physical Therapy                  Assessment  Assessment details: Raúl Arredondo is a 35 y o  female referred to outpt PT with dx of right calf pain  Pt presents with decrease in lumbar joint play, positive reproducible concordant sx's with neural tension testing right LE and repeated lumbar flexion testing  Pt funct is limited with decrease in tolerance to ambulation and standing prolonged due to concordant pain and positive sleep disturbances at times  Pt would benefit from skilled PT to address these limitations and max funct  Impairments: abnormal or restricted ROM, impaired physical strength, lacks appropriate home exercise program and pain with function     Prognosis: good    Goals  Funct 1  Improve tolerance to ambulation with no restrictions of right LE pain x4 weeks  2  No sleep tolerance due to LE pain x4 weeks  Impairment 1  Increase ROM by 25% x4 weeks  2  Decrease pain by 25 % x4 weeks  3  Increase strength by 1/2 grade x4 weeks  Plan  Plan details: 1-2 times per week  Patient would benefit from: skilled physical therapy  Planned therapy interventions: joint mobilization, manual therapy, home exercise program, therapeutic exercise, neuromuscular re-education, strengthening and stretching  Frequency: 2x week  Duration in weeks: 4        Subjective Evaluation    History of Present Illness  Mechanism of injury: Pt notes 4-6 months ago she began with right gastroc soreness with insidious onset  Pt notes sx's feels as a "pulled muscle" and that she over worked it, but feels that way 90% of the time  Pt does note that standing and walking for prolonged periods does increase in pain and soreness    Pt is able to walk and negotiate stairs, noting that sx's increase with pain present, but at other time pain improves and she is able to complete these tasks without increase in pain  Pt does note having some sleep disturbances due to gastroc pain  Pt has previous hx of lumbar and sciatic pain on right which has become worse approximately 6 months ago  Pt has been struggling to find a relieving activity, noting that at times she is able to relieve with exercises but other times it has no change  Pt has had recent MRI with progressive findings when compared with previous MRI in 2019  Pt currently not working, but is interviewing for a position as a  for a local nursing home  Pt scores a 62 on her FOTO index  Pain  Current pain ratin  At worst pain ratin  Location: R gastroc, increases to 6-7/10 for lumbar spine  Quality: cramping and pulling    Patient Goals  Patient goals for therapy: decreased pain and independence with ADLs/IADLs          Objective     Active Range of Motion     Lumbar   Flexion:  WFL and with pain  Extension:  WFL  Left lateral flexion:  WFL  Right lateral flexion:  WFL and with pain  Left rotation:  WFL  Right rotation:  WFL and with pain  Left Ankle/Foot   Normal active range of motion    Right Ankle/Foot   Normal active range of motion    Additional Active Range of Motion Details  Pt has mod TTP present L4-5 and base of sacrum  Pt has increase in reproducible concordant with with lumbar flexion performed repeatedly  Pt notes centralized pain with extension but increase in lumbar sx's  Pt has pain with right lumbar SB and rotation, but no pain into lumbar spine and LE concordant pain with left SB/rotation        Joint Play     Hypomobile: L1, L2, L3, L4, L5 and S1     Pain: L4, L5 and S1     Strength/Myotome Testing     Left Hip   Planes of Motion   Flexion: 5    Right Hip   Planes of Motion   Flexion: 5  Extension: 3+  Abduction: 3+    Left Knee   Flexion: 5  Extension: 5    Right Knee   Flexion: 4+  Extension: 5    Left Ankle/Foot   Dorsiflexion: 5  Plantar flexion: 5  Inversion: 5  Eversion: 5  Great toe extension: 5    Right Ankle/Foot   Dorsiflexion: 5  Plantar flexion: 5  Inversion: 5  Eversion: 5  Great toe extension: 5    Additional Strength Details  Pt has no increase in pain with ankle testing as above  Tests     Lumbar     Left   Negative crossed SLR, passive SLR and slump test      Right   Positive passive SLR and slump test    Negative crossed SLR  Left Hip   Negative CK and FADIR  Right Hip   Negative CK and FADIR  Left Ankle/Foot   Negative for calcaneal squeeze and Bowles  Right Ankle/Foot   Negative for calcaneal squeeze and Bowles  Additional Tests Details  SLR positive for neural tension with toe extension, ankle DF, and 45 degrees hip flexion on right  Positive slump at endrange ankle and knee extension  Precautions: Gastric bypass hx, pre-DM           Manual  5/16/22        Grade II CPA/R UPA              glute STM              sciatic nerve glides                                               Neuro Re-Ed             Prone glute squeeze          Prone hip ext                                                                           Therex            AMAN 5"x10           prone lying educated            sciatic nerve sliders              treadmill w/u                                                                                    Gait Training                                 Modalities

## 2022-05-17 ENCOUNTER — APPOINTMENT (OUTPATIENT)
Dept: PHYSICAL THERAPY | Facility: REHABILITATION | Age: 42
End: 2022-05-17
Payer: COMMERCIAL

## 2022-05-24 ENCOUNTER — OFFICE VISIT (OUTPATIENT)
Dept: PHYSICAL THERAPY | Facility: REHABILITATION | Age: 42
End: 2022-05-24
Payer: COMMERCIAL

## 2022-05-24 DIAGNOSIS — M79.661 PAIN OF RIGHT CALF: Primary | ICD-10-CM

## 2022-05-24 PROCEDURE — 97140 MANUAL THERAPY 1/> REGIONS: CPT | Performed by: PHYSICAL THERAPIST

## 2022-05-24 PROCEDURE — 97110 THERAPEUTIC EXERCISES: CPT | Performed by: PHYSICAL THERAPIST

## 2022-05-24 PROCEDURE — 97112 NEUROMUSCULAR REEDUCATION: CPT | Performed by: PHYSICAL THERAPIST

## 2022-05-24 NOTE — PROGRESS NOTES
Daily Note     Today's date: 2022  Patient name: Indra Banks  : 1980  MRN: [id number]  Referring provider: Pepper Lee MD  Dx:   Encounter Diagnosis     ICD-10-CM    1  Pain of right calf  M79 661                   Subjective: Pt reports that she is having more pain as of today 4/10 and feels slightly more into buttock  Pt had a massage last week in which she felt great during, but woke up the following morning with tingling, numbness, and "fire ants" feeling into right LE  Pt also notes similar sx's into right UE  Pt is currently seeing neurologist and being referred to a neurologist that specializes in [address]  Pt has appt in mid   Objective: See treatment diary below      Precautions: Gastric bypass hx, pre-DM        Manual  22          Grade II CPA/R UPA    10 mins          glute STM    5 mins          sciatic nerve glides    2 mins                                            Neuro Re-Ed             Prone glute squeeze    5"x10          Prone hip ext                                                                                                 Therex             AMAN 5"x10   5"x10           prone lying educated            sciatic nerve sliders              treadmill w/u                                                                                    Gait Training                                 Modalities                                                              Assessment: good tolerance to initiation of manual tx at today's visit  Pt cont to have tenderness into right glute, improved with STM and grade II joint mobs  Pt denies pain throughout manuals and prone tx  Pt did have slight increase in sx's with nerve sliders and diminished overall range throughout  Ended session with ice to assist with prone lying position and soreness  Pt issued updated HEP  Plan: Continue per plan of care

## 2022-05-26 ENCOUNTER — OFFICE VISIT (OUTPATIENT)
Dept: FAMILY MEDICINE CLINIC | Facility: CLINIC | Age: 42
End: 2022-05-26
Payer: COMMERCIAL

## 2022-05-26 ENCOUNTER — OFFICE VISIT (OUTPATIENT)
Dept: PHYSICAL THERAPY | Facility: REHABILITATION | Age: 42
End: 2022-05-26
Payer: COMMERCIAL

## 2022-05-26 VITALS
HEART RATE: 106 BPM | DIASTOLIC BLOOD PRESSURE: 90 MMHG | OXYGEN SATURATION: 99 % | BODY MASS INDEX: 29.3 KG/M2 | SYSTOLIC BLOOD PRESSURE: 122 MMHG | WEIGHT: 159.2 LBS | RESPIRATION RATE: 12 BRPM | HEIGHT: 62 IN

## 2022-05-26 DIAGNOSIS — M54.30 SCIATIC LEG PAIN: ICD-10-CM

## 2022-05-26 DIAGNOSIS — G43.009 MIGRAINE WITHOUT AURA AND WITHOUT STATUS MIGRAINOSUS, NOT INTRACTABLE: Primary | ICD-10-CM

## 2022-05-26 DIAGNOSIS — R56.9 SEIZURE (HCC): ICD-10-CM

## 2022-05-26 DIAGNOSIS — M79.661 PAIN OF RIGHT CALF: Primary | ICD-10-CM

## 2022-05-26 PROBLEM — E66.09 CLASS 1 OBESITY DUE TO EXCESS CALORIES WITHOUT SERIOUS COMORBIDITY WITH BODY MASS INDEX (BMI) OF 32.0 TO 32.9 IN ADULT: Status: RESOLVED | Noted: 2018-01-24 | Resolved: 2022-05-26

## 2022-05-26 PROBLEM — F90.9 ADHD: Status: ACTIVE | Noted: 2021-10-07

## 2022-05-26 PROBLEM — E66.811 CLASS 1 OBESITY DUE TO EXCESS CALORIES WITHOUT SERIOUS COMORBIDITY WITH BODY MASS INDEX (BMI) OF 32.0 TO 32.9 IN ADULT: Status: RESOLVED | Noted: 2018-01-24 | Resolved: 2022-05-26

## 2022-05-26 PROCEDURE — 97110 THERAPEUTIC EXERCISES: CPT | Performed by: PHYSICAL THERAPIST

## 2022-05-26 PROCEDURE — 99214 OFFICE O/P EST MOD 30 MIN: CPT | Performed by: FAMILY MEDICINE

## 2022-05-26 PROCEDURE — 97140 MANUAL THERAPY 1/> REGIONS: CPT | Performed by: PHYSICAL THERAPIST

## 2022-05-26 RX ORDER — LACTULOSE 10 G/15ML
SOLUTION ORAL
COMMUNITY
Start: 2022-05-16

## 2022-05-26 RX ORDER — TIZANIDINE 4 MG/1
TABLET ORAL
COMMUNITY
Start: 2022-05-10

## 2022-05-26 RX ORDER — NALTREXONE HYDROCHLORIDE 50 MG/1
TABLET, FILM COATED ORAL
COMMUNITY
Start: 2022-05-13

## 2022-05-26 NOTE — PROGRESS NOTES
Assessment/Plan:    Migraine without aura  She will continue follow-up with Neurology  Sciatic leg pain  She will cont with PT for right calf pain    Seizure [facility])  She has history of seizure January 26 due to hypoglycemic episode  She is off of them that and her Wellbutrin was decreased to once per day her psychiatrist   Her EEG was normal and her MRI was normal and she is able to drive  Unfortunately department of transportation has given her another notice of suspension of her license even though I have completed her forms to other times stating that she can drive  I will complete the form again and I have asked her to have her neurologist complete the form as well  We will include a copy of her normal EEG and MRI  Diagnoses and all orders for this visit:    Migraine without aura and without status migrainosus, not intractable    Sciatic leg pain    Seizure ([address] )    Other orders  -     lactulose (CHRONULAC) 10 g/15 mL solution  -     naltrexone (REVIA) 50 mg tablet  -     tiZANidine (ZANAFLEX) 4 mg tablet          Subjective:      Patient ID: Alysa Banks is a 35 y o  female  She is here with more paperwork from Department of transportation  We had completed forms twice for her since she had a seizure in January 26  She has seizure because of hypoglycemia  She had been taking Ozempic for weight loss  Her sugar was 63 in the emergency room  The diagnosis was seizure caused by hypoglycemia  She was immediately taken off of Ozempic  Her psychiatrist also decreased her bupropion from 150 mg bid to once daily    She has history of having 1 seizure about 10 years prior to this  This was caused by withdrawal from Xanax which she had discontinued abruptly  Normal EEG 2/3/2022  Normal MRI 2/15/2022  She is doing Botox for migraines  It has been helpful but defect is not long enough lasting    She is having Neurology do with the insurance company in order to get a little more frequently  She is going to physical therapy for right-sided calf pain which is likely sciatic pain  The following portions of the patient's history were reviewed and updated as appropriate: allergies, current medications, past family history, past medical history, past social history, past surgical history and problem list     Review of Systems   Constitutional: Negative for activity change, chills, fatigue and fever  HENT: Negative for congestion, ear pain, sinus pressure and sore throat  Eyes: Negative for pain and visual disturbance  Respiratory: Negative for cough, chest tightness, shortness of breath and wheezing  Cardiovascular: Negative for chest pain, palpitations and leg swelling  Gastrointestinal: Negative for abdominal pain, blood in stool, constipation, diarrhea, nausea and vomiting  Endocrine: Negative for polydipsia and polyuria  Genitourinary: Negative for difficulty urinating, dysuria, frequency and urgency  Musculoskeletal: Positive for back pain  Negative for arthralgias, joint swelling and myalgias  Skin: Negative for rash  Neurological: Positive for headaches  Negative for dizziness, weakness and numbness  Hematological: Negative for adenopathy  Does not bruise/bleed easily  Psychiatric/Behavioral: Negative for dysphoric mood  The patient is not nervous/anxious  Objective:      /90 (BP Location: Left arm, Patient Position: Sitting, Cuff Size: Standard)   Pulse (!) 106   Resp 12   Ht 5' 2" (1 575 m)   Wt 72 2 kg (159 lb 3 2 oz)   SpO2 99%   BMI 29 12 kg/m²          Physical Exam  Vitals and nursing note reviewed  Constitutional:       Appearance: Normal appearance  HENT:      Head: Normocephalic and atraumatic  Cardiovascular:      Rate and Rhythm: Normal rate and regular rhythm  Skin:     General: Skin is warm and dry  Neurological:      General: No focal deficit present        Mental Status: She is alert and oriented to person, place, and time     Psychiatric:         Mood and Affect: Mood normal          Behavior: Behavior normal

## 2022-05-26 NOTE — PROGRESS NOTES
Daily Note     Today's date: 2022  Patient name: Clarisa Carter  : 1980  MRN: [id number]  Referring provider: Yelena Gaitan MD  Dx:   Encounter Diagnosis     ICD-10-CM    1  Pain of right calf  M79 661                   Subjective: Pt notes having a few "bad" days  Pt feels she is having an increase in neural sx's and burning pain into bilat UE/LE since last visit  Pt does note improvement in calf pain, but worsening of all other sx's  Objective: See treatment diary below      Precautions: Gastric bypass hx, pre-DM        Manual  22        Grade II CPA/R UPA    10 mins  10 mins        glute STM    5 mins          sciatic nerve glides    2 mins                                            Neuro Re-Ed             Prone glute squeeze    5"x10          Prone hip ext                                                                                                 Therex             AMAN 5"x10   5"x10           prone lying educated            sciatic nerve sliders              treadmill w/u                                                                                    Gait Training                                 Modalities                                                          Education: 23 mins     Assessment: Educated pt in length regarding lumbar radicular sx's and having findings not conclusive of specific to lumbar spine reproducible sx's, especially the addition of bilat UE neural sx's  Spent 10 mins PNE with discussion of neural sensitivity and decrease in functional level prior to firing level  Pt does note increase in sx's post manual sx's initially, did not increase throughout timing or grading, but also doesn't dissipate or diminish  Due to previous visit with neurologist and having a scheduled f/u with MS specialist, pt may benefit from further evaluation prior to resuming PT, secondary to less mechanical sx's in nature and will be placed on hold  Plan: Hold from PT      7/18/22:  Pt was placed on hold at last visit due to sx's and awaiting neuro referral   Pt is scheduled for injection per last ortho visit and will be DC'd from PT at this time, will resume as recommended after referrals

## 2022-05-26 NOTE — ASSESSMENT & PLAN NOTE
She has history of seizure January 26 due to hypoglycemic episode  She is off of them that and her Wellbutrin was decreased to once per day her psychiatrist   Her EEG was normal and her MRI was normal and she is able to drive  Unfortunately department of transportation has given her another notice of suspension of her license even though I have completed her forms to other times stating that she can drive  I will complete the form again and I have asked her to have her neurologist complete the form as well  We will include a copy of her normal EEG and MRI

## 2022-06-13 ENCOUNTER — VBI (OUTPATIENT)
Dept: ADMINISTRATIVE | Facility: OTHER | Age: 42
End: 2022-06-13

## 2022-06-24 ENCOUNTER — TELEPHONE (OUTPATIENT)
Dept: FAMILY MEDICINE CLINIC | Facility: CLINIC | Age: 42
End: 2022-06-24

## 2022-07-13 ENCOUNTER — OFFICE VISIT (OUTPATIENT)
Dept: FAMILY MEDICINE CLINIC | Facility: CLINIC | Age: 42
End: 2022-07-13
Payer: COMMERCIAL

## 2022-07-13 ENCOUNTER — TELEPHONE (OUTPATIENT)
Dept: FAMILY MEDICINE CLINIC | Facility: CLINIC | Age: 42
End: 2022-07-13

## 2022-07-13 VITALS
DIASTOLIC BLOOD PRESSURE: 88 MMHG | SYSTOLIC BLOOD PRESSURE: 138 MMHG | BODY MASS INDEX: 28.52 KG/M2 | HEART RATE: 104 BPM | HEIGHT: 62 IN | WEIGHT: 155 LBS | RESPIRATION RATE: 18 BRPM | OXYGEN SATURATION: 99 %

## 2022-07-13 DIAGNOSIS — Z87.898 HISTORY OF SEIZURE: Primary | ICD-10-CM

## 2022-07-13 PROCEDURE — 99213 OFFICE O/P EST LOW 20 MIN: CPT | Performed by: INTERNAL MEDICINE

## 2022-07-13 RX ORDER — DULOXETIN HYDROCHLORIDE 30 MG/1
30-60 CAPSULE, DELAYED RELEASE ORAL DAILY
COMMUNITY
Start: 2022-06-23 | End: 2022-09-22 | Stop reason: ALTCHOICE

## 2022-07-13 NOTE — PROGRESS NOTES
Assessment/Plan:    History of seizure  She had episode of seizure being on Ozempic likely due to hypoglycemia  Workup from Neurology Service was negative  She was cleared to drive by Neurology  She brought me form to fill out  As long as she was cleared by Neurology and due to her negative workup and negative neurologic examination probably she may resume her driving  Diagnoses and all orders for this visit:    History of seizure    Other orders  -     DULoxetine (CYMBALTA) 30 mg delayed release capsule; Take 30-60 mg by mouth daily          Subjective:      Patient ID: Vladislav Trevino is a 35 y o  female  Patient had episode of seizures in the past, she was evaluated by Neurology with negative EEG and most likely her seizure was likely due to hypoglycemia being on Ozempic  She was cleared by neurology service to continue driving  The following portions of the patient's history were reviewed and updated as appropriate: allergies, current medications, past family history, past medical history, past social history, past surgical history, and problem list     Review of Systems   Neurological: Negative for dizziness, tremors, seizures, speech difficulty, weakness, numbness and headaches  Psychiatric/Behavioral: Negative for agitation and confusion  The patient is not nervous/anxious and is not hyperactive            Objective:      /88 (BP Location: Left arm, Patient Position: Sitting, Cuff Size: Large)   Pulse 104   Resp 18   Ht 5' 2" (1 575 m)   Wt 70 3 kg (155 lb)   SpO2 99%   BMI 28 35 kg/m²     Allergies   Allergen Reactions    Aspirin Anaphylaxis    Bee Venom Swelling and Wheezing    Penicillins Anaphylaxis and Rash    Bee Pollen     Ceclor [Cefaclor] Itching    Dust Mite Extract Other (See Comments)     congestion    Grass Pollen(K-O-R-T-Swt Osito) Other (See Comments)     congestion    Lactose - Food Allergy GI Intolerance    Other Other (See Comments)     congestion    Pollen Extract     Amoxicillin Rash    Doxycycline Rash    Erythromycin Rash    Nickel Rash          Current Outpatient Medications:     albuterol (PROVENTIL HFA,VENTOLIN HFA) 90 mcg/act inhaler, 2 PUFFS UP TO 4 TIMES A DAY AS NEEDED FOR WHEEZING, Disp: 18 g, Rfl: 1    baclofen 10 mg tablet, Take 10 mg by mouth 2 (two) times a day as needed, Disp: , Rfl:     Biotin 1000 MCG tablet, Take 10,000 mcg by mouth, Disp: , Rfl:     Botulinum Toxin Type A 200 units SOLR, Inject 155 units into face and neck IM every 90 days, Disp: , Rfl:     buPROPion (WELLBUTRIN SR) 150 mg 12 hr tablet, 1 po daily, Disp: 90 tablet, Rfl: 0    Continuous Blood Gluc Sensor (FreeStyle Larry 14 Day Sensor) MISC, Use 1 sensor q 14 days to check sugar levels, Disp: 1 each, Rfl: 11    cyanocobalamin (VITAMIN B-12) 100 mcg tablet, Take 1 tablet daily, Disp: , Rfl:     cyanocobalamin (VITAMIN B-12) 1000 MCG tablet, Take 1,000 mcg by mouth, Disp: , Rfl:     Docusate Sodium 100 MG capsule, Take 100 mg by mouth 2 (two) times a day, Disp: , Rfl:     DULoxetine (CYMBALTA) 30 mg delayed release capsule, Take 30-60 mg by mouth daily, Disp: , Rfl:     DULoxetine (CYMBALTA) 60 mg delayed release capsule, Take 1 capsule (60 mg total) by mouth daily, Disp: 30 capsule, Rfl: 2    EPINEPHrine (EPIPEN) 0 3 mg/0 3 mL SOAJ, Inject 0 3 mL (0 3 mg total) into a muscle once for 1 dose, Disp: 0 3 mL, Rfl: 0    ERGOCALCIFEROL PO, Take 2,400 mcg by mouth, Disp: , Rfl:     FERROUS SULFATE-VITAMIN C PO, Take 1 tablet once daily, Disp: , Rfl:     HYDROcodone-acetaminophen (NORCO) 5-325 mg per tablet, Take 1 tablet by mouth every 6 (six) hours as needed for pain Max Daily Amount: 4 tablets, Disp: 30 tablet, Rfl: 0    lactulose (CHRONULAC) 10 g/15 mL solution, , Disp: , Rfl:     loratadine (CLARITIN) 10 mg tablet, Take 1 tablet (10 mg total) by mouth daily, Disp:  , Rfl:     LORazepam (ATIVAN) 0 5 mg tablet, Take 0 5 mg by mouth 2 (two) times a day as needed for anxiety , Disp: , Rfl:     Magnesium 500 MG CAPS, Take 400 mg by mouth, Disp: , Rfl:     methocarbamol (ROBAXIN) 500 mg tablet, Take 1 tablet (500 mg total) by mouth 3 (three) times a day as needed for muscle spasms, Disp: 21 tablet, Rfl: 0    methylphenidate (RITALIN) 10 mg tablet, Take 10 mg by mouth daily as needed  , Disp: , Rfl: 0    methylphenidate (RITALIN) 20 MG tablet, Take 20 mg by mouth 2 (two) times a day Pt reports taking tid , Disp: , Rfl: 0    mometasone (ELOCON) 0 1 % lotion, APPLY TO AFFECTED AREA TOPICALLY EVERY DAY, Disp: 60 mL, Rfl: 0    Multiple Vitamin (MULTIVITAMIN ADULT PO), Take 1 tablet by mouth every morning, Disp: , Rfl:     naltrexone (REVIA) 50 mg tablet, , Disp: , Rfl:     nystatin (MYCOSTATIN) powder, APPLY TOPICALLY TO AFFECTED AREAS TWICE A DAY, Disp: , Rfl:     oxyCODONE-acetaminophen (ROXICET) 5-325 mg/5 mL solution, TAKE 1 TABLET BY MOUTH EVERY 6 HOURS AS NEEDED FOR SEVERE PAIN (PAIN SCORE 7 10)  MAX DAILY  4 TABS, Disp: , Rfl:     pantoprazole (PROTONIX) 40 mg tablet, TAKE 1 TABLET BY MOUTH TWICE A DAY 30 MINUTES BEFORE BREAKFAST & DINNER, Disp: , Rfl:     pregabalin (Lyrica) 150 mg capsule, Take 1 capsule (150 mg total) by mouth 3 (three) times a day, Disp: 90 capsule, Rfl: 1    RIBOFLAVIN PO, Take 400 mg by mouth, Disp: , Rfl:     rizatriptan (MAXALT) 10 MG tablet, TAKE 1 TABLET (10 MG TOTAL) BY MOUTH ONCE AS NEEDED FOR MIGRAINE, Disp: 9 tablet, Rfl: 1    sucralfate (CARAFATE) 1 g tablet, , Disp: , Rfl:     SUMAtriptan Succinate 6 MG/0 5ML SOAJ, Inject 0 5 mL (6 mg total) under the skin as needed (headache), Disp: 9 Cartridge, Rfl: 1    tiZANidine (ZANAFLEX) 4 mg tablet, , Disp: , Rfl:     traZODone (DESYREL) 100 mg tablet, , Disp: , Rfl:     triamcinolone (KENALOG) 0 1 % cream, APPLY TO AFFECTED AREA TWICE A DAY, Disp: 30 g, Rfl: 0     There are no Patient Instructions on file for this visit          Physical Exam  Constitutional:       General: She is not in acute distress  Appearance: She is not ill-appearing or toxic-appearing  Cardiovascular:      Rate and Rhythm: Normal rate  Heart sounds: No murmur heard  Neurological:      General: No focal deficit present  Mental Status: She is alert  Cranial Nerves: No cranial nerve deficit  Sensory: No sensory deficit  Motor: No weakness        Coordination: Coordination normal       Gait: Gait normal       Deep Tendon Reflexes: Reflexes normal

## 2022-07-13 NOTE — LETTER
July 20, 2022     Patient: Seth Macario  YOB: 1980  Date of Visit: 7/13/2022      To Whom it May Concern:     Tram was seen in my office on 7/13/2022  Tram from now on is under my care  She brought me the form for [employer] that was recently signed by her previous PCP, it was updated by me  If you have any questions or concerns, please don't hesitate to call           Sincerely,          Leona Caraballo MD        CC: No Recipients

## 2022-07-13 NOTE — TELEPHONE ENCOUNTER
Spoke with admin staff at [address] Neurology  Asked if they could please send over most recent forms completed for [employer] for patient for coordination of care, or if Mychal Colunga could please contact Dr Brice Griggs via cell phone or TC to discuss patient's ability to drive  Was advised by staff they would send a message to [employer] and let us know how we can proceed to assist patient

## 2022-07-20 PROBLEM — Z87.898 HISTORY OF SEIZURE: Status: ACTIVE | Noted: 2022-07-20

## 2022-07-20 NOTE — ASSESSMENT & PLAN NOTE
She had episode of seizure being on Ozempic likely due to hypoglycemia  Workup from Neurology Service was negative  She was cleared to drive by Neurology  She brought me form to fill out  As long as she was cleared by Neurology and due to her negative workup and negative neurologic examination probably she may resume her driving

## 2022-07-21 ENCOUNTER — TELEPHONE (OUTPATIENT)
Dept: FAMILY MEDICINE CLINIC | Facility: CLINIC | Age: 42
End: 2022-07-21

## 2022-07-21 NOTE — TELEPHONE ENCOUNTER
Left vm for pt that forms are ready for  for [employer]  If she would like it faxed we need a fax number   Forms are up front

## 2022-09-13 ENCOUNTER — VBI (OUTPATIENT)
Dept: ADMINISTRATIVE | Facility: OTHER | Age: 42
End: 2022-09-13

## 2022-09-22 ENCOUNTER — OFFICE VISIT (OUTPATIENT)
Dept: FAMILY MEDICINE CLINIC | Facility: CLINIC | Age: 42
End: 2022-09-22
Payer: COMMERCIAL

## 2022-09-22 VITALS
OXYGEN SATURATION: 99 % | BODY MASS INDEX: 28.49 KG/M2 | RESPIRATION RATE: 12 BRPM | HEART RATE: 60 BPM | SYSTOLIC BLOOD PRESSURE: 140 MMHG | DIASTOLIC BLOOD PRESSURE: 90 MMHG | WEIGHT: 154.8 LBS | HEIGHT: 62 IN

## 2022-09-22 DIAGNOSIS — F33.1 MODERATE EPISODE OF RECURRENT MAJOR DEPRESSIVE DISORDER (HCC): ICD-10-CM

## 2022-09-22 DIAGNOSIS — F41.9 ANXIETY: ICD-10-CM

## 2022-09-22 DIAGNOSIS — E78.2 MIXED HYPERLIPIDEMIA: ICD-10-CM

## 2022-09-22 DIAGNOSIS — J45.20 MILD INTERMITTENT ASTHMA WITHOUT COMPLICATION: ICD-10-CM

## 2022-09-22 DIAGNOSIS — E66.3 OVERWEIGHT (BMI 25.0-29.9): Primary | ICD-10-CM

## 2022-09-22 DIAGNOSIS — R56.9 SEIZURE (HCC): ICD-10-CM

## 2022-09-22 DIAGNOSIS — Z12.31 ENCOUNTER FOR SCREENING MAMMOGRAM FOR MALIGNANT NEOPLASM OF BREAST: ICD-10-CM

## 2022-09-22 DIAGNOSIS — Z87.898 HISTORY OF SEIZURE: ICD-10-CM

## 2022-09-22 PROCEDURE — 99214 OFFICE O/P EST MOD 30 MIN: CPT | Performed by: INTERNAL MEDICINE

## 2022-09-23 PROBLEM — E66.3 OVERWEIGHT (BMI 25.0-29.9): Status: ACTIVE | Noted: 2022-09-23

## 2022-09-23 NOTE — PROGRESS NOTES
Assessment/Plan:    Overweight (BMI 25 0-29  9)  She lost significant amount of weight  Currently she is just slightly overweight, she did a great job  Continue low carb diet and exercise  History of seizure  One episode of seizure likely due to hypoglycemia being on Ozempic  No more episodes  Moderate episode of recurrent major depressive disorder [facility])  She is currently follows up with Psychiatry  Mixed hyperlipidemia  Recent blood work from July revealed normal lipid panel  Asthma  Well controlled  Diagnoses and all orders for this visit:    Overweight (BMI 25 0-29  9)    Encounter for screening mammogram for malignant neoplasm of breast  -     Mammo screening bilateral w 3d & cad; Future    Seizure ([facility] [address] )    Mixed hyperlipidemia    History of seizure    Anxiety    [facility]    Mild intermittent asthma without complication          Subjective:      Patient ID: Seth Macario is a 38 y o  female  Patient came today for regular follow-up on her chronic problems  The following portions of the patient's history were reviewed and updated as appropriate: allergies, current medications, past family history, past medical history, past social history, past surgical history, and problem list     Review of Systems   Constitutional: Negative for chills and fever  Respiratory: Negative for shortness of breath and wheezing  Cardiovascular: Negative for chest pain and leg swelling  Gastrointestinal: Negative for abdominal distention  Neurological: Negative for dizziness, tremors, seizures, speech difficulty, weakness, numbness and headaches  Psychiatric/Behavioral: Negative for agitation and confusion  The patient is not nervous/anxious and is not hyperactive            Objective:      /90 (BP Location: Left arm, Cuff Size: Standard)   Pulse 60   Resp 12   Ht 5' 2" (1 575 m)   Wt 70 2 kg (154 lb 12 8 oz)   SpO2 99%   BMI 28 31 kg/m²     Allergies   Allergen Reactions    Aspirin Anaphylaxis    Bee Venom Swelling and Wheezing    Penicillins Anaphylaxis and Rash    Bee Pollen     Ceclor [Cefaclor] Itching    Dust Mite Extract Other (See Comments)     congestion    Grass Pollen(K-O-R-T-Swt Osito) Other (See Comments)     congestion    Lactose - Food Allergy GI Intolerance    Other Other (See Comments)     congestion    Pollen Extract     Amoxicillin Rash    Doxycycline Rash    Erythromycin Rash    Nickel Rash          Current Outpatient Medications:     albuterol (PROVENTIL HFA,VENTOLIN HFA) 90 mcg/act inhaler, 2 PUFFS UP TO 4 TIMES A DAY AS NEEDED FOR WHEEZING, Disp: 18 g, Rfl: 1    baclofen 10 mg tablet, Take 10 mg by mouth 2 (two) times a day as needed, Disp: , Rfl:     Biotin 1000 MCG tablet, Take 10,000 mcg by mouth, Disp: , Rfl:     Botulinum Toxin Type A 200 units SOLR, Inject 155 units into face and neck IM every 90 days, Disp: , Rfl:     buPROPion (WELLBUTRIN SR) 150 mg 12 hr tablet, 1 po daily, Disp: 90 tablet, Rfl: 0    Continuous Blood Gluc Sensor (FreeStyle Larry 14 Day Sensor) MISC, Use 1 sensor q 14 days to check sugar levels, Disp: 1 each, Rfl: 11    cyanocobalamin (VITAMIN B-12) 100 mcg tablet, Take 1 tablet daily, Disp: , Rfl:     cyanocobalamin (VITAMIN B-12) 1000 MCG tablet, Take 1,000 mcg by mouth, Disp: , Rfl:     Docusate Sodium 100 MG capsule, Take 100 mg by mouth 2 (two) times a day, Disp: , Rfl:     DULoxetine (CYMBALTA) 60 mg delayed release capsule, Take 1 capsule (60 mg total) by mouth daily, Disp: 30 capsule, Rfl: 2    ERGOCALCIFEROL PO, Take 2,400 mcg by mouth, Disp: , Rfl:     FERROUS SULFATE-VITAMIN C PO, Take 1 tablet once daily, Disp: , Rfl:     lactulose (CHRONULAC) 10 g/15 mL solution, , Disp: , Rfl:     loratadine (CLARITIN) 10 mg tablet, Take 1 tablet (10 mg total) by mouth daily, Disp:  , Rfl:     LORazepam (ATIVAN) 0 5 mg tablet, Take 0 5 mg by mouth 2 (two) times a day as needed for anxiety , Disp: , Rfl:     Magnesium 500 MG CAPS, Take 400 mg by mouth, Disp: , Rfl:     methocarbamol (ROBAXIN) 500 mg tablet, Take 1 tablet (500 mg total) by mouth 3 (three) times a day as needed for muscle spasms, Disp: 21 tablet, Rfl: 0    methylphenidate (RITALIN) 10 mg tablet, Take 10 mg by mouth daily as needed  , Disp: , Rfl: 0    methylphenidate (RITALIN) 20 MG tablet, Take 20 mg by mouth 2 (two) times a day Pt reports taking tid , Disp: , Rfl: 0    mometasone (ELOCON) 0 1 % lotion, APPLY TO AFFECTED AREA TOPICALLY EVERY DAY, Disp: 60 mL, Rfl: 0    Multiple Vitamin (MULTIVITAMIN ADULT PO), Take 1 tablet by mouth every morning, Disp: , Rfl:     naltrexone (REVIA) 50 mg tablet, , Disp: , Rfl:     nystatin (MYCOSTATIN) powder, APPLY TOPICALLY TO AFFECTED AREAS TWICE A DAY, Disp: , Rfl:     oxyCODONE-acetaminophen (ROXICET) 5-325 mg/5 mL solution, TAKE 1 TABLET BY MOUTH EVERY 6 HOURS AS NEEDED FOR SEVERE PAIN (PAIN SCORE 7 10)   MAX DAILY  4 TABS, Disp: , Rfl:     pantoprazole (PROTONIX) 40 mg tablet, TAKE 1 TABLET BY MOUTH TWICE A DAY 30 MINUTES BEFORE BREAKFAST & DINNER, Disp: , Rfl:     pregabalin (Lyrica) 150 mg capsule, Take 1 capsule (150 mg total) by mouth 3 (three) times a day, Disp: 90 capsule, Rfl: 1    RIBOFLAVIN PO, Take 400 mg by mouth, Disp: , Rfl:     rizatriptan (MAXALT) 10 MG tablet, TAKE 1 TABLET (10 MG TOTAL) BY MOUTH ONCE AS NEEDED FOR MIGRAINE, Disp: 9 tablet, Rfl: 1    sucralfate (CARAFATE) 1 g tablet, , Disp: , Rfl:     SUMAtriptan Succinate 6 MG/0 5ML SOAJ, Inject 0 5 mL (6 mg total) under the skin as needed (headache), Disp: 9 Cartridge, Rfl: 1    traZODone (DESYREL) 100 mg tablet, , Disp: , Rfl:     triamcinolone (KENALOG) 0 1 % cream, APPLY TO AFFECTED AREA TWICE A DAY, Disp: 30 g, Rfl: 0    EPINEPHrine (EPIPEN) 0 3 mg/0 3 mL SOAJ, Inject 0 3 mL (0 3 mg total) into a muscle once for 1 dose, Disp: 0 3 mL, Rfl: 0     There are no Patient Instructions on file for this visit  Physical Exam  Constitutional:       General: She is not in acute distress  Appearance: She is not ill-appearing or toxic-appearing  Cardiovascular:      Rate and Rhythm: Normal rate  Heart sounds: No murmur heard  No gallop  Pulmonary:      Effort: No respiratory distress  Breath sounds: No wheezing or rales  Abdominal:      General: Abdomen is flat  Neurological:      General: No focal deficit present  Mental Status: She is oriented to person, place, and time     Psychiatric:         Mood and Affect: Mood normal

## 2022-09-23 NOTE — ASSESSMENT & PLAN NOTE
She lost significant amount of weight  Currently she is just slightly overweight, she did a great job  Continue low carb diet and exercise

## 2022-09-28 ENCOUNTER — TELEMEDICINE (OUTPATIENT)
Dept: FAMILY MEDICINE CLINIC | Facility: CLINIC | Age: 42
End: 2022-09-28
Payer: COMMERCIAL

## 2022-09-28 DIAGNOSIS — J01.41 ACUTE RECURRENT PANSINUSITIS: Primary | ICD-10-CM

## 2022-09-28 PROCEDURE — G2012 BRIEF CHECK IN BY MD/QHP: HCPCS | Performed by: INTERNAL MEDICINE

## 2022-09-28 RX ORDER — AZITHROMYCIN 250 MG/1
TABLET, FILM COATED ORAL
Qty: 6 TABLET | Refills: 0 | Status: SHIPPED | OUTPATIENT
Start: 2022-09-28 | End: 2022-10-03

## 2022-09-28 NOTE — PROGRESS NOTES
Virtual Brief Visit    Patient is located in the following state in which I hold an active license [place]      Assessment/Plan:    Problem List Items Addressed This Visit    None     Visit Diagnoses     Acute recurrent pansinusitis    -  Primary    Relevant Medications    azithromycin (Zithromax) 250 mg tablet        Patient called today with complaints of a pain in her sinuses that started few days ago  Denies any chills or fevers, no shortness of breath or sore throat  She tested herself for COVID-19 and was negative  She said that she was using Z-Nathan with good response in the past   Will send Z-Nathan to her pharmacy  She will update me if no improvement in 48 hours  Recent Visits  Date Type Provider Dept   09/22/22 Office Visit MD [facility]   Showing recent visits within past 7 days and meeting all other requirements  Today's Visits  Date Type Provider Dept   09/28/22 Telemedicine MD [facility]   Showing today's visits and meeting all other requirements  Future Appointments  No visits were found meeting these conditions    Showing future appointments within next 150 days and meeting all other requirements         I spent 10 minutes directly with the patient during this visit

## 2022-12-02 ENCOUNTER — CLINICAL SUPPORT (OUTPATIENT)
Dept: FAMILY MEDICINE CLINIC | Facility: CLINIC | Age: 42
End: 2022-12-02

## 2022-12-02 DIAGNOSIS — R06.7 SNEEZING: ICD-10-CM

## 2022-12-02 DIAGNOSIS — R09.81 CONGESTION OF NASAL SINUS: ICD-10-CM

## 2022-12-02 DIAGNOSIS — R50.9 FEVER AND CHILLS: ICD-10-CM

## 2022-12-02 DIAGNOSIS — J02.9 SORE THROAT: Primary | ICD-10-CM

## 2022-12-02 LAB
SARS-COV-2 AG UPPER RESP QL IA: NEGATIVE
VALID CONTROL: NORMAL

## 2022-12-03 LAB
FLUAV RNA RESP QL NAA+PROBE: POSITIVE
FLUBV RNA RESP QL NAA+PROBE: NEGATIVE
SARS-COV-2 RNA RESP QL NAA+PROBE: POSITIVE

## 2023-01-09 ENCOUNTER — OFFICE VISIT (OUTPATIENT)
Dept: URGENT CARE | Facility: MEDICAL CENTER | Age: 43
End: 2023-01-09

## 2023-01-09 VITALS
RESPIRATION RATE: 18 BRPM | HEART RATE: 81 BPM | SYSTOLIC BLOOD PRESSURE: 122 MMHG | DIASTOLIC BLOOD PRESSURE: 74 MMHG | TEMPERATURE: 100.8 F | OXYGEN SATURATION: 99 %

## 2023-01-09 DIAGNOSIS — H65.01 RIGHT ACUTE SEROUS OTITIS MEDIA, RECURRENCE NOT SPECIFIED: Primary | ICD-10-CM

## 2023-01-09 RX ORDER — DULOXETIN HYDROCHLORIDE 30 MG/1
30 CAPSULE, DELAYED RELEASE ORAL DAILY
COMMUNITY
Start: 2022-12-12

## 2023-01-09 RX ORDER — AZITHROMYCIN 250 MG/1
TABLET, FILM COATED ORAL
Qty: 6 TABLET | Refills: 0 | Status: SHIPPED | OUTPATIENT
Start: 2023-01-09 | End: 2023-01-13

## 2023-01-09 RX ORDER — KETOROLAC TROMETHAMINE 30 MG/ML
INJECTION, SOLUTION INTRAMUSCULAR; INTRAVENOUS
COMMUNITY
Start: 2022-11-18

## 2023-01-09 RX ORDER — NEEDLES, FILTER 19GX1 1/2"
NEEDLE, DISPOSABLE MISCELLANEOUS
COMMUNITY
Start: 2022-12-01

## 2023-01-09 RX ORDER — BENZONATATE 200 MG/1
200 CAPSULE ORAL 3 TIMES DAILY PRN
Qty: 20 CAPSULE | Refills: 0 | Status: SHIPPED | OUTPATIENT
Start: 2023-01-09

## 2023-01-09 NOTE — LETTER
January 9, 2023     Patient: Kathrine Marvin   YOB: 1980   Date of Visit: 1/9/2023       To Whom It May Concern: It is my medical opinion that Praveen Mcleod may return to work 1/10/2023    If you have any questions or concerns, please don't hesitate to call           Sincerely,        Petra Jasso PA-C    CC: No Recipients

## 2023-01-09 NOTE — LETTER
January 9, 2023     Patient: Kassy Williamson   YOB: 1980   Date of Visit: 1/9/2023       To Whom It May Concern: It is my medical opinion that Kassy Williamson     If you have any questions or concerns, please don't hesitate to call           Sincerely,        Vee Anderson PA-C    CC: No Recipients

## 2023-01-09 NOTE — PROGRESS NOTES
Need to use posterior right diagnosis code function so they will have to look up each time  [address]        NAME: Kassy Williamson is a 38 y o  female  : 1980    MRN: [id number]  DATE: 2023  TIME: 1:17 PM    Assessment and Plan   Right acute serous otitis media, recurrence not specified [H65 01]  1  Right acute serous otitis media, recurrence not specified  azithromycin (ZITHROMAX) 250 mg tablet    benzonatate (TESSALON) 200 MG capsule            Patient Instructions       Follow up with PCP as needed  Chief Complaint     Chief Complaint   Patient presents with   • Fever     Patient c/o fever, chest congestion, productive cough, and chest heaviness x 4 days Patient reports testing negative on three at home Covid test           History of Present Illness       Patient with 4-day history of cough, congestion, head fullness postnasal drip  She has been having a fever over the last few days  Fever  This is a new problem  The problem occurs constantly  The problem has been gradually worsening  Associated symptoms include chills, congestion, coughing, fatigue, a fever and headaches  Pertinent negatives include no abdominal pain, anorexia, arthralgias, change in bowel habit, chest pain, diaphoresis, joint swelling, myalgias, nausea, neck pain, numbness, rash, sore throat, swollen glands, urinary symptoms, vertigo, visual change, vomiting or weakness  Review of Systems   Review of Systems   Constitutional: Positive for chills, fatigue and fever  Negative for diaphoresis  HENT: Positive for congestion  Negative for sore throat  Respiratory: Positive for cough  Cardiovascular: Negative for chest pain  Gastrointestinal: Negative for abdominal pain, anorexia, change in bowel habit, nausea and vomiting  Musculoskeletal: Negative for arthralgias, joint swelling, myalgias and neck pain  Skin: Negative for rash  Neurological: Positive for headaches   Negative for vertigo, weakness and numbness  All other systems reviewed and are negative          Current Medications       Current Outpatient Medications:   •  azithromycin (ZITHROMAX) 250 mg tablet, Take 2 tablets today then 1 tablet daily x 4 days, Disp: 6 tablet, Rfl: 0  •  benzonatate (TESSALON) 200 MG capsule, Take 1 capsule (200 mg total) by mouth 3 (three) times a day as needed for cough, Disp: 20 capsule, Rfl: 0  •  albuterol (PROVENTIL HFA,VENTOLIN HFA) 90 mcg/act inhaler, 2 PUFFS UP TO 4 TIMES A DAY AS NEEDED FOR WHEEZING, Disp: 18 g, Rfl: 1  •  baclofen 10 mg tablet, Take 10 mg by mouth 2 (two) times a day as needed, Disp: , Rfl:   •  BD Integra Syringe 25G X 1" 3 ML MISC, DRAW UP 1 ML OF TORADOL AND INJECT INTO MUSCLE , Disp: , Rfl:   •  Biotin 1000 MCG tablet, Take 10,000 mcg by mouth, Disp: , Rfl:   •  Botulinum Toxin Type A 200 units SOLR, Inject 155 units into face and neck IM every 90 days, Disp: , Rfl:   •  buPROPion (WELLBUTRIN SR) 150 mg 12 hr tablet, 1 po daily, Disp: 90 tablet, Rfl: 0  •  Continuous Blood Gluc Sensor (FreeStyle Larry 14 Day Sensor) MISC, Use 1 sensor q 14 days to check sugar levels, Disp: 1 each, Rfl: 11  •  cyanocobalamin (VITAMIN B-12) 100 mcg tablet, Take 1 tablet daily, Disp: , Rfl:   •  cyanocobalamin (VITAMIN B-12) 1000 MCG tablet, Take 1,000 mcg by mouth, Disp: , Rfl:   •  Docusate Sodium 100 MG capsule, Take 100 mg by mouth 2 (two) times a day, Disp: , Rfl:   •  DULoxetine (CYMBALTA) 30 mg delayed release capsule, Take 30 mg by mouth daily, Disp: , Rfl:   •  DULoxetine (CYMBALTA) 60 mg delayed release capsule, Take 1 capsule (60 mg total) by mouth daily, Disp: 30 capsule, Rfl: 2  •  EPINEPHrine (EPIPEN) 0 3 mg/0 3 mL SOAJ, Inject 0 3 mL (0 3 mg total) into a muscle once for 1 dose, Disp: 0 3 mL, Rfl: 0  •  ERGOCALCIFEROL PO, Take 2,400 mcg by mouth, Disp: , Rfl:   •  FERROUS SULFATE-VITAMIN C PO, Take 1 tablet once daily, Disp: , Rfl:   •  ketorolac (TORADOL) 30 mg/mL injection, INFUSE 1 ML (30 MG TOTAL) INTO A VENOUS CATHETER EVERY 12 (TWELVE) HOURS , Disp: , Rfl:   •  lactulose (CHRONULAC) 10 g/15 mL solution, , Disp: , Rfl:   •  loratadine (CLARITIN) 10 mg tablet, Take 1 tablet (10 mg total) by mouth daily, Disp:  , Rfl:   •  LORazepam (ATIVAN) 0 5 mg tablet, Take 0 5 mg by mouth 2 (two) times a day as needed for anxiety , Disp: , Rfl:   •  Magnesium 500 MG CAPS, Take 400 mg by mouth, Disp: , Rfl:   •  methocarbamol (ROBAXIN) 500 mg tablet, Take 1 tablet (500 mg total) by mouth 3 (three) times a day as needed for muscle spasms, Disp: 21 tablet, Rfl: 0  •  methylphenidate (RITALIN) 10 mg tablet, Take 10 mg by mouth daily as needed  , Disp: , Rfl: 0  •  methylphenidate (RITALIN) 20 MG tablet, Take 20 mg by mouth 2 (two) times a day Pt reports taking tid , Disp: , Rfl: 0  •  mometasone (ELOCON) 0 1 % lotion, APPLY TO AFFECTED AREA TOPICALLY EVERY DAY, Disp: 60 mL, Rfl: 0  •  Multiple Vitamin (MULTIVITAMIN ADULT PO), Take 1 tablet by mouth every morning, Disp: , Rfl:   •  naltrexone (REVIA) 50 mg tablet, , Disp: , Rfl:   •  nystatin (MYCOSTATIN) powder, APPLY TOPICALLY TO AFFECTED AREAS TWICE A DAY, Disp: , Rfl:   •  oxyCODONE-acetaminophen (ROXICET) 5-325 mg/5 mL solution, TAKE 1 TABLET BY MOUTH EVERY 6 HOURS AS NEEDED FOR SEVERE PAIN (PAIN SCORE 7 10)   MAX DAILY  4 TABS, Disp: , Rfl:   •  pantoprazole (PROTONIX) 40 mg tablet, TAKE 1 TABLET BY MOUTH TWICE A DAY 30 MINUTES BEFORE BREAKFAST & DINNER, Disp: , Rfl:   •  pregabalin (Lyrica) 150 mg capsule, Take 1 capsule (150 mg total) by mouth 3 (three) times a day, Disp: 90 capsule, Rfl: 1  •  RIBOFLAVIN PO, Take 400 mg by mouth, Disp: , Rfl:   •  rizatriptan (MAXALT) 10 MG tablet, TAKE 1 TABLET (10 MG TOTAL) BY MOUTH ONCE AS NEEDED FOR MIGRAINE, Disp: 9 tablet, Rfl: 1  •  sucralfate (CARAFATE) 1 g tablet, , Disp: , Rfl:   •  SUMAtriptan Succinate 6 MG/0 5ML SOAJ, Inject 0 5 mL (6 mg total) under the skin as needed (headache), Disp: 9 Cartridge, Rfl: 1  •  traZODone (DESYREL) 100 mg tablet, , Disp: , Rfl:   •  triamcinolone (KENALOG) 0 1 % cream, APPLY TO AFFECTED AREA TWICE A DAY, Disp: 30 g, Rfl: 0    Current Allergies     Allergies as of 01/09/2023 - Reviewed 01/09/2023   Allergen Reaction Noted   • Aspirin Anaphylaxis 05/02/2013   • Bee venom Swelling and Wheezing 01/12/2015   • Penicillins Anaphylaxis and Rash 05/02/2013   • Bee pollen  01/15/2016   • Ceclor [cefaclor] Itching 11/01/2012   • Dust mite extract Other (See Comments) 03/29/2018   • Grass pollen(k-o-r-t-swt rios) Other (See Comments) 11/23/2021   • Lactose - food allergy GI Intolerance 01/15/2016   • Other Other (See Comments) 11/23/2021   • Pollen extract  01/15/2016   • Amoxicillin Rash 05/02/2013   • Doxycycline Rash 05/02/2013   • Erythromycin Rash 05/02/2013   • Nickel Rash 08/10/2021            The following portions of the patient's history were reviewed and updated as appropriate: allergies, current medications, past family history, past medical history, past social history, past surgical history and problem list      Past Medical History:   Diagnosis Date   • Acid reflux    • Anxiety    • Asthma    • Depression    • Diabetes mellitus ([address] )     Pre   • Diverticulitis    • Epileptic seizures ([address] )     Medication related   • Gestational diabetes mellitus    • Hypertension    • Lateral epicondylitis    • Malignant tumor of cervix ([facility] )     between 2822-9694   • Obstructive sleep apnea     CPAP   • Paresthesias    • Plantar fasciitis    • Rectocele    • Tinea corporis        Past Surgical History:   Procedure Laterality Date   • ARTHROSCOPY ANKLE Right 9/1/2017    Procedure: ANKLE ARTHOSCOPY WITH EXTENSIVE SYNOVECTOMY, OPEN ARTHOTOY LATERAL ANKLE WITH ANKLE STABILIZATION, [address], INTERNAL BRACE AUGMENTATION, PERONEAL TENDON SYNOVECTOMY;  Surgeon: Josy Holman DPM;  Location: [facility];  Service: Podiatry   • COLONOSCOPY  02/27/2017   • GASTRIC BYPASS  07/2020   • HEMORRHOID SURGERY  08/2021   • HYSTERECTOMY  2015   • LA COLONOSCOPY FLX DX W/COLLJ SPEC WHEN PFRMD N/A 2/27/2017    Procedure: COLONOSCOPY;  Surgeon: Yoly Bojorquez MD;  [facility]; Service: Gastroenterology   • LA ESOPHAGOGASTRODUODENOSCOPY TRANSORAL DIAGNOSTIC N/A 9/7/2016    Procedure: EGD AND COLONOSCOPY;  Surgeon: Yoly Bojorquez MD;  [facility]; Service: Gastroenterology   • TONSILLECTOMY     • TUBAL LIGATION     • UPPER GASTROINTESTINAL ENDOSCOPY  2016       Family History   Problem Relation Age of Onset   • Anxiety disorder Mother    • Depression Mother    • Cancer Mother    • Endometrial cancer Mother         under afe 48   • Seizures Brother         Epilepsy   • Other Brother         Suicide risk   • Cancer Maternal Grandmother 48        lymphatic cancer   • Other Other         cardiac disorder   • Diabetes Other    • Hypertension Other    • Neuropathy Other    • Thyroid disease Other    • Breast cancer Paternal Grandmother 80   • No Known Problems Father    • No Known Problems Daughter    • Cancer Maternal Grandfather    • No Known Problems Paternal Grandfather    • No Known Problems Daughter    • No Known Problems Son    • No Known Problems Brother    • Leukemia Cousin    • Cancer Cousin         lympatic         Medications have been verified  Objective   /74   Pulse 81   Temp (!) 100 8 °F (38 2 °C)   Resp 18   SpO2 99%   No LMP recorded  Patient has had a hysterectomy  Physical Exam     Physical Exam  Vitals and nursing note reviewed  Constitutional:       Appearance: Normal appearance  She is normal weight  HENT:      Right Ear: Ear canal and external ear normal  Tympanic membrane is erythematous and bulging  Left Ear: Tympanic membrane, ear canal and external ear normal       Nose: Congestion and rhinorrhea present  Mouth/Throat:      Mouth: Mucous membranes are moist       Pharynx: Oropharynx is clear   No oropharyngeal exudate or posterior oropharyngeal erythema  Eyes:      Conjunctiva/sclera: Conjunctivae normal    Cardiovascular:      Rate and Rhythm: Normal rate and regular rhythm  Pulses: Normal pulses  Heart sounds: Normal heart sounds  Pulmonary:      Effort: Pulmonary effort is normal       Breath sounds: Normal breath sounds  Lymphadenopathy:      Cervical: No cervical adenopathy  Neurological:      Mental Status: She is alert     Psychiatric:         Mood and Affect: Mood normal          Behavior: Behavior normal

## 2023-01-28 ENCOUNTER — OFFICE VISIT (OUTPATIENT)
Dept: URGENT CARE | Facility: MEDICAL CENTER | Age: 43
End: 2023-01-28

## 2023-01-28 VITALS
DIASTOLIC BLOOD PRESSURE: 70 MMHG | TEMPERATURE: 97.4 F | SYSTOLIC BLOOD PRESSURE: 102 MMHG | OXYGEN SATURATION: 100 % | RESPIRATION RATE: 18 BRPM | HEART RATE: 57 BPM

## 2023-01-28 DIAGNOSIS — R31.9 URINARY TRACT INFECTION WITH HEMATURIA, SITE UNSPECIFIED: Primary | ICD-10-CM

## 2023-01-28 DIAGNOSIS — N39.0 URINARY TRACT INFECTION WITH HEMATURIA, SITE UNSPECIFIED: Primary | ICD-10-CM

## 2023-01-28 LAB
SL AMB  POCT GLUCOSE, UA: NEGATIVE
SL AMB LEUKOCYTE ESTERASE,UA: ABNORMAL
SL AMB POCT BILIRUBIN,UA: NEGATIVE
SL AMB POCT BLOOD,UA: ABNORMAL
SL AMB POCT CLARITY,UA: ABNORMAL
SL AMB POCT COLOR,UA: ABNORMAL
SL AMB POCT KETONES,UA: NEGATIVE
SL AMB POCT NITRITE,UA: NEGATIVE
SL AMB POCT PH,UA: 7.5
SL AMB POCT SPECIFIC GRAVITY,UA: 1
SL AMB POCT URINE PROTEIN: 100
SL AMB POCT UROBILINOGEN: 0.2

## 2023-01-28 RX ORDER — SULFAMETHOXAZOLE AND TRIMETHOPRIM 800; 160 MG/1; MG/1
1 TABLET ORAL EVERY 12 HOURS SCHEDULED
Qty: 10 TABLET | Refills: 0 | Status: SHIPPED | OUTPATIENT
Start: 2023-01-28 | End: 2023-02-02

## 2023-01-28 NOTE — PROGRESS NOTES
[address]        NAME: Dori Gray is a 38 y o  female  : 1980    MRN: [id number]  DATE: 2023  TIME: 11:17 AM    Assessment and Plan   Urinary tract infection with hematuria, site unspecified [N39 0, R31 9]  1  Urinary tract infection with hematuria, site unspecified  POCT urine dip    Urine culture    sulfamethoxazole-trimethoprim (BACTRIM DS) 800-160 mg per tablet            Patient Instructions       Follow up with PCP in 3-5 days  Proceed to  ER if symptoms worsen  Chief Complaint     Chief Complaint   Patient presents with   • Urinary Tract Infection     Pt C/O symptoms started yesterday  History of Present Illness       68-year-old female with UTI symptoms since last night  Dysuria started getting worse this morning  She also has urgency and gross hematuria upon urination  Denies fever or chills  She has not had a urine infection quite a while  Review of Systems   Review of Systems   Constitutional: Negative  Genitourinary: Positive for dysuria, hematuria and urgency           Current Medications       Current Outpatient Medications:   •  BD Integra Syringe 25G X 1" 3 ML MISC, DRAW UP 1 ML OF TORADOL AND INJECT INTO MUSCLE , Disp: , Rfl:   •  Biotin 1000 MCG tablet, Take 10,000 mcg by mouth, Disp: , Rfl:   •  buPROPion (WELLBUTRIN SR) 150 mg 12 hr tablet, 1 po daily, Disp: 90 tablet, Rfl: 0  •  Continuous Blood Gluc Sensor (FreeStyle Larry 14 Day Sensor) MISC, Use 1 sensor q 14 days to check sugar levels, Disp: 1 each, Rfl: 11  •  cyanocobalamin (VITAMIN B-12) 100 mcg tablet, Take 1 tablet daily, Disp: , Rfl:   •  Docusate Sodium 100 MG capsule, Take 100 mg by mouth 2 (two) times a day, Disp: , Rfl:   •  DULoxetine (CYMBALTA) 30 mg delayed release capsule, Take 30 mg by mouth daily, Disp: , Rfl:   •  DULoxetine (CYMBALTA) 60 mg delayed release capsule, Take 1 capsule (60 mg total) by mouth daily, Disp: 30 capsule, Rfl: 2  •  ERGOCALCIFEROL PO, Take 2,400 mcg by mouth, Disp: , Rfl:   •  FERROUS SULFATE-VITAMIN C PO, Take 1 tablet once daily, Disp: , Rfl:   •  ketorolac (TORADOL) 30 mg/mL injection, INFUSE 1 ML (30 MG TOTAL) INTO A VENOUS CATHETER EVERY 12 (TWELVE) HOURS , Disp: , Rfl:   •  lactulose (CHRONULAC) 10 g/15 mL solution, , Disp: , Rfl:   •  loratadine (CLARITIN) 10 mg tablet, Take 1 tablet (10 mg total) by mouth daily, Disp:  , Rfl:   •  LORazepam (ATIVAN) 0 5 mg tablet, Take 0 5 mg by mouth 2 (two) times a day as needed for anxiety , Disp: , Rfl:   •  Magnesium 500 MG CAPS, Take 400 mg by mouth, Disp: , Rfl:   •  methylphenidate (RITALIN) 10 mg tablet, Take 10 mg by mouth daily as needed  , Disp: , Rfl: 0  •  methylphenidate (RITALIN) 20 MG tablet, Take 20 mg by mouth 2 (two) times a day Pt reports taking tid , Disp: , Rfl: 0  •  Multiple Vitamin (MULTIVITAMIN ADULT PO), Take 1 tablet by mouth every morning, Disp: , Rfl:   •  naltrexone (REVIA) 50 mg tablet, , Disp: , Rfl:   •  nystatin (MYCOSTATIN) powder, APPLY TOPICALLY TO AFFECTED AREAS TWICE A DAY, Disp: , Rfl:   •  oxyCODONE-acetaminophen (ROXICET) 5-325 mg/5 mL solution, TAKE 1 TABLET BY MOUTH EVERY 6 HOURS AS NEEDED FOR SEVERE PAIN (PAIN SCORE 7 10)   MAX DAILY  4 TABS, Disp: , Rfl:   •  pantoprazole (PROTONIX) 40 mg tablet, TAKE 1 TABLET BY MOUTH TWICE A DAY 30 MINUTES BEFORE BREAKFAST & DINNER, Disp: , Rfl:   •  pregabalin (Lyrica) 150 mg capsule, Take 1 capsule (150 mg total) by mouth 3 (three) times a day, Disp: 90 capsule, Rfl: 1  •  RIBOFLAVIN PO, Take 400 mg by mouth, Disp: , Rfl:   •  rizatriptan (MAXALT) 10 MG tablet, TAKE 1 TABLET (10 MG TOTAL) BY MOUTH ONCE AS NEEDED FOR MIGRAINE, Disp: 9 tablet, Rfl: 1  •  sucralfate (CARAFATE) 1 g tablet, , Disp: , Rfl:   •  sulfamethoxazole-trimethoprim (BACTRIM DS) 800-160 mg per tablet, Take 1 tablet by mouth every 12 (twelve) hours for 5 days, Disp: 10 tablet, Rfl: 0  •  SUMAtriptan Succinate 6 MG/0 5ML SOAJ, Inject 0 5 mL (6 mg total) under the skin as needed (headache), Disp: 9 Cartridge, Rfl: 1  •  traZODone (DESYREL) 100 mg tablet, , Disp: , Rfl:   •  albuterol (PROVENTIL HFA,VENTOLIN HFA) 90 mcg/act inhaler, 2 PUFFS UP TO 4 TIMES A DAY AS NEEDED FOR WHEEZING (Patient not taking: Reported on 1/28/2023), Disp: 18 g, Rfl: 1  •  baclofen 10 mg tablet, Take 10 mg by mouth 2 (two) times a day as needed (Patient not taking: Reported on 1/28/2023), Disp: , Rfl:   •  benzonatate (TESSALON) 200 MG capsule, Take 1 capsule (200 mg total) by mouth 3 (three) times a day as needed for cough (Patient not taking: Reported on 1/28/2023), Disp: 20 capsule, Rfl: 0  •  Botulinum Toxin Type A 200 units SOLR, Inject 155 units into face and neck IM every 90 days, Disp: , Rfl:   •  cyanocobalamin (VITAMIN B-12) 1000 MCG tablet, Take 1,000 mcg by mouth, Disp: , Rfl:   •  EPINEPHrine (EPIPEN) 0 3 mg/0 3 mL SOAJ, Inject 0 3 mL (0 3 mg total) into a muscle once for 1 dose, Disp: 0 3 mL, Rfl: 0  •  methocarbamol (ROBAXIN) 500 mg tablet, Take 1 tablet (500 mg total) by mouth 3 (three) times a day as needed for muscle spasms (Patient not taking: Reported on 1/28/2023), Disp: 21 tablet, Rfl: 0  •  mometasone (ELOCON) 0 1 % lotion, APPLY TO AFFECTED AREA TOPICALLY EVERY DAY (Patient not taking: Reported on 1/28/2023), Disp: 60 mL, Rfl: 0  •  triamcinolone (KENALOG) 0 1 % cream, APPLY TO AFFECTED AREA TWICE A DAY (Patient not taking: Reported on 1/28/2023), Disp: 30 g, Rfl: 0    Current Allergies     Allergies as of 01/28/2023 - Reviewed 01/28/2023   Allergen Reaction Noted   • Aspirin Anaphylaxis 05/02/2013   • Bee venom Swelling and Wheezing 01/12/2015   • Penicillins Anaphylaxis and Rash 05/02/2013   • Bee pollen  01/15/2016   • Ceclor [cefaclor] Itching 11/01/2012   • Dust mite extract Other (See Comments) 03/29/2018   • Grass pollen(k-o-r-t-swt rios) Other (See Comments) 11/23/2021   • Lactose - food allergy GI Intolerance 01/15/2016   • Other Other (See Comments) 11/23/2021   • Pollen extract  01/15/2016   • Amoxicillin Rash 05/02/2013   • Doxycycline Rash 05/02/2013   • Erythromycin Rash 05/02/2013   • Nickel Rash 08/10/2021            The following portions of the patient's history were reviewed and updated as appropriate: allergies, current medications, past family history, past medical history, past social history, past surgical history and problem list      Past Medical History:   Diagnosis Date   • Acid reflux    • Anxiety    • Asthma    • Depression    • Diabetes mellitus ([facility] )     Pre   • Diverticulitis    • Epileptic seizures ([facility] )     Medication related   • Gestational diabetes mellitus    • Hypertension    • Lateral epicondylitis    • Malignant tumor of cervix ([facility] )     between 5598-4461   • Obstructive sleep apnea     CPAP   • Paresthesias    • Plantar fasciitis    • Rectocele    • Tinea corporis        Past Surgical History:   Procedure Laterality Date   • ARTHROSCOPY ANKLE Right 9/1/2017    Procedure: ANKLE ARTHOSCOPY WITH EXTENSIVE SYNOVECTOMY, OPEN ARTHOTOY LATERAL ANKLE WITH ANKLE STABILIZATION, BROSTOM REPAIR, INTERNAL BRACE AUGMENTATION, PERONEAL TENDON SYNOVECTOMY;  Surgeon: Vita Lund DPM;  [facility];  Service: Podiatry   • COLONOSCOPY  02/27/2017   • GASTRIC BYPASS  07/2020   • HEMORRHOID SURGERY  08/2021   • HYSTERECTOMY  2015   • NE COLONOSCOPY FLX DX W/COLLJ SPEC WHEN PFRMD N/A 2/27/2017    Procedure: COLONOSCOPY;  Surgeon: Juma Larson MD;  [facility]; Service: Gastroenterology   • NE ESOPHAGOGASTRODUODENOSCOPY TRANSORAL DIAGNOSTIC N/A 9/7/2016    Procedure: EGD AND COLONOSCOPY;  Surgeon: Juma Larson MD;  [facility];   Service: Gastroenterology   • TONSILLECTOMY     • TUBAL LIGATION     • UPPER GASTROINTESTINAL ENDOSCOPY  2016       Family History   Problem Relation Age of Onset   • Anxiety disorder Mother    • Depression Mother    • Cancer Mother    • Endometrial cancer Mother         under afe 48   • Seizures Brother Epilepsy   • Other Brother         Suicide risk   • Cancer Maternal Grandmother 48        lymphatic cancer   • Other Other         cardiac disorder   • Diabetes Other    • Hypertension Other    • Neuropathy Other    • Thyroid disease Other    • Breast cancer Paternal Grandmother 80   • No Known Problems Father    • No Known Problems Daughter    • Cancer Maternal Grandfather    • No Known Problems Paternal Grandfather    • No Known Problems Daughter    • No Known Problems Son    • No Known Problems Brother    • Leukemia Cousin    • Cancer Cousin         lympatic         Medications have been verified  Objective   /70 (BP Location: Right arm, Patient Position: Sitting, Cuff Size: Large)   Pulse 57   Temp (!) 97 4 °F (36 3 °C) (Tympanic)   Resp 18   SpO2 100%   No LMP recorded  Patient has had a hysterectomy  Physical Exam     Physical Exam  Vitals and nursing note reviewed  Constitutional:       Appearance: Normal appearance  Abdominal:      General: Bowel sounds are normal       Tenderness: There is no abdominal tenderness  There is no right CVA tenderness or left CVA tenderness  Neurological:      Mental Status: She is alert

## 2023-01-28 NOTE — PATIENT INSTRUCTIONS
Urine dipstick positive for leukocytes and blood  Urine culture sent  Patient placed empirically on Bactrim DS twice a day for 5 days  Urinary Tract Infection in Women   WHAT YOU NEED TO KNOW:   A urinary tract infection (UTI) is caused by bacteria that get inside your urinary tract  Most bacteria that enter your urinary tract come out when you urinate  If the bacteria stay in your urinary tract, you may get an infection  Your urinary tract includes your kidneys, ureters, bladder, and urethra  Urine is made in your kidneys, and it flows from the ureters to the bladder  Urine leaves the bladder through the urethra  A UTI is more common in your lower urinary tract, which includes your bladder and urethra  DISCHARGE INSTRUCTIONS:   Return to the emergency department if:   You are urinating very little or not at all  You have a high fever with shaking chills  You have side or back pain that gets worse  Call your doctor if:   You have a fever  You do not feel better after 2 days of taking antibiotics  You are vomiting  You have questions or concerns about your condition or care  Medicines:   Antibiotics  help fight a bacterial infection  If you have UTIs often (called recurrent UTIs), you may be given antibiotics to take regularly  You will be given directions for when and how to use antibiotics  The goal is to prevent UTIs but not cause antibiotic resistance by using antibiotics too often  Medicines  may be given to decrease pain and burning when you urinate  They will also help decrease the feeling that you need to urinate often  These medicines will make your urine orange or red  Take your medicine as directed  Contact your healthcare provider if you think your medicine is not helping or if you have side effects  Tell him or her if you are allergic to any medicine  Keep a list of the medicines, vitamins, and herbs you take  Include the amounts, and when and why you take them  Bring the list or the pill bottles to follow-up visits  Carry your medicine list with you in case of an emergency  Follow up with your doctor as directed:  Write down your questions so you remember to ask them during your visits  Prevent another UTI:   Empty your bladder often  Urinate and empty your bladder as soon as you feel the need  Do not hold your urine for long periods of time  Wipe from front to back after you urinate or have a bowel movement  This will help prevent germs from getting into your urinary tract through your urethra  Drink liquids as directed  Ask how much liquid to drink each day and which liquids are best for you  You may need to drink more liquids than usual to help flush out the bacteria  Do not drink alcohol, caffeine, or citrus juices  These can irritate your bladder and increase your symptoms  Your healthcare provider may recommend cranberry juice to help prevent a UTI  Urinate after you have sex  This can help flush out bacteria passed during sex  Do not douche or use feminine deodorants  These can change the chemical balance in your vagina  Change sanitary pads or tampons often  This will help prevent germs from getting into your urinary tract  Talk to your healthcare provider about your birth control method  You may need to change your method if it is increasing your risk for UTIs  Wear cotton underwear and clothes that are loose  Tight pants and nylon underwear can trap moisture and cause bacteria to grow  Vaginal estrogen may be recommended  This medicine helps prevent UTIs in women who have gone through menopause or are in tano-menopause  Do pelvic muscle exercises often  Pelvic muscle exercises may help you start and stop urinating  Strong pelvic muscles may help you empty your bladder easier  Squeeze these muscles tightly for 5 seconds like you are trying to hold back urine  Then relax for 5 seconds   Gradually work up to squeezing for 10 seconds  Do 3 sets of 15 repetitions a day, or as directed  © Copyright [employer] 2022 Information is for End User's use only and may not be sold, redistributed or otherwise used for commercial purposes  All illustrations and images included in CareNotes® are the copyrighted property of A D A M , Inc  or Marianne Mike  The above information is an  only  It is not intended as medical advice for individual conditions or treatments  Talk to your doctor, nurse or pharmacist before following any medical regimen to see if it is safe and effective for you

## 2023-01-29 LAB — BACTERIA UR CULT: NORMAL

## 2023-01-30 LAB — BACTERIA UR CULT: NORMAL

## 2023-01-30 NOTE — RESULT NOTES
Verified Results  (1) URINE CULTURE [id number] 12:00AM [facility] rPo     Test Name Result Flag Reference   Urine Culture,Comprehensive Final report     Result 1 Comment     No growth in 36 - 48 hours  English

## 2023-02-01 ENCOUNTER — TELEPHONE (OUTPATIENT)
Dept: FAMILY MEDICINE CLINIC | Facility: CLINIC | Age: 43
End: 2023-02-01

## 2023-02-01 NOTE — TELEPHONE ENCOUNTER
Patient called w update re: UTI  Treated at [facility] Now 1/28/23  Received a 5 day Rx of BACTRIM DS  C/o continued dysuria/burning  Notes no visible blood in urine anymore  Pt feels she needs to continue an antibiotic  Please advise if additional dosing may be sent in  Thank you

## 2023-02-07 ENCOUNTER — VBI (OUTPATIENT)
Dept: ADMINISTRATIVE | Facility: OTHER | Age: 43
End: 2023-02-07

## 2023-02-18 ENCOUNTER — APPOINTMENT (EMERGENCY)
Dept: RADIOLOGY | Facility: HOSPITAL | Age: 43
End: 2023-02-18

## 2023-02-18 ENCOUNTER — HOSPITAL ENCOUNTER (EMERGENCY)
Facility: HOSPITAL | Age: 43
Discharge: HOME | End: 2023-02-18
Attending: EMERGENCY MEDICINE

## 2023-02-18 VITALS
SYSTOLIC BLOOD PRESSURE: 122 MMHG | HEART RATE: 74 BPM | TEMPERATURE: 98 F | DIASTOLIC BLOOD PRESSURE: 69 MMHG | RESPIRATION RATE: 18 BRPM | OXYGEN SATURATION: 99 %
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
## 2023-02-18 DIAGNOSIS — R68.84 JAW PAIN: ICD-10-CM

## 2023-02-18 DIAGNOSIS — S06.0XAA CONCUSSION WITH UNKNOWN LOSS OF CONSCIOUSNESS STATUS, INITIAL ENCOUNTER: Primary | ICD-10-CM

## 2023-02-18 DIAGNOSIS — M25.531 RIGHT WRIST PAIN: ICD-10-CM

## 2023-02-18 RX ORDER — ACETAMINOPHEN 325 MG/1
975 TABLET ORAL ONCE
Status: COMPLETED | OUTPATIENT
Start: 2023-02-18 | End: 2023-02-18

## 2023-02-18 RX ADMIN — ACETAMINOPHEN 975 MG: 325 TABLET ORAL at 13:52

## 2023-02-18 NOTE — ED PROVIDER NOTES
History  Chief Complaint   Patient presents with   • Fall     Fall at work on thursday     38year-old female with a history of migraines, hypertension, diabetes, anxiety and depression who presents complaining of headache and right-sided jaw pain after a mechanical fall that occurred 2 days ago  The patient states that she was walking outside of work on Thursday night when she tripped and fell  The patient states that she struck her chin on the concrete but is not sure if she lost consciousness  The patient states that she was evaluated at another emergency department the patient was observed and given Tylenol prior to discharge  The patient states that she was seen in urgent care yesterday for continued symptoms, and the practitioner there was concerned that she had not had any imaging  The patient states that she has had a generalized headache since the fall but is different from her typical migraine  The patient also complains of right-sided jaw pain  The patient states that she has had some nausea and vomited once yesterday morning  Patient states that she has had trouble sleeping since the fall  The patient also complains of mild right wrist pain  The patient denies visual disturbances, dizziness, lightheadedness, neck pain, numbness or weakness in her extremities  Prior to Admission Medications   Prescriptions Last Dose Informant Patient Reported? Taking? BD Integra Syringe 25G X 1" 3 ML MISC   Yes No   Sig: DRAW UP 1 ML OF TORADOL AND INJECT INTO MUSCLE     Biotin 1000 MCG tablet   Yes No   Sig: Take 10,000 mcg by mouth   Botulinum Toxin Type A 200 units SOLR   Yes No   Sig: Inject 155 units into face and neck IM every 90 days   Continuous Blood Gluc Sensor (FreeStyle Larry 14 Day Sensor) MISC   No No   Sig: Use 1 sensor q 14 days to check sugar levels   DULoxetine (CYMBALTA) 30 mg delayed release capsule   Yes No   Sig: Take 30 mg by mouth daily   DULoxetine (CYMBALTA) 60 mg delayed release capsule   No No   Sig: Take 1 capsule (60 mg total) by mouth daily   Docusate Sodium 100 MG capsule   Yes No   Sig: Take 100 mg by mouth 2 (two) times a day   EPINEPHrine (EPIPEN) 0 3 mg/0 3 mL SOAJ   No No   Sig: Inject 0 3 mL (0 3 mg total) into a muscle once for 1 dose   ERGOCALCIFEROL PO   Yes No   Sig: Take 2,400 mcg by mouth   FERROUS SULFATE-VITAMIN C PO   Yes No   Sig: Take 1 tablet once daily   LORazepam (ATIVAN) 0 5 mg tablet   Yes No   Sig: Take 0 5 mg by mouth 2 (two) times a day as needed for anxiety    Magnesium 500 MG CAPS   Yes No   Sig: Take 400 mg by mouth   Multiple Vitamin (MULTIVITAMIN ADULT PO)   Yes No   Sig: Take 1 tablet by mouth every morning   RIBOFLAVIN PO   Yes No   Sig: Take 400 mg by mouth   SUMAtriptan Succinate 6 MG/0 5ML SOAJ   No No   Sig: Inject 0 5 mL (6 mg total) under the skin as needed (headache)   albuterol (PROVENTIL HFA,VENTOLIN HFA) 90 mcg/act inhaler   No No   Si PUFFS UP TO 4 TIMES A DAY AS NEEDED FOR WHEEZING   Patient not taking: Reported on 2023   baclofen 10 mg tablet   Yes No   Sig: Take 10 mg by mouth 2 (two) times a day as needed   Patient not taking: Reported on 2023   benzonatate (TESSALON) 200 MG capsule   No No   Sig: Take 1 capsule (200 mg total) by mouth 3 (three) times a day as needed for cough   Patient not taking: Reported on 2023   buPROPion (WELLBUTRIN SR) 150 mg 12 hr tablet   No No   Si po daily   cyanocobalamin (VITAMIN B-12) 100 mcg tablet   Yes No   Sig: Take 1 tablet daily   cyanocobalamin (VITAMIN B-12) 1000 MCG tablet   Yes No   Sig: Take 1,000 mcg by mouth   ketorolac (TORADOL) 30 mg/mL injection   Yes No   Sig: INFUSE 1 ML (30 MG TOTAL) INTO A VENOUS CATHETER EVERY 12 (TWELVE) HOURS    lactulose (CHRONULAC) 10 g/15 mL solution   Yes No   loratadine (CLARITIN) 10 mg tablet   No No   Sig: Take 1 tablet (10 mg total) by mouth daily   methocarbamol (ROBAXIN) 500 mg tablet   No No   Sig: Take 1 tablet (500 mg total) by mouth 3 (three) times a day as needed for muscle spasms   Patient not taking: Reported on 1/28/2023   methylphenidate (RITALIN) 10 mg tablet   Yes No   Sig: Take 10 mg by mouth daily as needed     methylphenidate (RITALIN) 20 MG tablet   Yes No   Sig: Take 20 mg by mouth 2 (two) times a day Pt reports taking tid    mometasone (ELOCON) 0 1 % lotion   No No   Sig: APPLY TO AFFECTED AREA TOPICALLY EVERY DAY   Patient not taking: Reported on 1/28/2023   naltrexone (REVIA) 50 mg tablet   Yes No   nystatin (MYCOSTATIN) powder   Yes No   Sig: APPLY TOPICALLY TO AFFECTED AREAS TWICE A DAY   oxyCODONE-acetaminophen (ROXICET) 5-325 mg/5 mL solution   Yes No   Sig: TAKE 1 TABLET BY MOUTH EVERY 6 HOURS AS NEEDED FOR SEVERE PAIN (PAIN SCORE 7 10)   MAX DAILY  4 TABS   pantoprazole (PROTONIX) 40 mg tablet   Yes No   Sig: TAKE 1 TABLET BY MOUTH TWICE A DAY 30 MINUTES BEFORE BREAKFAST & DINNER   pregabalin (Lyrica) 150 mg capsule   No No   Sig: Take 1 capsule (150 mg total) by mouth 3 (three) times a day   rizatriptan (MAXALT) 10 MG tablet   No No   Sig: TAKE 1 TABLET (10 MG TOTAL) BY MOUTH ONCE AS NEEDED FOR MIGRAINE   sucralfate (CARAFATE) 1 g tablet   Yes No   traZODone (DESYREL) 100 mg tablet   Yes No   triamcinolone (KENALOG) 0 1 % cream   No No   Sig: APPLY TO AFFECTED AREA TWICE A DAY   Patient not taking: Reported on 1/28/2023      Facility-Administered Medications: None       Past Medical History:   Diagnosis Date   • Acid reflux    • Anxiety    • Asthma    • Depression    • Diabetes mellitus (HCC)     Pre   • Diverticulitis    • Epileptic seizures ([facility] [address] )     Medication related   • Gestational diabetes mellitus    • Hypertension    • Lateral epicondylitis    • Malignant tumor of cervix ([facility] [address] )     between 1455-4006   • Obstructive sleep apnea     CPAP   • Paresthesias    • Plantar fasciitis    • Rectocele    • Tinea corporis        Past Surgical History:   Procedure Laterality Date   • ARTHROSCOPY ANKLE Right 9/1/2017 Procedure: ANKLE ARTHOSCOPY WITH EXTENSIVE SYNOVECTOMY, OPEN ARTHOTOY LATERAL ANKLE WITH ANKLE STABILIZATION, BROSTOM REPAIR, INTERNAL BRACE AUGMENTATION, PERONEAL TENDON SYNOVECTOMY;  Surgeon: Fuentes Sommer DPM;  Location: [facility];  Service: Podiatry   • COLONOSCOPY  2017   • GASTRIC BYPASS  2020   • HEMORRHOID SURGERY  2021   • HYSTERECTOMY     • ME COLONOSCOPY FLX DX W/COLLJ SPEC WHEN PFRMD N/A 2017    Procedure: COLONOSCOPY;  Surgeon: Aldo Lugo MD;  Location: [facility]; Service: Gastroenterology   • ME ESOPHAGOGASTRODUODENOSCOPY TRANSORAL DIAGNOSTIC N/A 2016    Procedure: EGD AND COLONOSCOPY;  Surgeon: Aldo Lugo MD;  Location: [facility]; Service: Gastroenterology   • TONSILLECTOMY     • TUBAL LIGATION     • UPPER GASTROINTESTINAL ENDOSCOPY         Family History   Problem Relation Age of Onset   • Anxiety disorder Mother    • Depression Mother    • Cancer Mother    • Endometrial cancer Mother         under afe 48   • Seizures Brother         Epilepsy   • Other Brother         Suicide risk   • Cancer Maternal Grandmother 48        lymphatic cancer   • Other Other         cardiac disorder   • Diabetes Other    • Hypertension Other    • Neuropathy Other    • Thyroid disease Other    • Breast cancer Paternal Grandmother 80   • No Known Problems Father    • No Known Problems Daughter    • Cancer Maternal Grandfather    • No Known Problems Paternal Grandfather    • No Known Problems Daughter    • No Known Problems Son    • No Known Problems Brother    • Leukemia Cousin    • Cancer Cousin         lympatic     I have reviewed and agree with the history as documented      E-Cigarette/Vaping     E-Cigarette/Vaping Substances     Social History     Tobacco Use   • Smoking status: Former     Types: Cigarettes     Quit date: 2013     Years since quittin 4   • Smokeless tobacco: Never   Substance Use Topics   • Alcohol use: Yes     Comment: occasionally, maybe 1-2 times a month   • Drug use: No        Review of Systems   Constitutional: Positive for fatigue  Negative for chills, diaphoresis and fever  HENT: Negative for congestion, facial swelling, sore throat and trouble swallowing  Eyes: Negative for pain, redness and visual disturbance  Respiratory: Negative for cough and shortness of breath  Cardiovascular: Negative for chest pain, palpitations and leg swelling  Gastrointestinal: Positive for nausea and vomiting  Negative for abdominal pain and diarrhea  Genitourinary: Negative for dysuria, flank pain and hematuria  Musculoskeletal: Positive for arthralgias  Negative for myalgias  Skin: Negative for color change, pallor and rash  Neurological: Positive for headaches  Negative for dizziness, seizures, syncope, weakness, light-headedness and numbness  All other systems reviewed and are negative  Physical Exam  ED Triage Vitals [02/18/23 1135]   Temperature Pulse Respirations Blood Pressure SpO2   98 °F (36 7 °C) 96 18 (!) 151/116 100 %      Temp Source Heart Rate Source Patient Position - Orthostatic VS BP Location FiO2 (%)   Oral Monitor Sitting Left arm --      Pain Score       6             Orthostatic Vital Signs  Vitals:    02/18/23 1135 02/18/23 1355   BP: (!) 151/116 122/69   Pulse: 96 74   Patient Position - Orthostatic VS: Sitting Lying       Physical Exam  Vitals and nursing note reviewed  Constitutional:       General: She is not in acute distress  Appearance: Normal appearance  She is not ill-appearing, toxic-appearing or diaphoretic  HENT:      Head: Normocephalic  Comments: Right-sided TMJ tenderness  Bruising and tenderness of the chin  Nose: Nose normal  No congestion or rhinorrhea  Mouth/Throat:      Mouth: Mucous membranes are moist       Pharynx: Oropharynx is clear  Eyes:      General: No scleral icterus  Extraocular Movements: Extraocular movements intact        Conjunctiva/sclera: Conjunctivae normal  Pupils: Pupils are equal, round, and reactive to light  Cardiovascular:      Rate and Rhythm: Normal rate and regular rhythm  Pulses: Normal pulses  Heart sounds: Normal heart sounds  No murmur heard  No friction rub  No gallop  Pulmonary:      Effort: Pulmonary effort is normal       Breath sounds: Normal breath sounds  No wheezing, rhonchi or rales  Abdominal:      General: Abdomen is flat  Palpations: Abdomen is soft  Tenderness: There is no abdominal tenderness  There is no right CVA tenderness, left CVA tenderness, guarding or rebound  Musculoskeletal:         General: Tenderness present  No swelling, deformity or signs of injury  Normal range of motion  Cervical back: Normal range of motion and neck supple  No rigidity or tenderness  Right lower leg: No edema  Left lower leg: No edema  Comments: Tenderness palpation of the distal right radius  No anatomic snuffbox tenderness  Lymphadenopathy:      Cervical: No cervical adenopathy  Skin:     General: Skin is warm and dry  Capillary Refill: Capillary refill takes less than 2 seconds  Coloration: Skin is not jaundiced or pale  Findings: Bruising present  No erythema, lesion or rash  Neurological:      General: No focal deficit present  Mental Status: She is alert and oriented to person, place, and time  Cranial Nerves: No cranial nerve deficit  Sensory: No sensory deficit  Motor: No weakness  Coordination: Coordination normal       Gait: Gait normal          ED Medications  Medications   acetaminophen (TYLENOL) tablet 975 mg (975 mg Oral Given 2/18/23 1352)       Diagnostic Studies  Results Reviewed     None                 CT head without contrast   Final Result by dOalys Lundberg MD (02/18 4839)      No acute intracranial abnormality  Mild nonspecific white matter changes                    Workstation performed: [id number]         CT facial bones without contrast   Final Result by Kala Schneider MD (02/18 1512)      No acute fracture  Workstation performed: [id number]         XR wrist 2 vw right    (Results Pending)         Procedures  Procedures      ED Course                             SBIRT 20yo+    Flowsheet Row Most Recent Value   SBIRT (25 yo +)    In order to provide better care to our patients, we are screening all of our patients for alcohol and drug use  Would it be okay to ask you these screening questions? No Filed at: 02/18/2023 0649                Medical Decision Making  25-year-old female with a history of migraines, hypertension, diabetes, anxiety and depression who presents complaining of headache and right-sided jaw pain, headache, and right wrist pain after a mechanical fall that occurred 2 days ago  The patient states that she has had intermittent nausea and 1 episode of vomiting as well as difficulty sleeping since the fall  The patient is initially hypertensive but blood pressure of 151/116  On repeat the patient's blood pressure is 122/69  The remainder the patient's vitals are within the normal limits  On exam the patient is alert and oriented, cranial nerves II through XII are intact, strength and sensation is intact throughout all extremities, coordination is intact, tenderness to palpation of the chin, tenderness palpation of the right TMJ, tenderness to palpation of the right distal radius with no anatomic snuffbox tenderness  The patient's symptoms are consistent with a concussion  Will order CT head, CT facial bones, x-ray of the right wrist to rule out intracranial bleeding or fractures  We will treat the patient's pain with Tylenol  CT head shows no intracranial abnormalities  CT facial bones shows no evidence of fracture  X-ray of the right wrist does not show any obvious fractures  The patient is referred to the concussion clinic  The patient is given return precautions and discharged      Concussion with unknown loss of consciousness status, initial encounter: acute illness or injury  Amount and/or Complexity of Data Reviewed  Radiology: ordered  Risk  OTC drugs  Disposition  Final diagnoses:   Concussion with unknown loss of consciousness status, initial encounter   Fall, initial encounter   Jaw pain   Right wrist pain     Time reflects when diagnosis was documented in both MDM as applicable and the Disposition within this note     Time User Action Codes Description Comment    2/18/2023  3:29 PM Gerardo Peck [S06  [id number]] Concussion with unknown loss of consciousness status, initial encounter     2/18/2023  3:29 PM Gerardo Peck [P03  YFEA] Fall, initial encounter     2/18/2023  3:29 PM Gerardo Peck [I83 [id number]] Jaw pain     2/18/2023  3:30 PM Gerardo Peck [[id number]] Right wrist pain       ED Disposition     ED Disposition   Discharge    Condition   Stable    Date/Time   Sat Feb 18, 2023  3:30 PM    Comment   Adra Pun discharge to home/self care                 Follow-up Information     Follow up With Specialties Details Why Contact Info Additional [address] Emergency Department Emergency Medicine Go to  If symptoms worsen [address] Emergency Department, [address], [place], [address]    Brittany Pedersen MD Internal Medicine Schedule an appointment as soon as possible for a visit  As needed [address]  [address] [facility] [address] [phone]             Discharge Medication List as of 2/18/2023  3:33 PM      CONTINUE these medications which have NOT CHANGED    Details   albuterol (PROVENTIL HFA,VENTOLIN HFA) 90 mcg/act inhaler 2 PUFFS UP TO 4 TIMES A DAY AS NEEDED FOR WHEEZING, Normal      baclofen 10 mg tablet Take 10 mg by mouth 2 (two) times a day as needed, Starting Tue 12/29/2020, Historical Med      BD Integra Syringe 25G X 1" 3 ML MISC DRAW UP 1 ML OF TORADOL AND INJECT INTO MUSCLE , Historical Med      benzonatate (TESSALON) 200 MG capsule Take 1 capsule (200 mg total) by mouth 3 (three) times a day as needed for cough, Starting Mon 1/9/2023, Normal      Biotin 1000 MCG tablet Take 10,000 mcg by mouth, Historical Med      Botulinum Toxin Type A 200 units SOLR Inject 155 units into face and neck IM every 90 days, Historical Med      buPROPion (WELLBUTRIN SR) 150 mg 12 hr tablet 1 po daily, No Print      Continuous Blood Gluc Sensor (FreeStyle Larry 14 Day Sensor) MISC Use 1 sensor q 14 days to check sugar levels, Normal      !! cyanocobalamin (VITAMIN B-12) 100 mcg tablet Take 1 tablet daily, Historical Med      !! cyanocobalamin (VITAMIN B-12) 1000 MCG tablet Take 1,000 mcg by mouth, Historical Med      Docusate Sodium 100 MG capsule Take 100 mg by mouth 2 (two) times a day, Historical Med      !! DULoxetine (CYMBALTA) 30 mg delayed release capsule Take 30 mg by mouth daily, Starting Mon 12/12/2022, Historical Med      !!  DULoxetine (CYMBALTA) 60 mg delayed release capsule Take 1 capsule (60 mg total) by mouth daily, Starting Thu 2/17/2022, No Print      EPINEPHrine (EPIPEN) 0 3 mg/0 3 mL SOAJ Inject 0 3 mL (0 3 mg total) into a muscle once for 1 dose, Starting Tue 2/16/2021, Normal      ERGOCALCIFEROL PO Take 2,400 mcg by mouth, Historical Med      FERROUS SULFATE-VITAMIN C PO Take 1 tablet once daily, Historical Med      ketorolac (TORADOL) 30 mg/mL injection INFUSE 1 ML (30 MG TOTAL) INTO A VENOUS CATHETER EVERY 12 (TWELVE) HOURS , Historical Med      lactulose (CHRONULAC) 10 g/15 mL solution Starting Mon 5/16/2022, Historical Med      loratadine (CLARITIN) 10 mg tablet Take 1 tablet (10 mg total) by mouth daily, Starting Fri 3/19/2021, No Print      LORazepam (ATIVAN) 0 5 mg tablet Take 0 5 mg by mouth 2 (two) times a day as needed for anxiety , Historical Med      Magnesium 500 MG CAPS Take 400 mg by mouth, Historical Med      methocarbamol (ROBAXIN) 500 mg tablet Take 1 tablet (500 mg total) by mouth 3 (three) times a day as needed for muscle spasms, Starting Fri 6/19/2020, Normal      !! methylphenidate (RITALIN) 10 mg tablet Take 10 mg by mouth daily as needed  , Starting Fri 5/24/2019, Historical Med      !! methylphenidate (RITALIN) 20 MG tablet Take 20 mg by mouth 2 (two) times a day Pt reports taking tid , Starting Tue 5/14/2019, Historical Med      mometasone (ELOCON) 0 1 % lotion APPLY TO AFFECTED AREA TOPICALLY EVERY DAY, Normal      Multiple Vitamin (MULTIVITAMIN ADULT PO) Take 1 tablet by mouth every morning, Historical Med      naltrexone (REVIA) 50 mg tablet Starting Fri 5/13/2022, Historical Med      nystatin (MYCOSTATIN) powder APPLY TOPICALLY TO AFFECTED AREAS TWICE A DAY, Historical Med      oxyCODONE-acetaminophen (ROXICET) 5-325 mg/5 mL solution TAKE 1 TABLET BY MOUTH EVERY 6 HOURS AS NEEDED FOR SEVERE PAIN (PAIN SCORE 7 10)  MAX DAILY  4 TABS, Historical Med      pantoprazole (PROTONIX) 40 mg tablet TAKE 1 TABLET BY MOUTH TWICE A DAY 30 MINUTES BEFORE BREAKFAST & DINNER, Historical Med      pregabalin (Lyrica) 150 mg capsule Take 1 capsule (150 mg total) by mouth 3 (three) times a day, Starting Thu 2/17/2022, No Print      RIBOFLAVIN PO Take 400 mg by mouth, Historical Med      rizatriptan (MAXALT) 10 MG tablet TAKE 1 TABLET (10 MG TOTAL) BY MOUTH ONCE AS NEEDED FOR MIGRAINE, Starting Mon 9/28/2020, Normal      sucralfate (CARAFATE) 1 g tablet Starting Mon 11/22/2021, Historical Med      SUMAtriptan Succinate 6 MG/0 5ML SOAJ Inject 0 5 mL (6 mg total) under the skin as needed (headache), Starting Thu 4/9/2020, Normal      traZODone (DESYREL) 100 mg tablet Starting Thu 9/9/2021, Historical Med      triamcinolone (KENALOG) 0 1 % cream APPLY TO AFFECTED AREA TWICE A DAY, Normal       !! - Potential duplicate medications found  Please discuss with provider              PDMP Review     None           ED Provider  Attending physically available and evaluated Seth Amirah HENDRICKSON managed the patient along with the ED Attending      Electronically Signed by         Carleen López DO  02/18/23 0483

## 2023-02-18 NOTE — ED ATTENDING ATTESTATION
2/18/2023  ITylor MD, saw and evaluated the patient  I have discussed the patient with the resident/non-physician practitioner and agree with the resident's/non-physician practitioner's findings, Plan of Care, and MDM as documented in the resident's/non-physician practitioner's note, except where noted  All available labs and Radiology studies were reviewed  I was present for key portions of any procedure(s) performed by the resident/non-physician practitioner and I was immediately available to provide assistance  At this point I agree with the current assessment done in the Emergency Department  I have conducted an independent evaluation of this patient a history and physical is as follows:    40-year-old woman presenting for evaluation after fall 2 days ago, states she tripped and fell forward striking her face  No loss of consciousness  Was evaluated initially at urgent care  Presenting because she has ongoing headache as well as some facial pain  Additionally complaining of some right wrist pain  Denies neck pain  Denies any weakness, numbness, tingling in arms or legs  On exam patient awake and alert no acute distress  Head atraumatic normocephalic  No C-spine tenderness  There is some left periorbital tenderness  There is bruising and tenderness to the chin  There is right TMJ tenderness  No malocclusion  There is mild tenderness over the right distal radius  No anatomic snuffbox tenderness  No swelling or deformity  We will get imaging, treat pain, treatment as indicated      ED Course         Critical Care Time  Procedures

## 2023-02-18 NOTE — DISCHARGE INSTRUCTIONS
You were seen in the emergency department after a fall that occurred 2 days ago  Your symptoms likely represent a concussion  Your imaging including x-ray of the right wrist, CT scan of the head, and CT scan of the facial bones did not show any concerning abnormalities  A referral was made to the concussion clinic  You should receive a call from them  Take over-the-counter medications such as Tylenol and ibuprofen for your pain  If you have worsening symptoms please return to the emergency department
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
## 2023-02-24 RX ORDER — TRIAMCINOLONE ACETONIDE 1 MG/G
1 CREAM TOPICAL 2 TIMES DAILY
Qty: 30 G | Refills: 0 | Status: SHIPPED | OUTPATIENT
Start: 2023-02-24

## 2023-03-10 ENCOUNTER — TELEPHONE (OUTPATIENT)
Dept: NEUROLOGY | Facility: CLINIC | Age: 43
End: 2023-03-10

## 2023-03-13 ENCOUNTER — EVALUATION (OUTPATIENT)
Dept: PHYSICAL THERAPY | Facility: REHABILITATION | Age: 43
End: 2023-03-13

## 2023-03-13 DIAGNOSIS — S06.0XAA CONCUSSION WITH UNKNOWN LOSS OF CONSCIOUSNESS STATUS, INITIAL ENCOUNTER: Primary | ICD-10-CM

## 2023-03-13 DIAGNOSIS — M54.2 NECK PAIN: ICD-10-CM

## 2023-03-13 DIAGNOSIS — G44.329 CHRONIC POST-TRAUMATIC HEADACHE, NOT INTRACTABLE: ICD-10-CM

## 2023-03-13 NOTE — PROGRESS NOTES
PT Evaluation     Name: Jason Clark  Date: 23  : 1980  Referring Provider: Cristian Hoffmann MD  AUTHORIZATION:   Insurance: Payor: [place] / Plan: [id number] [facility] [address] [address] / Product Type: Workers Compensation /   1 of 16 visits through 23, PN due 4/10/23    SUBJECTIVE:  HPI: Jason Clark is a 38 y o  female referred to outpatient physical therapy for the following diagnosis   Encounter Diagnoses   Name Primary? • Concussion with unknown loss of consciousness status, initial encounter Yes   • Chronic post-traumatic headache, not intractable    • Neck pain            Ángel Grimaldo is a 47-year-old female with a history of migraines, hypertension, diabetes, anxiety and depression who presents complaining of headache and right-sided jaw pain after a mechanical fall that occurred 2 days ago  Patient reports "I was at work and went outside coming down a hill, it was dark and I had my flashlight on my phone  I think I tripped on something and put my hand out, hurting my L knee, R wrist, and my chin "  "I remember being on my back so I must have rolled over though I don't remember, got up on my own and went to the nurses station who sent me to the ER  Imaging was clear  Motrin and tylenol sometimes help " Pt has residual pain/soreness in R wrist and L knee  Pt gets botox for migraines already but since the fall has had a headaches every day, most days it gets worse as the day goes on  They are debilitating and different than my usual migraines  I'm really really tired, 2-3 naps anywhere from 30 min to 2 and a half hours  I'm having trouble cooking and cleaning at home  Sometimes   I live with my , son and his girlfriend, I visit my grandma a lot and help her  BP: 131/84 mmHg, HR 81 bpm     Concussion History    Mechanism of Concussion  Description: Mechanical Fall   Concurrent Injury?   Yes, describe: R wrist pain, L knee pain       Immediate symptoms (within 30 minutes)  Headache, Dizziness and Other: lightheadedness, and lights bother me    Intermediate symptoms Headache, Dizziness and Other: the light bothers me    Current symptoms Headache, dizziness       History of prior concussion? No   Imaging? Yes   Any exertional, orthopedic, sports, or academic limitations? No       Patient goals: I want my headaches to go away and I want my wrist to stop hurting  My knee is just sore       Past Medical History:   Diagnosis Date   • Acid reflux    • Anxiety    • Asthma    • Depression    • Diabetes mellitus ([facility] )     Pre   • Diverticulitis    • Epileptic seizures ([facility] )     Medication related   • Gestational diabetes mellitus    • Hypertension    • Lateral epicondylitis    • Malignant tumor of cervix ([facility] )     between [id number]   • Obstructive sleep apnea     CPAP   • Paresthesias    • Plantar fasciitis    • Rectocele    • Tinea corporis        Current Outpatient Medications:   •  albuterol (PROVENTIL HFA,VENTOLIN HFA) 90 mcg/act inhaler, 2 PUFFS UP TO 4 TIMES A DAY AS NEEDED FOR WHEEZING (Patient not taking: Reported on 1/28/2023), Disp: 18 g, Rfl: 1  •  baclofen 10 mg tablet, Take 10 mg by mouth 2 (two) times a day as needed (Patient not taking: Reported on 1/28/2023), Disp: , Rfl:   •  BD Integra Syringe 25G X 1" 3 ML MISC, DRAW UP 1 ML OF TORADOL AND INJECT INTO MUSCLE , Disp: , Rfl:   •  benzonatate (TESSALON) 200 MG capsule, Take 1 capsule (200 mg total) by mouth 3 (three) times a day as needed for cough (Patient not taking: Reported on 1/28/2023), Disp: 20 capsule, Rfl: 0  •  Biotin 1000 MCG tablet, Take 10,000 mcg by mouth, Disp: , Rfl:   •  Botulinum Toxin Type A 200 units SOLR, Inject 155 units into face and neck IM every 90 days, Disp: , Rfl:   •  buPROPion (WELLBUTRIN SR) 150 mg 12 hr tablet, 1 po daily, Disp: 90 tablet, Rfl: 0  •  Continuous Blood Gluc Sensor (FreeStyle Larry 14 Day Sensor) MISC, Use 1 sensor q 14 days to check sugar levels, Disp: 1 each, Rfl: 11  •  cyanocobalamin (VITAMIN B-12) 100 mcg tablet, Take 1 tablet daily, Disp: , Rfl:   •  cyanocobalamin (VITAMIN B-12) 1000 MCG tablet, Take 1,000 mcg by mouth, Disp: , Rfl:   •  Docusate Sodium 100 MG capsule, Take 100 mg by mouth 2 (two) times a day, Disp: , Rfl:   •  DULoxetine (CYMBALTA) 30 mg delayed release capsule, Take 30 mg by mouth daily, Disp: , Rfl:   •  DULoxetine (CYMBALTA) 60 mg delayed release capsule, Take 1 capsule (60 mg total) by mouth daily, Disp: 30 capsule, Rfl: 2  •  EPINEPHrine (EPIPEN) 0 3 mg/0 3 mL SOAJ, Inject 0 3 mL (0 3 mg total) into a muscle once for 1 dose, Disp: 0 3 mL, Rfl: 0  •  ERGOCALCIFEROL PO, Take 2,400 mcg by mouth, Disp: , Rfl:   •  FERROUS SULFATE-VITAMIN C PO, Take 1 tablet once daily, Disp: , Rfl:   •  ketorolac (TORADOL) 30 mg/mL injection, INFUSE 1 ML (30 MG TOTAL) INTO A VENOUS CATHETER EVERY 12 (TWELVE) HOURS , Disp: , Rfl:   •  lactulose (CHRONULAC) 10 g/15 mL solution, , Disp: , Rfl:   •  loratadine (CLARITIN) 10 mg tablet, Take 1 tablet (10 mg total) by mouth daily, Disp:  , Rfl:   •  LORazepam (ATIVAN) 0 5 mg tablet, Take 0 5 mg by mouth 2 (two) times a day as needed for anxiety , Disp: , Rfl:   •  Magnesium 500 MG CAPS, Take 400 mg by mouth, Disp: , Rfl:   •  methocarbamol (ROBAXIN) 500 mg tablet, Take 1 tablet (500 mg total) by mouth 3 (three) times a day as needed for muscle spasms (Patient not taking: Reported on 1/28/2023), Disp: 21 tablet, Rfl: 0  •  methylphenidate (RITALIN) 10 mg tablet, Take 10 mg by mouth daily as needed  , Disp: , Rfl: 0  •  methylphenidate (RITALIN) 20 MG tablet, Take 20 mg by mouth 2 (two) times a day Pt reports taking tid , Disp: , Rfl: 0  •  mometasone (ELOCON) 0 1 % lotion, APPLY TO AFFECTED AREA TOPICALLY EVERY DAY (Patient not taking: Reported on 1/28/2023), Disp: 60 mL, Rfl: 0  •  Multiple Vitamin (MULTIVITAMIN ADULT PO), Take 1 tablet by mouth every morning, Disp: , Rfl:   •  naltrexone (REVIA) 50 mg tablet, , Disp: , Rfl:   •  nystatin (MYCOSTATIN) powder, APPLY TOPICALLY TO AFFECTED AREAS TWICE A DAY, Disp: , Rfl:   •  oxyCODONE-acetaminophen (ROXICET) 5-325 mg/5 mL solution, TAKE 1 TABLET BY MOUTH EVERY 6 HOURS AS NEEDED FOR SEVERE PAIN (PAIN SCORE 7 10)  MAX DAILY  4 TABS, Disp: , Rfl:   •  pantoprazole (PROTONIX) 40 mg tablet, TAKE 1 TABLET BY MOUTH TWICE A DAY 30 MINUTES BEFORE BREAKFAST & DINNER, Disp: , Rfl:   •  pregabalin (Lyrica) 150 mg capsule, Take 1 capsule (150 mg total) by mouth 3 (three) times a day, Disp: 90 capsule, Rfl: 1  •  RIBOFLAVIN PO, Take 400 mg by mouth, Disp: , Rfl:   •  rizatriptan (MAXALT) 10 MG tablet, TAKE 1 TABLET (10 MG TOTAL) BY MOUTH ONCE AS NEEDED FOR MIGRAINE, Disp: 9 tablet, Rfl: 1  •  sucralfate (CARAFATE) 1 g tablet, , Disp: , Rfl:   •  SUMAtriptan Succinate 6 MG/0 5ML SOAJ, Inject 0 5 mL (6 mg total) under the skin as needed (headache), Disp: 9 Cartridge, Rfl: 1  •  traZODone (DESYREL) 100 mg tablet, , Disp: , Rfl:   •  triamcinolone (KENALOG) 0 1 % cream, Apply 1 application topically 2 (two) times a day To affected area, Disp: 30 g, Rfl: 0    Patient-Specific Functional Scale   Task is scored 0 (unable to perform activity) to 10 (ability to perform activity independently)  Activity 3/13    1  I want my headaches to go away  2    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 2    3     House cleaning and making dinner  2         OBJECTIVE:  Pain Assessment      Headache Frequency: Everyday   Duration: Constant  Intensity:         Best:3        Worst:8        Average: 5  Location: Whole head "pressure/like it will explode"  Exacerbating Factors: light, activity  Relieving Factors: Tyelenol, rest    Cervical spine 3/10     Cervical Spine Examination:    Cervical spine active range of motion:   See chart   Right Left   Rotation WNL WNL   Sidebending WNL Limited - improved with soft tissue mobilization   Flexion / extension WNL WNL     Sharp princess:      Normal  Alar ligament stability test    Normal  Vertebrobasilar insufficiency test Normal  Cervical distraction test    Pain - muscular, stretching soft tissue     Oculomotor Function    Resting Nystagmus  Normal   Gaze Holding Nystagmus  Abnormal - mild 2-beat nystagmus with left gaze holding    Cross-Cover Testing  Normal       VOMS: Not Tested Headache  0-10 Dizziness  0-10 Nausea  0-10 Fogginess  0-10 Comments   Baseline Sx:  5 0 0 0    Smooth Pursuits  5 0 0 0 WNL   Saccades- Horizontal  5 0 0 0 WNL   Saccades- Vertical  5 0 0 0 WNL   Convergence  5 2 0 0 (Near Point in cm):   Measure 1: __24__   Measure 2: __24__   "My left eye felt blurry after that"   VOR - Horizontal  5 2 0 0 VOR x1 = nothing   VOR x 2 = 6/10, feel like I'd fall/trouble focusing    VOR - Vertical  - - - - Assess next visit    Visual Motion Sensitivity   5 4 0 5 10/25 baseline  10/40 with movement   Discussed vision/follow up with eye Dr appt this week about acuity at baseline/pt mixing up O's, C's, D'c on chart  Post testing, pt states:  "I guess I am foggy, trying to focus on your directions" and pt mixed up her L and R  Noticed pt miss-stating her D's, C's, and O'c on visual acuity chart  Manual Therapy: Soft tissue mobilization, occipital release, stretching of upper traps, SCM, scalenes, trigger points  ASSESSMENT:  Katelyn Goodrich is a pleasant 39 yo female recovering from a concussion from a mechanical fall that occurred last month  She appears to have some residual symptoms, including headache, dizziness, lethargy, and neck pain/soreness  Pt had exacerbation of symptoms with VOR testing  Symptoms are affecting her ability to complete household chores, cook, clean, and return to work  She would benefit from skilled therapy to further assess her symptom response to activity, vestibular system and balance, and improve tolerance to functional activities and working  SHORT-TERM GOALS: 4 weeks   1  Patient will become independent with HEP    2   Patient's baseline symptoms of headache will improve to 2/10 and decrease in frequency/duration  3  Patient will return to working part time and be able to return to cleaning in her home  4  Patients neck pain and soreness will resolve to 0/10 on NPRS  LONG-TERM GOALS: 8 weeks   1  Patient will participate in physical therapy without exacerbation of symptoms to return to work full time  2  Patient will no longer experience headaches and dizziness with dynamic movements in order to return to work, hobbies, and complete ADLs  Precautions Hx of seizure    PLAN OF CARE:  Patient will benefit from physical therapy 2x/wk times per week for 8 weeks, incorporating neuromuscular re-education, therapeutic exercise, and manual therapy/modalities as needed as described in exercises        Specialty Daily Treatment Diary     Exercise Diary       Home exercise program      Cervical spine Manual Therapy: STM       Exertion [place] concussion ([place])     Orthopedic exercises      Oculomotor      Vestibular/balance [place] testing ([place])      FGA ([place])      MCTSIB ([place])               BCTT Protocol               *starting speed 3 6 mph (modified if needed) and 0% incline              Time Speed Incline RPE HR BP   1  min  0%      2 min  1%      3min  2%      4 min  3%      5min  4%      6 min  5%      7 min  6%      8 min  7%      9 min  8%      10 min  9%      11 min  10%      12 min  11%      13 min  12%      14 min  13%      15 min  14%      16 min  15%          Arista Power  3/13/2023

## 2023-03-15 ENCOUNTER — OFFICE VISIT (OUTPATIENT)
Dept: NEUROLOGY | Facility: CLINIC | Age: 43
End: 2023-03-15

## 2023-03-15 VITALS
DIASTOLIC BLOOD PRESSURE: 86 MMHG | BODY MASS INDEX: 26.37 KG/M2 | HEART RATE: 115 BPM | OXYGEN SATURATION: 99 % | WEIGHT: 143.3 LBS | TEMPERATURE: 98.3 F | HEIGHT: 62 IN | SYSTOLIC BLOOD PRESSURE: 128 MMHG

## 2023-03-15 DIAGNOSIS — F41.9 ANXIETY AND DEPRESSION: ICD-10-CM

## 2023-03-15 DIAGNOSIS — Z87.820 HISTORY OF CONCUSSION: ICD-10-CM

## 2023-03-15 DIAGNOSIS — G43.709 CHRONIC MIGRAINE WITHOUT AURA WITHOUT STATUS MIGRAINOSUS, NOT INTRACTABLE: Primary | ICD-10-CM

## 2023-03-15 DIAGNOSIS — F32.A ANXIETY AND DEPRESSION: ICD-10-CM

## 2023-03-15 DIAGNOSIS — G44.309 POST-TRAUMATIC HEADACHE: ICD-10-CM

## 2023-03-15 DIAGNOSIS — M54.2 CERVICALGIA: ICD-10-CM

## 2023-03-15 DIAGNOSIS — R41.89 BRAIN FOG: ICD-10-CM

## 2023-03-15 RX ORDER — PREDNISONE 20 MG/1
TABLET ORAL
Qty: 12 TABLET | Refills: 0 | Status: SHIPPED | OUTPATIENT
Start: 2023-03-15 | End: 2023-03-21

## 2023-03-15 NOTE — PROGRESS NOTES
[address] [facility] Consult   PATIENT:  Puneet Garcia  MRN:  [id number]  :  1980  DATE OF SERVICE:  3/15/2023  REFERRED BY: Self, Referral  PMD: Rashad East MD    Assessment:     Puneet Garcia is a delightful  38 y o  female with a past medical history that includes IBS, GERD, anxiety, fibromyalgia, hyperlipidemia, depression, migraines, history of seizures (thought to be due to benzo withdrawal as per neurology notes from [facility]; also reports seizure a few months ago in the setting of hypoglycemia from [facility]) referred here for evaluation of mild TBI/concussion  Ms  Kervin Sanchez had an incident at work when she fell on 2023  Since that time, she has had a flareup of her baseline migraines and also endorses neck pain as well as brain fog  She currently follows with [facility] neurology for Botox for chronic migraine  I have recommended that she continue to follow with them for migraine treatment and reach out to them to consider a shorter interval between Botox treatments as requested by her occupational medicine doctor, but I emphasized that this is difficult and not likely possible  She has many risk factors for persistent symptoms after concussion and a prolonged recovery including a pre-existing history of chronic migraines, anxiety, depression, insomnia, and fibromyalgia  I emphasized that all of these issues can complicate recovery after a head injury, but I also ensured her that I expect her to get better, but it may take some time  I will try bridging her with a short course of prednisone to see if that can help with her headaches  She will reach out to me after approximately 1 week to let me know how she is doing and if she continues to have pain in the back of her head, we can schedule her for occipital nerve blocks  I would like her to touch base with her psychiatrist with regards to therapy    She was previously referred for therapy but never followed through on this and I believe it would be beneficial to her recovery going forward  She has many different stressors in her life at this time including an ongoing divorce which makes things even more complicated for her  Workup:  -CT head without contrast 2/18/2023: No acute intracranial findings  Nonspecific white matter changes  -MRI brain with and without contrast 2/15/2022: No acute intracranial findings  T2 and flair hyperintensities likely related to migraines or small vessel disease  Nonspecific  -MRI cervical spine without contrast 8/29/2022: Mild degenerative changes  No definitive abnormal signal in the cord  -Continue PT  -Continue to follow with psychiatry - discuss therapy with them    -We have discussed concussions and the natural course of recovery  We have discussed that symptoms from a concussion typically take 2 weeks to resolve, and although sometimes it can feel like concussion symptoms linger on, at this point these symptoms would be related to contributing factors  We also discussed that the course may wax and wane  - Contributing factors may include:   Prolonged removal from normal routine,  posttraumatic headache,  comorbid injuries, preexisting chronic headaches or migraines, cervicogenic headache, medication overuse headache, preexisting learning disability, history of concussion with prolonged recovery, anxiety or depression, stress, deconditioning,  comorbid medical diagnoses, young age  - I have recommended gradual return of normal cognitive and physical activity with safety precautions  - We discussed that newer research regarding concussion shows that the sooner one returns gradually to their normal physical and cognitive routine, the sooner one tends to recover   Prolonged removal from normal routine and deconditioning have been shown to prolong symptoms and worsen depression    - We discussed that sometimes there is a constellation of symptoms that some refer to as "post concussion syndrome," but I prefer not to use this term since that can be misleading and make people think they are still brain injured or "concussed," when the most common and likely etiology this far out from the head trauma is either contributing factors or a form of functional neurologic disorder with mixed symptoms, especially after a thorough workup to rule out other etiologies since concussion would not be the direct cause at this point    - We discussed how cognitive issues can have multiple causes and often related to multifactorial etiologies including stress, anxiety,  mood, pain, hypervigilance  and sleep issues and provided reassurance that, it is not likely the cognitive dysfunction is related to concussion at this point    - Safe driving precautions, should not drive at all if feeling sleepy or cognitively not well  Headache Preventive:  - Discussed headache hygiene and lifestyle factors that may improve headaches  - Continue Botox, Cymbalta, Lyrica, Mg and B2  - Continue to follow with [facility] neurology for migraines    Headache Abortive:  - Discussed not taking over-the-counter or prescription headache abortive more than 3 days per week to prevent medication overuse headache  - Prednisone bridge - if no improvement in symptoms after 1 week, will schedule her for ONB  Patient instructions: Activity Plan:  General counseling as discussed regarding appropriate level of tolerable physical activity  Gradual return to physical activity  It is ok to push through light symptoms, we just don't want to significantly exacerbate symptoms  Headache/migraine treatment:   Abortive medications (for immediate treatment of a headache): Ok to take ibuprofen or acetaminophen for headaches, but try to limit the amount and frequency that you are taking to avoid medication overuse/rebound headache  Ideally no more than 2-3 days per week      - Continue Sumatriptan and Rizatriptan - discuss with Dr Fred Ibarra about using these on the same day (I would not recommend it)    Bridge  Prednisone Bridge  Day 1 and 2: 60mg (3 tablets)  Day 3 and 4: 40mg (2 tablets)  Day 5 and 6: 20mg (1 tablet)    Over the counter preventive supplements for headaches/migraines - try for 2-3 months at least   (to take every day to help prevent headaches - not to take at the time of headache):  - Magnesium 400mg daily  - Can occasionally cause stomach upset - if so try at night, with food or stop, rarely can cause diarrhea if so stop  - Riboflavin (Vitamin B2) 400mg daily - FYI B2 may make your urine bright/neon yellow - find online     Prescription preventive medications for headaches/migraines   (to take every day to help prevent headaches - not to take at the time of headache):  - Continue Botox (discuss with Dr Hector Sanz about obtaining this ever 8 week), Cymbalta, and Lyrica    Lifestyle Recommendations:  - Maintain good sleep hygiene  Going to bed and waking up at consistent times, avoiding excessive daytime naps, avoiding caffeinated beverages in the evening, avoid excessive stimulation in the evening and generally using bed primarily for sleeping  One hour before bedtime would recommend turning lights down lower, decreasing your activity (may read quietly, listen to music at a low volume)  When you get into bed, should eliminate all technology (no texting, emailing, playing with your phone, iPad or tablet in bed)  - Maintain good hydration  Drink  2L of fluid a day (4 typical small water bottles)  - Maintain good nutrition  In particular don't skip meals and eat balanced meals regularly  Education and Follow-up  - Please contact us if any questions or concerns arise  Of course, try to protect yourself from head injuries, and if any new concerning symptoms or significant blow to the head or body go to the emergency department    - Follow up in 2 months  CC:   Efrain Baldwin is a  right handed female who presents for evaluation following a possible concussion  History obtained from patient as well as available medical record review  History of Present Illness:   Current medical illnesses or past medical history include IBS, GERD, anxiety, fibromyalgia, hyperlipidemia, depression, migraines, history of seizures (thought to be due to benzo withdrawal as per neurology notes from [facility]; also reports seizure a few months ago in the setting of hypoglycemia from [address] [place] [address])      Date/time of injury: 2/16/23  Definite reported mechanism of injury?   (discrete event with force to the head or rapid head movement without impact): Yes   Mechanism/Cause of injury: Fall - at work in the dark where she tripped  Impact Location: Frontal including left side of jaw  Intracranial injury or skull fx?: No  Loss of Conciousness? Unclear - possibly for seconds  Seizure? No  Was there an onset of typical symptoms within 24-48 hours of the injury event? Yes   Has there been gradual recovery or stability of symptoms over the first week of the injury? [] Yes (There have been improving symptoms over the first week)  [x] Yes (There have been stable symptoms over the first week)  [] No (There have been worsening symptoms over the first week)    Specifics:   -Seen in the ED on 2/18/2023 after a fall on 2/16/2023  Endorsed headache and jaw pain at that time   -Currently undergoing Botox at [facility] for migraines  Primary issues at this time:  [x] Headaches [] Oculomotor [] Vestibular [] Cognitive [] Mood [] Sleep/Fatigue  Headaches  - Ongoing care for chronic migraine with Botox at [facility]  - Frequency: Daily (prior to the fall, 3-4/30)  - Duration: All day if severe enough  - Intensity: At worst, 9/10  On average, 5-6  - Associated symptoms: Nausea and vomiting   Light and sound sensitivity  - Current medications: Botox, Cymbalta (pain and mood), Lyrica (pain), Sumatriptan and Rizatriptan (reports taking them on the same day occasionally)    Physical activity at baseline: three times a week - walking    Current level of physical activity: None    Sleep: anywhere from 4-10 hours per night  Trouble falling asleep: [] Yes [x] No - Trazodone for insomnia  Trouble staying asleep: [x] Yes [] No  History of sleep apnea: [x] Yes [] No  - Resolved since gastric bypass - 2019    Water: about 4-5 cups per day  Diet: 2 meals per day    The following portions of the patient's history were reviewed in the system and updated as appropriate: allergies, current medications, past family history, past medical history, past social history, past surgical history and problem list     Pertinent family history:  [x] Migraines (mother, grandmother, aunt)   [x] Learning disability (ADHD, dyslexia) (son, brother)   [x] Psych disorder (depression, anxiety) (mother, brother, father)    Pertinent social history:  Work: , CNA  Education: [employer]  lives in a variety of places - getting     Illicit Drugs: denies  Alcohol/tobacco: Denies tobacco use, alcohol intake: social drinker  Past Medical History:   1  Any history of prior Concussion? No  Any other TBI's aside from Concussion? no     2  Preexisting Headache history? positive;   Prior Headache medication treatment?  yes  Headache Type:  [x] Migraine  [] Tension [] Other:   -Follows with [facility] for chronic migraine  -Per Neurology note:  Medication trials for migraines:  Topamax - memory/cognitive SE  Zonegran - did not tolerate  Metoprolol - initially helpful  Imitrex - ineffective  Rizatriptan - helpful  Tylenol - ineffective  Motrin - ineffective  Excedrin - allergy to ASA  Melatonin - for sleep  Nortriptyline - tachycardia  Depakote - did not want due to weight gain  Cymbalta - fibromyalgia  Robaxin - fibromyalgia  Tizanidine - fibromyalgia  Baclofen - fibromyalgia  Flexeril - fibromyalgia  Emgality - ineffective  Codons - helpful to break status  Prochlorperazine - helpful for nausea  Gabapentin - not helpful  Lyrica - helpful for fibromyalgia     3  Preexisting Psych history? positive      [x] Anxiety  [x] Depression  []   Prior Psych treatment? Yes  - Previously on Wellbutrin  - Follows with psychiatry    4  Preexisting Learning disability? yes    [x] Dyslexia    5  Preexisting Sleep problems? Yes  - Insomnia and prior history of LISA    6  History of seizures/epilepsy (non febrile) yes  - In the setting of medication withdrawal and Ozempic use causing hypoglycemia  Past Medical History:   Diagnosis Date   • Acid reflux    • Anxiety    • Asthma    • Depression    • Diabetes mellitus ([facility] )     Pre   • Diverticulitis    • Epileptic seizures ([facility] )     Medication related   • Gestational diabetes mellitus    • Hypertension    • Lateral epicondylitis    • Malignant tumor of cervix ([facility] )     between 4612-8422   • Obstructive sleep apnea     CPAP   • Paresthesias    • Plantar fasciitis    • Rectocele    • Tinea corporis      Patient Active Problem List   Diagnosis   • Anxiety   • Asthma   • Fatigue   • Fibromyalgia   • Mixed hyperlipidemia   • Injury of right ankle   • Irritable bowel syndrome   • Lipoma of abdominal wall   • Low back pain   • Arthropathy of lumbar facet joint   • Paresthesia   • Plantar fasciitis   • Rectocele   • Knee pain   • History of hysterectomy   • Seasonal allergies   • Chondromalacia patellae   • Intentional overdose of drug in tablet form (HCC)   • Leukocytosis   • Moderate episode of recurrent major depressive disorder (HCC)   • Migraine without aura   • GERD (gastroesophageal reflux disease)   • Bradycardia   • Eczema of right external ear   • Intertrigo   • Hypoglycemia   • Sciatic leg pain   • ADHD   • History of seizure   • Overweight (BMI 25 0-29  9)       Medications:      Current Outpatient Medications   Medication Sig Dispense Refill   • albuterol (PROVENTIL HFA,VENTOLIN HFA) 90 mcg/act inhaler 2 PUFFS UP TO 4 TIMES A DAY AS NEEDED FOR WHEEZING 18 g 1   • baclofen 10 mg tablet Take 10 mg by mouth 2 (two) times a day as needed     • BD Integra Syringe 25G X 1" 3 ML MISC DRAW UP 1 ML OF TORADOL AND INJECT INTO MUSCLE  • benzonatate (TESSALON) 200 MG capsule Take 1 capsule (200 mg total) by mouth 3 (three) times a day as needed for cough 20 capsule 0   • Biotin 1000 MCG tablet Take 10,000 mcg by mouth     • Botulinum Toxin Type A 200 units SOLR Inject 155 units into face and neck IM every 90 days     • buPROPion (WELLBUTRIN SR) 150 mg 12 hr tablet 1 po daily 90 tablet 0   • Continuous Blood Gluc Sensor (FreeStyle Larry 14 Day Sensor) MISC Use 1 sensor q 14 days to check sugar levels 1 each 11   • cyanocobalamin (VITAMIN B-12) 100 mcg tablet Take 1 tablet daily     • cyanocobalamin (VITAMIN B-12) 1000 MCG tablet Take 1,000 mcg by mouth     • Docusate Sodium 100 MG capsule Take 100 mg by mouth 2 (two) times a day     • DULoxetine (CYMBALTA) 30 mg delayed release capsule Take 30 mg by mouth daily     • DULoxetine (CYMBALTA) 60 mg delayed release capsule Take 1 capsule (60 mg total) by mouth daily 30 capsule 2   • EPINEPHrine (EPIPEN) 0 3 mg/0 3 mL SOAJ Inject 0 3 mL (0 3 mg total) into a muscle once for 1 dose 0 3 mL 0   • ERGOCALCIFEROL PO Take 2,400 mcg by mouth     • FERROUS SULFATE-VITAMIN C PO Take 1 tablet once daily     • ketorolac (TORADOL) 30 mg/mL injection INFUSE 1 ML (30 MG TOTAL) INTO A VENOUS CATHETER EVERY 12 (TWELVE) HOURS       • lactulose (CHRONULAC) 10 g/15 mL solution      • loratadine (CLARITIN) 10 mg tablet Take 1 tablet (10 mg total) by mouth daily     • LORazepam (ATIVAN) 0 5 mg tablet Take 0 5 mg by mouth 2 (two) times a day as needed for anxiety      • Magnesium 500 MG CAPS Take 400 mg by mouth     • methocarbamol (ROBAXIN) 500 mg tablet Take 1 tablet (500 mg total) by mouth 3 (three) times a day as needed for muscle spasms 21 tablet 0   • methylphenidate (RITALIN) 10 mg tablet Take 10 mg by mouth daily as needed    0   • methylphenidate (RITALIN) 20 MG tablet Take 20 mg by mouth 2 (two) times a day Pt reports taking tid   0   • mometasone (ELOCON) 0 1 % lotion APPLY TO AFFECTED AREA TOPICALLY EVERY DAY 60 mL 0   • Multiple Vitamin (MULTIVITAMIN ADULT PO) Take 1 tablet by mouth every morning     • naltrexone (REVIA) 50 mg tablet      • nystatin (MYCOSTATIN) powder APPLY TOPICALLY TO AFFECTED AREAS TWICE A DAY     • oxyCODONE-acetaminophen (ROXICET) 5-325 mg/5 mL solution TAKE 1 TABLET BY MOUTH EVERY 6 HOURS AS NEEDED FOR SEVERE PAIN (PAIN SCORE 7 10)  MAX DAILY  4 TABS     • pantoprazole (PROTONIX) 40 mg tablet TAKE 1 TABLET BY MOUTH TWICE A DAY 30 MINUTES BEFORE BREAKFAST & DINNER     • pregabalin (Lyrica) 150 mg capsule Take 1 capsule (150 mg total) by mouth 3 (three) times a day 90 capsule 1   • RIBOFLAVIN PO Take 400 mg by mouth     • rizatriptan (MAXALT) 10 MG tablet TAKE 1 TABLET (10 MG TOTAL) BY MOUTH ONCE AS NEEDED FOR MIGRAINE 9 tablet 1   • sucralfate (CARAFATE) 1 g tablet      • SUMAtriptan Succinate 6 MG/0 5ML SOAJ Inject 0 5 mL (6 mg total) under the skin as needed (headache) 9 Cartridge 1   • traZODone (DESYREL) 100 mg tablet      • triamcinolone (KENALOG) 0 1 % cream Apply 1 application topically 2 (two) times a day To affected area 30 g 0     No current facility-administered medications for this visit  Allergies:       Allergies   Allergen Reactions   • Aspirin Anaphylaxis   • Bee Venom Swelling and Wheezing   • Penicillins Anaphylaxis and Rash   • Bee Pollen    • Ceclor [Cefaclor] Itching   • Dust Mite Extract Other (See Comments)     congestion   • Grass Pollen(K-O-R-T-Swt Osito) Other (See Comments)     congestion   • Lactose - Food Allergy GI Intolerance   • Other Other (See Comments)     congestion   • Pollen Extract    • Amoxicillin Rash   • Doxycycline Rash   • Erythromycin Rash   • Nickel Rash       Family History:        Family History   Problem Relation Age of Onset   • Anxiety disorder Mother    • Depression Mother    • Cancer Mother    • Endometrial cancer Mother under afe 50   • Seizures Brother         Epilepsy   • Other Brother         Suicide risk   • Cancer Maternal Grandmother 50        lymphatic cancer   • Other Other         cardiac disorder   • Diabetes Other    • Hypertension Other    • Neuropathy Other    • Thyroid disease Other    • Breast cancer Paternal Grandmother 80   • No Known Problems Father    • No Known Problems Daughter    • Cancer Maternal Grandfather    • No Known Problems Paternal Grandfather    • No Known Problems Daughter    • No Known Problems Son    • No Known Problems Brother    • Leukemia Cousin    • Cancer Cousin         lympatic         Social History:     Social History     Socioeconomic History   • Marital status: /Civil Union     Spouse name: Not on file   • Number of children: Not on file   • Years of education: Not on file   • Highest education level: Not on file   Occupational History   • Not on file   Tobacco Use   • Smoking status: Former     Types: Cigarettes     Quit date: 2013     Years since quittin 5   • Smokeless tobacco: Never   Vaping Use   • Vaping Use: Never used   Substance and Sexual Activity   • Alcohol use: Yes     Comment: occasionally, maybe 1-2 times a month   • Drug use: No   • Sexual activity: Yes     Partners: Male   Other Topics Concern   • Not on file   Social History Narrative   • Not on file     Social Determinants of Health     Financial Resource Strain: Not on file   Food Insecurity: Not on file   Transportation Needs: Not on file   Physical Activity: Not on file   Stress: Not on file   Social Connections: Not on file   Intimate Partner Violence: Not on file   Housing Stability: Not on file     Objective:   Physical Exam:                                                               Vitals:            Constitutional:    /86 (BP Location: Left arm, Patient Position: Sitting, Cuff Size: Large)   Pulse (!) 115   Temp 98 3 °F (36 8 °C) (Temporal)   Ht 5' 2" (1 575 m)   Wt 65 kg (143 lb 4 8 oz)   SpO2 99%   BMI 26 21 kg/m²   BP Readings from Last 3 Encounters:   03/15/23 128/86   02/18/23 122/69   01/28/23 102/70     Pulse Readings from Last 3 Encounters:   03/15/23 (!) 115   02/18/23 74   01/28/23 57         Well developed, well nourished, well groomed  No dysmorphic features  HEENT:  Normocephalic atraumatic  See neuro exam   Chest:  Respirations appear regular and unlabored  Cardiovascular:  no observed significant swelling  Musculoskeletal:  (see below under neurologic exam for evaluation of motor function and gait)   Skin:  warm and dry, not diaphoretic  Psychiatric:  Normal behavior and appropriate affect       Neurological Examination:     Mental status/cognitive function:   Recent and remote memory intact  Attention span and concentration as well as fund of knowledge are appropriate for age  Normal language and spontaneous speech  Cranial Nerves:  III, IV, VI-Pupils were equal, round  Extraocular movements were full and conjugate   VII-facial expression symmetric  VIII-hearing grossly intact bilaterally   Motor Exam: symmetric bulk throughout  no atrophy, fasciculations or abnormal movements noted  Coordination:  no apparent dysmetria, ataxia or tremor noted  Gait: steady casual gait    Pertinent lab results: None     Pertinent Imaging:   -CT head without contrast 2/18/2023: No acute intracranial findings  Nonspecific white matter changes  -MRI brain with and without contrast 2/15/2022: No acute intracranial findings  T2 and flair hyperintensities likely related to migraines or small vessel disease  Nonspecific  -MRI cervical spine without contrast 8/29/2022: Mild degenerative changes  No definitive abnormal signal in the cord  I have personally reviewed imaging and radiology read  Review of Systems:   Constitutional: Negative  Negative for appetite change and fever  HENT: Negative    Negative for hearing loss, tinnitus, trouble swallowing and voice change  Eyes: Positive for photophobia  Negative for pain and visual disturbance  Respiratory: Negative  Negative for shortness of breath  Cardiovascular: Negative  Negative for palpitations  Gastrointestinal: Positive for nausea  Negative for vomiting  Endocrine: Negative  Negative for cold intolerance  Genitourinary: Negative  Negative for dysuria, frequency and urgency  Musculoskeletal: Negative  Negative for gait problem, myalgias and neck pain  Neck tenderness   Skin: Negative  Negative for rash  Allergic/Immunologic: Negative  Neurological: Positive for dizziness, light-headedness and headaches  Negative for tremors, seizures, syncope, facial asymmetry, speech difficulty, weakness and numbness  Hematological: Negative  Does not bruise/bleed easily  Psychiatric/Behavioral: Negative  Negative for confusion, hallucinations and sleep disturbance  All other systems reviewed and are negative  I have spent 50 minutes with Patient  today in which greater than 50% of this time was spent in counseling/coordination of care regarding Diagnostic results, Prognosis, Risks and benefits of tx options, Patient and family education, Impressions, Counseling / Coordination of care, Documenting in the medical record and Reviewing / ordering tests, medicine, procedures    I also spent 25 minutes non face to face for this patient the same day       Activity Minutes   Precharting/reviewing 15   Patient care/counseling 50   Postcharting/care coordination 10       Author:  Romeo Armstrong DO   Fellowship trained Concussion Specialist

## 2023-03-15 NOTE — PATIENT INSTRUCTIONS
Activity Plan:  General counseling as discussed regarding appropriate level of tolerable physical activity  Gradual return to physical activity  It is ok to push through light symptoms, we just don't want to significantly exacerbate symptoms  Headache/migraine treatment:   Abortive medications (for immediate treatment of a headache): Ok to take ibuprofen or acetaminophen for headaches, but try to limit the amount and frequency that you are taking to avoid medication overuse/rebound headache  Ideally no more than 2-3 days per week  - Continue Sumatriptan and Rizatriptan - discuss with Dr Sarah Lloyd about using these on the same day (I would not recommend it)    Bridge  Prednisone Bridge  Day 1 and 2: 60mg (3 tablets)  Day 3 and 4: 40mg (2 tablets)  Day 5 and 6: 20mg (1 tablet)    Over the counter preventive supplements for headaches/migraines - try for 2-3 months at least   (to take every day to help prevent headaches - not to take at the time of headache):  - Magnesium 400mg daily  - Can occasionally cause stomach upset - if so try at night, with food or stop, rarely can cause diarrhea if so stop  - Riboflavin (Vitamin B2) 400mg daily - FYI B2 may make your urine bright/neon yellow - find online     Prescription preventive medications for headaches/migraines   (to take every day to help prevent headaches - not to take at the time of headache):  - Continue Botox (discuss with Dr Sarah Lloyd about obtaining this ever 8 week), Cymbalta, and Lyrica    Lifestyle Recommendations:  - Maintain good sleep hygiene  Going to bed and waking up at consistent times, avoiding excessive daytime naps, avoiding caffeinated beverages in the evening, avoid excessive stimulation in the evening and generally using bed primarily for sleeping  One hour before bedtime would recommend turning lights down lower, decreasing your activity (may read quietly, listen to music at a low volume)   When you get into bed, should eliminate all technology (no texting, emailing, playing with your phone, iPad or tablet in bed)  - Maintain good hydration  Drink  2L of fluid a day (4 typical small water bottles)  - Maintain good nutrition  In particular don't skip meals and eat balanced meals regularly  Education and Follow-up  - Please contact us if any questions or concerns arise  Of course, try to protect yourself from head injuries, and if any new concerning symptoms or significant blow to the head or body go to the emergency department    - Follow up in 2 months

## 2023-03-15 NOTE — PROGRESS NOTES
Review of Systems   Constitutional: Negative  Negative for appetite change and fever  HENT: Negative  Negative for hearing loss, tinnitus, trouble swallowing and voice change  Eyes: Positive for photophobia  Negative for pain and visual disturbance  Respiratory: Negative  Negative for shortness of breath  Cardiovascular: Negative  Negative for palpitations  Gastrointestinal: Positive for nausea  Negative for vomiting  Endocrine: Negative  Negative for cold intolerance  Genitourinary: Negative  Negative for dysuria, frequency and urgency  Musculoskeletal: Negative  Negative for gait problem, myalgias and neck pain  Neck tenderness   Skin: Negative  Negative for rash  Allergic/Immunologic: Negative  Neurological: Positive for dizziness, light-headedness and headaches  Negative for tremors, seizures, syncope, facial asymmetry, speech difficulty, weakness and numbness  Hematological: Negative  Does not bruise/bleed easily  Psychiatric/Behavioral: Negative  Negative for confusion, hallucinations and sleep disturbance  All other systems reviewed and are negative

## 2023-03-22 ENCOUNTER — OFFICE VISIT (OUTPATIENT)
Dept: PHYSICAL THERAPY | Facility: REHABILITATION | Age: 43
End: 2023-03-22

## 2023-03-22 DIAGNOSIS — G44.329 CHRONIC POST-TRAUMATIC HEADACHE, NOT INTRACTABLE: ICD-10-CM

## 2023-03-22 DIAGNOSIS — S06.0XAA CONCUSSION WITH UNKNOWN LOSS OF CONSCIOUSNESS STATUS, INITIAL ENCOUNTER: Primary | ICD-10-CM

## 2023-03-22 DIAGNOSIS — M54.2 NECK PAIN: ICD-10-CM

## 2023-03-22 NOTE — PROGRESS NOTES
Daily Note    Name: Liz Ramos  Date: 23  : 1980  Referring Provider: Mirella Cruz MD  AUTHORIZATION:   Insurance: Payor: [place] / Plan: [address] [address] / Product Type: Workers Compensation /   2 of 16 visits through 23, PN due 4/10/23    SUBJECTIVE:  HPI: Liz Ramos is a 38 y o  female referred to outpatient physical therapy for the following diagnosis   Encounter Diagnoses   Name Primary? 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  Has been going to help her grandmother in the mornings  She reports mild fogginess since fall  BP: 152/97 mmHg,  bpm       Patient-Specific Functional Scale   Task is scored 0 (unable to perform activity) to 10 (ability to perform activity independently)  Activity 3/13    1  I want my headaches to go away  2    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 2    3     House cleaning and making dinner  2         OBJECTIVE:  Pain Assessment 3/22/23   Headache Frequency: Everyday   Duration: Constant  Intensity:         Best:3        Worst:7        Average: 5  Location: Right eye and right temple  Exacerbating Factors: stress ( fighting with )  Relieving Factors: sleeping,Maxalt, (imitrix)   Cervical spine Stiffness         Specialty Daily Treatment Diary   Precautions:migraines, seizures, anxiety  Exercise Diary   3/22/23    Home exercise program  Rotations/lateral flexion end range nv VOR   Cervical spine Manual Therapy: STM   STM Uppre trap scalnes  Trigger point release Right>Left    Exertion [place] concussion (NV) 5 min 1 5 mph reported dizziness 6/10 lasting 1 5 min  4 0 min 1 5 mph dizzines 3/10  Lasting 1 minute    Orthopedic exercises  End ranged neck rotation  Lateral flexion  10 x :10 Oculomotor  VOR x 1  Rotation  :30 x 3  Flexion  :30 x 3    Vestibular/balance Grant-Hallpike testing (NV) Grant Hallpike + Right, upbeating tortional, lasting 20 sec   Intensity 5/10    Epley maneuver performed        FGA (NV) 23/30     MCTSIB (NV)   FTEO  FETC  Foam FTEO       FTEC    15 sec                    ASSESSMENT:  Initiated VOMs and exertional activities today  She tested possitive for R+ BPPV and responded well to Epley  She is very motivated and will like to return to work soon  Recomment 2-3x/week 4 hours in two weeks to start as cleared by physician  She will continue to benefit from continued skilled therapy to address her finding and help achieve her back to work goal  Goal were updated to include balance and vestibular componenents    SHORT-TERM GOALS: 4 weeks   1  Patient will become independent with HEP    2  Patient's baseline symptoms of headache will improve to 2/10 and decrease in frequency/duration  3  Patient will return to working part time and be able to return to cleaning in her home  4  Patients neck pain and soreness will resolve to 0/10 on NPRS  LONG-TERM GOALS: 8 weeks   1  Patient will participate in physical therapy without exacerbation of symptoms to return to work full time  2  Patient will no longer experience headaches and dizziness with dynamic movements in order to return to work, hobbies, and complete ADLs  New Goal: 3/22/23  Improve FGA by 5 points  Improve MCTSIB by 15 seconds  Present with negative Solectron I-Pulse    PLAN OF CARE:  Patient will benefit from physical therapy 2x/wk times per week for 7 weeks, incorporating neuromuscular re-education, therapeutic exercise, and manual therapy/modalities as needed as described in shabana Arellano, PT  3/22/2023

## 2023-03-24 ENCOUNTER — TELEPHONE (OUTPATIENT)
Dept: NEUROLOGY | Facility: CLINIC | Age: 43
End: 2023-03-24

## 2023-03-24 ENCOUNTER — APPOINTMENT (OUTPATIENT)
Dept: PHYSICAL THERAPY | Facility: REHABILITATION | Age: 43
End: 2023-03-24

## 2023-03-24 NOTE — TELEPHONE ENCOUNTER
Received fax in [address] for office notes from last visit to be faxed over to Danita  Faxed over to [facility]

## 2023-03-28 ENCOUNTER — TELEMEDICINE (OUTPATIENT)
Dept: FAMILY MEDICINE CLINIC | Facility: CLINIC | Age: 43
End: 2023-03-28

## 2023-03-28 ENCOUNTER — APPOINTMENT (OUTPATIENT)
Dept: LAB | Facility: CLINIC | Age: 43
End: 2023-03-28

## 2023-03-28 ENCOUNTER — APPOINTMENT (OUTPATIENT)
Dept: PHYSICAL THERAPY | Facility: REHABILITATION | Age: 43
End: 2023-03-28

## 2023-03-28 DIAGNOSIS — R10.9 FLANK PAIN: Primary | ICD-10-CM

## 2023-03-28 DIAGNOSIS — R10.9 FLANK PAIN: ICD-10-CM

## 2023-03-28 LAB
ALBUMIN SERPL BCP-MCNC: 4 G/DL (ref 3.5–5)
ALP SERPL-CCNC: 119 U/L (ref 46–116)
ALT SERPL W P-5'-P-CCNC: 30 U/L (ref 12–78)
ANION GAP SERPL CALCULATED.3IONS-SCNC: 5 MMOL/L (ref 4–13)
AST SERPL W P-5'-P-CCNC: 19 U/L (ref 5–45)
BASOPHILS # BLD AUTO: 0.03 THOUSANDS/ÂΜL (ref 0–0.1)
BASOPHILS NFR BLD AUTO: 0 % (ref 0–1)
BILIRUB SERPL-MCNC: 0.42 MG/DL (ref 0.2–1)
BILIRUB UR QL STRIP: NEGATIVE
BUN SERPL-MCNC: 10 MG/DL (ref 5–25)
CALCIUM SERPL-MCNC: 8.3 MG/DL (ref 8.3–10.1)
CHLORIDE SERPL-SCNC: 108 MMOL/L (ref 96–108)
CLARITY UR: CLEAR
CO2 SERPL-SCNC: 27 MMOL/L (ref 21–32)
COLOR UR: ABNORMAL
CREAT SERPL-MCNC: 0.8 MG/DL (ref 0.6–1.3)
EOSINOPHIL # BLD AUTO: 0.05 THOUSAND/ÂΜL (ref 0–0.61)
EOSINOPHIL NFR BLD AUTO: 1 % (ref 0–6)
ERYTHROCYTE [DISTWIDTH] IN BLOOD BY AUTOMATED COUNT: 12.4 % (ref 11.6–15.1)
GFR SERPL CREATININE-BSD FRML MDRD: 89 ML/MIN/1.73SQ M
GLUCOSE SERPL-MCNC: 162 MG/DL (ref 65–140)
GLUCOSE UR STRIP-MCNC: NEGATIVE MG/DL
HCT VFR BLD AUTO: 46 % (ref 34.8–46.1)
HGB BLD-MCNC: 15.6 G/DL (ref 11.5–15.4)
HGB UR QL STRIP.AUTO: NEGATIVE
IMM GRANULOCYTES # BLD AUTO: 0.04 THOUSAND/UL (ref 0–0.2)
IMM GRANULOCYTES NFR BLD AUTO: 0 % (ref 0–2)
KETONES UR STRIP-MCNC: NEGATIVE MG/DL
LEUKOCYTE ESTERASE UR QL STRIP: NEGATIVE
LYMPHOCYTES # BLD AUTO: 2.39 THOUSANDS/ÂΜL (ref 0.6–4.47)
LYMPHOCYTES NFR BLD AUTO: 23 % (ref 14–44)
MCH RBC QN AUTO: 32.5 PG (ref 26.8–34.3)
MCHC RBC AUTO-ENTMCNC: 33.9 G/DL (ref 31.4–37.4)
MCV RBC AUTO: 96 FL (ref 82–98)
MONOCYTES # BLD AUTO: 0.88 THOUSAND/ÂΜL (ref 0.17–1.22)
MONOCYTES NFR BLD AUTO: 9 % (ref 4–12)
NEUTROPHILS # BLD AUTO: 7.01 THOUSANDS/ÂΜL (ref 1.85–7.62)
NEUTS SEG NFR BLD AUTO: 67 % (ref 43–75)
NITRITE UR QL STRIP: NEGATIVE
NRBC BLD AUTO-RTO: 0 /100 WBCS
PH UR STRIP.AUTO: 6.5 [PH]
PLATELET # BLD AUTO: 310 THOUSANDS/UL (ref 149–390)
PMV BLD AUTO: 10.1 FL (ref 8.9–12.7)
POTASSIUM SERPL-SCNC: 3.4 MMOL/L (ref 3.5–5.3)
PROT SERPL-MCNC: 7.1 G/DL (ref 6.4–8.4)
PROT UR STRIP-MCNC: NEGATIVE MG/DL
RBC # BLD AUTO: 4.8 MILLION/UL (ref 3.81–5.12)
SODIUM SERPL-SCNC: 140 MMOL/L (ref 135–147)
SP GR UR STRIP.AUTO: <1.005 (ref 1–1.03)
UROBILINOGEN UR STRIP-ACNC: <2 MG/DL
WBC # BLD AUTO: 10.4 THOUSAND/UL (ref 4.31–10.16)

## 2023-03-28 NOTE — ASSESSMENT & PLAN NOTE
Low from antibiotics as for now, will do CBC, CMP, UA and cultures  She was instructed to go for the blood work today  We will decide on further management after her results

## 2023-03-28 NOTE — PROGRESS NOTES
Virtual Regular Visit    Verification of patient location:    Patient is located in the following [facility] in which I hold an active license [place]      Assessment/Plan:    Problem List Items Addressed This Visit        Other    Flank pain - Primary     Low from antibiotics as for now, will do CBC, CMP, UA and cultures  She was instructed to go for the blood work today  We will decide on further management after her results  Relevant Orders    Comprehensive metabolic panel    CBC and differential    UA (URINE) with reflex to Scope    Urine culture            Reason for visit is   Chief Complaint   Patient presents with   • Fatigue        Encounter provider Vaughn Barron MD    Provider located at [address] [id number] [phone]      Recent Visits  No visits were found meeting these conditions  Showing recent visits within past 7 days and meeting all other requirements  Today's Visits  Date Type Provider Dept   03/28/23 Telemedicine Vaughn Barron MD [place] [id number] Primary Care   Showing today's visits and meeting all other requirements  Future Appointments  No visits were found meeting these conditions  Showing future appointments within next 150 days and meeting all other requirements       The patient was identified by name and date of birth  May Jessie was informed that this is a telemedicine visit and that the visit is being conducted through the [facility]  She agrees to proceed     My office door was closed  No one else was in the room  She acknowledged consent and understanding of privacy and security of the video platform  The patient has agreed to participate and understands they can discontinue the visit at any time  Patient is aware this is a billable service  Subjective  Foster Bharti Gris Heading is a 38 y o  female         Patient called today with complaints of "frequency on urination and flank pain  She said that she did not have a chance to check her temperature  Denies any changes of her urine in terms of the color, she said that is not cloudy  Reported few episodes of diarrhea  Past Medical History:   Diagnosis Date   • Acid reflux    • Anxiety    • Asthma    • Depression    • Diabetes mellitus ([facility] )     Pre   • Diverticulitis    • Epileptic seizures ([facility] )     Medication related   • Gestational diabetes mellitus    • Hypertension    • Lateral epicondylitis    • Malignant tumor of cervix ([facility] )     between 9510-7545   • Obstructive sleep apnea     CPAP   • Paresthesias    • Plantar fasciitis    • Rectocele    • Tinea corporis        Past Surgical History:   Procedure Laterality Date   • ARTHROSCOPY ANKLE Right 9/1/2017    Procedure: ANKLE ARTHOSCOPY WITH EXTENSIVE SYNOVECTOMY, OPEN ARTHOTOY LATERAL ANKLE WITH ANKLE STABILIZATION, BROSTOM REPAIR, INTERNAL BRACE AUGMENTATION, PERONEAL TENDON SYNOVECTOMY;  Surgeon: Nina Salcedo DPM;  Location: [facility];  Service: Podiatry   • COLONOSCOPY  02/27/2017   • GASTRIC BYPASS  07/2020   • HEMORRHOID SURGERY  08/2021   • HYSTERECTOMY  2015   • ND COLONOSCOPY FLX DX W/COLLJ SPEC WHEN PFRMD N/A 2/27/2017    Procedure: COLONOSCOPY;  Surgeon: Maya Hall MD;  Location: [facility]; Service: Gastroenterology   • ND ESOPHAGOGASTRODUODENOSCOPY TRANSORAL DIAGNOSTIC N/A 9/7/2016    Procedure: EGD AND COLONOSCOPY;  Surgeon: Maya Hall MD;  Location: [facility]; Service: Gastroenterology   • TONSILLECTOMY     • TUBAL LIGATION     • UPPER GASTROINTESTINAL ENDOSCOPY  2016       Current Outpatient Medications   Medication Sig Dispense Refill   • albuterol (PROVENTIL HFA,VENTOLIN HFA) 90 mcg/act inhaler 2 PUFFS UP TO 4 TIMES A DAY AS NEEDED FOR WHEEZING 18 g 1   • baclofen 10 mg tablet Take 10 mg by mouth 2 (two) times a day as needed     • BD Integra Syringe 25G X 1\" 3 ML MISC DRAW UP 1 ML OF TORADOL AND INJECT INTO MUSCLE     " • benzonatate (TESSALON) 200 MG capsule Take 1 capsule (200 mg total) by mouth 3 (three) times a day as needed for cough 20 capsule 0   • Biotin 1000 MCG tablet Take 10,000 mcg by mouth     • Botulinum Toxin Type A 200 units SOLR Inject 155 units into face and neck IM every 90 days     • Continuous Blood Gluc Sensor (FreeStyle Larry 14 Day Sensor) MISC Use 1 sensor q 14 days to check sugar levels 1 each 11   • cyanocobalamin (VITAMIN B-12) 100 mcg tablet Take 1 tablet daily     • cyanocobalamin (VITAMIN B-12) 1000 MCG tablet Take 1,000 mcg by mouth     • Docusate Sodium 100 MG capsule Take 100 mg by mouth 2 (two) times a day     • DULoxetine (CYMBALTA) 30 mg delayed release capsule Take 30 mg by mouth daily     • DULoxetine (CYMBALTA) 60 mg delayed release capsule Take 1 capsule (60 mg total) by mouth daily 30 capsule 2   • EPINEPHrine (EPIPEN) 0 3 mg/0 3 mL SOAJ Inject 0 3 mL (0 3 mg total) into a muscle once for 1 dose 0 3 mL 0   • ERGOCALCIFEROL PO Take 2,400 mcg by mouth     • FERROUS SULFATE-VITAMIN C PO Take 1 tablet once daily     • ketorolac (TORADOL) 30 mg/mL injection INFUSE 1 ML (30 MG TOTAL) INTO A VENOUS CATHETER EVERY 12 (TWELVE) HOURS       • lactulose (CHRONULAC) 10 g/15 mL solution      • loratadine (CLARITIN) 10 mg tablet Take 1 tablet (10 mg total) by mouth daily     • LORazepam (ATIVAN) 0 5 mg tablet Take 0 5 mg by mouth 2 (two) times a day as needed for anxiety      • Magnesium 500 MG CAPS Take 400 mg by mouth     • methocarbamol (ROBAXIN) 500 mg tablet Take 1 tablet (500 mg total) by mouth 3 (three) times a day as needed for muscle spasms 21 tablet 0   • methylphenidate (RITALIN) 10 mg tablet Take 10 mg by mouth daily as needed    0   • methylphenidate (RITALIN) 20 MG tablet Take 20 mg by mouth 2 (two) times a day Pt reports taking tid   0   • mometasone (ELOCON) 0 1 % lotion APPLY TO AFFECTED AREA TOPICALLY EVERY DAY 60 mL 0   • Multiple Vitamin (MULTIVITAMIN ADULT PO) Take 1 tablet by mouth every morning     • naltrexone (REVIA) 50 mg tablet      • nystatin (MYCOSTATIN) powder APPLY TOPICALLY TO AFFECTED AREAS TWICE A DAY     • oxyCODONE-acetaminophen (ROXICET) 5-325 mg/5 mL solution TAKE 1 TABLET BY MOUTH EVERY 6 HOURS AS NEEDED FOR SEVERE PAIN (PAIN SCORE 7 10)  MAX DAILY  4 TABS     • pantoprazole (PROTONIX) 40 mg tablet TAKE 1 TABLET BY MOUTH TWICE A DAY 30 MINUTES BEFORE BREAKFAST & DINNER     • pregabalin (Lyrica) 150 mg capsule Take 1 capsule (150 mg total) by mouth 3 (three) times a day 90 capsule 1   • RIBOFLAVIN PO Take 400 mg by mouth     • rizatriptan (MAXALT) 10 MG tablet TAKE 1 TABLET (10 MG TOTAL) BY MOUTH ONCE AS NEEDED FOR MIGRAINE 9 tablet 1   • sucralfate (CARAFATE) 1 g tablet      • SUMAtriptan Succinate 6 MG/0 5ML SOAJ Inject 0 5 mL (6 mg total) under the skin as needed (headache) 9 Cartridge 1   • traZODone (DESYREL) 100 mg tablet      • triamcinolone (KENALOG) 0 1 % cream Apply 1 application topically 2 (two) times a day To affected area 30 g 0     No current facility-administered medications for this visit  Allergies   Allergen Reactions   • Aspirin Anaphylaxis   • Bee Venom Swelling and Wheezing   • Penicillins Anaphylaxis and Rash   • Bee Pollen    • Ceclor [Cefaclor] Itching   • Dust Mite Extract Other (See Comments)     congestion   • Grass Pollen(K-O-R-T-Swt [place]) Other (See Comments)     congestion   • Lactose - Food Allergy GI Intolerance   • Other Other (See Comments)     congestion   • Pollen Extract    • Amoxicillin Rash   • Doxycycline Rash   • Erythromycin Rash   • Nickel Rash       Review of Systems   Constitutional: Negative for chills and fever  Gastrointestinal: Negative for abdominal pain  Genitourinary: Positive for flank pain and frequency  Negative for dysuria, hematuria and pelvic pain  Musculoskeletal: Negative for back pain  Psychiatric/Behavioral: Negative for confusion         Video Exam    There were no vitals filed for this visit     Physical Exam  Neurological:      Mental Status: She is alert            I spent 10 minutes directly with the patient during this visit

## 2023-03-30 LAB — BACTERIA UR CULT: NORMAL

## 2023-03-31 ENCOUNTER — OFFICE VISIT (OUTPATIENT)
Dept: PHYSICAL THERAPY | Facility: REHABILITATION | Age: 43
End: 2023-03-31

## 2023-03-31 DIAGNOSIS — M54.2 NECK PAIN: ICD-10-CM

## 2023-03-31 DIAGNOSIS — S06.0XAA CONCUSSION WITH UNKNOWN LOSS OF CONSCIOUSNESS STATUS, INITIAL ENCOUNTER: Primary | ICD-10-CM

## 2023-03-31 DIAGNOSIS — G44.329 CHRONIC POST-TRAUMATIC HEADACHE, NOT INTRACTABLE: ICD-10-CM

## 2023-04-06 ENCOUNTER — APPOINTMENT (OUTPATIENT)
Dept: PHYSICAL THERAPY | Facility: REHABILITATION | Age: 43
End: 2023-04-06

## 2023-04-07 ENCOUNTER — OFFICE VISIT (OUTPATIENT)
Dept: PHYSICAL THERAPY | Facility: REHABILITATION | Age: 43
End: 2023-04-07

## 2023-04-07 DIAGNOSIS — S06.0XAA CONCUSSION WITH UNKNOWN LOSS OF CONSCIOUSNESS STATUS, INITIAL ENCOUNTER: Primary | ICD-10-CM

## 2023-04-07 DIAGNOSIS — M54.2 NECK PAIN: ICD-10-CM

## 2023-04-07 DIAGNOSIS — G44.329 CHRONIC POST-TRAUMATIC HEADACHE, NOT INTRACTABLE: ICD-10-CM

## 2023-04-07 NOTE — PROGRESS NOTES
Daily Note     Name: Danie Richter  Date: 23  : 1980  Referring Provider: Melia Graves MD  AUTHORIZATION:   Insurance: Payor: [place] / Plan: [address] [address] / Product Type: Workers Compensation /   2 of 16 visits through 23, PN due 4/10/23    SUBJECTIVE:  HPI: Danie Richter is a 38 y o  female referred to outpatient physical therapy for the following diagnosis   Encounter Diagnoses   Name Primary? 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    3     House cleaning and making dinner  2       [phone]  Group  [id number] 1:1 Nadine Martinez    OBJECTIVE: 117/79 HR  Pain Assessment 3/22/23 3/31 4/07     Headache Frequency: Everyday   Duration: Constant  Intensity:         Best:3        Worst:7        Average: 5  Location: Right eye and right temple  Exacerbating Factors: stress ( fighting with )  Relieving Factors: sleeping,Maxalt, (imitrix) Daily  5 x  Best: 3  Worst:7  Location:Left eye (today) and Right jaw   Exacerbating: randomly   Daily  4 x lasting for 1-2 hours  Best: 4  Average 6  Location:Left eye (today) and Right occipital area today   Exacerbating: randomly    Emitrix , tylenol, maxol   Cervical spine Stiffness Cervical pain upon arrival; 6/10 following sudden head turn at dentist     ( decreased post PT intervention) Cervical pain 0/10         Specialty Daily Treatment Diary   Precautions:migraines, seizures, anxiety  Exercise Diary 3/22/23 3/31/23  4/07     Home exercise program  Rotations/lateral flexion end range     Cervical spine Manual Therapy: STM   STM Uppre trap scalnes  Trigger point release Right>Left STM Upper trap scalnes  Trigger point release Right>Left  suboccipital releases  Gentle distraction STM Upper trap scalnes  Trigger point release Right>Left  suboccipital releases  Gentle distraction   Exertion [place] concussion (NV) 5 min 1 5 mph reported dizziness 6/10 lasting 1 5 min  4 0 min 1 5 mph dizziness 3/10  Lasting 1 minute Biodex treadmill  2 0 mph x 5 min  1 5 mph 8 min  2 2 mph x 2 5  2 4 mph x 3 min  2  Min cool down  20 min total   Biodex treadmill  1 0 mph 1 min   1 4 mph 2 min  2 0 mph 6 min   2 2 mph 5 min   2 4 min    HR 85 Spo2 98%   decreased headache to 4/10  20 min   Orthopedic exercises  End ranged neck rotation  Lateral flexion  10 x :10 D/c HEP    Oculomotor  VOR x 1  Rotation  :30 x 3  Flexion  :30 x 3 VOR x 1  HT :30 x 3  HN :30 x 3  VOR CX  :30 x 3  :30 x 3 dizziness  VOR x 1  HT :30 x 2  HN :30 x 2  FT HT foam ;30 x 2  FT HN Foam :30 x 2  VOR Cx  HT :17 x 1  4/10 dizzines        :30 x 2  HN :30 x 2      Vestibular/balance [place]-Hallpike testing (NV) [place] Hallpike + Right, upbeating tortional, lasting 20 sec   Intensity 5/10    Epley maneuver performed    No dizziness    [id number] x 1   head turns   head nods  Tidal wave #15 min      FGA (NV) 23/30      MCTSIB (NV)   FTEO  FETC  Foam FTEO       FTEC    15 sec                      ASSESSMENT:      Patient was able to participate in outdoor activities this weekend  She did have increased in headache post some of the activities  Continues to respond well to manual therapy with considerable decrease in muscle spasms at end of session  Blane Negrete continues with goals of returning to work  Recommend hold for 1 more week and slow return 3x/week for 4 hours  She will benefit from consistent PT intervention 2x week to help achieve her goals        Recommend Speech therapy due to patient reported word finding and forgetfulness  PLAN OF CARE:  Patient will benefit from physical therapy 2x/wk times per week for 4 weeks, incorporating neuromuscular re-education, therapeutic exercise, and manual therapy/modalities as needed as described in exercises  Will reassess at that time            Rafi Gay, PT  4/7/2023

## 2023-04-17 ENCOUNTER — APPOINTMENT (OUTPATIENT)
Dept: PHYSICAL THERAPY | Facility: REHABILITATION | Age: 43
End: 2023-04-17

## 2023-04-19 ENCOUNTER — APPOINTMENT (OUTPATIENT)
Dept: PHYSICAL THERAPY | Facility: REHABILITATION | Age: 43
End: 2023-04-19

## 2023-04-20 ENCOUNTER — APPOINTMENT (OUTPATIENT)
Dept: PHYSICAL THERAPY | Facility: REHABILITATION | Age: 43
End: 2023-04-20

## 2023-04-24 ENCOUNTER — APPOINTMENT (OUTPATIENT)
Dept: PHYSICAL THERAPY | Facility: REHABILITATION | Age: 43
End: 2023-04-24

## 2023-05-01 ENCOUNTER — OFFICE VISIT (OUTPATIENT)
Dept: FAMILY MEDICINE CLINIC | Facility: CLINIC | Age: 43
End: 2023-05-01

## 2023-05-01 ENCOUNTER — APPOINTMENT (OUTPATIENT)
Dept: LAB | Facility: MEDICAL CENTER | Age: 43
End: 2023-05-01

## 2023-05-01 VITALS
RESPIRATION RATE: 12 BRPM | BODY MASS INDEX: 25.05 KG/M2 | OXYGEN SATURATION: 98 % | HEART RATE: 86 BPM | WEIGHT: 141.4 LBS | SYSTOLIC BLOOD PRESSURE: 124 MMHG | HEIGHT: 63 IN | TEMPERATURE: 98.6 F | DIASTOLIC BLOOD PRESSURE: 78 MMHG

## 2023-05-01 DIAGNOSIS — Z13.89 SCREENING FOR BLOOD OR PROTEIN IN URINE: ICD-10-CM

## 2023-05-01 DIAGNOSIS — E55.9 VITAMIN D DEFICIENCY: ICD-10-CM

## 2023-05-01 DIAGNOSIS — E87.6 HYPOKALEMIA: ICD-10-CM

## 2023-05-01 DIAGNOSIS — Z00.00 ANNUAL PHYSICAL EXAM: Primary | ICD-10-CM

## 2023-05-01 DIAGNOSIS — Z11.59 NEED FOR HEPATITIS C SCREENING TEST: ICD-10-CM

## 2023-05-01 DIAGNOSIS — Z11.1 SCREENING FOR TUBERCULOSIS: ICD-10-CM

## 2023-05-01 DIAGNOSIS — E53.8 B12 DEFICIENCY: ICD-10-CM

## 2023-05-01 DIAGNOSIS — T78.40XD ALLERGY, SUBSEQUENT ENCOUNTER: ICD-10-CM

## 2023-05-01 DIAGNOSIS — G44.209 MUSCLE TENSION HEADACHE: ICD-10-CM

## 2023-05-01 LAB
25(OH)D3 SERPL-MCNC: 22.3 NG/ML (ref 30–100)
ALBUMIN SERPL BCP-MCNC: 4.1 G/DL (ref 3.5–5)
ALP SERPL-CCNC: 105 U/L (ref 46–116)
ALT SERPL W P-5'-P-CCNC: 24 U/L (ref 12–78)
AMORPH URATE CRY URNS QL MICRO: ABNORMAL
ANION GAP SERPL CALCULATED.3IONS-SCNC: 0 MMOL/L (ref 4–13)
AST SERPL W P-5'-P-CCNC: 22 U/L (ref 5–45)
BACTERIA UR QL AUTO: ABNORMAL /HPF
BILIRUB SERPL-MCNC: 0.66 MG/DL (ref 0.2–1)
BILIRUB UR QL STRIP: NEGATIVE
BUN SERPL-MCNC: 13 MG/DL (ref 5–25)
CALCIUM SERPL-MCNC: 9 MG/DL (ref 8.3–10.1)
CAOX CRY URNS QL MICRO: ABNORMAL /HPF
CHLORIDE SERPL-SCNC: 110 MMOL/L (ref 96–108)
CLARITY UR: ABNORMAL
CO2 SERPL-SCNC: 29 MMOL/L (ref 21–32)
COLOR UR: ABNORMAL
CREAT SERPL-MCNC: 0.75 MG/DL (ref 0.6–1.3)
GFR SERPL CREATININE-BSD FRML MDRD: 97 ML/MIN/1.73SQ M
GLUCOSE P FAST SERPL-MCNC: 117 MG/DL (ref 65–99)
GLUCOSE UR STRIP-MCNC: NEGATIVE MG/DL
HGB UR QL STRIP.AUTO: NEGATIVE
KETONES UR STRIP-MCNC: NEGATIVE MG/DL
LEUKOCYTE ESTERASE UR QL STRIP: ABNORMAL
MUCOUS THREADS UR QL AUTO: ABNORMAL
NITRITE UR QL STRIP: NEGATIVE
NON-SQ EPI CELLS URNS QL MICRO: ABNORMAL /HPF
PH UR STRIP.AUTO: 6.5 [PH]
POTASSIUM SERPL-SCNC: 3.8 MMOL/L (ref 3.5–5.3)
PROT SERPL-MCNC: 6.7 G/DL (ref 6.4–8.4)
PROT UR STRIP-MCNC: ABNORMAL MG/DL
RBC #/AREA URNS AUTO: ABNORMAL /HPF
SODIUM SERPL-SCNC: 139 MMOL/L (ref 135–147)
SP GR UR STRIP.AUTO: 1.03 (ref 1–1.03)
UROBILINOGEN UR STRIP-ACNC: 3 MG/DL
VIT B12 SERPL-MCNC: 286 PG/ML (ref 100–900)
WBC #/AREA URNS AUTO: ABNORMAL /HPF

## 2023-05-01 RX ORDER — METHOCARBAMOL 500 MG/1
500 TABLET, FILM COATED ORAL 3 TIMES DAILY PRN
Qty: 90 TABLET | Refills: 0 | Status: SHIPPED | OUTPATIENT
Start: 2023-05-01

## 2023-05-01 RX ORDER — EPINEPHRINE 0.3 MG/.3ML
0.3 INJECTION SUBCUTANEOUS ONCE
Qty: 0.6 ML | Refills: 0 | Status: SHIPPED | OUTPATIENT
Start: 2023-05-01 | End: 2023-05-01

## 2023-05-01 NOTE — PROGRESS NOTES
Assessment/Plan:    No problem-specific Assessment & Plan notes found for this encounter  Diagnoses and all orders for this visit:    Annual physical exam    Need for hepatitis C screening test  -     Hepatitis C Antibody; Future    Muscle tension headache  -     methocarbamol (ROBAXIN) 500 mg tablet; Take 1 tablet (500 mg total) by mouth 3 (three) times a day as needed for muscle spasms    Screening for tuberculosis  -     Quantiferon TB Gold Plus; Future    Hypokalemia  -     Comprehensive metabolic panel; Future    Vitamin D deficiency  -     Vitamin D 25 hydroxy; Future    Screening for blood or protein in urine  -     UA (URINE) with reflex to Scope    B12 deficiency  -     Vitamin B12; Future    Allergy, subsequent encounter  -     EPINEPHrine (EPIPEN) 0 3 mg/0 3 mL SOAJ; Inject 0 3 mL (0 3 mg total) into a muscle once for 1 dose          Subjective:      Patient ID: Susan Perry is a 38 y o  female  Patient came today for annual checkup  Vital signs are good, she is intentionally losing weight, she had bariatric surgery in the past   She is up-to-date on her vaccinations  She will consider COVID booster  She follows up with pain management and psychiatry  We will get records about her colonoscopy  Mammogram is ordered  The following portions of the patient's history were reviewed and updated as appropriate: allergies, current medications, past family history, past medical history, past social history, past surgical history, and problem list     Review of Systems   Constitutional: Negative for activity change, appetite change, chills, fatigue and fever  HENT: Negative for congestion, ear pain, rhinorrhea and sore throat  Respiratory: Negative for cough, shortness of breath and wheezing  Cardiovascular: Negative for chest pain, palpitations and leg swelling  Gastrointestinal: Negative for abdominal distention, abdominal pain, diarrhea, nausea and vomiting     Genitourinary: Negative "for difficulty urinating, frequency and pelvic pain  Musculoskeletal: Positive for back pain  Negative for arthralgias  Skin: Negative for rash  Neurological: Negative for dizziness, tremors, weakness, numbness and headaches           Objective:      /78 (BP Location: Left arm, Patient Position: Sitting, Cuff Size: Large)   Pulse 86   Temp 98 6 °F (37 °C) (Temporal)   Resp 12   Ht 5' 2 5\" (1 588 m)   Wt 64 1 kg (141 lb 6 4 oz)   SpO2 98%   BMI 25 45 kg/m²     Allergies   Allergen Reactions    Aspirin Anaphylaxis    Bee Venom Swelling and Wheezing    Penicillins Anaphylaxis and Rash    Bee Pollen     Ceclor [Cefaclor] Itching    Dust Mite Extract Other (See Comments)     congestion    Grass Pollen(K-O-R-T-Swt Osito) Other (See Comments)     congestion    Lactose - Food Allergy GI Intolerance    Other Other (See Comments)     congestion    Pollen Extract     Amoxicillin Rash    Doxycycline Rash    Erythromycin Rash    Nickel Rash          Current Outpatient Medications:     albuterol (PROVENTIL HFA,VENTOLIN HFA) 90 mcg/act inhaler, 2 PUFFS UP TO 4 TIMES A DAY AS NEEDED FOR WHEEZING, Disp: 18 g, Rfl: 1    baclofen 10 mg tablet, Take 10 mg by mouth 2 (two) times a day as needed, Disp: , Rfl:     BD Integra Syringe 25G X 1\" 3 ML MISC, DRAW UP 1 ML OF TORADOL AND INJECT INTO MUSCLE , Disp: , Rfl:     Biotin 1000 MCG tablet, Take 10,000 mcg by mouth, Disp: , Rfl:     Botulinum Toxin Type A 200 units SOLR, Inject 155 units into face and neck IM every 90 days, Disp: , Rfl:     cyanocobalamin (VITAMIN B-12) 1000 MCG tablet, Take 1,000 mcg by mouth, Disp: , Rfl:     DULoxetine (CYMBALTA) 30 mg delayed release capsule, Take 30 mg by mouth daily, Disp: , Rfl:     DULoxetine (CYMBALTA) 60 mg delayed release capsule, Take 1 capsule (60 mg total) by mouth daily, Disp: 30 capsule, Rfl: 2    EPINEPHrine (EPIPEN) 0 3 mg/0 3 mL SOAJ, Inject 0 3 mL (0 3 mg total) into a muscle once for 1 dose, " Disp: 0 3 mL, Rfl: 0    EPINEPHrine (EPIPEN) 0 3 mg/0 3 mL SOAJ, Inject 0 3 mL (0 3 mg total) into a muscle once for 1 dose, Disp: 0 6 mL, Rfl: 0    FERROUS SULFATE-VITAMIN C PO, Take 1 tablet once daily, Disp: , Rfl:     ketorolac (TORADOL) 30 mg/mL injection, INFUSE 1 ML (30 MG TOTAL) INTO A VENOUS CATHETER EVERY 12 (TWELVE) HOURS , Disp: , Rfl:     loratadine (CLARITIN) 10 mg tablet, Take 1 tablet (10 mg total) by mouth daily, Disp:  , Rfl:     LORazepam (ATIVAN) 0 5 mg tablet, Take 0 5 mg by mouth 2 (two) times a day as needed for anxiety , Disp: , Rfl:     methocarbamol (ROBAXIN) 500 mg tablet, Take 1 tablet (500 mg total) by mouth 3 (three) times a day as needed for muscle spasms, Disp: 90 tablet, Rfl: 0    methylphenidate (RITALIN) 10 mg tablet, Take 10 mg by mouth daily as needed  , Disp: , Rfl: 0    methylphenidate (RITALIN) 20 MG tablet, Take 20 mg by mouth 2 (two) times a day Pt reports taking tid , Disp: , Rfl: 0    mometasone (ELOCON) 0 1 % lotion, APPLY TO AFFECTED AREA TOPICALLY EVERY DAY, Disp: 60 mL, Rfl: 0    Multiple Vitamin (MULTIVITAMIN ADULT PO), Take 1 tablet by mouth every morning, Disp: , Rfl:     naltrexone (REVIA) 50 mg tablet, , Disp: , Rfl:     nystatin (MYCOSTATIN) powder, APPLY TOPICALLY TO AFFECTED AREAS TWICE A DAY, Disp: , Rfl:     pantoprazole (PROTONIX) 40 mg tablet, TAKE 1 TABLET BY MOUTH TWICE A DAY 30 MINUTES BEFORE BREAKFAST & DINNER, Disp: , Rfl:     pregabalin (Lyrica) 150 mg capsule, Take 1 capsule (150 mg total) by mouth 3 (three) times a day, Disp: 90 capsule, Rfl: 1    RIBOFLAVIN PO, Take 400 mg by mouth, Disp: , Rfl:     rizatriptan (MAXALT) 10 MG tablet, TAKE 1 TABLET (10 MG TOTAL) BY MOUTH ONCE AS NEEDED FOR MIGRAINE, Disp: 9 tablet, Rfl: 1    SUMAtriptan Succinate 6 MG/0 5ML SOAJ, Inject 0 5 mL (6 mg total) under the skin as needed (headache), Disp: 9 Cartridge, Rfl: 1    traZODone (DESYREL) 100 mg tablet, , Disp: , Rfl:     triamcinolone (KENALOG) 0 1 % cream, Apply 1 application topically 2 (two) times a day To affected area, Disp: 30 g, Rfl: 0    benzonatate (TESSALON) 200 MG capsule, Take 1 capsule (200 mg total) by mouth 3 (three) times a day as needed for cough (Patient not taking: Reported on 5/1/2023), Disp: 20 capsule, Rfl: 0    Continuous Blood Gluc Sensor (FreeStyle Larry 14 Day Sensor) MISC, Use 1 sensor q 14 days to check sugar levels (Patient not taking: Reported on 5/1/2023), Disp: 1 each, Rfl: 11    cyanocobalamin (VITAMIN B-12) 100 mcg tablet, Take 1 tablet daily (Patient not taking: Reported on 5/1/2023), Disp: , Rfl:     Docusate Sodium 100 MG capsule, Take 100 mg by mouth 2 (two) times a day, Disp: , Rfl:     ERGOCALCIFEROL PO, Take 2,400 mcg by mouth, Disp: , Rfl:     lactulose (CHRONULAC) 10 g/15 mL solution, , Disp: , Rfl:     Magnesium 500 MG CAPS, Take 400 mg by mouth (Patient not taking: Reported on 5/1/2023), Disp: , Rfl:     sucralfate (CARAFATE) 1 g tablet, , Disp: , Rfl:      There are no Patient Instructions on file for this visit  Physical Exam  Constitutional:       General: She is not in acute distress  Appearance: Normal appearance  She is not ill-appearing  HENT:      Nose: No rhinorrhea  Cardiovascular:      Rate and Rhythm: Normal rate and regular rhythm  Heart sounds: No murmur heard  No friction rub  No gallop  Pulmonary:      Effort: No respiratory distress  Breath sounds: No wheezing or rhonchi  Chest:      Chest wall: No tenderness  Abdominal:      General: There is no distension  Palpations: There is no mass  Tenderness: There is no abdominal tenderness  There is no guarding or rebound  Hernia: No hernia is present  Musculoskeletal:         General: No swelling or tenderness  Lymphadenopathy:      Cervical: No cervical adenopathy  Skin:     Coloration: Skin is not jaundiced  Findings: No rash     Neurological:      Mental Status: She is alert and oriented to person, place, and time  Motor: No weakness        Gait: Gait normal    Psychiatric:         Mood and Affect: Mood normal          Behavior: Behavior normal

## 2023-05-02 LAB — HCV AB SER QL: NORMAL

## 2023-05-03 LAB
GAMMA INTERFERON BACKGROUND BLD IA-ACNC: 0.04 IU/ML
M TB IFN-G BLD-IMP: NEGATIVE
M TB IFN-G CD4+ BCKGRND COR BLD-ACNC: 0.05 IU/ML
M TB IFN-G CD4+ BCKGRND COR BLD-ACNC: 0.05 IU/ML
MITOGEN IGNF BCKGRD COR BLD-ACNC: >10 IU/ML

## 2023-05-08 DIAGNOSIS — R31.29 MICROHEMATURIA: Primary | ICD-10-CM

## 2023-05-18 ENCOUNTER — TELEMEDICINE (OUTPATIENT)
Dept: FAMILY MEDICINE CLINIC | Facility: CLINIC | Age: 43
End: 2023-05-18

## 2023-05-18 ENCOUNTER — NURSE TRIAGE (OUTPATIENT)
Dept: OTHER | Facility: OTHER | Age: 43
End: 2023-05-18

## 2023-05-18 DIAGNOSIS — J01.11 ACUTE RECURRENT FRONTAL SINUSITIS: Primary | ICD-10-CM

## 2023-05-18 RX ORDER — SULFAMETHOXAZOLE AND TRIMETHOPRIM 800; 160 MG/1; MG/1
1 TABLET ORAL EVERY 12 HOURS SCHEDULED
Qty: 10 TABLET | Refills: 0 | Status: SHIPPED | OUTPATIENT
Start: 2023-05-18 | End: 2023-05-23

## 2023-05-18 NOTE — PROGRESS NOTES
Virtual Regular Visit    Verification of patient location:    Patient is located at Home in the following state in which I hold an active license [place]      Assessment/Plan:    Problem List Items Addressed This Visit        Respiratory    Acute recurrent frontal sinusitis - Primary     Clinical presentation consistent with recurrent sinusitis  She said that she finished a Z-Nathan recently with no improvement  She has multiple allergies to antibiotics  She also said that in the past she tried on Bactrim and it helped  We will use Bactrim DS twice daily for 5 days  Side effects discussed  Continue with OTC decongestions  If no improvement in 48 hours she will let me know  Relevant Medications    sulfamethoxazole-trimethoprim (BACTRIM DS) 800-160 mg per tablet            Reason for visit is   Chief Complaint   Patient presents with   • Virtual Regular Visit        Encounter provider Anny Anderson MD    Provider located at [address] [address] [phone]      Recent Visits  No visits were found meeting these conditions  Showing recent visits within past 7 days and meeting all other requirements  Today's Visits  Date Type Provider Dept   05/18/23 Telemedicine Anny Anderson MD [place] [address] [facility]   Showing today's visits and meeting all other requirements  Future Appointments  No visits were found meeting these conditions  Showing future appointments within next 150 days and meeting all other requirements       The patient was identified by name and date of birth  Ally Waldron was informed that this is a telemedicine visit and that the visit is being conducted through the [employer]  She agrees to proceed     My office door was closed  No one else was in the room  She acknowledged consent and understanding of privacy and security of the video platform   The patient has agreed to participate and understands they can discontinue the visit at any time  Patient is aware this is a billable service  Subjective  Marilee Denny is a 38 y o  female   Patient called today with complains of sinus pain and congestion that started about a week ago  She finished Z-Nathan with no significant improvement  Past Medical History:   Diagnosis Date   • Acid reflux    • Anxiety    • Asthma    • Depression    • Diabetes mellitus ([facility] [address] )     Pre   • Diverticulitis    • Epileptic seizures ([facility] [address] )     Medication related   • Gestational diabetes mellitus    • Hypertension    • Lateral epicondylitis    • Malignant tumor of cervix ([facility] [address] )     between 4079-7586   • Obstructive sleep apnea     CPAP   • Paresthesias    • Plantar fasciitis    • Rectocele    • Tinea corporis        Past Surgical History:   Procedure Laterality Date   • ARTHROSCOPY ANKLE Right 9/1/2017    Procedure: ANKLE ARTHOSCOPY WITH EXTENSIVE SYNOVECTOMY, OPEN ARTHOTOY LATERAL ANKLE WITH ANKLE STABILIZATION, BROSTOM REPAIR, INTERNAL BRACE AUGMENTATION, PERONEAL TENDON SYNOVECTOMY;  Surgeon: Ramon Dennis DPM;  Location: [facility];  Service: Podiatry   • COLONOSCOPY  02/27/2017   • GASTRIC BYPASS  07/2020   • HEMORRHOID SURGERY  08/2021   • HYSTERECTOMY  2015   • VT COLONOSCOPY FLX DX W/COLLJ SPEC WHEN PFRMD N/A 2/27/2017    Procedure: COLONOSCOPY;  Surgeon: Marlene Moya MD;  Location: [facility]; Service: Gastroenterology   • VT ESOPHAGOGASTRODUODENOSCOPY TRANSORAL DIAGNOSTIC N/A 9/7/2016    Procedure: EGD AND COLONOSCOPY;  Surgeon: Marlene Moya MD;  Location: [facility];   Service: Gastroenterology   • TONSILLECTOMY     • TUBAL LIGATION     • UPPER GASTROINTESTINAL ENDOSCOPY  2016       Current Outpatient Medications   Medication Sig Dispense Refill   • sulfamethoxazole-trimethoprim (BACTRIM DS) 800-160 mg per tablet Take 1 tablet by mouth every 12 (twelve) hours for 5 days 10 tablet 0   • albuterol (PROVENTIL HFA,VENTOLIN "HFA) 90 mcg/act inhaler 2 PUFFS UP TO 4 TIMES A DAY AS NEEDED FOR WHEEZING 18 g 1   • baclofen 10 mg tablet Take 10 mg by mouth 2 (two) times a day as needed     • BD Integra Syringe 25G X 1\" 3 ML MISC DRAW UP 1 ML OF TORADOL AND INJECT INTO MUSCLE  • benzonatate (TESSALON) 200 MG capsule Take 1 capsule (200 mg total) by mouth 3 (three) times a day as needed for cough (Patient not taking: Reported on 5/1/2023) 20 capsule 0   • Biotin 1000 MCG tablet Take 10,000 mcg by mouth     • Botulinum Toxin Type A 200 units SOLR Inject 155 units into face and neck IM every 90 days     • Continuous Blood Gluc Sensor (FreeStyle Larry 14 Day Sensor) MISC Use 1 sensor q 14 days to check sugar levels (Patient not taking: Reported on 5/1/2023) 1 each 11   • cyanocobalamin (VITAMIN B-12) 100 mcg tablet Take 1 tablet daily (Patient not taking: Reported on 5/1/2023)     • cyanocobalamin (VITAMIN B-12) 1000 MCG tablet Take 1,000 mcg by mouth     • Docusate Sodium 100 MG capsule Take 100 mg by mouth 2 (two) times a day     • DULoxetine (CYMBALTA) 30 mg delayed release capsule Take 30 mg by mouth daily     • DULoxetine (CYMBALTA) 60 mg delayed release capsule Take 1 capsule (60 mg total) by mouth daily 30 capsule 2   • EPINEPHrine (EPIPEN) 0 3 mg/0 3 mL SOAJ Inject 0 3 mL (0 3 mg total) into a muscle once for 1 dose 0 3 mL 0   • EPINEPHrine (EPIPEN) 0 3 mg/0 3 mL SOAJ Inject 0 3 mL (0 3 mg total) into a muscle once for 1 dose 0 6 mL 0   • ERGOCALCIFEROL PO Take 2,400 mcg by mouth     • FERROUS SULFATE-VITAMIN C PO Take 1 tablet once daily     • ketorolac (TORADOL) 30 mg/mL injection INFUSE 1 ML (30 MG TOTAL) INTO A VENOUS CATHETER EVERY 12 (TWELVE) HOURS       • lactulose (CHRONULAC) 10 g/15 mL solution  (Patient not taking: Reported on 5/1/2023)     • loratadine (CLARITIN) 10 mg tablet Take 1 tablet (10 mg total) by mouth daily     • LORazepam (ATIVAN) 0 5 mg tablet Take 0 5 mg by mouth 2 (two) times a day as needed for anxiety      • " Magnesium 500 MG CAPS Take 400 mg by mouth (Patient not taking: Reported on 5/1/2023)     • methocarbamol (ROBAXIN) 500 mg tablet Take 1 tablet (500 mg total) by mouth 3 (three) times a day as needed for muscle spasms 90 tablet 0   • methylphenidate (RITALIN) 10 mg tablet Take 10 mg by mouth daily as needed    0   • methylphenidate (RITALIN) 20 MG tablet Take 20 mg by mouth 2 (two) times a day Pt reports taking tid   0   • mometasone (ELOCON) 0 1 % lotion APPLY TO AFFECTED AREA TOPICALLY EVERY DAY 60 mL 0   • Multiple Vitamin (MULTIVITAMIN ADULT PO) Take 1 tablet by mouth every morning     • naltrexone (REVIA) 50 mg tablet      • nystatin (MYCOSTATIN) powder APPLY TOPICALLY TO AFFECTED AREAS TWICE A DAY     • pantoprazole (PROTONIX) 40 mg tablet TAKE 1 TABLET BY MOUTH TWICE A DAY 30 MINUTES BEFORE BREAKFAST & DINNER     • pregabalin (Lyrica) 150 mg capsule Take 1 capsule (150 mg total) by mouth 3 (three) times a day 90 capsule 1   • RIBOFLAVIN PO Take 400 mg by mouth     • rizatriptan (MAXALT) 10 MG tablet TAKE 1 TABLET (10 MG TOTAL) BY MOUTH ONCE AS NEEDED FOR MIGRAINE 9 tablet 1   • sucralfate (CARAFATE) 1 g tablet  (Patient not taking: Reported on 5/1/2023)     • SUMAtriptan Succinate 6 MG/0 5ML SOAJ Inject 0 5 mL (6 mg total) under the skin as needed (headache) 9 Cartridge 1   • traZODone (DESYREL) 100 mg tablet      • triamcinolone (KENALOG) 0 1 % cream Apply 1 application topically 2 (two) times a day To affected area 30 g 0     No current facility-administered medications for this visit          Allergies   Allergen Reactions   • Aspirin Anaphylaxis   • Bee Venom Swelling and Wheezing   • Penicillins Anaphylaxis and Rash   • Bee Pollen    • Ceclor [Cefaclor] Itching   • Dust Mite Extract Other (See Comments)     congestion   • Grass Pollen(K-O-R-T-Swt Osito) Other (See Comments)     congestion   • Lactose - Food Allergy GI Intolerance   • Other Other (See Comments)     congestion   • Pollen Extract    • Amoxicillin Rash   • Doxycycline Rash   • Erythromycin Rash   • Nickel Rash       Review of Systems   Constitutional: Positive for chills  Negative for fever  HENT: Positive for congestion, rhinorrhea and sinus pain  Negative for sore throat  Respiratory: Positive for cough  Negative for shortness of breath and wheezing  Gastrointestinal: Negative for diarrhea, nausea and vomiting  Musculoskeletal: Negative for arthralgias and myalgias  Psychiatric/Behavioral: Negative for confusion  Video Exam    There were no vitals filed for this visit      Physical Exam     Visit Time  Total Visit Duration: 15 min

## 2023-05-18 NOTE — TELEPHONE ENCOUNTER
"Regarding: Sinus infection  ----- Message from Juliet Moreira sent at 5/18/2023  7:36 AM EDT -----  \"I've had a sinus infection since yesterday morning  \"    "

## 2023-05-18 NOTE — ASSESSMENT & PLAN NOTE
Clinical presentation consistent with recurrent sinusitis  She said that she finished a Z-Nathan recently with no improvement  She has multiple allergies to antibiotics  She also said that in the past she tried on Bactrim and it helped  We will use Bactrim DS twice daily for 5 days  Side effects discussed  Continue with OTC decongestions  If no improvement in 48 hours she will let me know

## 2023-05-24 ENCOUNTER — TELEPHONE (OUTPATIENT)
Dept: NEUROLOGY | Facility: CLINIC | Age: 43
End: 2023-05-24

## 2023-05-25 ENCOUNTER — OFFICE VISIT (OUTPATIENT)
Dept: FAMILY MEDICINE CLINIC | Facility: CLINIC | Age: 43
End: 2023-05-25

## 2023-05-25 VITALS
TEMPERATURE: 98.8 F | WEIGHT: 138.8 LBS | SYSTOLIC BLOOD PRESSURE: 120 MMHG | OXYGEN SATURATION: 98 % | DIASTOLIC BLOOD PRESSURE: 76 MMHG | BODY MASS INDEX: 24.98 KG/M2 | HEART RATE: 65 BPM

## 2023-05-25 DIAGNOSIS — Z23 ENCOUNTER FOR IMMUNIZATION: Primary | ICD-10-CM

## 2023-05-25 DIAGNOSIS — J01.11 ACUTE RECURRENT FRONTAL SINUSITIS: ICD-10-CM

## 2023-05-25 RX ORDER — CEFIXIME 400 MG/1
400 CAPSULE ORAL DAILY
Qty: 7 CAPSULE | Refills: 0 | Status: SHIPPED | OUTPATIENT
Start: 2023-05-25 | End: 2023-06-01

## 2023-05-25 NOTE — PROGRESS NOTES
Assessment/Plan:    Acute recurrent frontal sinusitis  Patient with persistent sinusitis, she failed therapy with azithromycin and Bactrim  Reports fevers and chills  Complains of discomfort in her maxillary and frontal sinuses  She is allergic to penicillins, I think that we still can use third-generation cephalosporins  We will start cefixime 400 mg daily for 7 days  If any rash, wheezing she will discontinue medication and seek medical care immediately  Diagnoses and all orders for this visit:    Encounter for immunization    Acute recurrent frontal sinusitis  -     cefixime (SUPRAX) 400 mg; Take 1 capsule (400 mg total) by mouth daily for 7 days Patient is allergic to penicillins, she was able to tolerate cephalosporins in the past   Okay to fill          Subjective:      Patient ID: Maximus Moctezuma is a 38 y o  female  Patient came today again with persistent discomfort in her sinuses, fevers  She was treated with Z-Nathan and Bactrim with no significant relief  Sinusitis  Associated symptoms include chills, congestion and coughing  Pertinent negatives include no shortness of breath or sore throat  The following portions of the patient's history were reviewed and updated as appropriate: allergies, current medications, past family history, past medical history, past social history, past surgical history, and problem list     Review of Systems   Constitutional: Positive for chills  Negative for fever  HENT: Positive for congestion, rhinorrhea and sinus pain  Negative for sore throat  Respiratory: Positive for cough  Negative for shortness of breath and wheezing  Gastrointestinal: Negative for diarrhea, nausea and vomiting  Musculoskeletal: Negative for arthralgias and myalgias  Psychiatric/Behavioral: Negative for confusion           Objective:      /76 (BP Location: Left arm, Patient Position: Sitting, Cuff Size: Standard)   Pulse 65   Temp 98 8 °F (37 1 °C) (Tympanic)   Wt "63 kg (138 lb 12 8 oz)   SpO2 98%   BMI 24 98 kg/m²     Allergies   Allergen Reactions   • Aspirin Anaphylaxis   • Bee Venom Swelling and Wheezing   • Penicillins Anaphylaxis and Rash   • Bee Pollen    • Ceclor [Cefaclor] Itching   • Dust Mite Extract Other (See Comments)     congestion   • Grass Pollen(K-O-R-T-Swt Osito) Other (See Comments)     congestion   • Lactose - Food Allergy GI Intolerance   • Other Other (See Comments)     congestion   • Pollen Extract    • Amoxicillin Rash   • Doxycycline Rash   • Erythromycin Rash   • Nickel Rash          Current Outpatient Medications:   •  albuterol (PROVENTIL HFA,VENTOLIN HFA) 90 mcg/act inhaler, 2 PUFFS UP TO 4 TIMES A DAY AS NEEDED FOR WHEEZING, Disp: 18 g, Rfl: 1  •  baclofen 10 mg tablet, Take 10 mg by mouth 2 (two) times a day as needed, Disp: , Rfl:   •  BD Integra Syringe 25G X 1\" 3 ML MISC, DRAW UP 1 ML OF TORADOL AND INJECT INTO MUSCLE , Disp: , Rfl:   •  Biotin 1000 MCG tablet, Take 10,000 mcg by mouth, Disp: , Rfl:   •  Botulinum Toxin Type A 200 units SOLR, Inject 155 units into face and neck IM every 90 days, Disp: , Rfl:   •  cefixime (SUPRAX) 400 mg, Take 1 capsule (400 mg total) by mouth daily for 7 days Patient is allergic to penicillins, she was able to tolerate cephalosporins in the past   Okay to fill, Disp: 7 capsule, Rfl: 0  •  cyanocobalamin (VITAMIN B-12) 100 mcg tablet, , Disp: , Rfl:   •  cyanocobalamin (VITAMIN B-12) 1000 MCG tablet, Take 1,000 mcg by mouth, Disp: , Rfl:   •  Docusate Sodium 100 MG capsule, Take 100 mg by mouth 2 (two) times a day, Disp: , Rfl:   •  DULoxetine (CYMBALTA) 30 mg delayed release capsule, Take 30 mg by mouth daily, Disp: , Rfl:   •  DULoxetine (CYMBALTA) 60 mg delayed release capsule, Take 1 capsule (60 mg total) by mouth daily, Disp: 30 capsule, Rfl: 2  •  ERGOCALCIFEROL PO, Take 2,400 mcg by mouth, Disp: , Rfl:   •  FERROUS SULFATE-VITAMIN C PO, Take 1 tablet once daily, Disp: , Rfl:   •  ketorolac (TORADOL) " 30 mg/mL injection, INFUSE 1 ML (30 MG TOTAL) INTO A VENOUS CATHETER EVERY 12 (TWELVE) HOURS , Disp: , Rfl:   •  lactulose (CHRONULAC) 10 g/15 mL solution, , Disp: , Rfl:   •  loratadine (CLARITIN) 10 mg tablet, Take 1 tablet (10 mg total) by mouth daily, Disp:  , Rfl:   •  LORazepam (ATIVAN) 0 5 mg tablet, Take 0 5 mg by mouth 2 (two) times a day as needed for anxiety , Disp: , Rfl:   •  Magnesium 500 MG CAPS, Take 400 mg by mouth, Disp: , Rfl:   •  methocarbamol (ROBAXIN) 500 mg tablet, Take 1 tablet (500 mg total) by mouth 3 (three) times a day as needed for muscle spasms, Disp: 90 tablet, Rfl: 0  •  methylphenidate (RITALIN) 10 mg tablet, Take 10 mg by mouth daily as needed  , Disp: , Rfl: 0  •  methylphenidate (RITALIN) 20 MG tablet, Take 20 mg by mouth 2 (two) times a day Pt reports taking tid , Disp: , Rfl: 0  •  mometasone (ELOCON) 0 1 % lotion, APPLY TO AFFECTED AREA TOPICALLY EVERY DAY, Disp: 60 mL, Rfl: 0  •  Multiple Vitamin (MULTIVITAMIN ADULT PO), Take 1 tablet by mouth every morning, Disp: , Rfl:   •  naltrexone (REVIA) 50 mg tablet, , Disp: , Rfl:   •  nystatin (MYCOSTATIN) powder, APPLY TOPICALLY TO AFFECTED AREAS TWICE A DAY, Disp: , Rfl:   •  pantoprazole (PROTONIX) 40 mg tablet, TAKE 1 TABLET BY MOUTH TWICE A DAY 30 MINUTES BEFORE BREAKFAST & DINNER, Disp: , Rfl:   •  pregabalin (Lyrica) 150 mg capsule, Take 1 capsule (150 mg total) by mouth 3 (three) times a day, Disp: 90 capsule, Rfl: 1  •  RIBOFLAVIN PO, Take 400 mg by mouth, Disp: , Rfl:   •  rizatriptan (MAXALT) 10 MG tablet, TAKE 1 TABLET (10 MG TOTAL) BY MOUTH ONCE AS NEEDED FOR MIGRAINE, Disp: 9 tablet, Rfl: 1  •  SUMAtriptan Succinate 6 MG/0 5ML SOAJ, Inject 0 5 mL (6 mg total) under the skin as needed (headache), Disp: 9 Cartridge, Rfl: 1  •  traZODone (DESYREL) 100 mg tablet, , Disp: , Rfl:   •  triamcinolone (KENALOG) 0 1 % cream, Apply 1 application topically 2 (two) times a day To affected area, Disp: 30 g, Rfl: 0  •  benzonatate (TESSALON) 200 MG capsule, Take 1 capsule (200 mg total) by mouth 3 (three) times a day as needed for cough (Patient not taking: Reported on 5/1/2023), Disp: 20 capsule, Rfl: 0  •  Continuous Blood Gluc Sensor (FreeStyle Larry 14 Day Sensor) MISC, Use 1 sensor q 14 days to check sugar levels (Patient not taking: Reported on 5/1/2023), Disp: 1 each, Rfl: 11  •  EPINEPHrine (EPIPEN) 0 3 mg/0 3 mL SOAJ, Inject 0 3 mL (0 3 mg total) into a muscle once for 1 dose, Disp: 0 3 mL, Rfl: 0  •  EPINEPHrine (EPIPEN) 0 3 mg/0 3 mL SOAJ, Inject 0 3 mL (0 3 mg total) into a muscle once for 1 dose, Disp: 0 6 mL, Rfl: 0  •  sucralfate (CARAFATE) 1 g tablet, , Disp: , Rfl:      There are no Patient Instructions on file for this visit  Physical Exam  Constitutional:       General: She is not in acute distress  Appearance: She is ill-appearing  She is not toxic-appearing  HENT:      Nose: Congestion and rhinorrhea present  Cardiovascular:      Rate and Rhythm: Normal rate  Pulses: Normal pulses

## 2023-05-25 NOTE — ASSESSMENT & PLAN NOTE
Patient with persistent sinusitis, she failed therapy with azithromycin and Bactrim  Reports fevers and chills  Complains of discomfort in her maxillary and frontal sinuses  She is allergic to penicillins, I think that we still can use third-generation cephalosporins  We will start cefixime 400 mg daily for 7 days  If any rash, wheezing she will discontinue medication and seek medical care immediately

## 2023-05-25 NOTE — LETTER
May 25, 2023     Patient: Warren Vidal  YOB: 1980  Date of Visit: 5/25/2023      To Whom it May Concern:    Warren Vidal is under my professional care  Dann Matthewsvivek was seen in my office on 5/25/2023  Dann Kumar will be excused from work starting from 5/25/2023 until 5/28/2023  She may return back to work on 5/29/2023  If you have any questions or concerns, please don't hesitate to call           Sincerely,          Liset Vang MD        CC: No Recipients

## 2023-05-30 ENCOUNTER — OFFICE VISIT (OUTPATIENT)
Dept: NEUROLOGY | Facility: CLINIC | Age: 43
End: 2023-05-30

## 2023-05-30 VITALS
HEART RATE: 94 BPM | OXYGEN SATURATION: 97 % | WEIGHT: 144.5 LBS | DIASTOLIC BLOOD PRESSURE: 88 MMHG | SYSTOLIC BLOOD PRESSURE: 134 MMHG | TEMPERATURE: 99 F | HEIGHT: 63 IN | BODY MASS INDEX: 25.6 KG/M2

## 2023-05-30 DIAGNOSIS — M54.2 CERVICALGIA: ICD-10-CM

## 2023-05-30 DIAGNOSIS — R41.89 BRAIN FOG: ICD-10-CM

## 2023-05-30 DIAGNOSIS — F41.9 ANXIETY AND DEPRESSION: ICD-10-CM

## 2023-05-30 DIAGNOSIS — Z87.820 HISTORY OF CONCUSSION: Primary | ICD-10-CM

## 2023-05-30 DIAGNOSIS — F32.A ANXIETY AND DEPRESSION: ICD-10-CM

## 2023-05-30 DIAGNOSIS — G44.309 POST-TRAUMATIC HEADACHE: ICD-10-CM

## 2023-05-30 DIAGNOSIS — M79.18 CERVICAL MYOFASCIAL PAIN SYNDROME: ICD-10-CM

## 2023-05-30 DIAGNOSIS — G47.33 OBSTRUCTIVE SLEEP APNEA (ADULT) (PEDIATRIC): ICD-10-CM

## 2023-05-30 DIAGNOSIS — G43.709 CHRONIC MIGRAINE WITHOUT AURA WITHOUT STATUS MIGRAINOSUS, NOT INTRACTABLE: ICD-10-CM

## 2023-05-30 RX ORDER — MEMANTINE HYDROCHLORIDE 5 MG/1
TABLET ORAL
Qty: 120 TABLET | Refills: 3 | Status: SHIPPED | OUTPATIENT
Start: 2023-05-30

## 2023-05-30 RX ORDER — LIDOCAINE HYDROCHLORIDE 10 MG/ML
10 INJECTION, SOLUTION INFILTRATION; PERINEURAL ONCE
Status: COMPLETED | OUTPATIENT
Start: 2023-05-30 | End: 2023-05-30

## 2023-05-30 RX ADMIN — LIDOCAINE HYDROCHLORIDE 10 ML: 10 INJECTION, SOLUTION INFILTRATION; PERINEURAL at 09:53

## 2023-05-30 NOTE — PROGRESS NOTES
Review of Systems   Constitutional: Negative  Negative for appetite change and fever  HENT: Negative  Negative for hearing loss, tinnitus, trouble swallowing and voice change  Eyes: Positive for photophobia  Negative for pain and visual disturbance  Respiratory: Negative  Negative for shortness of breath  Cardiovascular: Negative  Negative for palpitations  Gastrointestinal: Negative  Negative for nausea and vomiting  Endocrine: Negative  Negative for cold intolerance  Genitourinary: Negative  Negative for dysuria, frequency and urgency  Musculoskeletal: Negative  Negative for gait problem, myalgias and neck pain  Skin: Negative  Negative for rash  Allergic/Immunologic: Negative  Neurological: Positive for dizziness, light-headedness and headaches  Negative for tremors, seizures, syncope, facial asymmetry, speech difficulty, weakness and numbness  Hematological: Negative  Does not bruise/bleed easily  Psychiatric/Behavioral: Negative  Negative for confusion, hallucinations and sleep disturbance  Brain fog & memory   All other systems reviewed and are negative  Since your last visit are your headaches same    Any change to the headache type? no    What is your current headache frequency: daily    Are you taking your current medications as prescribed? N/A    If no, why not? Do you have any side effects?      How may days per week do you take an abortive medicine? daily

## 2023-05-30 NOTE — PATIENT INSTRUCTIONS
Additional Testing  Sleep study    Headache/migraine treatment:   Abortive medications (for immediate treatment of a headache): Ok to take ibuprofen or acetaminophen for headaches, but try to limit the amount and frequency that you are taking to avoid medication overuse/rebound headache  Ideally no more than 2-3 days per week  - Continue Sumatriptan and Rizatriptan - discuss with Dr Debbie Gill about using these on the same day (I would not recommend it)     Prescription preventive medications for headaches/migraines   (to take every day to help prevent headaches - not to take at the time of headache):  - Continue Botox (discuss with Dr Debbie Gill about obtaining this ever 8 week), Cymbalta, and Lyrica  - Memantine -   Week 1: 5mg in the evening  Week 2: 5mg in the morning and 5mg in the evening  Week 3: 5mg in the morning and 10mg in the evening  Week 4: 10mg in the morning and 10mg in the evening - continue going forward     - Please touch base with your PCP about B12 injections since your last level was low and lower than previous levels  - Download the phone steph called Curable    Lifestyle Recommendations:  - Maintain good sleep hygiene  Going to bed and waking up at consistent times, avoiding excessive daytime naps, avoiding caffeinated beverages in the evening, avoid excessive stimulation in the evening and generally using bed primarily for sleeping  One hour before bedtime would recommend turning lights down lower, decreasing your activity (may read quietly, listen to music at a low volume)  When you get into bed, should eliminate all technology (no texting, emailing, playing with your phone, iPad or tablet in bed)  - Maintain good hydration  Drink  2L of fluid a day (4 typical small water bottles)  - Maintain good nutrition  In particular don't skip meals and eat balanced meals regularly  Education and Follow-up  - Please contact us if any questions or concerns arise   Of course, try to protect yourself from head injuries, and if any new concerning symptoms or significant blow to the head or body go to the emergency department    - Follow up in 3 months

## 2023-05-30 NOTE — PROGRESS NOTES
[facility] Consult - Follow up   PATIENT:  Lakeisha Peña  MRN:  [id number]  :  1980  DATE OF SERVICE:  2023  REFERRED BY: No ref  provider found  PMD: Corin Philippe MD    Assessment/Plan:   Lakeisha Peña is a delightful  38 y o  female with a past medical history that includes IBS, GERD, anxiety, fibromyalgia, hyperlipidemia, depression, migraines, history of seizures (thought to be due to benzo withdrawal as per neurology notes from [facility]; also reports seizure a few months ago in the setting of hypoglycemia from [address] [place] [address]) here for f/u evaluation of mild TBI/concussion  Since her last visit, she reports no improvement in terms of her headaches  Continues to experience them daily  She has been following with [facility] for Botox injections for migraine and is attempting to get them every 2 months, but this has not yet been approved  I have recommended that she continue to follow them for treatment of her migraines  Since she continues to experience daily headaches, I will have her try memantine which may also help with her brain fog  I feel that her fatigue and brain fog are multifactorial and likely stem from a combination of uncontrolled headaches, possible sleep apnea, and low B12  I have suggested that she undergo a repeat sleep study for evaluation of sleep apnea as she used to have a history of it in the past prior to her gastric bypass  With regards to B12, she has been taking supplements since her surgery, but she is not absorbing it as expected since her recent level was lower than previous levels  I have suggested that she touch base with her PCP for B12 injections and further work-up regarding this  I would like her to try and use the steph called Oxtox which may help with some of her pain as well    Finally, as I had previously recommended occipital nerve blocks, she was agreeable to trying them today and they were performed in the office  Workup:  -CT head without contrast 2/18/2023: No acute intracranial findings  Nonspecific white matter changes  -MRI brain with and without contrast 2/15/2022: No acute intracranial findings  T2 and flair hyperintensities likely related to migraines or small vessel disease  Nonspecific  -MRI cervical spine without contrast 8/29/2022: Mild degenerative changes  No definitive abnormal signal in the cord  -Continue to follow with psychiatry  - Sleep study     Headache Preventive:  - Discussed headache hygiene and lifestyle factors that may improve headaches  - Continue Botox, Cymbalta, Lyrica, Mg and B2  - Continue to follow with [facility] neurology for migraines  - Memantine with goal 10mg BID  - Curable steph     Headache Abortive:  - Discussed not taking over-the-counter or prescription headache abortive more than 3 days per week to prevent medication overuse headache  Patient instructions   Additional Testing  Sleep study    Headache/migraine treatment:   Abortive medications (for immediate treatment of a headache): Ok to take ibuprofen or acetaminophen for headaches, but try to limit the amount and frequency that you are taking to avoid medication overuse/rebound headache   Ideally no more than 2-3 days per week      - Continue Sumatriptan and Rizatriptan - discuss with Dr Carranza Began about using these on the same day (I would not recommend it)     Prescription preventive medications for headaches/migraines   (to take every day to help prevent headaches - not to take at the time of headache):  - Continue Botox (discuss with Dr Carranza Began about obtaining this ever 8 week), Cymbalta, and Lyrica  - Memantine -   Week 1: 5mg in the evening  Week 2: 5mg in the morning and 5mg in the evening  Week 3: 5mg in the morning and 10mg in the evening  Week 4: 10mg in the morning and 10mg in the evening - continue going forward     - Please touch base with your PCP about B12 injections since your last level was low and lower than previous levels  - Download the phone steph called Curable    Lifestyle Recommendations:  - Maintain good sleep hygiene  Going to bed and waking up at consistent times, avoiding excessive daytime naps, avoiding caffeinated beverages in the evening, avoid excessive stimulation in the evening and generally using bed primarily for sleeping  One hour before bedtime would recommend turning lights down lower, decreasing your activity (may read quietly, listen to music at a low volume)  When you get into bed, should eliminate all technology (no texting, emailing, playing with your phone, iPad or tablet in bed)  - Maintain good hydration  Drink  2L of fluid a day (4 typical small water bottles)  - Maintain good nutrition  In particular don't skip meals and eat balanced meals regularly      Education and Follow-up  - Please contact us if any questions or concerns arise  Of course, try to protect yourself from head injuries, and if any new concerning symptoms or significant blow to the head or body go to the emergency department  - Follow up in 3 months  Subjective:   5/30/23: Since her last visit, she reports no change in terms of her headaches  Continues to experience them daily  Over the last month she has had 3 sinus infections with a flare-up of headaches as well  Sleeping about 8-10 hours per night and feels well rested in the morning on most days  Following with [facility] for Botox, and attempting to get Botox every 2 months  Previous History:  3/15/23: Ms Cherie Telles had an incident at work when she fell on 2/16/2023  Since that time, she has had a flareup of her baseline migraines and also endorses neck pain as well as brain fog  She currently follows with [facility] neurology for Botox for chronic migraine    I have recommended that she continue to follow with them for migraine treatment and reach out to them to consider a shorter interval between Botox treatments as requested by her occupational medicine doctor, but I emphasized that this is difficult and not likely possible  She has many risk factors for persistent symptoms after concussion and a prolonged recovery including a pre-existing history of chronic migraines, anxiety, depression, insomnia, and fibromyalgia  I emphasized that all of these issues can complicate recovery after a head injury, but I also ensured her that I expect her to get better, but it may take some time  I will try bridging her with a short course of prednisone to see if that can help with her headaches  She will reach out to me after approximately 1 week to let me know how she is doing and if she continues to have pain in the back of her head, we can schedule her for occipital nerve blocks  I would like her to touch base with her psychiatrist with regards to therapy  She was previously referred for therapy but never followed through on this and I believe it would be beneficial to her recovery going forward  She has many different stressors in her life at this time including an ongoing divorce which makes things even more complicated for her      Past Medical History:     Past Medical History:   Diagnosis Date   • Acid reflux    • Anxiety    • Asthma    • Depression    • Diabetes mellitus ([facility] )     Pre   • Diverticulitis    • Epileptic seizures ([facility] )     Medication related   • Gestational diabetes mellitus    • Hypertension    • Lateral epicondylitis    • Malignant tumor of cervix ([facility] )     between 5011-9271   • Obstructive sleep apnea     CPAP   • Paresthesias    • Plantar fasciitis    • Rectocele    • Tinea corporis        Patient Active Problem List   Diagnosis   • Anxiety   • Asthma   • Fatigue   • Fibromyalgia   • Mixed hyperlipidemia   • Injury of right ankle   • Irritable bowel syndrome   • Lipoma of abdominal wall   • Low back pain   • Arthropathy of lumbar facet joint   • Paresthesia   • Plantar fasciitis   • Rectocele   • Knee pain   • History of hysterectomy   • Seasonal allergies "  • Chondromalacia patellae   • Intentional overdose of drug in tablet form (HCC)   • Leukocytosis   • Moderate episode of recurrent major depressive disorder (HCC)   • Migraine without aura   • GERD (gastroesophageal reflux disease)   • Bradycardia   • Eczema of right external ear   • Intertrigo   • Hypoglycemia   • Sciatic leg pain   • ADHD   • History of seizure   • Overweight (BMI 25 0-29  9)   • Flank pain   • Acute recurrent frontal sinusitis       Medications:      Current Outpatient Medications   Medication Sig Dispense Refill   • albuterol (PROVENTIL HFA,VENTOLIN HFA) 90 mcg/act inhaler 2 PUFFS UP TO 4 TIMES A DAY AS NEEDED FOR WHEEZING 18 g 1   • baclofen 10 mg tablet Take 10 mg by mouth 2 (two) times a day as needed     • BD Integra Syringe 25G X 1\" 3 ML MISC DRAW UP 1 ML OF TORADOL AND INJECT INTO MUSCLE       • Biotin 1000 MCG tablet Take 10,000 mcg by mouth     • Botulinum Toxin Type A 200 units SOLR Inject 155 units into face and neck IM every 90 days     • cefixime (SUPRAX) 400 mg Take 1 capsule (400 mg total) by mouth daily for 7 days Patient is allergic to penicillins, she was able to tolerate cephalosporins in the past   Okay to fill 7 capsule 0   • cyanocobalamin (VITAMIN B-12) 100 mcg tablet      • cyanocobalamin (VITAMIN B-12) 1000 MCG tablet Take 1,000 mcg by mouth     • Docusate Sodium 100 MG capsule Take 100 mg by mouth 2 (two) times a day     • DULoxetine (CYMBALTA) 30 mg delayed release capsule Take 30 mg by mouth daily     • DULoxetine (CYMBALTA) 60 mg delayed release capsule Take 1 capsule (60 mg total) by mouth daily 30 capsule 2   • EPINEPHrine (EPIPEN) 0 3 mg/0 3 mL SOAJ Inject 0 3 mL (0 3 mg total) into a muscle once for 1 dose 0 3 mL 0   • EPINEPHrine (EPIPEN) 0 3 mg/0 3 mL SOAJ Inject 0 3 mL (0 3 mg total) into a muscle once for 1 dose 0 6 mL 0   • ERGOCALCIFEROL PO Take 2,400 mcg by mouth     • FERROUS SULFATE-VITAMIN C PO Take 1 tablet once daily     • ketorolac (TORADOL) 30 mg/mL " injection INFUSE 1 ML (30 MG TOTAL) INTO A VENOUS CATHETER EVERY 12 (TWELVE) HOURS       • lactulose (CHRONULAC) 10 g/15 mL solution      • loratadine (CLARITIN) 10 mg tablet Take 1 tablet (10 mg total) by mouth daily     • LORazepam (ATIVAN) 0 5 mg tablet Take 0 5 mg by mouth 2 (two) times a day as needed for anxiety      • Magnesium 500 MG CAPS Take 400 mg by mouth     • memantine (NAMENDA) 5 mg tablet 5 mg PO Daily for 1 week, then 5 mg BID for 1 week, then 5 mg QAM and 10 mg QPM for 1 week; then 10 mg  tablet 3   • methocarbamol (ROBAXIN) 500 mg tablet Take 1 tablet (500 mg total) by mouth 3 (three) times a day as needed for muscle spasms 90 tablet 0   • methylphenidate (RITALIN) 10 mg tablet Take 10 mg by mouth daily as needed    0   • methylphenidate (RITALIN) 20 MG tablet Take 20 mg by mouth 2 (two) times a day Pt reports taking tid   0   • mometasone (ELOCON) 0 1 % lotion APPLY TO AFFECTED AREA TOPICALLY EVERY DAY 60 mL 0   • Multiple Vitamin (MULTIVITAMIN ADULT PO) Take 1 tablet by mouth every morning     • naltrexone (REVIA) 50 mg tablet      • nystatin (MYCOSTATIN) powder APPLY TOPICALLY TO AFFECTED AREAS TWICE A DAY     • pantoprazole (PROTONIX) 40 mg tablet TAKE 1 TABLET BY MOUTH TWICE A DAY 30 MINUTES BEFORE BREAKFAST & DINNER     • pregabalin (Lyrica) 150 mg capsule Take 1 capsule (150 mg total) by mouth 3 (three) times a day 90 capsule 1   • RIBOFLAVIN PO Take 400 mg by mouth     • rizatriptan (MAXALT) 10 MG tablet TAKE 1 TABLET (10 MG TOTAL) BY MOUTH ONCE AS NEEDED FOR MIGRAINE 9 tablet 1   • SUMAtriptan Succinate 6 MG/0 5ML SOAJ Inject 0 5 mL (6 mg total) under the skin as needed (headache) 9 Cartridge 1   • traZODone (DESYREL) 100 mg tablet      • triamcinolone (KENALOG) 0 1 % cream Apply 1 application topically 2 (two) times a day To affected area 30 g 0   • benzonatate (TESSALON) 200 MG capsule Take 1 capsule (200 mg total) by mouth 3 (three) times a day as needed for cough (Patient not taking: Reported on 5/1/2023) 20 capsule 0   • Continuous Blood Gluc Sensor (FreeStyle Larry 14 Day Sensor) MISC Use 1 sensor q 14 days to check sugar levels (Patient not taking: Reported on 5/1/2023) 1 each 11   • sucralfate (CARAFATE) 1 g tablet  (Patient not taking: Reported on 5/1/2023)       No current facility-administered medications for this visit  Allergies:       Allergies   Allergen Reactions   • Aspirin Anaphylaxis   • Bee Venom Swelling and Wheezing   • Penicillins Anaphylaxis and Rash   • Bee Pollen    • Ceclor [Cefaclor] Itching   • Dust Mite Extract Other (See Comments)     congestion   • Grass Pollen(K-O-R-T-Swt Osito) Other (See Comments)     congestion   • Lactose - Food Allergy GI Intolerance   • Other Other (See Comments)     congestion   • Pollen Extract    • Amoxicillin Rash   • Doxycycline Rash   • Erythromycin Rash   • Nickel Rash       Family History:     Family History   Problem Relation Age of Onset   • Anxiety disorder Mother    • Depression Mother    • Cancer Mother    • Endometrial cancer Mother         under afe 48   • Seizures Brother         Epilepsy   • Other Brother         Suicide risk   • Cancer Maternal Grandmother 50        lymphatic cancer   • Other Other         cardiac disorder   • Diabetes Other    • Hypertension Other    • Neuropathy Other    • Thyroid disease Other    • Breast cancer Paternal Grandmother 80   • No Known Problems Father    • No Known Problems Daughter    • Cancer Maternal Grandfather    • No Known Problems Paternal Grandfather    • No Known Problems Daughter    • No Known Problems Son    • No Known Problems Brother    • Leukemia Cousin    • Cancer Cousin         lympatic       Social History:     Social History     Socioeconomic History   • Marital status: /Civil Union     Spouse name: Not on file   • Number of children: Not on file   • Years of education: Not on file   • Highest education level: Not on file   Occupational History   • Not on "file   Tobacco Use   • Smoking status: Former     Types: Cigarettes     Quit date: 2013     Years since quittin 7   • Smokeless tobacco: Never   Vaping Use   • Vaping Use: Never used   Substance and Sexual Activity   • Alcohol use: Yes     Comment: occasionally, maybe 1-2 times a month   • Drug use: No   • Sexual activity: Yes     Partners: Male   Other Topics Concern   • Not on file   Social History Narrative   • Not on file     Social Determinants of Health     Financial Resource Strain: Not on file   Food Insecurity: Not on file   Transportation Needs: Not on file   Physical Activity: Not on file   Stress: Not on file   Social Connections: Not on file   Intimate Partner Violence: Not on file   Housing Stability: Not on file         Objective:   Physical Exam:                                                               Vitals:            Constitutional:  /88 (BP Location: Left arm, Patient Position: Sitting, Cuff Size: Adult)   Pulse 94   Temp 99 °F (37 2 °C) (Temporal)   Ht 5' 2 5\" (1 588 m)   Wt 65 5 kg (144 lb 8 oz)   SpO2 97%   BMI 26 01 kg/m²   BP Readings from Last 3 Encounters:   23 134/88   23 120/76   23 124/78     Pulse Readings from Last 3 Encounters:   23 94   23 65   23 86         Well developed, well nourished, well groomed  No dysmorphic features  HEENT:  Normocephalic atraumatic  See neuro exam   Chest:  Respirations appear regular and unlabored  Cardiovascular:  no observed significant swelling  Musculoskeletal:  (see below under neurologic exam for evaluation of motor function and gait)   Skin:  warm and dry, not diaphoretic  Psychiatric:  Normal behavior and appropriate affect       Neurological Examination:     Mental status/cognitive function:   Recent and remote memory intact  Attention span and concentration as well as fund of knowledge are appropriate for age  Normal language and spontaneous speech    Cranial " "Nerves:  III, IV, VI-Pupils were equal, round  Extraocular movements were full and conjugate   VII-facial expression symmetric  VIII-hearing grossly intact bilaterally   Motor Exam: symmetric bulk throughout  no atrophy, fasciculations or abnormal movements noted  Coordination:  no apparent dysmetria, ataxia or tremor noted  Gait: steady casual gait  Review of Systems:   Constitutional: Negative  Negative for appetite change and fever  HENT: Negative  Negative for hearing loss, tinnitus, trouble swallowing and voice change  Eyes: Positive for photophobia  Negative for pain and visual disturbance  Respiratory: Negative  Negative for shortness of breath  Cardiovascular: Negative  Negative for palpitations  Gastrointestinal: Negative  Negative for nausea and vomiting  Endocrine: Negative  Negative for cold intolerance  Genitourinary: Negative  Negative for dysuria, frequency and urgency  Musculoskeletal: Negative  Negative for gait problem, myalgias and neck pain  Skin: Negative  Negative for rash  Allergic/Immunologic: Negative  Neurological: Positive for dizziness, light-headedness and headaches  Negative for tremors, seizures, syncope, facial asymmetry, speech difficulty, weakness and numbness  Hematological: Negative  Does not bruise/bleed easily  Psychiatric/Behavioral: Negative  Negative for confusion, hallucinations and sleep disturbance  Brain fog & memory   All other systems reviewed and are negative  Universal Protocol:  Consent: Verbal consent obtained  Written consent obtained  Risks and benefits: risks, benefits and alternatives were discussed  Consent given by: patient  Time out: Immediately prior to procedure a \"time out\" was called to verify the correct patient, procedure, equipment, support staff and site/side marked as required    Patient understanding: patient states understanding of the procedure being performed  Patient consent: the patient's " understanding of the procedure matches consent given  Procedure consent: procedure consent matches procedure scheduled  Relevant documents: relevant documents present and verified  Patient identity confirmed: verbally with patient    Supporting Documentation  Trigger Point Injections: multiple trigger points: 3 or more muscle groups     Procedure Details  Location(s):  Needle size: 27 G  Patient tolerance: patient tolerated the procedure well with no immediate complications  Additional procedure details: Using a 27-gauge needle, 10 cc of 1% lidocaine was injected into the bilateral semispinalis, occipitalis, and trapezius muscles  5 cc bilaterally  This is part of ongoing and continuous management and care          I have spent 40 minutes with Patient  today in which greater than 50% of this time was spent in counseling/coordination of care regarding Prognosis, Risks and benefits of tx options, Patient and family education, Impressions, Documenting in the medical record and Obtaining or reviewing history    I also spent 10 minutes non face to face for this patient the same day       Activity Minutes   Precharting/reviewing 5   Patient care/counseling 40   Postcharting/care coordination 5       Author:  Boris Perkins DO 5/30/2023 9:56 AM

## 2023-05-31 ENCOUNTER — TELEPHONE (OUTPATIENT)
Dept: NEUROLOGY | Facility: CLINIC | Age: 43
End: 2023-05-31

## 2023-05-31 NOTE — TELEPHONE ENCOUNTER
Nurse  at [facility] requesting last office notes from Dr Melissa Michelle  Fax number is [phone]

## 2023-06-01 ENCOUNTER — TELEPHONE (OUTPATIENT)
Dept: SLEEP CENTER | Facility: CLINIC | Age: 43
End: 2023-06-01

## 2023-06-01 NOTE — TELEPHONE ENCOUNTER
----- Message from Deirdre Wright MD sent at 5/31/2023  4:44 PM EDT -----  Approved   ----- Message -----  From: Tray Watkins  Sent: 5/31/2023   8:28 AM EDT  To: Sleep Medicine Rishabh Provider    This Home sleep study needs approval      If approved please sign and return to clerical pool  If denied please include reasons why  Also provide alternative testing if warranted  Please sign and return to clerical pool

## 2023-06-01 NOTE — TELEPHONE ENCOUNTER
Last office note faxed to [phone] attention Lalita nurse  at [facility]

## 2023-06-16 DIAGNOSIS — E53.8 B12 DEFICIENCY: Primary | ICD-10-CM

## 2023-06-16 RX ORDER — CYANOCOBALAMIN 1000 UG/ML
1000 INJECTION, SOLUTION INTRAMUSCULAR; SUBCUTANEOUS WEEKLY
Qty: 4 ML | Refills: 0 | Status: SHIPPED | OUTPATIENT
Start: 2023-06-16

## 2023-06-23 ENCOUNTER — OFFICE VISIT (OUTPATIENT)
Dept: FAMILY MEDICINE CLINIC | Facility: CLINIC | Age: 43
End: 2023-06-23
Payer: COMMERCIAL

## 2023-06-23 VITALS
BODY MASS INDEX: 26.19 KG/M2 | HEART RATE: 109 BPM | HEIGHT: 63 IN | TEMPERATURE: 98.5 F | WEIGHT: 147.8 LBS | DIASTOLIC BLOOD PRESSURE: 78 MMHG | SYSTOLIC BLOOD PRESSURE: 132 MMHG | OXYGEN SATURATION: 99 %

## 2023-06-23 DIAGNOSIS — R21 RASH: ICD-10-CM

## 2023-06-23 DIAGNOSIS — E53.8 B12 DEFICIENCY: Primary | ICD-10-CM

## 2023-06-23 PROCEDURE — 99212 OFFICE O/P EST SF 10 MIN: CPT | Performed by: INTERNAL MEDICINE

## 2023-06-23 RX ORDER — CYANOCOBALAMIN 1000 UG/ML
1000 INJECTION, SOLUTION INTRAMUSCULAR; SUBCUTANEOUS
Status: SHIPPED | OUTPATIENT
Start: 2023-06-23

## 2023-06-23 RX ADMIN — CYANOCOBALAMIN 1000 MCG: 1000 INJECTION, SOLUTION INTRAMUSCULAR; SUBCUTANEOUS at 15:24

## 2023-06-23 NOTE — PROGRESS NOTES
"Assessment/Plan:    Rash  Physical exam is normal   We will continue to observe  If any worsening she will let me know  Diagnoses and all orders for this visit:    B12 deficiency  -     cyanocobalamin injection 1,000 mcg    Rash          Subjective:      Patient ID: Odalys Elizabeth is a 38 y o  female  She reported that she recently noticed rash and bumps on her scalp, she reported that she noticed that it was changing locations  The following portions of the patient's history were reviewed and updated as appropriate: allergies, current medications, past family history, past medical history, past social history, past surgical history, and problem list     Review of Systems   Skin: Positive for rash           Objective:      /78 (BP Location: Left arm, Patient Position: Sitting, Cuff Size: Standard)   Pulse (!) 109   Temp 98 5 °F (36 9 °C) (Tympanic)   Ht 5' 2 5\" (1 588 m)   Wt 67 kg (147 lb 12 8 oz)   SpO2 99%   BMI 26 60 kg/m²     Allergies   Allergen Reactions   • Aspirin Anaphylaxis   • Bee Venom Swelling and Wheezing   • Penicillins Anaphylaxis and Rash   • Bee Pollen    • Ceclor [Cefaclor] Itching   • Dust Mite Extract Other (See Comments)     congestion   • Grass Pollen(K-O-R-T-Swt [place]) Other (See Comments)     congestion   • Lactose - Food Allergy GI Intolerance   • Other Other (See Comments)     congestion   • Pollen Extract    • Amoxicillin Rash   • Doxycycline Rash   • Erythromycin Rash   • Nickel Rash          Current Outpatient Medications:   •  albuterol (PROVENTIL HFA,VENTOLIN HFA) 90 mcg/act inhaler, 2 PUFFS UP TO 4 TIMES A DAY AS NEEDED FOR WHEEZING, Disp: 18 g, Rfl: 1  •  baclofen 10 mg tablet, Take 10 mg by mouth 2 (two) times a day as needed, Disp: , Rfl:   •  BD Integra Syringe 25G X 1\" 3 ML MISC, DRAW UP 1 ML OF TORADOL AND INJECT INTO MUSCLE , Disp: , Rfl:   •  Biotin 1000 MCG tablet, Take 10,000 mcg by mouth, Disp: , Rfl:   •  Botulinum Toxin Type A 200 units SOLR, " Inject 155 units into face and neck IM every 90 days, Disp: , Rfl:   •  cyanocobalamin (VITAMIN B-12) 100 mcg tablet, , Disp: , Rfl:   •  cyanocobalamin (VITAMIN B-12) 1000 MCG tablet, Take 1,000 mcg by mouth, Disp: , Rfl:   •  cyanocobalamin 1,000 mcg/mL, Inject 1 mL (1,000 mcg total) into a muscle once a week, Disp: 4 mL, Rfl: 0  •  Docusate Sodium 100 MG capsule, Take 100 mg by mouth 2 (two) times a day, Disp: , Rfl:   •  DULoxetine (CYMBALTA) 30 mg delayed release capsule, Take 30 mg by mouth daily, Disp: , Rfl:   •  DULoxetine (CYMBALTA) 60 mg delayed release capsule, Take 1 capsule (60 mg total) by mouth daily, Disp: 30 capsule, Rfl: 2  •  ERGOCALCIFEROL PO, Take 2,400 mcg by mouth, Disp: , Rfl:   •  FERROUS SULFATE-VITAMIN C PO, Take 1 tablet once daily, Disp: , Rfl:   •  ketorolac (TORADOL) 30 mg/mL injection, INFUSE 1 ML (30 MG TOTAL) INTO A VENOUS CATHETER EVERY 12 (TWELVE) HOURS , Disp: , Rfl:   •  lactulose (CHRONULAC) 10 g/15 mL solution, , Disp: , Rfl:   •  loratadine (CLARITIN) 10 mg tablet, Take 1 tablet (10 mg total) by mouth daily, Disp:  , Rfl:   •  LORazepam (ATIVAN) 0 5 mg tablet, Take 0 5 mg by mouth 2 (two) times a day as needed for anxiety , Disp: , Rfl:   •  Magnesium 500 MG CAPS, Take 400 mg by mouth, Disp: , Rfl:   •  memantine (NAMENDA) 5 mg tablet, 5 mg PO Daily for 1 week, then 5 mg BID for 1 week, then 5 mg QAM and 10 mg QPM for 1 week; then 10 mg BID, Disp: 120 tablet, Rfl: 3  •  methocarbamol (ROBAXIN) 500 mg tablet, Take 1 tablet (500 mg total) by mouth 3 (three) times a day as needed for muscle spasms, Disp: 90 tablet, Rfl: 0  •  methylphenidate (RITALIN) 10 mg tablet, Take 10 mg by mouth daily as needed  , Disp: , Rfl: 0  •  methylphenidate (RITALIN) 20 MG tablet, Take 20 mg by mouth 2 (two) times a day Pt reports taking tid , Disp: , Rfl: 0  •  mometasone (ELOCON) 0 1 % lotion, APPLY TO AFFECTED AREA TOPICALLY EVERY DAY, Disp: 60 mL, Rfl: 0  •  Multiple Vitamin (MULTIVITAMIN ADULT PO), Take 1 tablet by mouth every morning, Disp: , Rfl:   •  naltrexone (REVIA) 50 mg tablet, , Disp: , Rfl:   •  nystatin (MYCOSTATIN) powder, APPLY TOPICALLY TO AFFECTED AREAS TWICE A DAY, Disp: , Rfl:   •  pantoprazole (PROTONIX) 40 mg tablet, TAKE 1 TABLET BY MOUTH TWICE A DAY 30 MINUTES BEFORE BREAKFAST & DINNER, Disp: , Rfl:   •  pregabalin (Lyrica) 150 mg capsule, Take 1 capsule (150 mg total) by mouth 3 (three) times a day, Disp: 90 capsule, Rfl: 1  •  RIBOFLAVIN PO, Take 400 mg by mouth, Disp: , Rfl:   •  rizatriptan (MAXALT) 10 MG tablet, TAKE 1 TABLET (10 MG TOTAL) BY MOUTH ONCE AS NEEDED FOR MIGRAINE, Disp: 9 tablet, Rfl: 1  •  SUMAtriptan Succinate 6 MG/0 5ML SOAJ, Inject 0 5 mL (6 mg total) under the skin as needed (headache), Disp: 9 Cartridge, Rfl: 1  •  traZODone (DESYREL) 100 mg tablet, , Disp: , Rfl:   •  triamcinolone (KENALOG) 0 1 % cream, Apply 1 application topically 2 (two) times a day To affected area, Disp: 30 g, Rfl: 0  •  benzonatate (TESSALON) 200 MG capsule, Take 1 capsule (200 mg total) by mouth 3 (three) times a day as needed for cough (Patient not taking: Reported on 5/1/2023), Disp: 20 capsule, Rfl: 0  •  Continuous Blood Gluc Sensor (FreeStyle Larry 14 Day Sensor) MISC, Use 1 sensor q 14 days to check sugar levels (Patient not taking: Reported on 5/1/2023), Disp: 1 each, Rfl: 11  •  EPINEPHrine (EPIPEN) 0 3 mg/0 3 mL SOAJ, Inject 0 3 mL (0 3 mg total) into a muscle once for 1 dose, Disp: 0 3 mL, Rfl: 0  •  EPINEPHrine (EPIPEN) 0 3 mg/0 3 mL SOAJ, Inject 0 3 mL (0 3 mg total) into a muscle once for 1 dose, Disp: 0 6 mL, Rfl: 0  •  sucralfate (CARAFATE) 1 g tablet, , Disp: , Rfl:     Current Facility-Administered Medications:   •  cyanocobalamin injection 1,000 mcg, 1,000 mcg, Intramuscular, Q30 Days, Ronny Brown MD, 1,000 mcg at 06/23/23 1524     There are no Patient Instructions on file for this visit          Physical Exam  Skin:     Findings: No erythema, lesion or rash

## 2023-06-30 ENCOUNTER — TELEPHONE (OUTPATIENT)
Dept: FAMILY MEDICINE CLINIC | Facility: CLINIC | Age: 43
End: 2023-06-30

## 2023-06-30 NOTE — TELEPHONE ENCOUNTER
Date/Time: 6-30-23 / 8:38 AM    Pt called in to cancel her B 12 shot today due to being sick. 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 Please advise.

## 2023-07-06 NOTE — TELEPHONE ENCOUNTER
Called pt per Dr. Yoan Pratt questions. ...the only supplies she stated she would need would be the needles. She is inquiring if a script for 4 needles could be sent to her local pharmacy. Please advise.

## 2023-07-08 DIAGNOSIS — E53.8 B12 DEFICIENCY: ICD-10-CM

## 2023-07-10 DIAGNOSIS — R41.89 BRAIN FOG: ICD-10-CM

## 2023-07-10 DIAGNOSIS — G47.33 OBSTRUCTIVE SLEEP APNEA (ADULT) (PEDIATRIC): Primary | ICD-10-CM

## 2023-07-10 RX ORDER — CYANOCOBALAMIN 1000 UG/ML
1000 INJECTION, SOLUTION INTRAMUSCULAR; SUBCUTANEOUS WEEKLY
Qty: 12 ML | Refills: 1 | Status: SHIPPED | OUTPATIENT
Start: 2023-07-10

## 2023-07-17 ENCOUNTER — OFFICE VISIT (OUTPATIENT)
Dept: FAMILY MEDICINE CLINIC | Facility: CLINIC | Age: 43
End: 2023-07-17
Payer: COMMERCIAL

## 2023-07-17 VITALS
DIASTOLIC BLOOD PRESSURE: 82 MMHG | SYSTOLIC BLOOD PRESSURE: 130 MMHG | HEART RATE: 78 BPM | OXYGEN SATURATION: 98 % | TEMPERATURE: 98.1 F

## 2023-07-17 DIAGNOSIS — S06.0X1A CONCUSSION WITH LOSS OF CONSCIOUSNESS OF 30 MINUTES OR LESS, INITIAL ENCOUNTER: ICD-10-CM

## 2023-07-17 DIAGNOSIS — E16.2 HYPOGLYCEMIA: ICD-10-CM

## 2023-07-17 DIAGNOSIS — E53.8 B12 DEFICIENCY: Primary | ICD-10-CM

## 2023-07-17 DIAGNOSIS — M54.50 LOW BACK PAIN WITHOUT SCIATICA, UNSPECIFIED BACK PAIN LATERALITY, UNSPECIFIED CHRONICITY: ICD-10-CM

## 2023-07-17 PROBLEM — J01.11 ACUTE RECURRENT FRONTAL SINUSITIS: Status: RESOLVED | Noted: 2023-05-18 | Resolved: 2023-07-17

## 2023-07-17 PROCEDURE — 99214 OFFICE O/P EST MOD 30 MIN: CPT | Performed by: FAMILY MEDICINE

## 2023-07-17 RX ORDER — METHOCARBAMOL 500 MG/1
500 TABLET, FILM COATED ORAL 4 TIMES DAILY
Qty: 30 TABLET | Refills: 1 | Status: SHIPPED | OUTPATIENT
Start: 2023-07-17

## 2023-07-17 RX ORDER — FLASH GLUCOSE SENSOR
KIT MISCELLANEOUS
Qty: 1 EACH | Refills: 11 | Status: SHIPPED | OUTPATIENT
Start: 2023-07-17

## 2023-07-17 NOTE — PROGRESS NOTES
Assessment/Plan:       Problem List Items Addressed This Visit        Endocrine    Hypoglycemia     Monitor blood sugars with freestyle larry if persistently low or reoccurs we will do work-up for hypoglycemia though most likely was due to not eating and gastric bypass advised to make sure to eat more frequently         Relevant Medications    Continuous Blood Gluc Sensor (FreeStyle Larry 14 Day Sensor) MISC       Nervous and Auditory    Concussion wth loss of consciousness of 30 minutes or less     Likely concussion loss of consciousness was prior to concussion, will refer to physical therapy, if no improvement consider neurology evaluation         Relevant Orders    Ambulatory Referral to Physical Therapy       Other    Low back pain     Avoid NSAIDs, will try Robaxin as had worked well in the past and patient has tolerated well         Relevant Medications    methocarbamol (ROBAXIN) 500 mg tablet   Other Visit Diagnoses     B12 deficiency    -  Primary    needs supplies for self injection    Relevant Medications    Syringe/Needle, Disp, (SYRINGE 3CC/45BK0-7/2") 25G X 1-1/2" 3 ML MISC            Subjective:      Patient ID: Timothy Perez is a 38 y.o. female. HPI    40-year-old female presenting after an episode of syncope, seen in the ER and diagnosed with hypoglycemia, she reports this happened before last month 6 months ago. She did not eat anything the day before or the morning of. She is now feeling well, she she does have a past medical history of diabetes and has not had any elevated blood sugars since her Vi-en-Y surgery. Since her fall she has had headaches and low back pain she reports she fell onto her lower back and hit the back of her head.           The following portions of the patient's history were reviewed and updated as appropriate: allergies, current medications, past family history, past medical history, past social history, past surgical history and problem list.      Current Outpatient Medications:   •  albuterol (PROVENTIL HFA,VENTOLIN HFA) 90 mcg/act inhaler, 2 PUFFS UP TO 4 TIMES A DAY AS NEEDED FOR WHEEZING, Disp: 18 g, Rfl: 1  •  BD Integra Syringe 25G X 1" 3 ML MISC, DRAW UP 1 ML OF TORADOL AND INJECT INTO MUSCLE., Disp: , Rfl:   •  Biotin 1000 MCG tablet, Take 10,000 mcg by mouth, Disp: , Rfl:   •  Botulinum Toxin Type A 200 units SOLR, Inject 155 units into face and neck IM every 90 days, Disp: , Rfl:   •  Continuous Blood Gluc Sensor (FreeStyle Larry 14 Day Sensor) MISC, Use 1 sensor q 14 days to check sugar levels, Disp: 1 each, Rfl: 11  •  cyanocobalamin 1,000 mcg/mL, INJECT 1 ML (1,000 MCG TOTAL) INTO A MUSCLE ONCE A WEEK, Disp: 12 mL, Rfl: 1  •  Docusate Sodium 100 MG capsule, Take 100 mg by mouth 2 (two) times a day, Disp: , Rfl:   •  DULoxetine (CYMBALTA) 30 mg delayed release capsule, Take 30 mg by mouth daily, Disp: , Rfl:   •  DULoxetine (CYMBALTA) 60 mg delayed release capsule, Take 1 capsule (60 mg total) by mouth daily, Disp: 30 capsule, Rfl: 2  •  ERGOCALCIFEROL PO, Take 2,400 mcg by mouth, Disp: , Rfl:   •  FERROUS SULFATE-VITAMIN C PO, Take 1 tablet once daily, Disp: , Rfl:   •  ketorolac (TORADOL) 30 mg/mL injection, INFUSE 1 ML (30 MG TOTAL) INTO A VENOUS CATHETER EVERY 12 (TWELVE) HOURS., Disp: , Rfl:   •  lactulose (CHRONULAC) 10 g/15 mL solution, , Disp: , Rfl:   •  loratadine (CLARITIN) 10 mg tablet, Take 1 tablet (10 mg total) by mouth daily, Disp:  , Rfl:   •  LORazepam (ATIVAN) 0.5 mg tablet, Take 0.5 mg by mouth 2 (two) times a day as needed for anxiety , Disp: , Rfl:   •  Magnesium 500 MG CAPS, Take 400 mg by mouth, Disp: , Rfl:   •  memantine (NAMENDA) 5 mg tablet, Take 2 tablets (10 mg total) by mouth 2 (two) times a day, Disp: 360 tablet, Rfl: 3  •  methocarbamol (ROBAXIN) 500 mg tablet, Take 1 tablet (500 mg total) by mouth 3 (three) times a day as needed for muscle spasms, Disp: 90 tablet, Rfl: 0  •  methocarbamol (ROBAXIN) 500 mg tablet, Take 1 tablet (500 mg total) by mouth 4 (four) times a day, Disp: 30 tablet, Rfl: 1  •  methylphenidate (RITALIN) 10 mg tablet, Take 10 mg by mouth daily as needed  , Disp: , Rfl: 0  •  methylphenidate (RITALIN) 20 MG tablet, Take 20 mg by mouth 2 (two) times a day Pt reports taking tid , Disp: , Rfl: 0  •  mometasone (ELOCON) 0.1 % lotion, APPLY TO AFFECTED AREA TOPICALLY EVERY DAY, Disp: 60 mL, Rfl: 0  •  Multiple Vitamin (MULTIVITAMIN ADULT PO), Take 1 tablet by mouth every morning, Disp: , Rfl:   •  naltrexone (REVIA) 50 mg tablet, , Disp: , Rfl:   •  nystatin (MYCOSTATIN) powder, APPLY TOPICALLY TO AFFECTED AREAS TWICE A DAY, Disp: , Rfl:   •  pantoprazole (PROTONIX) 40 mg tablet, TAKE 1 TABLET BY MOUTH TWICE A DAY 30 MINUTES BEFORE BREAKFAST & DINNER, Disp: , Rfl:   •  pregabalin (Lyrica) 150 mg capsule, Take 1 capsule (150 mg total) by mouth 3 (three) times a day, Disp: 90 capsule, Rfl: 1  •  RIBOFLAVIN PO, Take 400 mg by mouth, Disp: , Rfl:   •  rizatriptan (MAXALT) 10 MG tablet, TAKE 1 TABLET (10 MG TOTAL) BY MOUTH ONCE AS NEEDED FOR MIGRAINE, Disp: 9 tablet, Rfl: 1  •  SUMAtriptan Succinate 6 MG/0.5ML SOAJ, Inject 0.5 mL (6 mg total) under the skin as needed (headache), Disp: 9 Cartridge, Rfl: 1  •  Syringe/Needle, Disp, (SYRINGE 3CC/50HE2-7/2") 25G X 1-1/2" 3 ML MISC, Use every 30 (thirty) days Draw up and inject 1ml every 30 days. , Disp: 3 each, Rfl: 0  •  traZODone (DESYREL) 100 mg tablet, , Disp: , Rfl:   •  triamcinolone (KENALOG) 0.1 % cream, Apply 1 application topically 2 (two) times a day To affected area, Disp: 30 g, Rfl: 0  •  benzonatate (TESSALON) 200 MG capsule, Take 1 capsule (200 mg total) by mouth 3 (three) times a day as needed for cough (Patient not taking: Reported on 5/1/2023), Disp: 20 capsule, Rfl: 0  •  cyanocobalamin (VITAMIN B-12) 100 mcg tablet, , Disp: , Rfl:   •  cyanocobalamin (VITAMIN B-12) 1000 MCG tablet, Take 1,000 mcg by mouth (Patient not taking: Reported on 7/17/2023), Disp: , Rfl:   •  EPINEPHrine (EPIPEN) 0.3 mg/0.3 mL SOAJ, Inject 0.3 mL (0.3 mg total) into a muscle once for 1 dose, Disp: 0.3 mL, Rfl: 0  •  EPINEPHrine (EPIPEN) 0.3 mg/0.3 mL SOAJ, Inject 0.3 mL (0.3 mg total) into a muscle once for 1 dose, Disp: 0.6 mL, Rfl: 0  •  sucralfate (CARAFATE) 1 g tablet, , Disp: , Rfl:     Current Facility-Administered Medications:   •  cyanocobalamin injection 1,000 mcg, 1,000 mcg, Intramuscular, Q30 Days, Tanvi Choi MD, 1,000 mcg at 06/23/23 1524     Review of Systems   Constitutional: Negative for activity change. Respiratory: Negative for apnea and chest tightness. Cardiovascular: Negative for chest pain and palpitations. Gastrointestinal: Negative for abdominal distention and abdominal pain. Musculoskeletal: Negative for arthralgias and back pain. Objective:      /82 (BP Location: Left arm, Patient Position: Sitting, Cuff Size: Standard)   Pulse 78   Temp 98.1 °F (36.7 °C) (Tympanic)   SpO2 98%          Physical Exam  Constitutional:       Appearance: Normal appearance. Cardiovascular:      Rate and Rhythm: Normal rate and regular rhythm. Pulmonary:      Effort: Pulmonary effort is normal.      Breath sounds: Normal breath sounds. Musculoskeletal:         General: Normal range of motion. Neurological:      General: No focal deficit present. Mental Status: She is alert and oriented to person, place, and time. Cranial Nerves: Cranial nerves 2-12 are intact. Sensory: Sensation is intact. Motor: Motor function is intact. Coordination: Romberg sign positive. Finger-Nose-Finger Test normal.      Gait: Gait is intact.  Gait and tandem walk normal.           Emily Wong MD

## 2023-07-17 NOTE — ASSESSMENT & PLAN NOTE
Likely concussion loss of consciousness was prior to concussion, will refer to physical therapy, if no improvement consider neurology evaluation

## 2023-07-17 NOTE — PATIENT INSTRUCTIONS
1. B12 deficiency  -     Syringe/Needle, Disp, (SYRINGE 3CC/92LN8-3/2") 25G X 1-1/2" 3 ML MISC; Use every 30 (thirty) days Draw up and inject 1ml every 30 days. 2. Hypoglycemia  -     Continuous Blood Gluc  ([employer] Larry 2 [place]) JUDY; Use 1 each once for 1 dose Use to scan Larry 2 sensors every 8 hours to monitor blood sugars as discussed    3. Low back pain without sciatica, unspecified back pain laterality, unspecified chronicity  -     methocarbamol (ROBAXIN) 500 mg tablet; Take 1 tablet (500 mg total) by mouth 4 (four) times a day    4. Concussion with loss of consciousness of 30 minutes or less, initial encounter  -     Ambulatory Referral to Physical Therapy;  Future     Call with any new symptoms or problems with b12

## 2023-07-17 NOTE — ASSESSMENT & PLAN NOTE
Monitor blood sugars with freestyle rissa if persistently low or reoccurs we will do work-up for hypoglycemia though most likely was due to not eating and gastric bypass advised to make sure to eat more frequently

## 2023-07-28 DIAGNOSIS — R41.89 BRAIN FOG: ICD-10-CM

## 2023-07-28 DIAGNOSIS — G44.309 POST-TRAUMATIC HEADACHE: ICD-10-CM

## 2023-07-28 DIAGNOSIS — Z87.820 HISTORY OF CONCUSSION: ICD-10-CM

## 2023-07-28 RX ORDER — MEMANTINE HYDROCHLORIDE 10 MG/1
TABLET ORAL
Qty: 180 TABLET | Refills: 4 | Status: SHIPPED | OUTPATIENT
Start: 2023-07-28 | End: 2023-08-21

## 2023-07-31 ENCOUNTER — HOSPITAL ENCOUNTER (EMERGENCY)
Facility: HOSPITAL | Age: 43
Discharge: HOME | End: 2023-08-01
Attending: EMERGENCY MEDICINE
Payer: COMMERCIAL

## 2023-07-31 ENCOUNTER — APPOINTMENT (EMERGENCY)
Dept: RADIOLOGY | Facility: HOSPITAL | Age: 43
End: 2023-07-31
Payer: COMMERCIAL

## 2023-07-31 VITALS
DIASTOLIC BLOOD PRESSURE: 62 MMHG | OXYGEN SATURATION: 96 % | TEMPERATURE: 98.6 F | HEART RATE: 68 BPM | RESPIRATION RATE: 18 BRPM | SYSTOLIC BLOOD PRESSURE: 94 MMHG

## 2023-07-31 DIAGNOSIS — E16.2 HYPOGLYCEMIA: ICD-10-CM

## 2023-07-31 DIAGNOSIS — R56.9 SEIZURE-LIKE ACTIVITY (HCC): ICD-10-CM
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
## 2023-07-31 DIAGNOSIS — S01.01XA LACERATION OF SCALP, INITIAL ENCOUNTER: ICD-10-CM

## 2023-07-31 DIAGNOSIS — S09.90XA CLOSED HEAD INJURY, INITIAL ENCOUNTER: Primary | ICD-10-CM

## 2023-07-31 LAB
2HR DELTA HS TROPONIN: 8 NG/L
ALBUMIN SERPL BCP-MCNC: 3.7 G/DL (ref 3.5–5)
ALP SERPL-CCNC: 109 U/L (ref 46–116)
ALT SERPL W P-5'-P-CCNC: 30 U/L (ref 12–78)
ANION GAP SERPL CALCULATED.3IONS-SCNC: 6 MMOL/L
AST SERPL W P-5'-P-CCNC: 23 U/L (ref 5–45)
ATRIAL RATE: 110 BPM
BASOPHILS # BLD AUTO: 0.04 THOUSANDS/ÂΜL (ref 0–0.1)
BASOPHILS NFR BLD AUTO: 0 % (ref 0–1)
BILIRUB SERPL-MCNC: 0.41 MG/DL (ref 0.2–1)
BUN SERPL-MCNC: 10 MG/DL (ref 5–25)
CALCIUM SERPL-MCNC: 8.3 MG/DL (ref 8.3–10.1)
CARDIAC TROPONIN I PNL SERPL HS: 12 NG/L
CARDIAC TROPONIN I PNL SERPL HS: 4 NG/L
CHLORIDE SERPL-SCNC: 112 MMOL/L (ref 96–108)
CO2 SERPL-SCNC: 21 MMOL/L (ref 21–32)
CREAT SERPL-MCNC: 0.79 MG/DL (ref 0.6–1.3)
EOSINOPHIL # BLD AUTO: 0.06 THOUSAND/ÂΜL (ref 0–0.61)
EOSINOPHIL NFR BLD AUTO: 0 % (ref 0–6)
ERYTHROCYTE [DISTWIDTH] IN BLOOD BY AUTOMATED COUNT: 11.9 % (ref 11.6–15.1)
GFR SERPL CREATININE-BSD FRML MDRD: 91 ML/MIN/1.73SQ M
GLUCOSE SERPL-MCNC: 129 MG/DL (ref 65–140)
GLUCOSE SERPL-MCNC: 155 MG/DL (ref 65–140)
GLUCOSE SERPL-MCNC: 95 MG/DL (ref 65–140)
HCT VFR BLD AUTO: 37.1 % (ref 34.8–46.1)
HGB BLD-MCNC: 12.9 G/DL (ref 11.5–15.4)
IMM GRANULOCYTES # BLD AUTO: 0.07 THOUSAND/UL (ref 0–0.2)
IMM GRANULOCYTES NFR BLD AUTO: 1 % (ref 0–2)
INSULIN SERPL-ACNC: 4.12 UIU/ML
LYMPHOCYTES # BLD AUTO: 2.38 THOUSANDS/ÂΜL (ref 0.6–4.47)
LYMPHOCYTES NFR BLD AUTO: 16 % (ref 14–44)
MCH RBC QN AUTO: 32.2 PG (ref 26.8–34.3)
MCHC RBC AUTO-ENTMCNC: 34.8 G/DL (ref 31.4–37.4)
MCV RBC AUTO: 93 FL (ref 82–98)
MONOCYTES # BLD AUTO: 1.37 THOUSAND/ÂΜL (ref 0.17–1.22)
MONOCYTES NFR BLD AUTO: 9 % (ref 4–12)
NEUTROPHILS # BLD AUTO: 11.09 THOUSANDS/ÂΜL (ref 1.85–7.62)
NEUTS SEG NFR BLD AUTO: 74 % (ref 43–75)
NRBC BLD AUTO-RTO: 0 /100 WBCS
P AXIS: 48 DEGREES
PLATELET # BLD AUTO: 237 THOUSANDS/UL (ref 149–390)
PMV BLD AUTO: 9.6 FL (ref 8.9–12.7)
POTASSIUM SERPL-SCNC: 3.3 MMOL/L (ref 3.5–5.3)
PR INTERVAL: 130 MS
PROT SERPL-MCNC: 6.3 G/DL (ref 6.4–8.4)
QRS AXIS: 96 DEGREES
QRSD INTERVAL: 78 MS
QT INTERVAL: 318 MS
QTC INTERVAL: 430 MS
RBC # BLD AUTO: 4.01 MILLION/UL (ref 3.81–5.12)
SODIUM SERPL-SCNC: 139 MMOL/L (ref 135–147)
T WAVE AXIS: 12 DEGREES
VENTRICULAR RATE: 110 BPM
WBC # BLD AUTO: 15.01 THOUSAND/UL (ref 4.31–10.16)

## 2023-07-31 PROCEDURE — 93005 ELECTROCARDIOGRAM TRACING: CPT

## 2023-07-31 PROCEDURE — 83525 ASSAY OF INSULIN: CPT | Performed by: EMERGENCY MEDICINE

## 2023-07-31 PROCEDURE — 82948 REAGENT STRIP/BLOOD GLUCOSE: CPT

## 2023-07-31 PROCEDURE — 99284 EMERGENCY DEPT VISIT MOD MDM: CPT

## 2023-07-31 PROCEDURE — 96374 THER/PROPH/DIAG INJ IV PUSH: CPT

## 2023-07-31 PROCEDURE — 85025 COMPLETE CBC W/AUTO DIFF WBC: CPT | Performed by: EMERGENCY MEDICINE

## 2023-07-31 PROCEDURE — 84681 ASSAY OF C-PEPTIDE: CPT | Performed by: EMERGENCY MEDICINE

## 2023-07-31 PROCEDURE — 70450 CT HEAD/BRAIN W/O DYE: CPT

## 2023-07-31 PROCEDURE — 36415 COLL VENOUS BLD VENIPUNCTURE: CPT

## 2023-07-31 PROCEDURE — 80053 COMPREHEN METABOLIC PANEL: CPT | Performed by: EMERGENCY MEDICINE

## 2023-07-31 PROCEDURE — 84206 ASSAY OF PROINSULIN: CPT | Performed by: EMERGENCY MEDICINE

## 2023-07-31 PROCEDURE — 84484 ASSAY OF TROPONIN QUANT: CPT | Performed by: EMERGENCY MEDICINE

## 2023-07-31 PROCEDURE — 96361 HYDRATE IV INFUSION ADD-ON: CPT

## 2023-07-31 PROCEDURE — 93010 ELECTROCARDIOGRAM REPORT: CPT | Performed by: INTERNAL MEDICINE

## 2023-07-31 PROCEDURE — G1004 CDSM NDSC: HCPCS

## 2023-07-31 RX ORDER — ACETAMINOPHEN 325 MG/1
650 TABLET ORAL ONCE
Status: COMPLETED | OUTPATIENT
Start: 2023-07-31 | End: 2023-07-31

## 2023-07-31 RX ORDER — LIDOCAINE HYDROCHLORIDE 10 MG/ML
10 INJECTION, SOLUTION EPIDURAL; INFILTRATION; INTRACAUDAL; PERINEURAL ONCE
Status: COMPLETED | OUTPATIENT
Start: 2023-07-31 | End: 2023-07-31

## 2023-07-31 RX ORDER — KETOROLAC TROMETHAMINE 30 MG/ML
15 INJECTION, SOLUTION INTRAMUSCULAR; INTRAVENOUS ONCE
Status: COMPLETED | OUTPATIENT
Start: 2023-07-31 | End: 2023-07-31

## 2023-07-31 RX ADMIN — KETOROLAC TROMETHAMINE 15 MG: 30 INJECTION, SOLUTION INTRAMUSCULAR; INTRAVENOUS at 22:49

## 2023-07-31 RX ADMIN — LIDOCAINE HYDROCHLORIDE 10 ML: 10 INJECTION, SOLUTION EPIDURAL; INFILTRATION; INTRACAUDAL; PERINEURAL at 23:18

## 2023-07-31 RX ADMIN — SODIUM CHLORIDE 1000 ML: 0.9 INJECTION, SOLUTION INTRAVENOUS at 21:55

## 2023-07-31 RX ADMIN — ACETAMINOPHEN 650 MG: 325 TABLET, FILM COATED ORAL at 20:09

## 2023-07-31 NOTE — ED NOTES
Pt reports smoking marijuana from unreliable source yesterday.       Kylah Anderson RN  07/31/23 1210

## 2023-08-01 NOTE — ED ATTENDING ATTESTATION
7/31/2023  IAbdirizak DO, saw and evaluated the patient. I have discussed the patient with the resident/non-physician practitioner and agree with the resident's/non-physician practitioner's findings, Plan of Care, and MDM as documented in the resident's/non-physician practitioner's note, except where noted. All available labs and Radiology studies were reviewed. I was present for key portions of any procedure(s) performed by the resident/non-physician practitioner and I was immediately available to provide assistance. At this point I agree with the current assessment done in the Emergency Department. I have conducted an independent evaluation of this patient a history and physical is as follows:    37 yo female presents for evaluation s/p reported seizure while at [facility] - BG noted to be 54mg/dL. Received treatment, now it is >150mg/dL. She had one prior episode of seizure that was provoked by hypoglycemia as well. She does not take insulin or sulfonylureas. Is taking naltrexone for weight loss. She has a variety of other medications she takes such as namenda - unclear indication. Currently c/o HA, bilateral jaw pain. Able to fully open and close her mouth. Imp: seizure due to hypoglycemia. Closed head injury, laceration.  Plan: CT head, ECG, metabolic panel, C-peptide, proinsulin, insulin random        ED Course         Critical Care Time  Procedures

## 2023-08-01 NOTE — DISCHARGE INSTRUCTIONS
Your sutures should be removed in: 5 days. It can occur in the emergency room or through your PCP or at [facility] / urgent care. Follow up with healthcare provider: Please follow up with your healthcare provider as directed to ensure proper healing and to remove any stitches or sutures. Keep the wound clean and dry: Avoid getting the wound wet for at least 24 hours after the repair. Avoid swimming or soaking in a hot tub until the wound has completely healed. If the wound gets dirty, gently clean it with mild soap and water. Keep the wound out of the sun: Exposure to sunlight can cause scars to darken and become more noticeable. Patients should keep the wound covered or use a sunscreen with an SPF of 30 or higher. Avoid strenuous activity: Patients should avoid any activities that may cause the wound to reopen or become more irritated, such as heavy lifting or sports. It's important to give the wound time to heal. When the sutures are removed is the most likely time for the repair to fail and the wound is the weakest.     Cover the wound: Keep the wound covered with a sterile, non-stick dressing to prevent infection and to keep it moist; or at minimum gauze or a bandaid. You should change the dressing regularly, at least once a day or more frequently if it becomes dirty or wet. The appearance of the wound, in terms of scarring, isn't really discussed until one year for laceration repair. All lacerations WILL scar regardless of a procedure done in the ER or by a plastic surgeon. But doing as described here will minimize scarring. Watch for signs of infection: Patients should monitor the wound for signs of infection, such as redness, swelling, warmth, or drainage. If any of these symptoms occur, contact your healthcare provider immediately or return to the emergency room for re-evaluation.      Take pain medication as directed: Depending on the location and severity of the laceration, you might experience some pain or discomfort. Over-the-counter pain medications such as acetaminophen and ibuprofen may be used simultaneously.

## 2023-08-01 NOTE — ED PROVIDER NOTES
History  Chief Complaint   Patient presents with   • Hypoglycemia - Symptomatic     Pt found down at [facility] in pool of blood. Seizure x3 min per pts friend. Pt with BG of 54 on arrival, given 250mL of D10. Pt with seizure 2 weeks ago d/t hypoglycemia. Pt with laceration to posterior head   • Seizure - New Onset     HPI see MDM    Prior to Admission Medications   Prescriptions Last Dose Informant Patient Reported? Taking? BD Integra Syringe 25G X 1" 3 ML MISC  Self Yes No   Sig: DRAW UP 1 ML OF TORADOL AND INJECT INTO MUSCLE.    Biotin 1000 MCG tablet  Self Yes No   Sig: Take 10,000 mcg by mouth   Botulinum Toxin Type A 200 units SOLR  Self Yes No   Sig: Inject 155 units into face and neck IM every 90 days   Continuous Blood Gluc Sensor (FreeStyle Larry 14 Day Sensor) MISC   No No   Sig: Use 1 sensor q 14 days to check sugar levels   DULoxetine (CYMBALTA) 30 mg delayed release capsule  Self Yes No   Sig: Take 30 mg by mouth daily   DULoxetine (CYMBALTA) 60 mg delayed release capsule  Self No No   Sig: Take 1 capsule (60 mg total) by mouth daily   Docusate Sodium 100 MG capsule  Self Yes No   Sig: Take 100 mg by mouth 2 (two) times a day   EPINEPHrine (EPIPEN) 0.3 mg/0.3 mL SOAJ  Self No No   Sig: Inject 0.3 mL (0.3 mg total) into a muscle once for 1 dose   EPINEPHrine (EPIPEN) 0.3 mg/0.3 mL SOAJ   No No   Sig: Inject 0.3 mL (0.3 mg total) into a muscle once for 1 dose   ERGOCALCIFEROL PO  Self Yes No   Sig: Take 2,400 mcg by mouth   FERROUS SULFATE-VITAMIN C PO  Self Yes No   Sig: Take 1 tablet once daily   LORazepam (ATIVAN) 0.5 mg tablet  Self Yes No   Sig: Take 0.5 mg by mouth 2 (two) times a day as needed for anxiety    Magnesium 500 MG CAPS  Self Yes No   Sig: Take 400 mg by mouth   Multiple Vitamin (MULTIVITAMIN ADULT PO)  Self Yes No   Sig: Take 1 tablet by mouth every morning   RIBOFLAVIN PO  Self Yes No   Sig: Take 400 mg by mouth   SUMAtriptan Succinate 6 MG/0.5ML SOAJ  Self No No   Sig: Inject 0.5 mL (6 mg total) under the skin as needed (headache)   Syringe/Needle, Disp, (SYRINGE 3CC/27MK6-1/2") 25G X 1-1/2" 3 ML MISC   No No   Sig: Use every 30 (thirty) days Draw up and inject 1ml every 30 days.    albuterol (PROVENTIL HFA,VENTOLIN HFA) 90 mcg/act inhaler  Self No No   Si PUFFS UP TO 4 TIMES A DAY AS NEEDED FOR WHEEZING   benzonatate (TESSALON) 200 MG capsule  Self No No   Sig: Take 1 capsule (200 mg total) by mouth 3 (three) times a day as needed for cough   Patient not taking: Reported on 2023   cyanocobalamin (VITAMIN B-12) 100 mcg tablet  Self Yes No   Patient not taking: Reported on 2023   cyanocobalamin (VITAMIN B-12) 1000 MCG tablet  Self Yes No   Sig: Take 1,000 mcg by mouth   Patient not taking: Reported on 2023   cyanocobalamin 1,000 mcg/mL  Self No No   Sig: INJECT 1 ML (1,000 MCG TOTAL) INTO A MUSCLE ONCE A WEEK   ketorolac (TORADOL) 30 mg/mL injection  Self Yes No   Sig: INFUSE 1 ML (30 MG TOTAL) INTO A VENOUS CATHETER EVERY 12 (TWELVE) HOURS.   lactulose (CHRONULAC) 10 g/15 mL solution  Self Yes No   loratadine (CLARITIN) 10 mg tablet  Self No No   Sig: Take 1 tablet (10 mg total) by mouth daily   memantine (NAMENDA) 10 mg tablet   No No   Sig: TAKE 1 TABLET ONLY ( 10 MG) BY MOUTH 2 TIMES A DAY.**05 MG NOT COVERED   methocarbamol (ROBAXIN) 500 mg tablet  Self No No   Sig: Take 1 tablet (500 mg total) by mouth 3 (three) times a day as needed for muscle spasms   methocarbamol (ROBAXIN) 500 mg tablet   No No   Sig: Take 1 tablet (500 mg total) by mouth 4 (four) times a day   methylphenidate (RITALIN) 10 mg tablet  Self Yes No   Sig: Take 10 mg by mouth daily as needed     methylphenidate (RITALIN) 20 MG tablet  Self Yes No   Sig: Take 20 mg by mouth 2 (two) times a day Pt reports taking tid    mometasone (ELOCON) 0.1 % lotion  Self No No   Sig: APPLY TO AFFECTED AREA TOPICALLY EVERY DAY   naltrexone (REVIA) 50 mg tablet  Self Yes No   nystatin (MYCOSTATIN) powder  Self Yes No   Sig: APPLY TOPICALLY TO AFFECTED AREAS TWICE A DAY   pantoprazole (PROTONIX) 40 mg tablet  Self Yes No   Sig: TAKE 1 TABLET BY MOUTH TWICE A DAY 30 MINUTES BEFORE BREAKFAST & DINNER   pregabalin (Lyrica) 150 mg capsule  Self No No   Sig: Take 1 capsule (150 mg total) by mouth 3 (three) times a day   rizatriptan (MAXALT) 10 MG tablet  Self No No   Sig: TAKE 1 TABLET (10 MG TOTAL) BY MOUTH ONCE AS NEEDED FOR MIGRAINE   sucralfate (CARAFATE) 1 g tablet  Self Yes No   Patient not taking: Reported on 5/1/2023   traZODone (DESYREL) 100 mg tablet  Self Yes No   triamcinolone (KENALOG) 0.1 % cream  Self No No   Sig: Apply 1 application topically 2 (two) times a day To affected area      Facility-Administered Medications Last Administration Doses Remaining   cyanocobalamin injection 1,000 mcg 6/23/2023  3:24 PM           Past Medical History:   Diagnosis Date   • Acid reflux    • Anxiety    • Asthma    • Depression    • Diabetes mellitus ([address])     Pre   • Diverticulitis    • Epileptic seizures ([address])     Medication related   • Gestational diabetes mellitus    • Hypertension    • Lateral epicondylitis    • Malignant tumor of cervix ([address])     between 7026-2508   • Obstructive sleep apnea     CPAP   • Paresthesias    • Plantar fasciitis    • Rectocele    • Tinea corporis        Past Surgical History:   Procedure Laterality Date   • ARTHROSCOPY ANKLE Right 9/1/2017    Procedure: ANKLE ARTHOSCOPY WITH EXTENSIVE SYNOVECTOMY, OPEN ARTHOTOY LATERAL ANKLE WITH ANKLE STABILIZATION, BROSTOM REPAIR, INTERNAL BRACE AUGMENTATION, PERONEAL TENDON SYNOVECTOMY;  Surgeon: Hersey Romberg, DPM;  Location: [facility];  Service: Podiatry   • COLONOSCOPY  02/27/2017   • GASTRIC BYPASS  07/2020   • HEMORRHOID SURGERY  08/2021   • HYSTERECTOMY  2015   • TN COLONOSCOPY FLX DX W/COLLJ SPEC WHEN PFRMD N/A 2/27/2017    Procedure: COLONOSCOPY;  Surgeon: Hugo Ponce MD;  Location: [facility];   Service: Gastroenterology   • TN ESOPHAGOGASTRODUODENOSCOPY TRANSORAL DIAGNOSTIC N/A 2016    Procedure: EGD AND COLONOSCOPY;  Surgeon: Perico Hernandez MD;  Location:  GI LAB; Service: Gastroenterology   • TONSILLECTOMY     • TUBAL LIGATION     • UPPER GASTROINTESTINAL ENDOSCOPY         Family History   Problem Relation Age of Onset   • Anxiety disorder Mother    • Depression Mother    • Cancer Mother    • Endometrial cancer Mother         under afe 48   • Seizures Brother         Epilepsy   • Other Brother         Suicide risk   • Cancer Maternal Grandmother 48        lymphatic cancer   • Other Other         cardiac disorder   • Diabetes Other    • Hypertension Other    • Neuropathy Other    • Thyroid disease Other    • Breast cancer Paternal Grandmother 80   • No Known Problems Father    • No Known Problems Daughter    • Cancer Maternal Grandfather    • No Known Problems Paternal Grandfather    • No Known Problems Daughter    • No Known Problems Son    • No Known Problems Brother    • Leukemia Cousin    • Cancer Cousin         lympatic     I have reviewed and agree with the history as documented.     E-Cigarette/Vaping   • E-Cigarette Use Never User      E-Cigarette/Vaping Substances   • Nicotine No    • THC No    • CBD No    • Flavoring No      Social History     Tobacco Use   • Smoking status: Former     Types: Cigarettes     Quit date: 2013     Years since quittin.9   • Smokeless tobacco: Never   Vaping Use   • Vaping Use: Never used   Substance Use Topics   • Alcohol use: Yes     Comment: occasionally, maybe 1-2 times a month   • Drug use: Yes     Types: Marijuana        Review of Systems    Physical Exam  ED Triage Vitals [23]   Temperature Pulse Respirations Blood Pressure SpO2   98.6 °F (37 °C) (!) 106 22 124/87 100 %      Temp Source Heart Rate Source Patient Position - Orthostatic VS BP Location FiO2 (%)   Oral -- -- -- --      Pain Score       8             Orthostatic Vital Signs  Vitals:    23 2145 23 2330   BP: 124/87 94/62   Pulse: (!) 106 70 68       Physical Exam    ED Medications  Medications   acetaminophen (TYLENOL) tablet 650 mg (650 mg Oral Given 7/31/23 2009)   sodium chloride 0.9 % bolus 1,000 mL (0 mL Intravenous Stopped 7/31/23 2338)   ketorolac (TORADOL) injection 15 mg (15 mg Intravenous Given 7/31/23 2249)   lidocaine (PF) (XYLOCAINE-MPF) 1 % injection 10 mL (10 mL Infiltration Given by Other 7/31/23 2318)       Diagnostic Studies  Results Reviewed     Procedure Component Value Units Date/Time    Proinsulin [[id number]] Collected: 07/31/23 2149    Lab Status: Final result Specimen: Blood from Arm, Left Updated: 08/04/23 1706     Proinsulin 3.6 pmol/L     Narrative:      Performed at:  [address], [place], [place]  [id number]  : Jade Brown MD, Phone:  [phone]    C-peptide [[id number]] Collected: 07/31/23 2149    Lab Status: Final result Specimen: Blood from Arm, Left Updated: 08/02/23 0810     C-Peptide 2.4 ng/mL     Narrative:      Performed at:  [address]  [id number]  : Ned Elmore MD, Phone:  [phone]    Insulin, random [[id number]] Collected: 07/31/23 2149    Lab Status: Final result Specimen: Blood from Arm, Left Updated: 07/31/23 2255     Insulin 4.12 uIU/mL     Fingerstick Glucose (POCT) [[id number]]  (Normal) Collected: 07/31/23 2252    Lab Status: Final result Updated: 07/31/23 2253     POC Glucose 95 mg/dl     HS Troponin I 2hr [[id number]]  (Normal) Collected: 07/31/23 2149    Lab Status: Final result Specimen: Blood from Arm, Left Updated: 07/31/23 2232     hs TnI 2hr 12 ng/L      Delta 2hr hsTnI 8 ng/L     CBC and differential [[id number]]  (Abnormal) Collected: 07/31/23 2149    Lab Status: Final result Specimen: Blood from Arm, Left Updated: 07/31/23 2203     WBC 15.01 Thousand/uL      RBC 4.01 Million/uL      Hemoglobin 12.9 g/dL      Hematocrit 37.1 %      MCV 93 fL      MCH 32.2 pg      MCHC 34.8 g/dL RDW 11.9 %      MPV 9.6 fL      Platelets 462 Thousands/uL      nRBC 0 /100 WBCs      Neutrophils Relative 74 %      Immat GRANS % 1 %      Lymphocytes Relative 16 %      Monocytes Relative 9 %      Eosinophils Relative 0 %      Basophils Relative 0 %      Neutrophils Absolute 11.09 Thousands/µL      Immature Grans Absolute 0.07 Thousand/uL      Lymphocytes Absolute 2.38 Thousands/µL      Monocytes Absolute 1.37 Thousand/µL      Eosinophils Absolute 0.06 Thousand/µL      Basophils Absolute 0.04 Thousands/µL     HS Troponin 0hr (reflex protocol) [[id number]]  (Normal) Collected: 07/31/23 2011    Lab Status: Final result Specimen: Blood from Arm, Right Updated: 07/31/23 2102     hs TnI 0hr 4 ng/L     Comprehensive metabolic panel [[id number]]  (Abnormal) Collected: 07/31/23 2011    Lab Status: Final result Specimen: Blood from Arm, Right Updated: 07/31/23 2055     Sodium 139 mmol/L      Potassium 3.3 mmol/L      Chloride 112 mmol/L      CO2 21 mmol/L      ANION GAP 6 mmol/L      BUN 10 mg/dL      Creatinine 0.79 mg/dL      Glucose 155 mg/dL      Calcium 8.3 mg/dL      AST 23 U/L      ALT 30 U/L      Alkaline Phosphatase 109 U/L      Total Protein 6.3 g/dL      Albumin 3.7 g/dL      Total Bilirubin 0.41 mg/dL      eGFR 91 ml/min/1.73sq m     Narrative:      [facility] guidelines for Chronic Kidney Disease (CKD):   •  Stage 1 with normal or high GFR (GFR > 90 mL/min/1.73 square meters)  •  Stage 2 Mild CKD (GFR = 60-89 mL/min/1.73 square meters)  •  Stage 3A Moderate CKD (GFR = 45-59 mL/min/1.73 square meters)  •  Stage 3B Moderate CKD (GFR = 30-44 mL/min/1.73 square meters)  •  Stage 4 Severe CKD (GFR = 15-29 mL/min/1.73 square meters)  •  Stage 5 End Stage CKD (GFR <15 mL/min/1.73 square meters)  Note: GFR calculation is accurate only with a steady state creatinine    Fingerstick Glucose (POCT) [[id number]]  (Normal) Collected: 07/31/23 2001    Lab Status: Final result Updated: 07/31/23 2002 POC Glucose 129 mg/dl                  CT head without contrast   Final Result by Kayla Hardy MD (07/31 2200)      No acute intracranial abnormality. Scalp swelling and laceration posterior vertex image 3/35 without underlying fracture. Workstation performed: [id number]               Procedures  Universal Protocol:  Consent: Verbal consent obtained. Consent given by: patient  Patient understanding: patient states understanding of the procedure being performed  Patient consent: the patient's understanding of the procedure matches consent given  Procedure consent: procedure consent matches procedure scheduled  Relevant documents: relevant documents present and verified  Test results: test results available and properly labeled  Site marked: the operative site was marked  Radiology Images displayed and confirmed. If images not available, report reviewed: imaging studies available  Patient identity confirmed: verbally with patient    Laceration repair    Date/Time: 7/31/2023 5:01 PM    Performed by: Luisa Lee DO  Authorized by: Luisa Lee DO  Body area: head/neck  Location details: scalp  Foreign bodies: no foreign bodies  Tendon involvement: none  Nerve involvement: none  Vascular damage: no  Anesthesia: local infiltration    Anesthesia:  Local Anesthetic: lidocaine 1% with epinephrine    Sedation:  Patient sedated: no      Wound Dehiscence:  Superficial Wound Dehiscence: simple closure      Procedure Details:  Irrigation solution: saline  Skin closure: staples  Number of sutures: 6            ED Course                             SBIRT 22yo+    Flowsheet Row Most Recent Value   Initial Alcohol Screen: US AUDIT-C     1. How often do you have a drink containing alcohol? 1 Filed at: 07/31/2023 2001   2. How many drinks containing alcohol do you have on a typical day you are drinking? 1 Filed at: 07/31/2023 2001   3a. Male UNDER 65:  How often do you have five or more drinks on one occasion? 0 Filed at: 07/31/2023 2001   3b. FEMALE Any Age, or MALE 65+: How often do you have 4 or more drinks on one occassion? 0 Filed at: 07/31/2023 2001   Audit-C Score 2 Filed at: 07/31/2023 2001   ABELARDO: How many times in the past year have you. .. Used an illegal drug or used a prescription medication for non-medical reasons? Once or Twice Filed at: 07/31/2023 2001                MDM   Pt is a 40-year-old female is brought in by EMS for possible seizure versus syncope. Patient was found at [facility]  on the floor, with a posterior scalp laceration in a pool of blood. Patient on my exam is awake and alert, GCS 15, no focal neurologic deficits, states that she takes multiple medications for anxiety and some oral appointments, and one episode of hypoglycemia with seizure in the past, no history of epilepsy, not on antiepileptics. Has some pain at the laceration site but does not complain of any severe headache, neck pain, any other joint pain, chest pain, shortness of breath, abdominal pain, urinary symptoms. On exam   General: VSS, NAD, awake, alert. Talking normally. Head: Normocephalic, 3 cm circular laceration on the posterior scalp, some active bleeding. Mildly tender to palpation. Eyes: EOM-No subconjunctival hemorrhages. ENT: Nose atraumatic. MMM  No malocclusion. No stridor. Normal phonation. No drooling. Normal swallowing. Neck: Trachea midline. No JVD. CV: RRR. Lungs: CTAB No tachypnea. No paradoxical motion. Abd: +BS, soft, NT/ND. No guarding/rigidity. MSK: Full ROM throughout. No lower extremity edema. Skin: Dry, intact. Neuro: AAOx3, GCS 15, CN II-XII grossly intact. Motor/sensory grossly intact. Psychiatric/Behavioral: mood/affect normal; behavior normal; thought content normal; judgement normal   Exam: deferred    Ddx: Hyperglycemia, syncope versus seizure, unlikely but also will work-up for exogenous insulin intake  Scalp laceration.   Plan: Patient was evaluated with CBC, CMP for anemia and electrolyte abnormalities. Troponin as part of syncope work-up also insulin and C-peptide. Yes  Labs as interpreted by me no leukocytosis, hemoglobin stable, electrolytes normal, troponin normal, EKG normal  EKG as interpreted by me This EKG was interpreted by me. The EKG demonstrates Normal sinus rhythm, normal intervals and axis, normal QRS, no acute ST changes present. Imaging as interpreted by me small laceration on the posterior scalp, no acute intracranial bleeding or fracture noted    ED Course: Laceration repaired, work-up negative, patient was ambulating in the ED without any difficulty, checked point-of-care glucose 2-3 times which was normal, patient requesting to go home. Final Dispo: Pt is hemodynamically stable and clear for discharge with outpatient f/u with their PCP. Return precautions given pt verbalized understanding      Disposition  Final diagnoses:   Closed head injury, initial encounter   Laceration of scalp, initial encounter   Hypoglycemia   Fall, initial encounter   Seizure-like activity [facility])     Time reflects when diagnosis was documented in both MDM as applicable and the Disposition within this note     Time User Action Codes Description Comment    7/31/2023 11:41 PM Cierra Mcdonald Add [S09.90XA] Closed head injury, initial encounter     7/31/2023 11:41 PM Carol Phillips Add [S01.01XA] Laceration of scalp, initial encounter     7/31/2023 11:41 PM Carol Phillips Add [E16.2] Hypoglycemia     7/31/2023 11:41 PM Carol Phillips Add Jelena.[place]scalon. HCXI] Fall, initial encounter     7/31/2023 11:41 PM Janice Phillips Add [R56.9] Seizure-like activity [facility])       ED Disposition     ED Disposition   Discharge    Condition   Stable    Date/Time   Mon Jul 31, 2023 11:19 PM    Comment   Bettie Trinh discharge to home/self care.                Follow-up Information     Follow up With Specialties Details Why Contact Info Additional Information    Ronny [address] Nain Juares MD Internal Medicine In 1 week  [address] [facility] [place]  [address] [facility] [address]  [facility]  If symptoms worsen [facility] [address]  [address] [place] [address], [address] [place] [address], [address] [place] [address]          Discharge Medication List as of 7/31/2023 11:44 PM      CONTINUE these medications which have NOT CHANGED    Details   albuterol (PROVENTIL HFA,VENTOLIN HFA) 90 mcg/act inhaler 2 PUFFS UP TO 4 TIMES A DAY AS NEEDED FOR WHEEZING, Normal      !! BD Integra Syringe 25G X 1" 3 ML MISC DRAW UP 1 ML OF TORADOL AND INJECT INTO MUSCLE., Historical Med      benzonatate (TESSALON) 200 MG capsule Take 1 capsule (200 mg total) by mouth 3 (three) times a day as needed for cough, Starting Mon 1/9/2023, Normal      Biotin 1000 MCG tablet Take 10,000 mcg by mouth, Historical Med      Botulinum Toxin Type A 200 units SOLR Inject 155 units into face and neck IM every 90 days, Historical Med      Continuous Blood Gluc Sensor (FreeStyle Larry 14 Day Sensor) MISC Use 1 sensor q 14 days to check sugar levels, Normal      !! cyanocobalamin (VITAMIN B-12) 100 mcg tablet Historical Med      !! cyanocobalamin (VITAMIN B-12) 1000 MCG tablet Take 1,000 mcg by mouth, Historical Med      cyanocobalamin 1,000 mcg/mL INJECT 1 ML (1,000 MCG TOTAL) INTO A MUSCLE ONCE A WEEK, Starting Mon 7/10/2023, Normal      Docusate Sodium 100 MG capsule Take 100 mg by mouth 2 (two) times a day, Historical Med      !! DULoxetine (CYMBALTA) 30 mg delayed release capsule Take 30 mg by mouth daily, Starting Mon 12/12/2022, Historical Med      !!  DULoxetine (CYMBALTA) 60 mg delayed release capsule Take 1 capsule (60 mg total) by mouth daily, Starting u 2/17/2022, No Print      EPINEPHrine (EPIPEN) 0.3 mg/0.3 mL SOAJ Inject 0.3 mL (0.3 mg total) into a muscle once for 1 dose, Starting Tue 2/16/2021, Normal      EPINEPHrine (EPIPEN) 0.3 mg/0.3 mL SOAJ Inject 0.3 mL (0.3 mg total) into a muscle once for 1 dose, Starting Mon 5/1/2023, Normal      ERGOCALCIFEROL PO Take 2,400 mcg by mouth, Historical Med      FERROUS SULFATE-VITAMIN C PO Take 1 tablet once daily, Historical Med      ketorolac (TORADOL) 30 mg/mL injection INFUSE 1 ML (30 MG TOTAL) INTO A VENOUS CATHETER EVERY 12 (TWELVE) HOURS., Historical Med      lactulose (CHRONULAC) 10 g/15 mL solution Starting Mon 5/16/2022, Historical Med      loratadine (CLARITIN) 10 mg tablet Take 1 tablet (10 mg total) by mouth daily, Starting Fri 3/19/2021, No Print      LORazepam (ATIVAN) 0.5 mg tablet Take 0.5 mg by mouth 2 (two) times a day as needed for anxiety , Historical Med      Magnesium 500 MG CAPS Take 400 mg by mouth, Historical Med      memantine (NAMENDA) 10 mg tablet TAKE 1 TABLET ONLY ( 10 MG) BY MOUTH 2 TIMES A DAY.**05 MG NOT COVERED, Normal      !! methocarbamol (ROBAXIN) 500 mg tablet Take 1 tablet (500 mg total) by mouth 3 (three) times a day as needed for muscle spasms, Starting Mon 5/1/2023, Normal      !! methocarbamol (ROBAXIN) 500 mg tablet Take 1 tablet (500 mg total) by mouth 4 (four) times a day, Starting Mon 7/17/2023, Normal      !! methylphenidate (RITALIN) 10 mg tablet Take 10 mg by mouth daily as needed  , Starting Fri 5/24/2019, Historical Med      !! methylphenidate (RITALIN) 20 MG tablet Take 20 mg by mouth 2 (two) times a day Pt reports taking tid , Starting Tue 5/14/2019, Historical Med      mometasone (ELOCON) 0.1 % lotion APPLY TO AFFECTED AREA TOPICALLY EVERY DAY, Normal      Multiple Vitamin (MULTIVITAMIN ADULT PO) Take 1 tablet by mouth every morning, Historical Med      naltrexone (REVIA) 50 mg tablet Starting Fri 5/13/2022, Historical Med      nystatin (MYCOSTATIN) powder APPLY TOPICALLY TO AFFECTED AREAS TWICE A DAY, Historical Med      pantoprazole (PROTONIX) 40 mg tablet TAKE 1 TABLET BY MOUTH TWICE A DAY 30 MINUTES BEFORE BREAKFAST & DINNER, Historical Med      pregabalin (Lyrica) 150 mg capsule Take 1 capsule (150 mg total) by mouth 3 (three) times a day, Starting Thu 2/17/2022, No Print      RIBOFLAVIN PO Take 400 mg by mouth, Historical Med      rizatriptan (MAXALT) 10 MG tablet TAKE 1 TABLET (10 MG TOTAL) BY MOUTH ONCE AS NEEDED FOR MIGRAINE, Starting Mon 9/28/2020, Normal      sucralfate (CARAFATE) 1 g tablet Starting Mon 11/22/2021, Historical Med      SUMAtriptan Succinate 6 MG/0.5ML SOAJ Inject 0.5 mL (6 mg total) under the skin as needed (headache), Starting Thu 4/9/2020, Normal      !! Syringe/Needle, Disp, (SYRINGE 3CC/00YV7-6/2") 25G X 1-1/2" 3 ML MISC Use every 30 (thirty) days Draw up and inject 1ml every 30 days. , Starting Mon 7/17/2023, Normal      traZODone (DESYREL) 100 mg tablet Starting u 9/9/2021, Historical Med      triamcinolone (KENALOG) 0.1 % cream Apply 1 application topically 2 (two) times a day To affected area, Starting Fri 2/24/2023, Normal       !! - Potential duplicate medications found. Please discuss with provider. No discharge procedures on file. PDMP Review     None           ED Provider  Attending physically available and evaluated Godfrey Jimenez. I managed the patient along with the ED Attending.     Electronically Signed by         Rocky Ambrosio DO  08/06/23 6000

## 2023-08-02 LAB — C PEPTIDE SERPL-MCNC: 2.4 NG/ML (ref 1.1–4.4)

## 2023-08-04 LAB — PROINSULIN SERPL-SCNC: 3.6 PMOL/L (ref 0–10)

## 2023-08-08 ENCOUNTER — OFFICE VISIT (OUTPATIENT)
Dept: FAMILY MEDICINE CLINIC | Facility: CLINIC | Age: 43
End: 2023-08-08
Payer: COMMERCIAL

## 2023-08-08 VITALS
WEIGHT: 144 LBS | SYSTOLIC BLOOD PRESSURE: 134 MMHG | DIASTOLIC BLOOD PRESSURE: 80 MMHG | BODY MASS INDEX: 25.92 KG/M2 | OXYGEN SATURATION: 98 % | HEART RATE: 106 BPM

## 2023-08-08 DIAGNOSIS — R68.84 JAW PAIN: ICD-10-CM

## 2023-08-08 DIAGNOSIS — E16.2 HYPOGLYCEMIA: Primary | ICD-10-CM

## 2023-08-08 DIAGNOSIS — Z48.02 ENCOUNTER FOR STAPLE REMOVAL: ICD-10-CM

## 2023-08-08 PROCEDURE — 99214 OFFICE O/P EST MOD 30 MIN: CPT | Performed by: FAMILY MEDICINE

## 2023-08-08 RX ORDER — BLOOD-GLUCOSE METER
KIT MISCELLANEOUS
Qty: 1 KIT | Refills: 0 | Status: SHIPPED | OUTPATIENT
Start: 2023-08-08

## 2023-08-08 RX ORDER — BLOOD SUGAR DIAGNOSTIC
STRIP MISCELLANEOUS
Qty: 200 EACH | Refills: 3 | Status: SHIPPED | OUTPATIENT
Start: 2023-08-08

## 2023-08-08 RX ORDER — LANCETS 33 GAUGE
EACH MISCELLANEOUS
Qty: 200 EACH | Refills: 3 | Status: SHIPPED | OUTPATIENT
Start: 2023-08-08

## 2023-08-08 NOTE — PATIENT INSTRUCTIONS
1. Hypoglycemia  -     Hemoglobin A1C; Future; Expected date: 08/08/2023  -     Blood Glucose Monitoring Suppl (OneTouch Verio Reflect) w/Device KIT; Check blood sugars twice daily. Please substitute with appropriate alternative as covered by patient's insurance. Dx: E11.65  -     glucose blood (OneTouch Verio) test strip; Check blood sugars twice daily. Please substitute with appropriate alternative as covered by patient's insurance. Dx: E11.65  -     OneTouch Delica Lancets 41B MISC; Check blood sugars twice daily. Please substitute with appropriate alternative as covered by patient's insurance. Dx: E11.65     Call us if you are not improving within two days and we will have you see ENT and get imaging of your bones.

## 2023-08-08 NOTE — ASSESSMENT & PLAN NOTE
Send in for glucose monitoring Jeromecristhian Morelos was denied by his insurance at previous visit reviewed ER labs today, patient states she has felt better since making that changes as recommended by bariatric office, will monitor blood sugars if still having low blood sugars will refer to endocrinology for further evaluation she does have upcoming neurology appointment as well

## 2023-08-08 NOTE — ASSESSMENT & PLAN NOTE
Currently improving if symptoms persist or stop improving will obtain CT scan and refer to ENT due to recent trauma

## 2023-08-08 NOTE — PROGRESS NOTES
Assessment/Plan:       Problem List Items Addressed This Visit        Endocrine    Hypoglycemia - Primary     Send in for glucose monitoring Oscar Wasserman was denied by his insurance at previous visit reviewed ER labs today, patient states she has felt better since making that changes as recommended by bariatric office, will monitor blood sugars if still having low blood sugars will refer to endocrinology for further evaluation she does have upcoming neurology appointment as well         Relevant Medications    Blood Glucose Monitoring Suppl (OneTouch Verio Reflect) w/Device KIT    glucose blood (OneTouch Verio) test strip    OneTouch Delica Lancets 79A MISC    Other Relevant Orders    Hemoglobin A1C       Other    Jaw pain     Currently improving if symptoms persist or stop improving will obtain CT scan and refer to ENT due to recent trauma        Other Visit Diagnoses     Encounter for staple removal              Suture removal    Date/Time: 8/8/2023 9:40 AM    Performed by: Faustino Deshpande MD  Authorized by: Faustino Deshpande MD  Universal Protocol:  Consent: Verbal consent obtained. Written consent not obtained. Consent given by: patient  Timeout called at: 8/8/2023 9:50 AM.  Patient understanding: patient states understanding of the procedure being performed        Patient location:  Bedside  Location:     Location:  [address] [place] [address] location:  Scalp  Procedure details: Tools used: Other (comment) (staple remover)    Wound appearance:  No sign(s) of infection, clean, pink, nontender and nonpurulent    Number of staples removed:  3  Post-procedure details:     Post-removal:  No dressing applied    Patient tolerance of procedure: Tolerated well, no immediate complications  Comments: Tolerated well reports 3 staples removed at home when washing hair prior to viist.            Subjective:      Patient ID: Denver Meeker is a 38 y.o. female.     HPI  40-year-old female presenting today in follow-up after ER visit, she had syncope and was found to be secondary to low blood sugars. She had staples placed in her head. Since that time she has been feeling better she did see. Bariatrics and started on a frequent meals and has had no symptoms since this time. She does have a small amount of jaw pain and left ear pain that is improving she did feel like her hearing was decreased but this is also returning      The following portions of the patient's history were reviewed and updated as appropriate: allergies, current medications, past family history, past medical history, past social history, past surgical history and problem list.      Current Outpatient Medications:   •  albuterol (PROVENTIL HFA,VENTOLIN HFA) 90 mcg/act inhaler, 2 PUFFS UP TO 4 TIMES A DAY AS NEEDED FOR WHEEZING, Disp: 18 g, Rfl: 1  •  BD Integra Syringe 25G X 1" 3 ML MISC, DRAW UP 1 ML OF TORADOL AND INJECT INTO MUSCLE., Disp: , Rfl:   •  Biotin 1000 MCG tablet, Take 10,000 mcg by mouth, Disp: , Rfl:   •  Blood Glucose Monitoring Suppl (OneTouch Verio Reflect) w/Device KIT, Check blood sugars twice daily. Please substitute with appropriate alternative as covered by patient's insurance.  Dx: E11.65, Disp: 1 kit, Rfl: 0  •  Botulinum Toxin Type A 200 units SOLR, Inject 155 units into face and neck IM every 90 days, Disp: , Rfl:   •  Continuous Blood Gluc Sensor (FreeStyle Larry 14 Day Sensor) MISC, Use 1 sensor q 14 days to check sugar levels, Disp: 1 each, Rfl: 11  •  cyanocobalamin 1,000 mcg/mL, INJECT 1 ML (1,000 MCG TOTAL) INTO A MUSCLE ONCE A WEEK, Disp: 12 mL, Rfl: 1  •  Docusate Sodium 100 MG capsule, Take 100 mg by mouth 2 (two) times a day, Disp: , Rfl:   •  DULoxetine (CYMBALTA) 30 mg delayed release capsule, Take 30 mg by mouth daily, Disp: , Rfl:   •  DULoxetine (CYMBALTA) 60 mg delayed release capsule, Take 1 capsule (60 mg total) by mouth daily, Disp: 30 capsule, Rfl: 2  •  EPINEPHrine (EPIPEN) 0.3 mg/0.3 mL SOAJ, Inject 0.3 mL (0.3 mg total) into a muscle once for 1 dose, Disp: 0.3 mL, Rfl: 0  •  EPINEPHrine (EPIPEN) 0.3 mg/0.3 mL SOAJ, Inject 0.3 mL (0.3 mg total) into a muscle once for 1 dose, Disp: 0.6 mL, Rfl: 0  •  ERGOCALCIFEROL PO, Take 2,400 mcg by mouth, Disp: , Rfl:   •  FERROUS SULFATE-VITAMIN C PO, Take 1 tablet once daily, Disp: , Rfl:   •  glucose blood (OneTouch Verio) test strip, Check blood sugars twice daily. Please substitute with appropriate alternative as covered by patient's insurance.  Dx: E11.65, Disp: 200 each, Rfl: 3  •  ketorolac (TORADOL) 30 mg/mL injection, INFUSE 1 ML (30 MG TOTAL) INTO A VENOUS CATHETER EVERY 12 (TWELVE) HOURS., Disp: , Rfl:   •  lactulose (CHRONULAC) 10 g/15 mL solution, , Disp: , Rfl:   •  loratadine (CLARITIN) 10 mg tablet, Take 1 tablet (10 mg total) by mouth daily, Disp:  , Rfl:   •  LORazepam (ATIVAN) 0.5 mg tablet, Take 0.5 mg by mouth 2 (two) times a day as needed for anxiety , Disp: , Rfl:   •  Magnesium 500 MG CAPS, Take 400 mg by mouth, Disp: , Rfl:   •  memantine (NAMENDA) 10 mg tablet, TAKE 1 TABLET ONLY ( 10 MG) BY MOUTH 2 TIMES A DAY.**05 MG NOT COVERED, Disp: 180 tablet, Rfl: 4  •  methocarbamol (ROBAXIN) 500 mg tablet, Take 1 tablet (500 mg total) by mouth 3 (three) times a day as needed for muscle spasms, Disp: 90 tablet, Rfl: 0  •  methocarbamol (ROBAXIN) 500 mg tablet, Take 1 tablet (500 mg total) by mouth 4 (four) times a day, Disp: 30 tablet, Rfl: 1  •  methylphenidate (RITALIN) 10 mg tablet, Take 10 mg by mouth daily as needed  , Disp: , Rfl: 0  •  methylphenidate (RITALIN) 20 MG tablet, Take 20 mg by mouth 2 (two) times a day Pt reports taking tid , Disp: , Rfl: 0  •  mometasone (ELOCON) 0.1 % lotion, APPLY TO AFFECTED AREA TOPICALLY EVERY DAY, Disp: 60 mL, Rfl: 0  •  Multiple Vitamin (MULTIVITAMIN ADULT PO), Take 1 tablet by mouth every morning, Disp: , Rfl:   •  naltrexone (REVIA) 50 mg tablet, , Disp: , Rfl:   •  nystatin (MYCOSTATIN) powder, APPLY TOPICALLY TO AFFECTED AREAS TWICE A DAY, Disp: , Rfl:   •  OneTouch Delica Lancets 35R MISC, Check blood sugars twice daily. Please substitute with appropriate alternative as covered by patient's insurance. Dx: E11.65, Disp: 200 each, Rfl: 3  •  pantoprazole (PROTONIX) 40 mg tablet, TAKE 1 TABLET BY MOUTH TWICE A DAY 30 MINUTES BEFORE BREAKFAST & DINNER, Disp: , Rfl:   •  pregabalin (Lyrica) 150 mg capsule, Take 1 capsule (150 mg total) by mouth 3 (three) times a day, Disp: 90 capsule, Rfl: 1  •  RIBOFLAVIN PO, Take 400 mg by mouth, Disp: , Rfl:   •  rizatriptan (MAXALT) 10 MG tablet, TAKE 1 TABLET (10 MG TOTAL) BY MOUTH ONCE AS NEEDED FOR MIGRAINE, Disp: 9 tablet, Rfl: 1  •  SUMAtriptan Succinate 6 MG/0.5ML SOAJ, Inject 0.5 mL (6 mg total) under the skin as needed (headache), Disp: 9 Cartridge, Rfl: 1  •  Syringe/Needle, Disp, (SYRINGE 3CC/10PU7-4/2") 25G X 1-1/2" 3 ML MISC, Use every 30 (thirty) days Draw up and inject 1ml every 30 days. , Disp: 3 each, Rfl: 0  •  traZODone (DESYREL) 100 mg tablet, , Disp: , Rfl:   •  triamcinolone (KENALOG) 0.1 % cream, Apply 1 application topically 2 (two) times a day To affected area, Disp: 30 g, Rfl: 0  •  benzonatate (TESSALON) 200 MG capsule, Take 1 capsule (200 mg total) by mouth 3 (three) times a day as needed for cough (Patient not taking: Reported on 5/1/2023), Disp: 20 capsule, Rfl: 0  •  cyanocobalamin (VITAMIN B-12) 100 mcg tablet, , Disp: , Rfl:   •  cyanocobalamin (VITAMIN B-12) 1000 MCG tablet, Take 1,000 mcg by mouth (Patient not taking: Reported on 7/17/2023), Disp: , Rfl:   •  sucralfate (CARAFATE) 1 g tablet, , Disp: , Rfl:     Current Facility-Administered Medications:   •  cyanocobalamin injection 1,000 mcg, 1,000 mcg, Intramuscular, Q30 Days, Aliya Roberson MD, 1,000 mcg at 06/23/23 1524     Review of Systems   Constitutional: Negative for activity change and appetite change. Respiratory: Negative for apnea and chest tightness. Cardiovascular: Negative for chest pain and palpitations. Gastrointestinal: Negative for abdominal distention and abdominal pain. Musculoskeletal: Negative for arthralgias and back pain. Neurological: Positive for syncope. Psychiatric/Behavioral: Negative for agitation and behavioral problems. Objective:      /80 (BP Location: Left arm, Patient Position: Sitting, Cuff Size: Standard)   Pulse (!) 106   Wt 65.3 kg (144 lb)   SpO2 98%   BMI 25.92 kg/m²          Physical Exam  Constitutional:       Appearance: Normal appearance. HENT:      Head: Normocephalic. Right Ear: Tympanic membrane normal.      Left Ear: Tympanic membrane and ear canal normal.   Cardiovascular:      Rate and Rhythm: Normal rate and regular rhythm. Pulmonary:      Effort: Pulmonary effort is normal.      Breath sounds: Normal breath sounds. Musculoskeletal:         General: Normal range of motion. Neurological:      General: No focal deficit present. Mental Status: She is alert and oriented to person, place, and time.            Aniceto Pollock MD

## 2023-08-10 NOTE — TELEPHONE ENCOUNTER
Pt called to ask if we received Prior Auth documents. Informed we did not receive any. Pt confirmed fax number and was told to ask to have it re-submitted.

## 2023-08-16 ENCOUNTER — TELEPHONE (OUTPATIENT)
Dept: FAMILY MEDICINE CLINIC | Facility: CLINIC | Age: 43
End: 2023-08-16

## 2023-08-16 NOTE — TELEPHONE ENCOUNTER
Left vm for pt stating we did receive the prior  auth and it was sent to plan.  A waiting response for decision

## 2023-08-16 NOTE — TELEPHONE ENCOUNTER
Date/Time: 8-16-23 / 10:24 AM    Pt called in stating that this is the 3rd time she has called the office, and her pharmacy has sent a prior auth. She stated it's been a week, and she wants to know why it hasn't been taking so long.

## 2023-08-20 DIAGNOSIS — Z87.820 HISTORY OF CONCUSSION: ICD-10-CM

## 2023-08-20 DIAGNOSIS — G44.309 POST-TRAUMATIC HEADACHE: ICD-10-CM

## 2023-08-20 DIAGNOSIS — R41.89 BRAIN FOG: ICD-10-CM

## 2023-08-21 RX ORDER — MEMANTINE HYDROCHLORIDE 10 MG/1
TABLET ORAL
Qty: 180 TABLET | Refills: 5 | Status: SHIPPED | OUTPATIENT
Start: 2023-08-21

## 2023-08-28 ENCOUNTER — TELEPHONE (OUTPATIENT)
Dept: NEUROLOGY | Facility: CLINIC | Age: 43
End: 2023-08-28

## 2023-08-28 NOTE — TELEPHONE ENCOUNTER
Hello,     I have called the patient to re-schedule appt request no answer, LMOM.     Thank you,     Cori Turner

## 2023-09-07 ENCOUNTER — HOSPITAL ENCOUNTER (EMERGENCY)
Facility: HOSPITAL | Age: 43
Discharge: HOME | End: 2023-09-07
Attending: EMERGENCY MEDICINE
Payer: COMMERCIAL

## 2023-09-07 ENCOUNTER — APPOINTMENT (EMERGENCY)
Dept: RADIOLOGY | Facility: HOSPITAL | Age: 43
End: 2023-09-07
Payer: COMMERCIAL

## 2023-09-07 VITALS
RESPIRATION RATE: 18 BRPM | OXYGEN SATURATION: 100 % | SYSTOLIC BLOOD PRESSURE: 141 MMHG | DIASTOLIC BLOOD PRESSURE: 79 MMHG | TEMPERATURE: 97.6 F | HEART RATE: 76 BPM

## 2023-09-07 DIAGNOSIS — S60.222A CONTUSION OF LEFT HAND, INITIAL ENCOUNTER: Primary | ICD-10-CM

## 2023-09-07 DIAGNOSIS — S01.81XA LACERATION OF FOREHEAD, INITIAL ENCOUNTER: ICD-10-CM

## 2023-09-07 LAB — GLUCOSE SERPL-MCNC: 89 MG/DL (ref 65–140)

## 2023-09-07 PROCEDURE — 99284 EMERGENCY DEPT VISIT MOD MDM: CPT | Performed by: EMERGENCY MEDICINE

## 2023-09-07 PROCEDURE — 82948 REAGENT STRIP/BLOOD GLUCOSE: CPT

## 2023-09-07 PROCEDURE — 73130 X-RAY EXAM OF HAND: CPT

## 2023-09-07 PROCEDURE — 29125 APPL SHORT ARM SPLINT STATIC: CPT | Performed by: EMERGENCY MEDICINE

## 2023-09-07 PROCEDURE — 99283 EMERGENCY DEPT VISIT LOW MDM: CPT

## 2023-09-07 NOTE — ED ATTENDING ATTESTATION
9/7/2023  I, Frankey Mallard, MD, saw and evaluated the patient. I have discussed the patient with the resident/non-physician practitioner and agree with the resident's/non-physician practitioner's findings, Plan of Care, and MDM as documented in the resident's/non-physician practitioner's note, except where noted. All available labs and Radiology studies were reviewed. I was present for key portions of any procedure(s) performed by the resident/non-physician practitioner and I was immediately available to provide assistance. At this point I agree with the current assessment done in the Emergency Department. I have conducted an independent evaluation of this patient a history and physical is as follows:    ED Course         Critical Care Time  Procedures    37 yo female with hx of concussion, had a fall today while walking, felt dizzy similar to previous episodes of hypoglycemia and concussion symptoms. Pt hit head, no loc. Pt with left hand pain with swelling noted. No blood thinners. No n/v/d, no cp, no sob. Vss, afebrile, lungs cta, rrr, abrasion to scalp, healing, no neuro deficits, left hand with soft tissue swelling of thenar and hypothenar areas with eccymosis, carpal tenderness. Nvi. Xray of hand. Fingerstick.

## 2023-09-07 NOTE — DISCHARGE INSTRUCTIONS
Continue to monitor symptoms including dizziness and pain at home. Please follow up with Orthopedics within one week. Please return to the Emergency Department if you experience worsening of your current symptoms, severe headache, nausea and vomiting, dizziness that does not resolve, changes in vision or hearing, or any other concerning symptoms.

## 2023-09-07 NOTE — Clinical Note
Yony Green was seen and treated in our emergency department on 9/7/2023. Diagnosis: Hand contusion and Forehead laceration s/p seema Ugalde  is off the rest of the shift today. She may return on this date: 09/09/2023         If you have any questions or concerns, please don't hesitate to call.       Epifanio Deluca, DO    ______________________________           _______________          _______________  Hospital Representative                              Date                                Time

## 2023-09-12 NOTE — ED PROVIDER NOTES
History  Chief Complaint   Patient presents with   • Hand Injury     Patient reports falling about 2 hours ago and landed on her left side. Patient reports pain in left hand. Left hand visibly swollen upon triage. Patient reports pain 4/10. HPI   Patient is a 37year old female who presents to the ED for evaluation of left hand pain s/p fall. Patient reports she was walking outside when she began to have an episode of dizziness and tripped falling onto her left hand and hit the left side of her head. Patient reports she has frequent episodes of dizziness following a concussion and also frequently has hypoglycemia. Denies LOC, any AC/AP use, vomiting, changes in vision or hearing, chest pain, SOB, or any other injuries at this time. Prior to Admission Medications   Prescriptions Last Dose Informant Patient Reported? Taking? BD Integra Syringe 25G X 1" 3 ML MISC  Self Yes No   Sig: DRAW UP 1 ML OF TORADOL AND INJECT INTO MUSCLE. Biotin 1000 MCG tablet  Self Yes No   Sig: Take 10,000 mcg by mouth   Blood Glucose Monitoring Suppl (OneTouch Verio Reflect) w/Device KIT   No No   Sig: Check blood sugars twice daily. Please substitute with appropriate alternative as covered by patient's insurance.  Dx: E11.65   Botulinum Toxin Type A 200 units SOLR  Self Yes No   Sig: Inject 155 units into face and neck IM every 90 days   Continuous Blood Gluc Sensor (FreeStyle Larry 14 Day Sensor) MISC   No No   Sig: Use 1 sensor q 14 days to check sugar levels   DULoxetine (CYMBALTA) 30 mg delayed release capsule  Self Yes No   Sig: Take 30 mg by mouth daily   DULoxetine (CYMBALTA) 60 mg delayed release capsule  Self No No   Sig: Take 1 capsule (60 mg total) by mouth daily   Docusate Sodium 100 MG capsule  Self Yes No   Sig: Take 100 mg by mouth 2 (two) times a day   EPINEPHrine (EPIPEN) 0.3 mg/0.3 mL SOAJ  Self No No   Sig: Inject 0.3 mL (0.3 mg total) into a muscle once for 1 dose   EPINEPHrine (EPIPEN) 0.3 mg/0.3 mL SOAJ   No No   Sig: Inject 0.3 mL (0.3 mg total) into a muscle once for 1 dose   ERGOCALCIFEROL PO  Self Yes No   Sig: Take 2,400 mcg by mouth   FERROUS SULFATE-VITAMIN C PO  Self Yes No   Sig: Take 1 tablet once daily   LORazepam (ATIVAN) 0.5 mg tablet  Self Yes No   Sig: Take 0.5 mg by mouth 2 (two) times a day as needed for anxiety    Magnesium 500 MG CAPS  Self Yes No   Sig: Take 400 mg by mouth   Multiple Vitamin (MULTIVITAMIN ADULT PO)  Self Yes No   Sig: Take 1 tablet by mouth every morning   OneTouch Delica Lancets 72M MISC   No No   Sig: Check blood sugars twice daily. Please substitute with appropriate alternative as covered by patient's insurance. Dx: E11.65   RIBOFLAVIN PO  Self Yes No   Sig: Take 400 mg by mouth   SUMAtriptan Succinate 6 MG/0.5ML SOAJ  Self No No   Sig: Inject 0.5 mL (6 mg total) under the skin as needed (headache)   Syringe/Needle, Disp, (SYRINGE 3CC/84JX6-6/2") 25G X 1-1/2" 3 ML MISC   No No   Sig: Use every 30 (thirty) days Draw up and inject 1ml every 30 days. albuterol (PROVENTIL HFA,VENTOLIN HFA) 90 mcg/act inhaler  Self No No   Si PUFFS UP TO 4 TIMES A DAY AS NEEDED FOR WHEEZING   benzonatate (TESSALON) 200 MG capsule  Self No No   Sig: Take 1 capsule (200 mg total) by mouth 3 (three) times a day as needed for cough   Patient not taking: Reported on 2023   cyanocobalamin (VITAMIN B-12) 100 mcg tablet  Self Yes No   Patient not taking: Reported on 2023   cyanocobalamin (VITAMIN B-12) 1000 MCG tablet  Self Yes No   Sig: Take 1,000 mcg by mouth   Patient not taking: Reported on 2023   cyanocobalamin 1,000 mcg/mL  Self No No   Sig: INJECT 1 ML (1,000 MCG TOTAL) INTO A MUSCLE ONCE A WEEK   glucose blood (OneTouch Verio) test strip   No No   Sig: Check blood sugars twice daily. Please substitute with appropriate alternative as covered by patient's insurance.  Dx: E11.65   ketorolac (TORADOL) 30 mg/mL injection  Self Yes No   Sig: INFUSE 1 ML (30 MG TOTAL) INTO A VENOUS CATHETER EVERY 12 (TWELVE) HOURS.   lactulose (CHRONULAC) 10 g/15 mL solution  Self Yes No   loratadine (CLARITIN) 10 mg tablet  Self No No   Sig: Take 1 tablet (10 mg total) by mouth daily   memantine (NAMENDA) 10 mg tablet   No No   Sig: TAKE 1 TABLET ONLY ( 10 MG) BY MOUTH 2 TIMES A DAY.**05 MG NOT COVERED   methocarbamol (ROBAXIN) 500 mg tablet  Self No No   Sig: Take 1 tablet (500 mg total) by mouth 3 (three) times a day as needed for muscle spasms   methocarbamol (ROBAXIN) 500 mg tablet   No No   Sig: Take 1 tablet (500 mg total) by mouth 4 (four) times a day   methylphenidate (RITALIN) 10 mg tablet  Self Yes No   Sig: Take 10 mg by mouth daily as needed     methylphenidate (RITALIN) 20 MG tablet  Self Yes No   Sig: Take 20 mg by mouth 2 (two) times a day Pt reports taking tid    mometasone (ELOCON) 0.1 % lotion  Self No No   Sig: APPLY TO AFFECTED AREA TOPICALLY EVERY DAY   naltrexone (REVIA) 50 mg tablet  Self Yes No   nystatin (MYCOSTATIN) powder  Self Yes No   Sig: APPLY TOPICALLY TO AFFECTED AREAS TWICE A DAY   pantoprazole (PROTONIX) 40 mg tablet  Self Yes No   Sig: TAKE 1 TABLET BY MOUTH TWICE A DAY 30 MINUTES BEFORE BREAKFAST & DINNER   pregabalin (Lyrica) 150 mg capsule  Self No No   Sig: Take 1 capsule (150 mg total) by mouth 3 (three) times a day   rizatriptan (MAXALT) 10 MG tablet  Self No No   Sig: TAKE 1 TABLET (10 MG TOTAL) BY MOUTH ONCE AS NEEDED FOR MIGRAINE   sucralfate (CARAFATE) 1 g tablet  Self Yes No   Patient not taking: Reported on 5/1/2023   traZODone (DESYREL) 100 mg tablet  Self Yes No   triamcinolone (KENALOG) 0.1 % cream  Self No No   Sig: Apply 1 application topically 2 (two) times a day To affected area      Facility-Administered Medications Last Administration Doses Remaining   cyanocobalamin injection 1,000 mcg 6/23/2023  3:24 PM           Past Medical History:   Diagnosis Date   • Acid reflux    • Anxiety    • Asthma    • Depression    • Diabetes mellitus (HCC)     Pre   • Diverticulitis    • Epileptic seizures ([address])     Medication related   • Gestational diabetes mellitus    • Hypertension    • Lateral epicondylitis    • Malignant tumor of cervix ([address])     between 1438-5486   • Obstructive sleep apnea     CPAP   • Paresthesias    • Plantar fasciitis    • Rectocele    • Tinea corporis        Past Surgical History:   Procedure Laterality Date   • ARTHROSCOPY ANKLE Right 9/1/2017    Procedure: ANKLE ARTHOSCOPY WITH EXTENSIVE SYNOVECTOMY, OPEN ARTHOTOY LATERAL ANKLE WITH ANKLE STABILIZATION, BROSTOM REPAIR, INTERNAL BRACE AUGMENTATION, PERONEAL TENDON SYNOVECTOMY;  Surgeon: Gurinder Randhawa DPM;  Location: [facility];  Service: Podiatry   • COLONOSCOPY  02/27/2017   • GASTRIC BYPASS  07/2020   • HEMORRHOID SURGERY  08/2021   • HYSTERECTOMY  2015   • AK COLONOSCOPY FLX DX W/COLLJ SPEC WHEN PFRMD N/A 2/27/2017    Procedure: COLONOSCOPY;  Surgeon: Otto Clark MD;  Location: [facility]; Service: Gastroenterology   • AK ESOPHAGOGASTRODUODENOSCOPY TRANSORAL DIAGNOSTIC N/A 9/7/2016    Procedure: EGD AND COLONOSCOPY;  Surgeon: Otto Clark MD;  Location: [facility];   Service: Gastroenterology   • TONSILLECTOMY     • TUBAL LIGATION     • UPPER GASTROINTESTINAL ENDOSCOPY  2016       Family History   Problem Relation Age of Onset   • Anxiety disorder Mother    • Depression Mother    • Cancer Mother    • Endometrial cancer Mother         under afe 48   • Seizures Brother         Epilepsy   • Other Brother         Suicide risk   • Cancer Maternal Grandmother 48        lymphatic cancer   • Other Other         cardiac disorder   • Diabetes Other    • Hypertension Other    • Neuropathy Other    • Thyroid disease Other    • Breast cancer Paternal Grandmother 80   • No Known Problems Father    • No Known Problems Daughter    • Cancer Maternal Grandfather    • No Known Problems Paternal Grandfather    • No Known Problems Daughter    • No Known Problems Son    • No Known Problems Brother    • Leukemia Cousin    • Cancer Cousin         lympatic     I have reviewed and agree with the history as documented. E-Cigarette/Vaping   • E-Cigarette Use Never User      E-Cigarette/Vaping Substances   • Nicotine No    • THC No    • CBD No    • Flavoring No      Social History     Tobacco Use   • Smoking status: Former     Types: Cigarettes     Quit date: 9/7/2013     Years since quitting: 10.0   • Smokeless tobacco: Never   Vaping Use   • Vaping Use: Never used   Substance Use Topics   • Alcohol use: Yes     Comment: occasionally, maybe 1-2 times a month   • Drug use: Yes     Types: Marijuana        Review of Systems   Neurological: Positive for dizziness. Negative for syncope and weakness. All other systems reviewed and are negative. Physical Exam  ED Triage Vitals [09/07/23 1723]   Temperature Pulse Respirations Blood Pressure SpO2   97.6 °F (36.4 °C) 76 18 141/79 100 %      Temp Source Heart Rate Source Patient Position - Orthostatic VS BP Location FiO2 (%)   Temporal Monitor Sitting Left arm --      Pain Score       --             Orthostatic Vital Signs  Vitals:    09/07/23 1723   BP: 141/79   Pulse: 76   Patient Position - Orthostatic VS: Sitting       Physical Exam  Vitals and nursing note reviewed. Constitutional:       General: She is awake. She is not in acute distress. HENT:      Head: Normocephalic. Comments: Small 1cm well approximated laceration to left forehead, bleeding controlled     Nose: No congestion or rhinorrhea. Mouth/Throat:      Mouth: Mucous membranes are moist.      Pharynx: Oropharynx is clear. Eyes:      General: No scleral icterus. Extraocular Movements: Extraocular movements intact. Conjunctiva/sclera: Conjunctivae normal.      Pupils: Pupils are equal, round, and reactive to light. Cardiovascular:      Rate and Rhythm: Normal rate and regular rhythm. Pulmonary:      Effort: Pulmonary effort is normal. No respiratory distress.    Abdominal:      General: Abdomen is flat. There is no distension. Musculoskeletal:      Right hand: Normal.      Left hand: Swelling and tenderness (left lateral wrist) present. No lacerations or bony tenderness (no snuffbox tenderness). Decreased range of motion. Normal sensation. Normal capillary refill. Cervical back: Normal range of motion and neck supple. Skin:     General: Skin is warm. Coloration: Skin is not jaundiced. Neurological:      Mental Status: She is alert. Psychiatric:         Mood and Affect: Mood normal.         Behavior: Behavior is cooperative. ED Medications  Medications - No data to display    Diagnostic Studies  Results Reviewed     Procedure Component Value Units Date/Time    Fingerstick Glucose (POCT) [[id number]]  (Normal) Collected: 09/07/23 1807    Lab Status: Final result Updated: 09/07/23 1808     POC Glucose 89 mg/dl                  XR hand 3+ views LEFT   Final Result by Arabella Rodriguez MD (09/08 8694)      No acute osseous abnormality. Workstation performed: [id number]               Procedures  Splint application    Date/Time: 9/7/2023 6:20 PM    Performed by: Edgar Cain DO  Authorized by: Alyssia Monk DO  Universal Protocol:  Consent: Verbal consent obtained. Risks and benefits: risks, benefits and alternatives were discussed  Consent given by: patient  Patient understanding: patient states understanding of the procedure being performed  Patient consent: the patient's understanding of the procedure matches consent given  Procedure consent: procedure consent matches procedure scheduled  Relevant documents: relevant documents present and verified  Radiology Images displayed and confirmed.  If images not available, report reviewed: imaging studies available  Required items: required blood products, implants, devices, and special equipment available  Patient identity confirmed: verbally with patient, arm band and provided demographic data      Pre-procedure details:     Sensation:  Normal  Procedure details:     Laterality:  Left    Location:  Wrist    Wrist:  L wrist    Splint type:  Wrist Buzzy Da)    Supplies:  Cotton padding, elastic bandage and Ortho-Glass  Post-procedure details:     Pain:  Improved    Sensation:  Normal    Patient tolerance of procedure: Tolerated well, no immediate complications          ED Course  ED Course as of 09/12/23 1851   Thu Sep 07, 2023   1752 Patient is a 38 y.o. female who presents with mechanical fall with left hand pain and left forehead laceration. Differential diagnosis includes but not limited to: left hand fracture, contusion, doubt dislocation, left forehead contusion, left forehead laceration. PLAN: X-ray left hand, POC glucose. [place] CT Head rule negative, C-spine clear clinically. 1800 Patient w/ PMHx gastric bypass, declines Tylenol or topical treatment for pain. 1810 POC Glucose: 89  Doubt hypoglycemia   1814 XR hand 3+ views LEFT  No acute osseous abnormality, will apply splint for comfort. See procedure note above. Medical Decision Making  Contusion of left hand, initial encounter: acute illness or injury  Laceration of forehead, initial encounter: acute illness or injury  Amount and/or Complexity of Data Reviewed  Labs: ordered. Decision-making details documented in ED Course. Radiology: ordered. Decision-making details documented in ED Course. Risk  OTC drugs. See ED course for additional details.     Disposition  Final diagnoses:   Contusion of left hand, initial encounter   Laceration of forehead, initial encounter     Time reflects when diagnosis was documented in both MDM as applicable and the Disposition within this note     Time User Action Codes Description Comment    9/7/2023  6:44 PM Mulu Saavedra Add [S60.222A] Contusion of left hand, initial encounter     9/7/2023  6:45 PM Willam Joyce Add [S01.81XA] Laceration of forehead, initial encounter       ED Disposition     ED Disposition   Discharge    Condition   Stable    Date/Time   Thu Sep 7, 2023  6:44 PM    Comment   Nguyen Hendrixjimmie discharge to home/self care. Follow-up Information     Follow up With Specialties Details Why Contact Info Additional [address]  Orthopedic Surgery Schedule an appointment as soon as possible for a visit in 1 week  [address] [address]  [phone] [facility], [address] [place], [place], [address] [place] [address] Ave  Use Entrance A     [address] Emergency Department Emergency Medicine Go to  If symptoms worsen [address] [address]  [facility] Emergency Department, [address], [place], [facility]          Discharge Medication List as of 9/7/2023  6:48 PM      CONTINUE these medications which have NOT CHANGED    Details   albuterol (PROVENTIL HFA,VENTOLIN HFA) 90 mcg/act inhaler 2 PUFFS UP TO 4 TIMES A DAY AS NEEDED FOR WHEEZING, Normal      !! BD Integra Syringe 25G X 1" 3 ML MISC DRAW UP 1 ML OF TORADOL AND INJECT INTO MUSCLE., Historical Med      benzonatate (TESSALON) 200 MG capsule Take 1 capsule (200 mg total) by mouth 3 (three) times a day as needed for cough, Starting Mon 1/9/2023, Normal      Biotin 1000 MCG tablet Take 10,000 mcg by mouth, Historical Med      Blood Glucose Monitoring Suppl (OneTouch Verio Reflect) w/Device KIT Check blood sugars twice daily. Please substitute with appropriate alternative as covered by patient's insurance.  Dx: E11.65, Normal      Botulinum Toxin Type A 200 units SOLR Inject 155 units into face and neck IM every 90 days, Historical Med      Continuous Blood Gluc Sensor (FreeStyle Larry 14 Day Sensor) MISC Use 1 sensor q 14 days to check sugar levels, Normal      !! cyanocobalamin (VITAMIN B-12) 100 mcg tablet Historical Med      !! cyanocobalamin (VITAMIN B-12) 1000 MCG tablet Take 1,000 mcg by mouth, Historical Med      cyanocobalamin 1,000 mcg/mL INJECT 1 ML (1,000 MCG TOTAL) INTO A MUSCLE ONCE A WEEK, Starting Mon 7/10/2023, Normal      Docusate Sodium 100 MG capsule Take 100 mg by mouth 2 (two) times a day, Historical Med      !! DULoxetine (CYMBALTA) 30 mg delayed release capsule Take 30 mg by mouth daily, Starting Mon 12/12/2022, Historical Med      !! DULoxetine (CYMBALTA) 60 mg delayed release capsule Take 1 capsule (60 mg total) by mouth daily, Starting Thu 2/17/2022, No Print      EPINEPHrine (EPIPEN) 0.3 mg/0.3 mL SOAJ Inject 0.3 mL (0.3 mg total) into a muscle once for 1 dose, Starting Tue 2/16/2021, Normal      EPINEPHrine (EPIPEN) 0.3 mg/0.3 mL SOAJ Inject 0.3 mL (0.3 mg total) into a muscle once for 1 dose, Starting Mon 5/1/2023, Normal      ERGOCALCIFEROL PO Take 2,400 mcg by mouth, Historical Med      FERROUS SULFATE-VITAMIN C PO Take 1 tablet once daily, Historical Med      glucose blood (OneTouch Verio) test strip Check blood sugars twice daily. Please substitute with appropriate alternative as covered by patient's insurance.  Dx: E11.65, Normal      ketorolac (TORADOL) 30 mg/mL injection INFUSE 1 ML (30 MG TOTAL) INTO A VENOUS CATHETER EVERY 12 (TWELVE) HOURS., Historical Med      lactulose (CHRONULAC) 10 g/15 mL solution Starting Mon 5/16/2022, Historical Med      loratadine (CLARITIN) 10 mg tablet Take 1 tablet (10 mg total) by mouth daily, Starting Fri 3/19/2021, No Print      LORazepam (ATIVAN) 0.5 mg tablet Take 0.5 mg by mouth 2 (two) times a day as needed for anxiety , Historical Med      Magnesium 500 MG CAPS Take 400 mg by mouth, Historical Med      memantine (NAMENDA) 10 mg tablet TAKE 1 TABLET ONLY ( 10 MG) BY MOUTH 2 TIMES A DAY.**05 MG NOT COVERED, Normal      !! methocarbamol (ROBAXIN) 500 mg tablet Take 1 tablet (500 mg total) by mouth 3 (three) times a day as needed for muscle spasms, Starting Mon 5/1/2023, Normal      !! methocarbamol (ROBAXIN) 500 mg tablet Take 1 tablet (500 mg total) by mouth 4 (four) times a day, Starting Mon 7/17/2023, Normal      !! methylphenidate (RITALIN) 10 mg tablet Take 10 mg by mouth daily as needed  , Starting Fri 5/24/2019, Historical Med      !! methylphenidate (RITALIN) 20 MG tablet Take 20 mg by mouth 2 (two) times a day Pt reports taking tid , Starting Tue 5/14/2019, Historical Med      mometasone (ELOCON) 0.1 % lotion APPLY TO AFFECTED AREA TOPICALLY EVERY DAY, Normal      Multiple Vitamin (MULTIVITAMIN ADULT PO) Take 1 tablet by mouth every morning, Historical Med      naltrexone (REVIA) 50 mg tablet Starting Fri 5/13/2022, Historical Med      nystatin (MYCOSTATIN) powder APPLY TOPICALLY TO AFFECTED AREAS TWICE A DAY, Historical Med      OneTouch Delica Lancets 02X MISC Check blood sugars twice daily. Please substitute with appropriate alternative as covered by patient's insurance. Dx: E11.65, Normal      pantoprazole (PROTONIX) 40 mg tablet TAKE 1 TABLET BY MOUTH TWICE A DAY 30 MINUTES BEFORE BREAKFAST & DINNER, Historical Med      pregabalin (Lyrica) 150 mg capsule Take 1 capsule (150 mg total) by mouth 3 (three) times a day, Starting Thu 2/17/2022, No Print      RIBOFLAVIN PO Take 400 mg by mouth, Historical Med      rizatriptan (MAXALT) 10 MG tablet TAKE 1 TABLET (10 MG TOTAL) BY MOUTH ONCE AS NEEDED FOR MIGRAINE, Starting Mon 9/28/2020, Normal      sucralfate (CARAFATE) 1 g tablet Starting Mon 11/22/2021, Historical Med      SUMAtriptan Succinate 6 MG/0.5ML SOAJ Inject 0.5 mL (6 mg total) under the skin as needed (headache), Starting Thu 4/9/2020, Normal      !! Syringe/Needle, Disp, (SYRINGE 3CC/48BU3-3/2") 25G X 1-1/2" 3 ML MISC Use every 30 (thirty) days Draw up and inject 1ml every 30 days. , Starting Mon 7/17/2023, Normal      traZODone (DESYREL) 100 mg tablet Starting Thu 9/9/2021, Historical Med      triamcinolone (KENALOG) 0.1 % cream Apply 1 application topically 2 (two) times a day To affected area, Starting Fri 2/24/2023, Normal       !! - Potential duplicate medications found. Please discuss with provider. No discharge procedures on file. PDMP Review     None           ED Provider  Attending physically available and evaluated Usha Elder. I managed the patient along with the ED Attending.     Electronically Signed by         Bernarda Rondon DO  09/12/23 0213

## 2023-09-13 ENCOUNTER — APPOINTMENT (OUTPATIENT)
Dept: LAB | Facility: CLINIC | Age: 43
End: 2023-09-13
Payer: COMMERCIAL

## 2023-09-13 DIAGNOSIS — E16.2 HYPOGLYCEMIA: ICD-10-CM

## 2023-09-13 LAB
BACTERIA UR QL AUTO: ABNORMAL /HPF
BILIRUB UR QL STRIP: NEGATIVE
CAOX CRY URNS QL MICRO: ABNORMAL /HPF
CLARITY UR: ABNORMAL
COLOR UR: YELLOW
EST. AVERAGE GLUCOSE BLD GHB EST-MCNC: 105 MG/DL
GLUCOSE UR STRIP-MCNC: NEGATIVE MG/DL
HBA1C MFR BLD: 5.3 %
HGB UR QL STRIP.AUTO: NEGATIVE
KETONES UR STRIP-MCNC: NEGATIVE MG/DL
LEUKOCYTE ESTERASE UR QL STRIP: ABNORMAL
MUCOUS THREADS UR QL AUTO: ABNORMAL
NITRITE UR QL STRIP: NEGATIVE
NON-SQ EPI CELLS URNS QL MICRO: ABNORMAL /HPF
PH UR STRIP.AUTO: 6 [PH]
PROT UR STRIP-MCNC: ABNORMAL MG/DL
RBC #/AREA URNS AUTO: ABNORMAL /HPF
SP GR UR STRIP.AUTO: 1.03 (ref 1–1.03)
UROBILINOGEN UR STRIP-ACNC: <2 MG/DL
WBC #/AREA URNS AUTO: ABNORMAL /HPF

## 2023-09-13 PROCEDURE — 36415 COLL VENOUS BLD VENIPUNCTURE: CPT

## 2023-09-13 PROCEDURE — 83036 HEMOGLOBIN GLYCOSYLATED A1C: CPT

## 2023-11-24 ENCOUNTER — VBI (OUTPATIENT)
Dept: ADMINISTRATIVE | Facility: OTHER | Age: 43
End: 2023-11-24

## 2024-02-15 ENCOUNTER — TELEPHONE (OUTPATIENT)
Dept: NEUROLOGY | Facility: CLINIC | Age: 44
End: 2024-02-15

## 2024-02-15 NOTE — TELEPHONE ENCOUNTER
Received via [employer] subpoena for medical records from [employer].  Request scanned to  and faxed to [employer].

## 2024-02-23 NOTE — TELEPHONE ENCOUNTER
2/22     Divya from [employer] left a VM re: no longer needing medical records, as the pt's case is now settled.    Called back to confirm receipt of VM and left a message informing Divya of this on her VM.

## 2024-06-26 DIAGNOSIS — E53.8 B12 DEFICIENCY: ICD-10-CM

## 2024-06-26 RX ORDER — CYANOCOBALAMIN 1000 UG/ML
1000 INJECTION, SOLUTION INTRAMUSCULAR; SUBCUTANEOUS
Qty: 12 ML | Refills: 0 | Status: SHIPPED | OUTPATIENT
Start: 2024-06-26

## 2024-08-26 ENCOUNTER — TELEPHONE (OUTPATIENT)
Dept: OTHER | Facility: OTHER | Age: 44
End: 2024-08-26

## 2024-08-26 ENCOUNTER — LAB (OUTPATIENT)
Dept: LAB | Facility: MEDICAL CENTER | Age: 44
End: 2024-08-26
Payer: COMMERCIAL

## 2024-08-26 ENCOUNTER — OFFICE VISIT (OUTPATIENT)
Dept: FAMILY MEDICINE CLINIC | Facility: CLINIC | Age: 44
End: 2024-08-26
Payer: COMMERCIAL

## 2024-08-26 VITALS
HEART RATE: 88 BPM | HEIGHT: 63 IN | DIASTOLIC BLOOD PRESSURE: 60 MMHG | WEIGHT: 141.2 LBS | OXYGEN SATURATION: 99 % | RESPIRATION RATE: 18 BRPM | SYSTOLIC BLOOD PRESSURE: 128 MMHG | BODY MASS INDEX: 25.02 KG/M2 | TEMPERATURE: 98.5 F

## 2024-08-26 DIAGNOSIS — R50.81 FEVER IN OTHER DISEASES: ICD-10-CM

## 2024-08-26 DIAGNOSIS — E16.2 HYPOGLYCEMIA: Primary | ICD-10-CM

## 2024-08-26 PROBLEM — R50.9 FEVER: Status: ACTIVE | Noted: 2024-08-26

## 2024-08-26 LAB
ALBUMIN SERPL BCG-MCNC: 4.9 G/DL (ref 3.5–5)
ALP SERPL-CCNC: 99 U/L (ref 34–104)
ALT SERPL W P-5'-P-CCNC: 18 U/L (ref 7–52)
AMORPH URATE CRY URNS QL MICRO: ABNORMAL
ANION GAP SERPL CALCULATED.3IONS-SCNC: 9 MMOL/L (ref 4–13)
AST SERPL W P-5'-P-CCNC: 17 U/L (ref 13–39)
BACTERIA UR QL AUTO: ABNORMAL /HPF
BASOPHILS # BLD AUTO: 0.05 THOUSANDS/ÂΜL (ref 0–0.1)
BASOPHILS NFR BLD AUTO: 1 % (ref 0–1)
BILIRUB SERPL-MCNC: 0.43 MG/DL (ref 0.2–1)
BILIRUB UR QL STRIP: NEGATIVE
BUN SERPL-MCNC: 18 MG/DL (ref 5–25)
CALCIUM SERPL-MCNC: 9.2 MG/DL (ref 8.4–10.2)
CAOX CRY URNS QL MICRO: ABNORMAL /HPF
CHLORIDE SERPL-SCNC: 105 MMOL/L (ref 96–108)
CLARITY UR: ABNORMAL
CO2 SERPL-SCNC: 25 MMOL/L (ref 21–32)
COLOR UR: ABNORMAL
CREAT SERPL-MCNC: 0.69 MG/DL (ref 0.6–1.3)
CRP SERPL QL: <1 MG/L
EOSINOPHIL # BLD AUTO: 0.08 THOUSAND/ÂΜL (ref 0–0.61)
EOSINOPHIL NFR BLD AUTO: 1 % (ref 0–6)
ERYTHROCYTE [DISTWIDTH] IN BLOOD BY AUTOMATED COUNT: 12.4 % (ref 11.6–15.1)
GFR SERPL CREATININE-BSD FRML MDRD: 105 ML/MIN/1.73SQ M
GLUCOSE SERPL-MCNC: 31 MG/DL (ref 65–140)
GLUCOSE UR STRIP-MCNC: NEGATIVE MG/DL
HCT VFR BLD AUTO: 44.2 % (ref 34.8–46.1)
HGB BLD-MCNC: 14.5 G/DL (ref 11.5–15.4)
HGB UR QL STRIP.AUTO: NEGATIVE
HIV 1+2 AB+HIV1 P24 AG SERPL QL IA: NORMAL
HIV 2 AB SERPL QL IA: NORMAL
HIV1 AB SERPL QL IA: NORMAL
HIV1 P24 AG SERPL QL IA: NORMAL
IMM GRANULOCYTES # BLD AUTO: 0.04 THOUSAND/UL (ref 0–0.2)
IMM GRANULOCYTES NFR BLD AUTO: 0 % (ref 0–2)
KETONES UR STRIP-MCNC: NEGATIVE MG/DL
LEUKOCYTE ESTERASE UR QL STRIP: ABNORMAL
LYMPHOCYTES # BLD AUTO: 2.17 THOUSANDS/ÂΜL (ref 0.6–4.47)
LYMPHOCYTES NFR BLD AUTO: 21 % (ref 14–44)
MCH RBC QN AUTO: 31.7 PG (ref 26.8–34.3)
MCHC RBC AUTO-ENTMCNC: 32.8 G/DL (ref 31.4–37.4)
MCV RBC AUTO: 97 FL (ref 82–98)
MONOCYTES # BLD AUTO: 1.14 THOUSAND/ÂΜL (ref 0.17–1.22)
MONOCYTES NFR BLD AUTO: 11 % (ref 4–12)
MUCOUS THREADS UR QL AUTO: ABNORMAL
NEUTROPHILS # BLD AUTO: 6.95 THOUSANDS/ÂΜL (ref 1.85–7.62)
NEUTS SEG NFR BLD AUTO: 66 % (ref 43–75)
NITRITE UR QL STRIP: NEGATIVE
NON-SQ EPI CELLS URNS QL MICRO: ABNORMAL /HPF
NRBC BLD AUTO-RTO: 0 /100 WBCS
PH UR STRIP.AUTO: 5.5 [PH]
PLATELET # BLD AUTO: 270 THOUSANDS/UL (ref 149–390)
PMV BLD AUTO: 11.1 FL (ref 8.9–12.7)
POTASSIUM SERPL-SCNC: 3.5 MMOL/L (ref 3.5–5.3)
PROT SERPL-MCNC: 7.3 G/DL (ref 6.4–8.4)
PROT UR STRIP-MCNC: ABNORMAL MG/DL
RBC # BLD AUTO: 4.57 MILLION/UL (ref 3.81–5.12)
RBC #/AREA URNS AUTO: ABNORMAL /HPF
SODIUM SERPL-SCNC: 139 MMOL/L (ref 135–147)
SP GR UR STRIP.AUTO: 1.03 (ref 1–1.03)
TSH SERPL DL<=0.05 MIU/L-ACNC: 0.97 UIU/ML (ref 0.45–4.5)
UROBILINOGEN UR STRIP-ACNC: <2 MG/DL
WBC # BLD AUTO: 10.43 THOUSAND/UL (ref 4.31–10.16)
WBC #/AREA URNS AUTO: ABNORMAL /HPF

## 2024-08-26 PROCEDURE — 86663 EPSTEIN-BARR ANTIBODY: CPT

## 2024-08-26 PROCEDURE — 81001 URINALYSIS AUTO W/SCOPE: CPT

## 2024-08-26 PROCEDURE — 86664 EPSTEIN-BARR NUCLEAR ANTIGEN: CPT

## 2024-08-26 PROCEDURE — 86618 LYME DISEASE ANTIBODY: CPT

## 2024-08-26 PROCEDURE — 99214 OFFICE O/P EST MOD 30 MIN: CPT | Performed by: FAMILY MEDICINE

## 2024-08-26 PROCEDURE — 86140 C-REACTIVE PROTEIN: CPT

## 2024-08-26 PROCEDURE — 85025 COMPLETE CBC W/AUTO DIFF WBC: CPT

## 2024-08-26 PROCEDURE — 86665 EPSTEIN-BARR CAPSID VCA: CPT

## 2024-08-26 PROCEDURE — 84443 ASSAY THYROID STIM HORMONE: CPT

## 2024-08-26 PROCEDURE — 87389 HIV-1 AG W/HIV-1&-2 AB AG IA: CPT

## 2024-08-26 PROCEDURE — 36415 COLL VENOUS BLD VENIPUNCTURE: CPT

## 2024-08-26 PROCEDURE — 80053 COMPREHEN METABOLIC PANEL: CPT

## 2024-08-26 RX ORDER — FLASH GLUCOSE SENSOR
KIT MISCELLANEOUS
Qty: 1 EACH | Refills: 11 | Status: SHIPPED | OUTPATIENT
Start: 2024-08-26

## 2024-08-26 NOTE — ASSESSMENT & PLAN NOTE
Discussed monitoring blood sugars patient does not want to prick fingers, will prescribed free style rissa,continue high protein diet as recommended by bariatrics follow up nutrionist referred by bariatric doctor.

## 2024-08-26 NOTE — ASSESSMENT & PLAN NOTE
Check labs as below, no cause found on exam, bring log of fevers to follow up appointment, check lyme due to possible exposures though no rash or tick bites, no recent travel

## 2024-08-26 NOTE — PROGRESS NOTES
Depression Screening and Follow-up Plan: Patient's depression screening was positive with a PHQ-9 score of 13. Patient advised to follow-up with PCP for further management.     Assessment/Plan:       Problem List Items Addressed This Visit          Endocrine    Hypoglycemia - Primary     Discussed monitoring blood sugars patient does not want to prick fingers, will prescribed free style larry,continue high protein diet as recommended by bariatrics follow up nutrionist referred by bariatric doctor.         Relevant Medications    Continuous Glucose Sensor (FreeStyle Larry 14 Day Sensor) MISC       Other    Fever     Check labs as below, no cause found on exam, bring log of fevers to follow up appointment, check lyme due to possible exposures though no rash or tick bites, no recent travel         Relevant Orders    CBC and differential    Comprehensive metabolic panel    C-reactive protein    EBV Acute Panel    HIV 1/2 AG/AB w Reflex [facility] for 2 yr old and above    TSH, 3rd generation with Free T4 reflex    UA (URINE) with reflex to Scope    XR chest pa & lateral    Lyme Total AB W Reflex to IGM/IGG         Subjective:      Patient ID: Tram Perez is a 43 y.o. female.    Fever  This is a recurrent problem. The current episode started 1 to 4 weeks ago. The problem occurs 2 to 4 times per day. The problem has been unchanged. Associated symptoms include chills, fatigue, a fever and headaches. Pertinent negatives include no abdominal pain, anorexia, congestion, coughing, joint swelling, myalgias, nausea, neck pain, sore throat, swollen glands, urinary symptoms or visual change. Nothing aggravates the symptoms. She has tried acetaminophen for the symptoms. The treatment provided moderate relief.     43 year old female with pmh of hypoglycemia, presenting for 3 weeks of fevers and fatigue, reports chills and temeperatures between 100-102 at home.    Has been fatigued most of the day, feels as if blood sugars are low but  "has not been able to check blood sugars.    Following high protient diet.    The following portions of the patient's history were reviewed and updated as appropriate: allergies, current medications, past family history, past medical history, past social history, past surgical history and problem list.      Current Outpatient Medications:     albuterol (PROVENTIL HFA,VENTOLIN HFA) 90 mcg/act inhaler, 2 PUFFS UP TO 4 TIMES A DAY AS NEEDED FOR WHEEZING, Disp: 18 g, Rfl: 1    BD Integra Syringe 25G X 1\" 3 ML MISC, DRAW UP 1 ML OF TORADOL AND INJECT INTO MUSCLE., Disp: , Rfl:     Biotin 1000 MCG tablet, Take 10,000 mcg by mouth, Disp: , Rfl:     Blood Glucose Monitoring Suppl (OneTouch Verio Reflect) w/Device KIT, Check blood sugars twice daily. Please substitute with appropriate alternative as covered by patient's insurance. Dx: E11.65, Disp: 1 kit, Rfl: 0    Botulinum Toxin Type A 200 units SOLR, Inject 155 units into face and neck IM every 90 days, Disp: , Rfl:     Continuous Glucose Sensor (FreeStyle Larry 14 Day Sensor) MISC, Use 1 sensor q 14 days to check sugar levels, Disp: 1 each, Rfl: 11    cyanocobalamin 1,000 mcg/mL, Inject 1 mL (1,000 mcg total) into a muscle every 30 (thirty) days, Disp: 12 mL, Rfl: 0    Docusate Sodium 100 MG capsule, Take 100 mg by mouth 2 (two) times a day, Disp: , Rfl:     DULoxetine (CYMBALTA) 30 mg delayed release capsule, Take 30 mg by mouth daily, Disp: , Rfl:     DULoxetine (CYMBALTA) 60 mg delayed release capsule, Take 1 capsule (60 mg total) by mouth daily, Disp: 30 capsule, Rfl: 2    ERGOCALCIFEROL PO, Take 2,400 mcg by mouth, Disp: , Rfl:     FERROUS SULFATE-VITAMIN C PO, Take 1 tablet once daily, Disp: , Rfl:     glucose blood (OneTouch Verio) test strip, Check blood sugars twice daily. Please substitute with appropriate alternative as covered by patient's insurance. Dx: E11.65, Disp: 200 each, Rfl: 3    ketorolac (TORADOL) 30 mg/mL injection, INFUSE 1 ML (30 MG TOTAL) INTO A " VENOUS CATHETER EVERY 12 (TWELVE) HOURS., Disp: , Rfl:     lactulose (CHRONULAC) 10 g/15 mL solution, , Disp: , Rfl:     loratadine (CLARITIN) 10 mg tablet, Take 1 tablet (10 mg total) by mouth daily, Disp:  , Rfl:     LORazepam (ATIVAN) 0.5 mg tablet, Take 0.5 mg by mouth 2 (two) times a day as needed for anxiety , Disp: , Rfl:     Magnesium 500 MG CAPS, Take 400 mg by mouth, Disp: , Rfl:     memantine (NAMENDA) 10 mg tablet, TAKE 1 TABLET ONLY ( 10 MG) BY MOUTH 2 TIMES A DAY.**05 MG NOT COVERED, Disp: 180 tablet, Rfl: 5    methocarbamol (ROBAXIN) 500 mg tablet, Take 1 tablet (500 mg total) by mouth 3 (three) times a day as needed for muscle spasms, Disp: 90 tablet, Rfl: 0    methocarbamol (ROBAXIN) 500 mg tablet, Take 1 tablet (500 mg total) by mouth 4 (four) times a day, Disp: 30 tablet, Rfl: 1    methylphenidate (RITALIN) 10 mg tablet, Take 10 mg by mouth daily as needed  , Disp: , Rfl: 0    mometasone (ELOCON) 0.1 % lotion, APPLY TO AFFECTED AREA TOPICALLY EVERY DAY, Disp: 60 mL, Rfl: 0    Multiple Vitamin (MULTIVITAMIN ADULT PO), Take 1 tablet by mouth every morning, Disp: , Rfl:     naltrexone (REVIA) 50 mg tablet, , Disp: , Rfl:     nystatin (MYCOSTATIN) powder, Apply topically 2 (two) times a day, Disp: 60 g, Rfl: 0    OneTouch Delica Lancets 33G MISC, Check blood sugars twice daily. Please substitute with appropriate alternative as covered by patient's insurance. Dx: E11.65, Disp: 200 each, Rfl: 3    pantoprazole (PROTONIX) 40 mg tablet, TAKE 1 TABLET BY MOUTH TWICE A DAY 30 MINUTES BEFORE BREAKFAST & DINNER, Disp: , Rfl:     pregabalin (Lyrica) 150 mg capsule, Take 1 capsule (150 mg total) by mouth 3 (three) times a day, Disp: 90 capsule, Rfl: 1    RIBOFLAVIN PO, Take 400 mg by mouth, Disp: , Rfl:     rizatriptan (MAXALT) 10 MG tablet, TAKE 1 TABLET (10 MG TOTAL) BY MOUTH ONCE AS NEEDED FOR MIGRAINE, Disp: 9 tablet, Rfl: 1    SUMAtriptan Succinate 6 MG/0.5ML SOAJ, Inject 0.5 mL (6 mg total) under the skin  "as needed (headache), Disp: 9 Cartridge, Rfl: 1    Syringe/Needle, Disp, (SYRINGE 3CC/40NS3-4/2\") 25G X 1-1/2\" 3 ML MISC, Use every 30 (thirty) days Draw up and inject 1ml every 30 days., Disp: 3 each, Rfl: 0    traZODone (DESYREL) 100 mg tablet, , Disp: , Rfl:     triamcinolone (KENALOG) 0.1 % cream, Apply 1 application topically 2 (two) times a day To affected area, Disp: 30 g, Rfl: 0    benzonatate (TESSALON) 200 MG capsule, Take 1 capsule (200 mg total) by mouth 3 (three) times a day as needed for cough (Patient not taking: Reported on 5/1/2023), Disp: 20 capsule, Rfl: 0    cyanocobalamin (VITAMIN B-12) 100 mcg tablet, , Disp: , Rfl:     cyanocobalamin (VITAMIN B-12) 1000 MCG tablet, Take 1,000 mcg by mouth (Patient not taking: Reported on 7/17/2023), Disp: , Rfl:     EPINEPHrine (EPIPEN) 0.3 mg/0.3 mL SOAJ, Inject 0.3 mL (0.3 mg total) into a muscle once for 1 dose, Disp: 0.6 mL, Rfl: 0    methylphenidate (RITALIN) 20 MG tablet, Take 20 mg by mouth 2 (two) times a day Pt reports taking tid  (Patient not taking: Reported on 8/26/2024), Disp: , Rfl: 0    sucralfate (CARAFATE) 1 g tablet, , Disp: , Rfl:     Current Facility-Administered Medications:     cyanocobalamin injection 1,000 mcg, 1,000 mcg, Intramuscular, Q30 Days, Ronny Brown MD, 1,000 mcg at 06/23/23 1524     Review of Systems   Constitutional:  Positive for chills, fatigue and fever.   HENT:  Negative for congestion and sore throat.    Respiratory:  Negative for cough.    Gastrointestinal:  Negative for abdominal pain, anorexia and nausea.   Musculoskeletal:  Negative for joint swelling, myalgias and neck pain.   Neurological:  Positive for headaches.         Objective:      /60 (BP Location: Left arm, Patient Position: Sitting, Cuff Size: Standard)   Pulse 88   Temp 98.5 °F (36.9 °C) (Tympanic)   Resp 18   Ht 5' 2.5\" (1.588 m)   Wt 64 kg (141 lb 3.2 oz)   SpO2 99%   BMI 25.41 kg/m²          Physical Exam  Constitutional:  "      Appearance: Normal appearance.   Cardiovascular:      Rate and Rhythm: Normal rate and regular rhythm.      Pulses: Normal pulses.      Heart sounds: Normal heart sounds.   Pulmonary:      Effort: Pulmonary effort is normal.      Breath sounds: Normal breath sounds.   Abdominal:      General: Abdomen is flat.   Musculoskeletal:         General: Normal range of motion.   Neurological:      General: No focal deficit present.      Mental Status: She is alert and oriented to person, place, and time.           Robert Bryant MD

## 2024-08-27 ENCOUNTER — APPOINTMENT (OUTPATIENT)
Dept: LAB | Facility: AMBULARY SURGERY CENTER | Age: 44
End: 2024-08-27
Payer: COMMERCIAL

## 2024-08-27 ENCOUNTER — DOCUMENTATION (OUTPATIENT)
Dept: FAMILY MEDICINE CLINIC | Facility: CLINIC | Age: 44
End: 2024-08-27

## 2024-08-27 ENCOUNTER — TELEPHONE (OUTPATIENT)
Dept: FAMILY MEDICINE CLINIC | Facility: CLINIC | Age: 44
End: 2024-08-27

## 2024-08-27 ENCOUNTER — HOSPITAL ENCOUNTER (OUTPATIENT)
Dept: RADIOLOGY | Facility: HOSPITAL | Age: 44
Discharge: HOME | End: 2024-08-27
Payer: COMMERCIAL

## 2024-08-27 DIAGNOSIS — Z98.84 BARIATRIC SURGERY STATUS: ICD-10-CM

## 2024-08-27 DIAGNOSIS — E16.2 HYPOGLYCEMIA: ICD-10-CM

## 2024-08-27 DIAGNOSIS — R50.81 FEVER IN OTHER DISEASES: Primary | ICD-10-CM

## 2024-08-27 DIAGNOSIS — R50.81 FEVER IN OTHER DISEASES: ICD-10-CM

## 2024-08-27 DIAGNOSIS — R53.82 CHRONIC FATIGUE: ICD-10-CM

## 2024-08-27 LAB
B BURGDOR IGG+IGM SER QL IA: NEGATIVE
INSULIN SERPL-ACNC: 6.84 UIU/ML
VIT B12 SERPL-MCNC: 2248 PG/ML (ref 180–914)

## 2024-08-27 PROCEDURE — 84681 ASSAY OF C-PEPTIDE: CPT

## 2024-08-27 PROCEDURE — 82607 VITAMIN B-12: CPT

## 2024-08-27 PROCEDURE — 87077 CULTURE AEROBIC IDENTIFY: CPT

## 2024-08-27 PROCEDURE — 84590 ASSAY OF VITAMIN A: CPT

## 2024-08-27 PROCEDURE — 87086 URINE CULTURE/COLONY COUNT: CPT

## 2024-08-27 PROCEDURE — 84207 ASSAY OF VITAMIN B-6: CPT

## 2024-08-27 PROCEDURE — 83525 ASSAY OF INSULIN: CPT

## 2024-08-27 PROCEDURE — 84630 ASSAY OF ZINC: CPT

## 2024-08-27 PROCEDURE — 84425 ASSAY OF VITAMIN B-1: CPT

## 2024-08-27 PROCEDURE — 71046 X-RAY EXAM CHEST 2 VIEWS: CPT

## 2024-08-27 PROCEDURE — 87186 SC STD MICRODIL/AGAR DIL: CPT

## 2024-08-27 PROCEDURE — 36415 COLL VENOUS BLD VENIPUNCTURE: CPT

## 2024-08-27 RX ORDER — BLOOD-GLUCOSE METER
KIT MISCELLANEOUS
Qty: 1 KIT | Refills: 0 | Status: SHIPPED | OUTPATIENT
Start: 2024-08-27

## 2024-08-27 RX ORDER — LANCETS 33 GAUGE
EACH MISCELLANEOUS
Qty: 200 EACH | Refills: 3 | Status: SHIPPED | OUTPATIENT
Start: 2024-08-27

## 2024-08-27 RX ORDER — BLOOD SUGAR DIAGNOSTIC
STRIP MISCELLANEOUS
Qty: 200 EACH | Refills: 3 | Status: SHIPPED | OUTPATIENT
Start: 2024-08-27

## 2024-08-27 NOTE — TELEPHONE ENCOUNTER
Lab Result: Glucose: 31   Date/Time Drawn: 8/26/2024 / 15:15   Ordering Provider: Robert Bryant   Lab Personnel's Name: Kenzie       Read back to the lab as documented above     [x]     Secure Chat message sent to on-call provider  [x]     Provider confirmed receipt of message     []

## 2024-08-27 NOTE — PROGRESS NOTES
Patient called with results, had seen on epic already, feeling okay but very tired has already eaten now in bed, will review all labs and make further plan later today.

## 2024-08-27 NOTE — TELEPHONE ENCOUNTER
Patient called due lab results to fevers, and crystals on urine check ct abd, check Insulin for hypoglycemia previous work up negative.    Message left for patient to call back.    Patient will discuss further at upcoming appointment and consider endocrine referral.

## 2024-08-27 NOTE — TELEPHONE ENCOUNTER
Patient calling back from message left for her by Dr. Brown. Patient asking what the plan is moving forward. Warm transfer to office for further directions

## 2024-08-28 ENCOUNTER — TELEPHONE (OUTPATIENT)
Dept: FAMILY MEDICINE CLINIC | Facility: CLINIC | Age: 44
End: 2024-08-28

## 2024-08-28 DIAGNOSIS — N39.0 URINARY TRACT INFECTION WITHOUT HEMATURIA, SITE UNSPECIFIED: Primary | ICD-10-CM

## 2024-08-28 LAB — C PEPTIDE SERPL-MCNC: 3.2 NG/ML (ref 1.1–4.4)

## 2024-08-28 RX ORDER — SULFAMETHOXAZOLE/TRIMETHOPRIM 800-160 MG
1 TABLET ORAL EVERY 12 HOURS SCHEDULED
Qty: 14 TABLET | Refills: 0 | Status: SHIPPED | OUTPATIENT
Start: 2024-08-28 | End: 2024-09-04

## 2024-08-28 NOTE — TELEPHONE ENCOUNTER
"I called and spoke to her. She expressed understanding the message and said: \"Thanks!\"----- Message from Robert Bryant MD sent at 8/28/2024 11:18 AM EDT -----  Please call and let patient know the urine did show an infection was we sent antibiotics to the pharmacy  "

## 2024-08-29 LAB
BACTERIA UR CULT: ABNORMAL
EBV EA IGG SER QL IA: POSITIVE
EBV NA IGG SER QL IA: POSITIVE
EBV VCA IGG SER QL IA: POSITIVE
EBV VCA IGM SER QL IA: NEGATIVE
ZINC SERPL-MCNC: 57 UG/DL (ref 44–115)

## 2024-08-30 LAB — VIT B6 SERPL-MCNC: 8.2 UG/L (ref 3.4–65.2)

## 2024-09-01 LAB — VIT B1 BLD-SCNC: 104.2 NMOL/L (ref 66.5–200)

## 2024-09-03 ENCOUNTER — PATIENT MESSAGE (OUTPATIENT)
Dept: FAMILY MEDICINE CLINIC | Facility: CLINIC | Age: 44
End: 2024-09-03

## 2024-09-03 LAB — VIT A SERPL-MCNC: 32.4 UG/DL (ref 20.1–62)

## 2024-09-06 ENCOUNTER — APPOINTMENT (OUTPATIENT)
Dept: LAB | Facility: MEDICAL CENTER | Age: 44
End: 2024-09-06
Payer: COMMERCIAL

## 2024-09-06 ENCOUNTER — OFFICE VISIT (OUTPATIENT)
Dept: FAMILY MEDICINE CLINIC | Facility: CLINIC | Age: 44
End: 2024-09-06
Payer: COMMERCIAL

## 2024-09-06 VITALS
HEART RATE: 80 BPM | HEIGHT: 63 IN | SYSTOLIC BLOOD PRESSURE: 116 MMHG | BODY MASS INDEX: 25.16 KG/M2 | OXYGEN SATURATION: 98 % | DIASTOLIC BLOOD PRESSURE: 82 MMHG | RESPIRATION RATE: 18 BRPM | TEMPERATURE: 99.4 F | WEIGHT: 142 LBS

## 2024-09-06 DIAGNOSIS — R50.9 FEVER OF UNKNOWN ORIGIN: Primary | ICD-10-CM

## 2024-09-06 DIAGNOSIS — R50.9 FEVER OF UNKNOWN ORIGIN: ICD-10-CM

## 2024-09-06 LAB
ANA SER QL IA: NEGATIVE
BASOPHILS # BLD AUTO: 0.03 THOUSANDS/ÂΜL (ref 0–0.1)
BASOPHILS NFR BLD AUTO: 0 % (ref 0–1)
CRP SERPL QL: <1 MG/L
EOSINOPHIL # BLD AUTO: 0.03 THOUSAND/ÂΜL (ref 0–0.61)
EOSINOPHIL NFR BLD AUTO: 0 % (ref 0–6)
ERYTHROCYTE [DISTWIDTH] IN BLOOD BY AUTOMATED COUNT: 12.4 % (ref 11.6–15.1)
ERYTHROCYTE [SEDIMENTATION RATE] IN BLOOD: <1 MM/HOUR (ref 0–19)
HCT VFR BLD AUTO: 42.8 % (ref 34.8–46.1)
HGB BLD-MCNC: 14.2 G/DL (ref 11.5–15.4)
IMM GRANULOCYTES # BLD AUTO: 0.02 THOUSAND/UL (ref 0–0.2)
IMM GRANULOCYTES NFR BLD AUTO: 0 % (ref 0–2)
LYMPHOCYTES # BLD AUTO: 1.62 THOUSANDS/ÂΜL (ref 0.6–4.47)
LYMPHOCYTES NFR BLD AUTO: 21 % (ref 14–44)
MCH RBC QN AUTO: 31.8 PG (ref 26.8–34.3)
MCHC RBC AUTO-ENTMCNC: 33.2 G/DL (ref 31.4–37.4)
MCV RBC AUTO: 96 FL (ref 82–98)
MONOCYTES # BLD AUTO: 0.55 THOUSAND/ÂΜL (ref 0.17–1.22)
MONOCYTES NFR BLD AUTO: 7 % (ref 4–12)
NEUTROPHILS # BLD AUTO: 5.35 THOUSANDS/ÂΜL (ref 1.85–7.62)
NEUTS SEG NFR BLD AUTO: 72 % (ref 43–75)
NRBC BLD AUTO-RTO: 0 /100 WBCS
PLATELET # BLD AUTO: 220 THOUSANDS/UL (ref 149–390)
PMV BLD AUTO: 10.5 FL (ref 8.9–12.7)
PROCALCITONIN SERPL-MCNC: <0.05 NG/ML
RBC # BLD AUTO: 4.46 MILLION/UL (ref 3.81–5.12)
SL AMB  POCT GLUCOSE, UA: NORMAL
SL AMB LEUKOCYTE ESTERASE,UA: NORMAL
SL AMB POCT BILIRUBIN,UA: NORMAL
SL AMB POCT BLOOD,UA: NORMAL
SL AMB POCT CLARITY,UA: CLEAR
SL AMB POCT COLOR,UA: YELLOW
SL AMB POCT KETONES,UA: NORMAL
SL AMB POCT NITRITE,UA: NORMAL
SL AMB POCT PH,UA: 7
SL AMB POCT SPECIFIC GRAVITY,UA: 1.01
SL AMB POCT URINE PROTEIN: 15
SL AMB POCT UROBILINOGEN: NORMAL
WBC # BLD AUTO: 7.6 THOUSAND/UL (ref 4.31–10.16)

## 2024-09-06 PROCEDURE — 81002 URINALYSIS NONAUTO W/O SCOPE: CPT | Performed by: INTERNAL MEDICINE

## 2024-09-06 PROCEDURE — 99214 OFFICE O/P EST MOD 30 MIN: CPT | Performed by: INTERNAL MEDICINE

## 2024-09-06 PROCEDURE — 86038 ANTINUCLEAR ANTIBODIES: CPT

## 2024-09-06 PROCEDURE — 86140 C-REACTIVE PROTEIN: CPT

## 2024-09-06 PROCEDURE — 85652 RBC SED RATE AUTOMATED: CPT

## 2024-09-06 PROCEDURE — 85025 COMPLETE CBC W/AUTO DIFF WBC: CPT

## 2024-09-06 PROCEDURE — 36415 COLL VENOUS BLD VENIPUNCTURE: CPT

## 2024-09-06 PROCEDURE — 84145 PROCALCITONIN (PCT): CPT

## 2024-09-06 NOTE — ASSESSMENT & PLAN NOTE
Previous blood work was unrevealing except of slightly elevated white blood cell count.  Will add ESR, CRP, HIRAM, procalcitonin.  Will also do CT scan of her chest, CT scan of the abdomen and pelvis is already ordered.  She will continue with as needed Tylenol for now.  She was treated for Klebsiella UTI with no improvement of her fevers.  Denies any urinary complaints right now.  Further management after results of her imaging test and her blood work.

## 2024-09-06 NOTE — PROGRESS NOTES
Assessment/Plan:    Fever of unknown origin  Previous blood work was unrevealing except of slightly elevated white blood cell count.  Will add ESR, CRP, HIRAM, procalcitonin.  Will also do CT scan of her chest, CT scan of the abdomen and pelvis is already ordered.  She will continue with as needed Tylenol for now.  She was treated for Klebsiella UTI with no improvement of her fevers.  Denies any urinary complaints right now.  Further management after results of her imaging test and her blood work.       Diagnoses and all orders for this visit:    Fever of unknown origin  -     CBC and differential; Future  -     Sedimentation rate, automated; Future  -     C-reactive protein; Future  -     Procalcitonin; Future  -     HIRAM Screen w/ Reflex to Titer/Pattern; Future  -     CT chest wo contrast; Future  -     Quantiferon TB Gold Plus Assay; Future  -     POCT urine dip          Subjective:      Patient ID: Tram Perez is a 43 y.o. female.    Patient came today for follow-up on her current problem of unresolving fevers that she has already for about 4 weeks.  She had blood work and x-ray of the chest which was overall negative.  Urine sample showed Klebsiella, she was treated for that with no improvement of her fevers.    Fever  Associated symptoms include fatigue and headaches. Pertinent negatives include no arthralgias, chest pain or rash.   Fatigue  Associated symptoms include fatigue and headaches. Pertinent negatives include no arthralgias, chest pain or rash.   Headache      The following portions of the patient's history were reviewed and updated as appropriate: allergies, current medications, past family history, past medical history, past social history, past surgical history, and problem list.    Review of Systems   Constitutional:  Positive for fatigue.   Respiratory:  Negative for choking, chest tightness, shortness of breath and wheezing.    Cardiovascular:  Negative for chest pain, palpitations and leg  "swelling.   Genitourinary:  Negative for decreased urine volume, dysuria, hematuria, urgency and vaginal pain.   Musculoskeletal:  Negative for arthralgias and back pain.   Skin:  Negative for color change and rash.   Neurological:  Positive for headaches.         Objective:      /82 (BP Location: Left arm, Patient Position: Sitting, Cuff Size: Standard)   Pulse 80   Temp 99.4 °F (37.4 °C) (Temporal)   Resp 18   Ht 5' 2.5\" (1.588 m)   Wt 64.4 kg (142 lb)   SpO2 98%   BMI 25.56 kg/m²     Allergies   Allergen Reactions    Aspirin Anaphylaxis    Bee Venom Swelling and Wheezing    Penicillins Anaphylaxis and Rash    Bee Pollen     Ceclor [Cefaclor] Itching    Dust Mite Extract Other (See Comments)     congestion    Grass Pollen(K-O-R-T-Swt [place]) Other (See Comments)     congestion    Lactose - Food Allergy GI Intolerance    Other Other (See Comments)     congestion    Pollen Extract     Amoxicillin Rash    Doxycycline Rash    Erythromycin Rash    Nickel Rash          Current Outpatient Medications:     albuterol (PROVENTIL HFA,VENTOLIN HFA) 90 mcg/act inhaler, 2 PUFFS UP TO 4 TIMES A DAY AS NEEDED FOR WHEEZING, Disp: 18 g, Rfl: 1    BD Integra Syringe 25G X 1\" 3 ML MISC, DRAW UP 1 ML OF TORADOL AND INJECT INTO MUSCLE., Disp: , Rfl:     Biotin 1000 MCG tablet, Take 10,000 mcg by mouth, Disp: , Rfl:     Blood Glucose Monitoring Suppl (OneTouch Verio Reflect) w/Device KIT, Check blood sugars twice daily. Please substitute with appropriate alternative as covered by patient's insurance. Dx: E11.65, Disp: 1 kit, Rfl: 0    Blood Glucose Monitoring Suppl (OneTouch Verio Reflect) w/Device KIT, Check blood sugars twice daily. Please substitute with appropriate alternative as covered by patient's insurance. Dx: E11.65, Disp: 1 kit, Rfl: 0    Botulinum Toxin Type A 200 units SOLR, Inject 155 units into face and neck IM every 90 days, Disp: , Rfl:     Continuous Glucose Sensor (FreeStyle Larry 14 Day Sensor) MISC, Use 1 " sensor q 14 days to check sugar levels, Disp: 1 each, Rfl: 11    cyanocobalamin 1,000 mcg/mL, Inject 1 mL (1,000 mcg total) into a muscle every 30 (thirty) days, Disp: 12 mL, Rfl: 0    Docusate Sodium 100 MG capsule, Take 100 mg by mouth 2 (two) times a day, Disp: , Rfl:     DULoxetine (CYMBALTA) 60 mg delayed release capsule, Take 1 capsule (60 mg total) by mouth daily, Disp: 30 capsule, Rfl: 2    ERGOCALCIFEROL PO, Take 2,400 mcg by mouth, Disp: , Rfl:     FERROUS SULFATE-VITAMIN C PO, Take 1 tablet once daily, Disp: , Rfl:     glucose blood (OneTouch Verio) test strip, Check blood sugars twice daily. Please substitute with appropriate alternative as covered by patient's insurance. Dx: E11.65, Disp: 200 each, Rfl: 3    glucose blood (OneTouch Verio) test strip, Check blood sugars twice daily. Please substitute with appropriate alternative as covered by patient's insurance. Dx: E11.65, Disp: 200 each, Rfl: 3    ketorolac (TORADOL) 30 mg/mL injection, INFUSE 1 ML (30 MG TOTAL) INTO A VENOUS CATHETER EVERY 12 (TWELVE) HOURS., Disp: , Rfl:     lactulose (CHRONULAC) 10 g/15 mL solution, , Disp: , Rfl:     loratadine (CLARITIN) 10 mg tablet, Take 1 tablet (10 mg total) by mouth daily, Disp:  , Rfl:     LORazepam (ATIVAN) 0.5 mg tablet, Take 0.5 mg by mouth 2 (two) times a day as needed for anxiety , Disp: , Rfl:     Magnesium 500 MG CAPS, Take 400 mg by mouth, Disp: , Rfl:     memantine (NAMENDA) 10 mg tablet, TAKE 1 TABLET ONLY ( 10 MG) BY MOUTH 2 TIMES A DAY.**05 MG NOT COVERED, Disp: 180 tablet, Rfl: 5    methylphenidate (RITALIN) 10 mg tablet, Take 10 mg by mouth daily as needed  , Disp: , Rfl: 0    mometasone (ELOCON) 0.1 % lotion, APPLY TO AFFECTED AREA TOPICALLY EVERY DAY, Disp: 60 mL, Rfl: 0    Multiple Vitamin (MULTIVITAMIN ADULT PO), Take 1 tablet by mouth every morning, Disp: , Rfl:     nystatin (MYCOSTATIN) powder, Apply topically 2 (two) times a day, Disp: 60 g, Rfl: 0    OneTouch Delica Lancets 33G MISC,  "Check blood sugars twice daily. Please substitute with appropriate alternative as covered by patient's insurance. Dx: E11.65, Disp: 200 each, Rfl: 3    OneTouch Delica Lancets 33G MISC, Check blood sugars twice daily. Please substitute with appropriate alternative as covered by patient's insurance. Dx: E11.65, Disp: 200 each, Rfl: 3    pantoprazole (PROTONIX) 40 mg tablet, TAKE 1 TABLET BY MOUTH TWICE A DAY 30 MINUTES BEFORE BREAKFAST & DINNER, Disp: , Rfl:     pregabalin (Lyrica) 150 mg capsule, Take 1 capsule (150 mg total) by mouth 3 (three) times a day, Disp: 90 capsule, Rfl: 1    RIBOFLAVIN PO, Take 400 mg by mouth, Disp: , Rfl:     rizatriptan (MAXALT) 10 MG tablet, TAKE 1 TABLET (10 MG TOTAL) BY MOUTH ONCE AS NEEDED FOR MIGRAINE, Disp: 9 tablet, Rfl: 1    SUMAtriptan Succinate 6 MG/0.5ML SOAJ, Inject 0.5 mL (6 mg total) under the skin as needed (headache), Disp: 9 Cartridge, Rfl: 1    Syringe/Needle, Disp, (SYRINGE 3CC/67RQ8-8/2\") 25G X 1-1/2\" 3 ML MISC, Use every 30 (thirty) days Draw up and inject 1ml every 30 days., Disp: 3 each, Rfl: 0    traZODone (DESYREL) 100 mg tablet, , Disp: , Rfl:     triamcinolone (KENALOG) 0.1 % cream, Apply 1 application topically 2 (two) times a day To affected area, Disp: 30 g, Rfl: 0    benzonatate (TESSALON) 200 MG capsule, Take 1 capsule (200 mg total) by mouth 3 (three) times a day as needed for cough (Patient not taking: Reported on 5/1/2023), Disp: 20 capsule, Rfl: 0    cyanocobalamin (VITAMIN B-12) 100 mcg tablet, , Disp: , Rfl:     cyanocobalamin (VITAMIN B-12) 1000 MCG tablet, Take 1,000 mcg by mouth (Patient not taking: Reported on 7/17/2023), Disp: , Rfl:     DULoxetine (CYMBALTA) 30 mg delayed release capsule, Take 30 mg by mouth daily (Patient not taking: Reported on 9/6/2024), Disp: , Rfl:     EPINEPHrine (EPIPEN) 0.3 mg/0.3 mL SOAJ, Inject 0.3 mL (0.3 mg total) into a muscle once for 1 dose, Disp: 0.6 mL, Rfl: 0    methocarbamol (ROBAXIN) 500 mg tablet, Take 1 " tablet (500 mg total) by mouth 3 (three) times a day as needed for muscle spasms (Patient not taking: Reported on 9/6/2024), Disp: 90 tablet, Rfl: 0    methocarbamol (ROBAXIN) 500 mg tablet, Take 1 tablet (500 mg total) by mouth 4 (four) times a day (Patient not taking: Reported on 9/6/2024), Disp: 30 tablet, Rfl: 1    methylphenidate (RITALIN) 20 MG tablet, Take 20 mg by mouth 2 (two) times a day Pt reports taking tid  (Patient not taking: Reported on 8/26/2024), Disp: , Rfl: 0    naltrexone (REVIA) 50 mg tablet, , Disp: , Rfl:     sucralfate (CARAFATE) 1 g tablet, , Disp: , Rfl:     Current Facility-Administered Medications:     cyanocobalamin injection 1,000 mcg, 1,000 mcg, Intramuscular, Q30 Days, Ronny Brown MD, 1,000 mcg at 06/23/23 1524     There are no Patient Instructions on file for this visit.        Physical Exam  Constitutional:       General: She is not in acute distress.     Appearance: She is not ill-appearing or toxic-appearing.   Cardiovascular:      Heart sounds: No murmur heard.     No gallop.   Pulmonary:      Effort: No respiratory distress.      Breath sounds: No wheezing or rales.   Psychiatric:         Mood and Affect: Mood normal.         Behavior: Behavior normal.

## 2024-09-09 ENCOUNTER — APPOINTMENT (OUTPATIENT)
Dept: LAB | Facility: CLINIC | Age: 44
End: 2024-09-09
Payer: COMMERCIAL

## 2024-09-09 PROCEDURE — 36415 COLL VENOUS BLD VENIPUNCTURE: CPT

## 2024-09-09 PROCEDURE — 86480 TB TEST CELL IMMUN MEASURE: CPT

## 2024-09-10 LAB
GAMMA INTERFERON BACKGROUND BLD IA-ACNC: <0 IU/ML
M TB IFN-G BLD-IMP: NEGATIVE
M TB IFN-G CD4+ BCKGRND COR BLD-ACNC: 0.09 IU/ML
M TB IFN-G CD4+ BCKGRND COR BLD-ACNC: 0.1 IU/ML
MITOGEN IGNF BCKGRD COR BLD-ACNC: 10 IU/ML

## 2024-09-13 ENCOUNTER — HOSPITAL ENCOUNTER (OUTPATIENT)
Dept: CT IMAGING | Facility: HOSPITAL | Age: 44
Discharge: HOME | End: 2024-09-13
Attending: FAMILY MEDICINE

## 2024-09-17 ENCOUNTER — HOSPITAL ENCOUNTER (EMERGENCY)
Facility: HOSPITAL | Age: 44
Discharge: HOME | End: 2024-09-17
Attending: EMERGENCY MEDICINE | Admitting: EMERGENCY MEDICINE
Payer: COMMERCIAL

## 2024-09-17 ENCOUNTER — TELEPHONE (OUTPATIENT)
Dept: FAMILY MEDICINE CLINIC | Facility: CLINIC | Age: 44
End: 2024-09-17

## 2024-09-17 ENCOUNTER — APPOINTMENT (EMERGENCY)
Dept: RADIOLOGY | Facility: HOSPITAL | Age: 44
End: 2024-09-17
Payer: COMMERCIAL

## 2024-09-17 VITALS
RESPIRATION RATE: 18 BRPM | DIASTOLIC BLOOD PRESSURE: 68 MMHG | HEART RATE: 58 BPM | TEMPERATURE: 98.5 F | SYSTOLIC BLOOD PRESSURE: 132 MMHG | OXYGEN SATURATION: 99 %

## 2024-09-17 DIAGNOSIS — R50.9 FEVER OF UNKNOWN ORIGIN: Primary | ICD-10-CM

## 2024-09-17 LAB
ALBUMIN SERPL BCG-MCNC: 4.6 G/DL (ref 3.5–5)
ALP SERPL-CCNC: 85 U/L (ref 34–104)
ALT SERPL W P-5'-P-CCNC: 19 U/L (ref 7–52)
ANION GAP SERPL CALCULATED.3IONS-SCNC: 8 MMOL/L (ref 4–13)
AST SERPL W P-5'-P-CCNC: 16 U/L (ref 13–39)
BASOPHILS # BLD AUTO: 0.03 THOUSANDS/ΜL (ref 0–0.1)
BASOPHILS NFR BLD AUTO: 0 % (ref 0–1)
BILIRUB SERPL-MCNC: 0.66 MG/DL (ref 0.2–1)
BILIRUB UR QL STRIP: NEGATIVE
BUN SERPL-MCNC: 19 MG/DL (ref 5–25)
CALCIUM SERPL-MCNC: 9.3 MG/DL (ref 8.4–10.2)
CHLORIDE SERPL-SCNC: 105 MMOL/L (ref 96–108)
CLARITY UR: CLEAR
CO2 SERPL-SCNC: 26 MMOL/L (ref 21–32)
COLOR UR: YELLOW
CREAT SERPL-MCNC: 0.63 MG/DL (ref 0.6–1.3)
EOSINOPHIL # BLD AUTO: 0.03 THOUSAND/ΜL (ref 0–0.61)
EOSINOPHIL NFR BLD AUTO: 0 % (ref 0–6)
ERYTHROCYTE [DISTWIDTH] IN BLOOD BY AUTOMATED COUNT: 12.2 % (ref 11.6–15.1)
FLUAV AG UPPER RESP QL IA.RAPID: NEGATIVE
FLUBV AG UPPER RESP QL IA.RAPID: NEGATIVE
GFR SERPL CREATININE-BSD FRML MDRD: 107 ML/MIN/1.73SQ M
GLUCOSE SERPL-MCNC: 87 MG/DL (ref 65–140)
GLUCOSE UR STRIP-MCNC: NEGATIVE MG/DL
HCT VFR BLD AUTO: 43.3 % (ref 34.8–46.1)
HGB BLD-MCNC: 14.5 G/DL (ref 11.5–15.4)
HGB UR QL STRIP.AUTO: NEGATIVE
IMM GRANULOCYTES # BLD AUTO: 0.03 THOUSAND/UL (ref 0–0.2)
IMM GRANULOCYTES NFR BLD AUTO: 0 % (ref 0–2)
KETONES UR STRIP-MCNC: ABNORMAL MG/DL
LEUKOCYTE ESTERASE UR QL STRIP: NEGATIVE
LYMPHOCYTES # BLD AUTO: 2.8 THOUSANDS/ΜL (ref 0.6–4.47)
LYMPHOCYTES NFR BLD AUTO: 26 % (ref 14–44)
MCH RBC QN AUTO: 31.8 PG (ref 26.8–34.3)
MCHC RBC AUTO-ENTMCNC: 33.5 G/DL (ref 31.4–37.4)
MCV RBC AUTO: 95 FL (ref 82–98)
MONOCYTES # BLD AUTO: 0.74 THOUSAND/ΜL (ref 0.17–1.22)
MONOCYTES NFR BLD AUTO: 7 % (ref 4–12)
NEUTROPHILS # BLD AUTO: 7.33 THOUSANDS/ΜL (ref 1.85–7.62)
NEUTS SEG NFR BLD AUTO: 67 % (ref 43–75)
NITRITE UR QL STRIP: NEGATIVE
NRBC BLD AUTO-RTO: 0 /100 WBCS
PH UR STRIP.AUTO: 6.5 [PH]
PLATELET # BLD AUTO: 246 THOUSANDS/UL (ref 149–390)
PMV BLD AUTO: 9.9 FL (ref 8.9–12.7)
POTASSIUM SERPL-SCNC: 3.8 MMOL/L (ref 3.5–5.3)
PROT SERPL-MCNC: 6.9 G/DL (ref 6.4–8.4)
PROT UR STRIP-MCNC: NEGATIVE MG/DL
RBC # BLD AUTO: 4.56 MILLION/UL (ref 3.81–5.12)
SARS-COV+SARS-COV-2 AG RESP QL IA.RAPID: NEGATIVE
SODIUM SERPL-SCNC: 139 MMOL/L (ref 135–147)
SP GR UR STRIP.AUTO: 1.02 (ref 1–1.03)
UROBILINOGEN UR STRIP-ACNC: <2 MG/DL
WBC # BLD AUTO: 10.96 THOUSAND/UL (ref 4.31–10.16)

## 2024-09-17 PROCEDURE — 99284 EMERGENCY DEPT VISIT MOD MDM: CPT

## 2024-09-17 PROCEDURE — 87804 INFLUENZA ASSAY W/OPTIC: CPT

## 2024-09-17 PROCEDURE — 99284 EMERGENCY DEPT VISIT MOD MDM: CPT | Performed by: EMERGENCY MEDICINE

## 2024-09-17 PROCEDURE — 74177 CT ABD & PELVIS W/CONTRAST: CPT

## 2024-09-17 PROCEDURE — 81003 URINALYSIS AUTO W/O SCOPE: CPT

## 2024-09-17 PROCEDURE — 80053 COMPREHEN METABOLIC PANEL: CPT

## 2024-09-17 PROCEDURE — 71260 CT THORAX DX C+: CPT

## 2024-09-17 PROCEDURE — 36415 COLL VENOUS BLD VENIPUNCTURE: CPT

## 2024-09-17 PROCEDURE — 85025 COMPLETE CBC W/AUTO DIFF WBC: CPT

## 2024-09-17 PROCEDURE — 87811 SARS-COV-2 COVID19 W/OPTIC: CPT

## 2024-09-17 RX ADMIN — IOHEXOL 100 ML: 350 INJECTION, SOLUTION INTRAVENOUS at 13:32

## 2024-09-17 RX ADMIN — IOHEXOL 50 ML: 240 INJECTION, SOLUTION INTRATHECAL; INTRAVASCULAR; INTRAVENOUS; ORAL at 11:47

## 2024-09-17 NOTE — ED PROVIDER NOTES
1. Fever of unknown origin      ED Disposition       ED Disposition   Discharge    Condition   Stable    Date/Time   Tue Sep 17, 2024  2:43 PM    Comment   Tram Hernándezefer discharge to home/self care.                   Assessment & Plan       Medical Decision Making  Patient is a 44-year-old female presenting for evaluation of 6 weeks of fever    Patient has had extensive outpatient workup per chart review including TB testing EBV Lyme inflammatory markers HIRAM RF metabolic panel CBC all come back negative.  EBV positive for IgG but no IgM no active infection.  Doubt rheumatologic.  No recent travel to suggest parasitic mosquito borne or tickborne illness.  Doubt malignancy.  Unclear as to etiology of patient's fevers.  She is currently afebrile in the emergency department today.  Otherwise has benign physical exam.  Was due to have CT chest abdomen pelvis performed outpatient but she was unable to go to this scheduled imaging study secondary to malaise and generalized weakness, will perform in the emergency department today since she is here.  Will also check basic lab work swab for COVID flu    Labs unremarkable.  CT imaging negative for any acute pathology.  Incidental findings discussed with patient.  She is hemodynamically stable and cleared for discharge with outpatient follow-up with her PCP.  Return precautions given patient verbalized understanding    Discussed findings with pt not related to their chief complaint or primary disease process and advised outpatient follow up with their PCP for further evaluation and management. Pt is aware of findings and verbalized understanding      Amount and/or Complexity of Data Reviewed  Labs: ordered.  Radiology: ordered.    Risk  Prescription drug management.                       Medications   iohexol (OMNIPAQUE) 240 MG/ML solution 50 mL (50 mL Oral Given 9/17/24 1147)   iohexol (OMNIPAQUE) 350 MG/ML injection (MULTI-DOSE) 100 mL (100 mL Intravenous Given 9/17/24 1332)  "      History of Present Illness       Chief Complaint   Patient presents with    Fever     Fevers for 6 weeks, unable to get rid of them with abx. Denies any other illnesses. \"Temps have been 99, that is a fever for me\" taking tylenol 3x a day  States she has also been hypoglycemic, currently 100 in her freestyle, had bariatric surgery during COVID. Lost 13 lbs in a week this month       Past Medical History:   Diagnosis Date    Acid reflux     Anxiety     Asthma     Depression     Diabetes mellitus (HCC)     Pre    Diverticulitis     Epileptic seizures (HCC)     Medication related    Gestational diabetes mellitus     Hypertension     Lateral epicondylitis     Malignant tumor of cervix (HCC)     between [id number]    Obstructive sleep apnea     CPAP    Paresthesias     Plantar fasciitis     Rectocele     Tinea corporis          Patient is a 44-year-old female presenting for evaluation of intermittent fever x 6 weeks with associated malaise and generalized weakness/fatigue.  Patient cannot recall any specific inciting incident or event when her symptoms started which began approximately 6 weeks ago she states that since that time she has had intermittent fevers ranging from 99-1 03 at home has been taking Tylenol.  Has been followed up outpatient without any diagnosis to explain etiology of these fevers.  No recent travel.  Does endorse some recent weight loss that was unintentional.  I any other complaints at this time              Review of Systems   Constitutional:  Positive for fatigue, fever and unexpected weight change. Negative for chills.   HENT:  Negative for ear pain and sore throat.    Eyes:  Negative for pain and visual disturbance.   Respiratory:  Negative for cough and shortness of breath.    Cardiovascular:  Negative for chest pain and palpitations.   Gastrointestinal:  Negative for abdominal pain, nausea and vomiting.   Genitourinary:  Negative for dysuria, hematuria and vaginal bleeding. "   Musculoskeletal:  Negative for arthralgias, back pain, joint swelling, myalgias and neck stiffness.   Skin:  Negative for color change and rash.   Neurological:  Negative for seizures, syncope, numbness and headaches.   All other systems reviewed and are negative.          Objective     ED Triage Vitals [09/17/24 0930]   Temperature Pulse Blood Pressure Respirations SpO2 Patient Position - Orthostatic VS   98.5 °F (36.9 °C) 99 151/86 17 99 % --      Temp Source Heart Rate Source BP Location FiO2 (%) Pain Score    Tympanic Monitor -- -- 5        Physical Exam  Vitals and nursing note reviewed.   Constitutional:       General: She is not in acute distress.     Appearance: She is well-developed. She is not ill-appearing.   HENT:      Head: Normocephalic and atraumatic.      Mouth/Throat:      Mouth: Mucous membranes are moist.   Eyes:      Extraocular Movements: Extraocular movements intact.      Conjunctiva/sclera: Conjunctivae normal.   Cardiovascular:      Rate and Rhythm: Normal rate and regular rhythm.      Heart sounds: No murmur heard.  Pulmonary:      Effort: Pulmonary effort is normal. No respiratory distress.      Breath sounds: Normal breath sounds.   Abdominal:      Palpations: Abdomen is soft.      Tenderness: There is no abdominal tenderness.   Musculoskeletal:         General: No swelling. Normal range of motion.      Cervical back: Normal range of motion and neck supple.   Skin:     General: Skin is warm and dry.   Neurological:      General: No focal deficit present.      Mental Status: She is alert.           Results Reviewed       Procedure Component Value Units Date/Time    UA w Reflex to Microscopic w Reflex to Culture [[id number]]  (Abnormal) Collected: 09/17/24 1231    Lab Status: Final result Specimen: Urine, Clean Catch Updated: 09/17/24 1241     Color, UA Yellow     Clarity, UA Clear     Specific Gravity, UA 1.020     pH, UA 6.5     Leukocytes, UA Negative     Nitrite, UA Negative      Protein, UA Negative mg/dl      Glucose, UA Negative mg/dl      Ketones, UA 20 (1+) mg/dl      Urobilinogen, UA <2.0 mg/dl      Bilirubin, UA Negative     Occult Blood, UA Negative    Comprehensive metabolic panel [[id number]] Collected: 09/17/24 1147    Lab Status: Final result Specimen: Blood from Arm, Left Updated: 09/17/24 1231     Sodium 139 mmol/L      Potassium 3.8 mmol/L      Chloride 105 mmol/L      CO2 26 mmol/L      ANION GAP 8 mmol/L      BUN 19 mg/dL      Creatinine 0.63 mg/dL      Glucose 87 mg/dL      Calcium 9.3 mg/dL      AST 16 U/L      ALT 19 U/L      Alkaline Phosphatase 85 U/L      Total Protein 6.9 g/dL      Albumin 4.6 g/dL      Total Bilirubin 0.66 mg/dL      eGFR 107 ml/min/1.73sq m     Narrative:      National Kidney Disease Foundation guidelines for Chronic Kidney Disease (CKD):     Stage 1 with normal or high GFR (GFR > 90 mL/min/1.73 square meters)    Stage 2 Mild CKD (GFR = 60-89 mL/min/1.73 square meters)    Stage 3A Moderate CKD (GFR = 45-59 mL/min/1.73 square meters)    Stage 3B Moderate CKD (GFR = 30-44 mL/min/1.73 square meters)    Stage 4 Severe CKD (GFR = 15-29 mL/min/1.73 square meters)    Stage 5 End Stage CKD (GFR <15 mL/min/1.73 square meters)  Note: GFR calculation is accurate only with a steady state creatinine    FLU/COVID Rapid Antigen (30 min. TAT) - Preferred screening test in ED [[id number]]  (Normal) Collected: 09/17/24 1147    Lab Status: Final result Specimen: Nares from Nose Updated: 09/17/24 1214     SARS COV Rapid Antigen Negative     Influenza A Rapid Antigen Negative     Influenza B Rapid Antigen Negative    Narrative:      This test has been performed using the [employer] Emmie 2 FLU+SARS Antigen test under the Emergency Use Authorization (EUA). This test has been validated by the  and verified by the performing laboratory. The Emmie uses lateral flow immunofluorescent sandwich assay to detect SARS-COV, Influenza A and Influenza B Antigen.     The [employer]  Emmie 2 SARS Antigen test does not differentiate between SARS-CoV and SARS-CoV-2.     Negative results are presumptive and may be confirmed with a molecular assay, if necessary, for patient management. Negative results do not rule out SARS-CoV-2 or influenza infection and should not be used as the sole basis for treatment or patient management decisions. A negative test result may occur if the level of antigen in a sample is below the limit of detection of this test.     Positive results are indicative of the presence of viral antigens, but do not rule out bacterial infection or co-infection with other viruses.     All test results should be used as an adjunct to clinical observations and other information available to the provider.    FOR PEDIATRIC PATIENTS - copy/paste COVID Guidelines URL to browser: [email or web address]    CBC and differential [[id number]]  (Abnormal) Collected: 09/17/24 1147    Lab Status: Final result Specimen: Blood from Arm, Left Updated: 09/17/24 1159     WBC 10.96 Thousand/uL      RBC 4.56 Million/uL      Hemoglobin 14.5 g/dL      Hematocrit 43.3 %      MCV 95 fL      MCH 31.8 pg      MCHC 33.5 g/dL      RDW 12.2 %      MPV 9.9 fL      Platelets 246 Thousands/uL      nRBC 0 /100 WBCs      Segmented % 67 %      Immature Grans % 0 %      Lymphocytes % 26 %      Monocytes % 7 %      Eosinophils Relative 0 %      Basophils Relative 0 %      Absolute Neutrophils 7.33 Thousands/µL      Absolute Immature Grans 0.03 Thousand/uL      Absolute Lymphocytes 2.80 Thousands/µL      Absolute Monocytes 0.74 Thousand/µL      Eosinophils Absolute 0.03 Thousand/µL      Basophils Absolute 0.03 Thousands/µL             Labs Reviewed   CBC AND DIFFERENTIAL - Abnormal       Result Value    WBC 10.96 (*)     RBC 4.56      Hemoglobin 14.5      Hematocrit 43.3      MCV 95      MCH 31.8      MCHC 33.5      RDW 12.2      MPV 9.9      Platelets 246      nRBC 0       Segmented % 67      Immature Grans % 0      Lymphocytes % 26      Monocytes % 7      Eosinophils Relative 0      Basophils Relative 0      Absolute Neutrophils 7.33      Absolute Immature Grans 0.03      Absolute Lymphocytes 2.80      Absolute Monocytes 0.74      Eosinophils Absolute 0.03      Basophils Absolute 0.03     UA W REFLEX TO MICROSCOPIC WITH REFLEX TO CULTURE - Abnormal    Color, UA Yellow      Clarity, UA Clear      Specific Gravity, UA 1.020      pH, UA 6.5      Leukocytes, UA Negative      Nitrite, UA Negative      Protein, UA Negative      Glucose, UA Negative      Ketones, UA 20 (1+) (*)     Urobilinogen, UA <2.0      Bilirubin, UA Negative      Occult Blood, UA Negative     COVID-19/INFLUENZA A/B RAPID ANTIGEN (30 MIN.TAT) - Normal    SARS COV Rapid Antigen Negative      Influenza A Rapid Antigen Negative      Influenza B Rapid Antigen Negative      Narrative:     This test has been performed using the [employer] Emmie 2 FLU+SARS Antigen test under the Emergency Use Authorization (EUA). This test has been validated by the  and verified by the performing laboratory. The Emmie uses lateral flow immunofluorescent sandwich assay to detect SARS-COV, Influenza A and Influenza B Antigen.     The [employer] Emmie 2 SARS Antigen test does not differentiate between SARS-CoV and SARS-CoV-2.     Negative results are presumptive and may be confirmed with a molecular assay, if necessary, for patient management. Negative results do not rule out SARS-CoV-2 or influenza infection and should not be used as the sole basis for treatment or patient management decisions. A negative test result may occur if the level of antigen in a sample is below the limit of detection of this test.     Positive results are indicative of the presence of viral antigens, but do not rule out bacterial infection or co-infection with other viruses.     All test results should be used as an adjunct to clinical observations and other  information available to the provider.    FOR PEDIATRIC PATIENTS - copy/paste COVID Guidelines URL to browser: [email or web address]   COMPREHENSIVE METABOLIC PANEL    Sodium 139      Potassium 3.8      Chloride 105      CO2 26      ANION GAP 8      BUN 19      Creatinine 0.63      Glucose 87      Calcium 9.3      AST 16      ALT 19      Alkaline Phosphatase 85      Total Protein 6.9      Albumin 4.6      Total Bilirubin 0.66      eGFR 107      Narrative:     National Kidney Disease Foundation guidelines for Chronic Kidney Disease (CKD):     Stage 1 with normal or high GFR (GFR > 90 mL/min/1.73 square meters)    Stage 2 Mild CKD (GFR = 60-89 mL/min/1.73 square meters)    Stage 3A Moderate CKD (GFR = 45-59 mL/min/1.73 square meters)    Stage 3B Moderate CKD (GFR = 30-44 mL/min/1.73 square meters)    Stage 4 Severe CKD (GFR = 15-29 mL/min/1.73 square meters)    Stage 5 End Stage CKD (GFR <15 mL/min/1.73 square meters)  Note: GFR calculation is accurate only with a steady state creatinine     CT chest abdomen pelvis w contrast   Final Interpretation by Cole Sommer MD (09/17 0676)      No identifiable acute abnormality to account for the patient's clinical presentation.               Workstation performed: [id number]             Noe Kilpatrick,   09/18/24 0965

## 2024-09-17 NOTE — DISCHARGE INSTRUCTIONS
You were seen in the ER for fever of unknown origin. No abnormalities on CT scan see attached report below. Follow up with PCP. If symptoms worsen or persist return to the ER     CT CHEST, ABDOMEN AND PELVIS WITH IV CONTRAST     INDICATION: Fever of unknown origin.     COMPARISON: None.     TECHNIQUE: CT examination of the chest, abdomen and pelvis was performed. Multiplanar 2D reformatted images were created from the source data.     This examination, like all CT scans performed in the [facility], was performed utilizing techniques to minimize radiation dose exposure, including the use of iterative reconstruction and automated exposure control. Radiation dose length   product (DLP) for this visit: 637.89 mGy-cm     IV Contrast: 100 mL of iohexol (OMNIPAQUE) 50 mL of iohexol (OMNIPAQUE)  Enteric Contrast: Not administered.     FINDINGS:     CHEST     LUNGS: Mild linear scarring within the medial right middle lobe. No lobar consolidation or suspicious focal lesions.     PLEURA: Unremarkable.     HEART/GREAT VESSELS: Heart is unremarkable for patient's age. No thoracic aortic aneurysm.     MEDIASTINUM AND TAIWO: Residual thymic tissue is present, without clearly defined focal lesions. This is of uncertain clinical significance. No lymphadenopathy. The esophagus and central airways are grossly unremarkable.     CHEST WALL AND LOWER NECK: Unremarkable.     ABDOMEN     LIVER/BILIARY TREE: Unremarkable.     GALLBLADDER: No calcified gallstones. No pericholecystic inflammatory change.     SPLEEN: There are 2 tiny hypodensities within the inferior spleen, most likely representing benign cysts .     PANCREAS: Unremarkable.     ADRENAL GLANDS: Unremarkable.     KIDNEYS/URETERS: No hydronephrosis. No suspicious focal lesions. There is a well-circumscribed hypodensity within the lateral interpolar left kidney, too small for definitive characterization. While nonspecific, this appearance statistically favors    benign cyst(s). The left ureter is unremarkable.     STOMACH AND BOWEL: Prior gastric bypass surgery. There is no evidence of obstruction or inflammation involving the proximal or distal anastomosis. The distal small bowel loops and colon are unremarkable.     APPENDIX: No findings to suggest appendicitis.     ABDOMINOPELVIC CAVITY: No ascites. No pneumoperitoneum. No lymphadenopathy.     VESSELS: Unremarkable for patient's age.     PELVIS     REPRODUCTIVE ORGANS: Unremarkable for patient's age.     URINARY BLADDER: Unremarkable.     ABDOMINAL WALL/INGUINAL REGIONS: Unremarkable.     BONES: No acute fracture or suspicious osseous lesion.     IMPRESSION:     No identifiable acute abnormality to account for the patient's clinical presentation.

## 2024-09-18 ENCOUNTER — OFFICE VISIT (OUTPATIENT)
Dept: FAMILY MEDICINE CLINIC | Facility: CLINIC | Age: 44
End: 2024-09-18
Payer: COMMERCIAL

## 2024-09-18 VITALS
OXYGEN SATURATION: 99 % | HEART RATE: 75 BPM | SYSTOLIC BLOOD PRESSURE: 138 MMHG | RESPIRATION RATE: 16 BRPM | DIASTOLIC BLOOD PRESSURE: 82 MMHG | TEMPERATURE: 98.4 F | BODY MASS INDEX: 24.24 KG/M2 | HEIGHT: 63 IN | WEIGHT: 136.8 LBS

## 2024-09-18 DIAGNOSIS — R50.9 FEVER OF UNKNOWN ORIGIN (FUO): Primary | ICD-10-CM

## 2024-09-18 DIAGNOSIS — E16.2 HYPOGLYCEMIA: ICD-10-CM

## 2024-09-18 PROCEDURE — 99214 OFFICE O/P EST MOD 30 MIN: CPT | Performed by: INTERNAL MEDICINE

## 2024-09-18 RX ORDER — FLASH GLUCOSE SENSOR
KIT MISCELLANEOUS
Qty: 2 EACH | Refills: 3 | Status: SHIPPED | OUTPATIENT
Start: 2024-09-18

## 2024-09-18 NOTE — PROGRESS NOTES
Assessment/Plan:    Hypoglycemia  Continue with frequent meals, will continue to use larry, symptoms of hypoglycemia discussed.  Likely due to recent ongoing fevers.  She will keep lifesaver with her at all times.    Fever of unknown origin (FUO)  Imaging and blood work were negative for now except of minimal leukocytosis and positive IgG for Henry-Barr virus.  Will order 2 sets of blood cultures, she will also be referred for echo of the heart as well.  As for now continue with Tylenol 1000 mg 3 times daily as needed.       Diagnoses and all orders for this visit:    Fever of unknown origin (FUO)  -     Blood culture; Future  -     Echo complete w/ contrast if indicated; Future  -     Cortisol; Future  -     Anti-neutrophilic cytoplasmic antibody; Future  -     Blood culture; Future    Hypoglycemia  -     Continuous Glucose Sensor (FreeStyle Larry 14 Day Sensor) MISC; Use 1 sensor q 14 days to check sugar levels  -     Ambulatory referral to clinical pharmacy; Future          Subjective:      Patient ID: Tram Perez is a 44 y.o. female.    Patient came today for follow-up due to persistent fevers, she was recently in the emergency room, CT scan of the chest, abdomen and pelvis with contrast was done that did not reveal any source of her problem.  Extensive blood work was also done in the past with negative results.        The following portions of the patient's history were reviewed and updated as appropriate: allergies, current medications, past family history, past medical history, past social history, past surgical history, and problem list.    Review of Systems   Constitutional:  Positive for fatigue. Negative for chills and fever.   Respiratory:  Negative for cough, choking, chest tightness, shortness of breath and wheezing.    Cardiovascular:  Negative for chest pain, palpitations and leg swelling.   Genitourinary:  Negative for decreased urine volume, dysuria, hematuria, urgency and vaginal pain.  "  Musculoskeletal:  Negative for arthralgias and back pain.   Skin:  Negative for color change and rash.   Neurological:  Positive for headaches (Baseline).         Objective:      /82 (BP Location: Left arm, Patient Position: Sitting, Cuff Size: Large)   Pulse 75   Temp 98.4 °F (36.9 °C) (Temporal)   Resp 16   Ht 5' 2.5\" (1.588 m)   Wt 62.1 kg (136 lb 12.8 oz)   SpO2 99%   BMI 24.62 kg/m²     Allergies   Allergen Reactions    Aspirin Anaphylaxis    Bee Venom Swelling and Wheezing    Penicillins Anaphylaxis and Rash    Bee Pollen     Ceclor [Cefaclor] Itching    Dust Mite Extract Other (See Comments)     congestion    Grass Pollen(K-O-R-T-Swt Osito) Other (See Comments)     congestion    Lactose - Food Allergy GI Intolerance    Other Other (See Comments)     congestion    Pollen Extract     Amoxicillin Rash    Doxycycline Rash    Erythromycin Rash    Nickel Rash          Current Outpatient Medications:     albuterol (PROVENTIL HFA,VENTOLIN HFA) 90 mcg/act inhaler, 2 PUFFS UP TO 4 TIMES A DAY AS NEEDED FOR WHEEZING, Disp: 18 g, Rfl: 1    BD Integra Syringe 25G X 1\" 3 ML MISC, DRAW UP 1 ML OF TORADOL AND INJECT INTO MUSCLE., Disp: , Rfl:     Biotin 1000 MCG tablet, Take 10,000 mcg by mouth, Disp: , Rfl:     Blood Glucose Monitoring Suppl (OneTouch Verio Reflect) w/Device KIT, Check blood sugars twice daily. Please substitute with appropriate alternative as covered by patient's insurance. Dx: E11.65, Disp: 1 kit, Rfl: 0    Blood Glucose Monitoring Suppl (OneTouch Verio Reflect) w/Device KIT, Check blood sugars twice daily. Please substitute with appropriate alternative as covered by patient's insurance. Dx: E11.65, Disp: 1 kit, Rfl: 0    Botulinum Toxin Type A 200 units SOLR, Inject 155 units into face and neck IM every 90 days, Disp: , Rfl:     Continuous Glucose Sensor (FreeStyle Larry 14 Day Sensor) MISC, Use 1 sensor q 14 days to check sugar levels, Disp: 2 each, Rfl: 3    DULoxetine (CYMBALTA) 60 mg " delayed release capsule, Take 1 capsule (60 mg total) by mouth daily, Disp: 30 capsule, Rfl: 2    glucose blood (OneTouch Verio) test strip, Check blood sugars twice daily. Please substitute with appropriate alternative as covered by patient's insurance. Dx: E11.65, Disp: 200 each, Rfl: 3    glucose blood (OneTouch Verio) test strip, Check blood sugars twice daily. Please substitute with appropriate alternative as covered by patient's insurance. Dx: E11.65, Disp: 200 each, Rfl: 3    ketorolac (TORADOL) 30 mg/mL injection, INFUSE 1 ML (30 MG TOTAL) INTO A VENOUS CATHETER EVERY 12 (TWELVE) HOURS., Disp: , Rfl:     lactulose (CHRONULAC) 10 g/15 mL solution, , Disp: , Rfl:     LORazepam (ATIVAN) 0.5 mg tablet, Take 0.5 mg by mouth 2 (two) times a day as needed for anxiety , Disp: , Rfl:     methylphenidate (RITALIN) 10 mg tablet, Take 10 mg by mouth daily as needed  , Disp: , Rfl: 0    methylphenidate (RITALIN) 20 MG tablet, Take 20 mg by mouth 2 (two) times a day Pt reports taking tid, Disp: , Rfl: 0    nystatin (MYCOSTATIN) powder, Apply topically 2 (two) times a day, Disp: 60 g, Rfl: 0    OneTouch Delica Lancets 33G MISC, Check blood sugars twice daily. Please substitute with appropriate alternative as covered by patient's insurance. Dx: E11.65, Disp: 200 each, Rfl: 3    OneTouch Delica Lancets 33G MISC, Check blood sugars twice daily. Please substitute with appropriate alternative as covered by patient's insurance. Dx: E11.65, Disp: 200 each, Rfl: 3    pantoprazole (PROTONIX) 40 mg tablet, TAKE 1 TABLET BY MOUTH TWICE A DAY 30 MINUTES BEFORE BREAKFAST & DINNER, Disp: , Rfl:     pregabalin (Lyrica) 150 mg capsule, Take 1 capsule (150 mg total) by mouth 3 (three) times a day, Disp: 90 capsule, Rfl: 1    rizatriptan (MAXALT) 10 MG tablet, TAKE 1 TABLET (10 MG TOTAL) BY MOUTH ONCE AS NEEDED FOR MIGRAINE, Disp: 9 tablet, Rfl: 1    SUMAtriptan Succinate 6 MG/0.5ML SOAJ, Inject 0.5 mL (6 mg total) under the skin as needed  (headache), Disp: 9 Cartridge, Rfl: 1    traZODone (DESYREL) 100 mg tablet, , Disp: , Rfl:     EPINEPHrine (EPIPEN) 0.3 mg/0.3 mL SOAJ, Inject 0.3 mL (0.3 mg total) into a muscle once for 1 dose, Disp: 0.6 mL, Rfl: 0    Multiple Vitamin (MULTIVITAMIN ADULT PO), Take 1 tablet by mouth every morning (Patient not taking: Reported on 9/18/2024), Disp: , Rfl:     naltrexone (REVIA) 50 mg tablet, , Disp: , Rfl:     Current Facility-Administered Medications:     cyanocobalamin injection 1,000 mcg, 1,000 mcg, Intramuscular, Q30 Days, Ronny Brown MD, 1,000 mcg at 06/23/23 1524     There are no Patient Instructions on file for this visit.        Physical Exam  Constitutional:       General: She is not in acute distress.     Appearance: She is not ill-appearing or toxic-appearing.   Cardiovascular:      Rate and Rhythm: Normal rate.      Heart sounds: No murmur heard.     No gallop.   Pulmonary:      Effort: No respiratory distress.      Breath sounds: No wheezing or rales.   Lymphadenopathy:      Cervical: No cervical adenopathy.

## 2024-09-18 NOTE — ASSESSMENT & PLAN NOTE
Imaging and blood work were negative for now except of minimal leukocytosis and positive IgG for Henry-Barr virus.  Will order 2 sets of blood cultures, she will also be referred for echo of the heart as well.  As for now continue with Tylenol 1000 mg 3 times daily as needed.

## 2024-09-18 NOTE — ASSESSMENT & PLAN NOTE
Continue with frequent meals, will continue to use rissa, symptoms of hypoglycemia discussed.  Likely due to recent ongoing fevers.  She will keep lifesaver with her at all times.

## 2024-09-19 ENCOUNTER — TELEPHONE (OUTPATIENT)
Dept: FAMILY MEDICINE CLINIC | Facility: CLINIC | Age: 44
End: 2024-09-19

## 2024-09-20 ENCOUNTER — TELEPHONE (OUTPATIENT)
Age: 44
End: 2024-09-20

## 2024-09-20 NOTE — TELEPHONE ENCOUNTER
Called Pt as requested per Nguyen to cancel her Appt with Karen .Pt will be establishing care with a new PCP and they will scheduling with pharmacist.

## 2024-09-20 NOTE — TELEPHONE ENCOUNTER
Called Pt for clarification on her concern and to schedule a visit with Karen. No answer LM requesting Pt to call back.

## 2024-09-21 DIAGNOSIS — Z00.6 ENCOUNTER FOR EXAMINATION FOR NORMAL COMPARISON OR CONTROL IN CLINICAL RESEARCH PROGRAM: ICD-10-CM

## 2024-09-22 NOTE — ED ATTENDING ATTESTATION
9/17/2024  I, Josue Cabrera DO, saw and evaluated the patient. I have discussed the patient with the resident/non-physician practitioner and agree with the resident's/non-physician practitioner's findings, Plan of Care, and MDM as documented in the resident's/non-physician practitioner's note, except where noted. All available labs and Radiology studies were reviewed.  I was present for key portions of any procedure(s) performed by the resident/non-physician practitioner and I was immediately available to provide assistance.       At this point I agree with the current assessment done in the Emergency Department.  I have conducted an independent evaluation of this patient a history and physical is as follows:    ED Course  ED Course as of 09/22/24 1630   Tue Sep 17, 2024   1107 44F hx of fibromyalgia, fevers for last 6 wks.    Patient has been evaluated as an outpatient with noted labs and testing for Lyme, also inflammatory marker testing by her PCP x 2.  Her labs are essentially are all unremarkable that she has been having noted fevers intermittently over that timeframe, she has no discomfort or pain other or more abnormal than her normal fibromyalgia.    ROS: per resident physician note    Gen: NAD, AA&Ox3  HEENT: PERRL, EOMI  Neck: supple  CV: RRR  Lungs: CTA B/L  Abdomen: soft, NT/ND  Ext: no swelling or deformity  Neuro: 5/5 strength all extremities, sensation grossly intact  Skin: no rash      Differential diagnosis includes parasitic infection, bloodstream infection, viral illness, post-viral syndrome,    Repeat testing IV fluids and reevaluate      Critical Care Time  Procedures

## 2024-09-25 ENCOUNTER — HOSPITAL ENCOUNTER (OUTPATIENT)
Dept: CT IMAGING | Facility: HOSPITAL | Age: 44
Discharge: HOME | End: 2024-09-25
Payer: COMMERCIAL

## 2024-09-25 DIAGNOSIS — R50.9 FEVER OF UNKNOWN ORIGIN: ICD-10-CM

## 2024-09-25 PROCEDURE — 71250 CT THORAX DX C-: CPT

## 2024-09-27 ENCOUNTER — APPOINTMENT (OUTPATIENT)
Dept: LAB | Facility: HOSPITAL | Age: 44
End: 2024-09-27
Payer: COMMERCIAL

## 2024-09-27 ENCOUNTER — HOSPITAL ENCOUNTER (OUTPATIENT)
Dept: NON INVASIVE DIAGNOSTICS | Facility: HOSPITAL | Age: 44
Discharge: HOME | End: 2024-09-27
Payer: COMMERCIAL

## 2024-09-27 VITALS
BODY MASS INDEX: 24.1 KG/M2 | HEIGHT: 63 IN | SYSTOLIC BLOOD PRESSURE: 138 MMHG | DIASTOLIC BLOOD PRESSURE: 82 MMHG | WEIGHT: 136 LBS | HEART RATE: 75 BPM

## 2024-09-27 DIAGNOSIS — R50.9 FEVER OF UNKNOWN ORIGIN (FUO): ICD-10-CM

## 2024-09-27 DIAGNOSIS — Z00.6 ENCOUNTER FOR EXAMINATION FOR NORMAL COMPARISON OR CONTROL IN CLINICAL RESEARCH PROGRAM: ICD-10-CM

## 2024-09-27 LAB
AORTIC ROOT: 2.8 CM
APICAL FOUR CHAMBER EJECTION FRACTION: 62 %
ASCENDING AORTA: 3.2 CM
BSA FOR ECHO PROCEDURE: 1.63 M2
CORTIS SERPL-MCNC: 6.1 UG/DL
E WAVE DECELERATION TIME: 238 MS
E/A RATIO: 1.29
FRACTIONAL SHORTENING: 34 (ref 28–44)
INTERVENTRICULAR SEPTUM IN DIASTOLE (PARASTERNAL SHORT AXIS VIEW): 0.6 CM
INTERVENTRICULAR SEPTUM: 0.6 CM (ref 0.6–1.1)
LAAS-AP2: 17.2 CM2
LAAS-AP4: 12.9 CM2
LEFT ATRIUM SIZE: 3.1 CM
LEFT ATRIUM VOLUME (MOD BIPLANE): 40 ML
LEFT ATRIUM VOLUME INDEX (MOD BIPLANE): 24.4 ML/M2
LEFT INTERNAL DIMENSION IN SYSTOLE: 3.3 CM (ref 2.1–4)
LEFT VENTRICULAR INTERNAL DIMENSION IN DIASTOLE: 5 CM (ref 3.5–6)
LEFT VENTRICULAR POSTERIOR WALL IN END DIASTOLE: 1 CM
LEFT VENTRICULAR STROKE VOLUME: 77 ML
LVSV (TEICH): 77 ML
MV E'TISSUE VEL-SEP: 11 CM/S
MV PEAK A VEL: 0.85 M/S
MV PEAK E VEL: 110 CM/S
MV STENOSIS PRESSURE HALF TIME: 69 MS
MV VALVE AREA P 1/2 METHOD: 3.19
RA PRESSURE ESTIMATED: 3 MMHG
RIGHT ATRIUM AREA SYSTOLE A4C: 10.3 CM2
RIGHT VENTRICLE ID DIMENSION: 3.2 CM
RV PSP: 16 MMHG
SL CV LEFT ATRIUM LENGTH A2C: 4.8 CM
SL CV LV EF: 60
SL CV PED ECHO LEFT VENTRICLE DIASTOLIC VOLUME (MOD BIPLANE) 2D: 121 ML
SL CV PED ECHO LEFT VENTRICLE SYSTOLIC VOLUME (MOD BIPLANE) 2D: 44 ML
TR MAX PG: 13 MMHG
TR PEAK VELOCITY: 1.8 M/S
TRICUSPID ANNULAR PLANE SYSTOLIC EXCURSION: 1.7 CM
TRICUSPID VALVE PEAK REGURGITATION VELOCITY: 1.79 M/S

## 2024-09-27 PROCEDURE — 82533 TOTAL CORTISOL: CPT

## 2024-09-27 PROCEDURE — 93306 TTE W/DOPPLER COMPLETE: CPT | Performed by: INTERNAL MEDICINE

## 2024-09-27 PROCEDURE — 86037 ANCA TITER EACH ANTIBODY: CPT

## 2024-09-27 PROCEDURE — 87040 BLOOD CULTURE FOR BACTERIA: CPT

## 2024-09-27 PROCEDURE — 83520 IMMUNOASSAY QUANT NOS NONAB: CPT

## 2024-09-27 PROCEDURE — 93306 TTE W/DOPPLER COMPLETE: CPT

## 2024-09-27 PROCEDURE — 36415 COLL VENOUS BLD VENIPUNCTURE: CPT

## 2024-09-29 LAB
BACTERIA BLD CULT: NORMAL
BACTERIA BLD CULT: NORMAL

## 2024-10-01 LAB
C-ANCA TITR SER IF: NORMAL TITER
MYELOPEROXIDASE AB SER IA-ACNC: <0.2 UNITS (ref 0–0.9)
P-ANCA ATYPICAL TITR SER IF: NORMAL TITER
P-ANCA TITR SER IF: NORMAL TITER
PROTEINASE3 AB SER IA-ACNC: <0.2 UNITS (ref 0–0.9)

## 2024-10-02 LAB
BACTERIA BLD CULT: NORMAL
BACTERIA BLD CULT: NORMAL

## 2024-10-03 ENCOUNTER — OFFICE VISIT (OUTPATIENT)
Dept: FAMILY MEDICINE CLINIC | Facility: CLINIC | Age: 44
End: 2024-10-03
Payer: COMMERCIAL

## 2024-10-03 VITALS
HEART RATE: 91 BPM | SYSTOLIC BLOOD PRESSURE: 120 MMHG | WEIGHT: 142 LBS | DIASTOLIC BLOOD PRESSURE: 82 MMHG | BODY MASS INDEX: 26.13 KG/M2 | HEIGHT: 62 IN | RESPIRATION RATE: 17 BRPM | TEMPERATURE: 99.9 F | OXYGEN SATURATION: 99 %

## 2024-10-03 DIAGNOSIS — E16.2 HYPOGLYCEMIA: Primary | ICD-10-CM

## 2024-10-03 DIAGNOSIS — M79.7 FIBROMYALGIA: ICD-10-CM

## 2024-10-03 DIAGNOSIS — Z23 NEED FOR INFLUENZA VACCINATION: ICD-10-CM

## 2024-10-03 DIAGNOSIS — Z90.3 POSTGASTRECTOMY MALABSORPTION: ICD-10-CM

## 2024-10-03 DIAGNOSIS — R79.89 LOW SERUM CORTISOL LEVEL: ICD-10-CM

## 2024-10-03 DIAGNOSIS — R50.9 FEVER OF UNKNOWN ORIGIN: ICD-10-CM

## 2024-10-03 DIAGNOSIS — M25.59 PAIN IN OTHER JOINT: ICD-10-CM

## 2024-10-03 DIAGNOSIS — K91.2 POSTGASTRECTOMY MALABSORPTION: ICD-10-CM

## 2024-10-03 DIAGNOSIS — R50.9 FEVER OF UNKNOWN ORIGIN (FUO): ICD-10-CM

## 2024-10-03 DIAGNOSIS — Z86.39 HISTORY OF DIABETES MELLITUS: ICD-10-CM

## 2024-10-03 DIAGNOSIS — G43.009 MIGRAINE WITHOUT AURA AND WITHOUT STATUS MIGRAINOSUS, NOT INTRACTABLE: ICD-10-CM

## 2024-10-03 DIAGNOSIS — E55.9 VITAMIN D DEFICIENCY: ICD-10-CM

## 2024-10-03 PROBLEM — M25.50 JOINT PAIN: Status: ACTIVE | Noted: 2024-10-03

## 2024-10-03 PROCEDURE — 90471 IMMUNIZATION ADMIN: CPT

## 2024-10-03 PROCEDURE — 90673 RIV3 VACCINE NO PRESERV IM: CPT

## 2024-10-03 PROCEDURE — 99204 OFFICE O/P NEW MOD 45 MIN: CPT | Performed by: NURSE PRACTITIONER

## 2024-10-03 RX ORDER — LORAZEPAM 1 MG/1
1 TABLET ORAL DAILY PRN
COMMUNITY
Start: 2024-09-17

## 2024-10-03 RX ORDER — HYDROCODONE BITARTRATE AND ACETAMINOPHEN 7.5; 325 MG/1; MG/1
1 TABLET ORAL EVERY 4 HOURS PRN
COMMUNITY

## 2024-10-03 RX ORDER — METHOCARBAMOL 750 MG/1
750 TABLET, FILM COATED ORAL 2 TIMES DAILY
COMMUNITY

## 2024-10-03 RX ORDER — MELOXICAM 15 MG/1
7.5 TABLET ORAL DAILY
COMMUNITY

## 2024-10-03 RX ORDER — ACYCLOVIR 400 MG/1
1 TABLET ORAL ONCE
Qty: 3 EACH | Refills: 0 | Status: SHIPPED | OUTPATIENT
Start: 2024-10-03 | End: 2024-10-03

## 2024-10-03 NOTE — PROGRESS NOTES
FAMILY PRACTICE OFFICE VISIT       NAME: Tram Perez  AGE: 44 y.o. SEX: female       : 1980        MRN: [id number]    DATE: 10/7/2024  TIME: 2:26 PM    Assessment and Plan   1. Hypoglycemia  -     Continuous Glucose  (Dexcom G7 ) JUDY; Use 1 each once for 1 dose  -     Ambulatory Referral to Endocrinology; Future  2. Fibromyalgia  -     Ambulatory Referral to Rheumatology; Future  3. Low serum cortisol level  -     Ambulatory Referral to Rheumatology; Future  -     Vitamin D 25 hydroxy; Future  4. Fever of unknown origin  -     Peripheral Smear; Future  -     CBC and differential; Future  -     Iron Panel (Includes Ferritin, Iron Sat%, Iron, and TIBC); Future  5. Fever of unknown origin (FUO)  -      MSK limited; Future; Expected date: 10/03/2024  -     Vitamin D 25 hydroxy; Future  6. Pain in other joint  -     Ambulatory Referral to Rheumatology; Future  -      MSK limited; Future; Expected date: 10/03/2024  7. Migraine without aura and without status migrainosus, not intractable  8. History of diabetes mellitus  -     Ambulatory Referral to Endocrinology; Future  9. Need for influenza vaccination  -     influenza vaccine, recombinant, PF, 0.5 mL IM (Flublok)  10. Vitamin D deficiency  -     Vitamin D 25 hydroxy; Future  11. Postgastrectomy malabsorption  -     Vitamin D 25 hydroxy; Future       There are no Patient Instructions on file for this visit.        Chief Complaint     Chief Complaint   Patient presents with    Establish Care     New Pt being seen to establish care       History of Present Illness   Tram Perez is a 44 y.o.-year-old female who is here as a new patient  Stated this was an interview for me as well, which is perfectly fine  Has multiple problems/health issues    Cortisol low  Vit b12 low  No lyme  No pavithra barre, may have had recently, no swollen lymph nodes:  may have had some at base of ilene swollen  Hypoglycemic  3 concussion in past year, on at work and  couple due to fall with hypoglycemia  Was to see in house pharmacist at other office, not sure why per patient  Taking tylenol 3 times per day  Cotninues to get fevers 99.8-103  Suggested she has autoimmune disease  Elvin's??  Working on [employer] in past  Use to see endo and rheumatology  Feels sick and does not feel right  Normal temp 97.6  Has seen dr powell before  Has seen dr KOTHARI at [facility] rheumatology    Hx of bariatric surgery  Needs to eat every hour  Does not get dumping syndrome  July 2021 had bariatric surgery    Feels fatigue and in bed a lot  Past few months have been worse and cannot get out of bed  Getting fevers of unknown origin    Did ct scan of chest and abd/pelvis  Cxr and labs  No known cause of fevers found so far  9 weeks and no cause of fevers found and still getting them  Tmax 103 temp  Yesterday 102, 101.5, 101.7      Echo 9/27/2024  Interpretation Summary  Show Result Comparison     Left Ventricle: Left ventricular cavity size is normal. Wall thickness is normal. The left ventricular ejection fraction is 60%. Systolic function is normal. Wall motion is normal. Diastolic function is normal.    Right Ventricle: Right ventricular cavity size is normal. Systolic function is normal.    No significant valvular disease.    Colonoscopy 2021  Diverticulosis    Review of Systems   Review of Systems   Constitutional:  Positive for activity change, appetite change, fatigue and fever.   HENT:  Negative for congestion, ear pain, postnasal drip, rhinorrhea and sore throat.    Eyes:  Negative for photophobia and visual disturbance.   Respiratory:  Negative for cough, shortness of breath and wheezing.    Cardiovascular:  Negative for chest pain and palpitations.   Gastrointestinal:  Negative for constipation, diarrhea, nausea and vomiting.   Genitourinary:  Negative for dysuria and frequency.   Skin:  Negative for rash.   Neurological:  Positive for dizziness and light-headedness. Negative for headaches.    Hematological:  Positive for adenopathy.   Psychiatric/Behavioral:  Positive for dysphoric mood. Negative for sleep disturbance and suicidal ideas. The patient is nervous/anxious.        Active Problem List     Patient Active Problem List   Diagnosis    Anxiety    Asthma    Fatigue    Fibromyalgia    Injury of right ankle    Irritable bowel syndrome    Lipoma of abdominal wall    Low back pain    Arthropathy of lumbar facet joint    Paresthesia    Plantar fasciitis    Rectocele    Knee pain    History of hysterectomy    Seasonal allergies    Chondromalacia patellae    Leukocytosis    Moderate episode of recurrent major depressive disorder (HCC)    Migraine without aura    GERD (gastroesophageal reflux disease)    Bradycardia    Eczema of right external ear    Intertrigo    Hypoglycemia    Sciatic leg pain    ADHD    History of seizure    Overweight (BMI 25.0-29.9)    Flank pain    Rash    Concussion wth loss of consciousness of 30 minutes or less    Jaw pain    Fever of unknown origin (FUO)    Joint pain    Low serum cortisol level    Abnormal brain MRI    Incisional hernia    Postgastrectomy malabsorption    Reactive hypoglycemia    Multiple neurological symptoms    Seronegative rheumatoid arthritis (HCC)    Other ovarian cyst, left side    Vitamin D deficiency         Past Medical History:  Past Medical History:   Diagnosis Date    Acid reflux     Allergic     Anxiety     Asthma     Depression     Diabetes mellitus (HCC)     Pre    Diverticulitis     Epileptic seizures (HCC)     Medication related    Gestational diabetes mellitus     Headache(784.0)     Migraines    Hypertension     Inflammatory bowel disease     Lateral epicondylitis     Malignant tumor of cervix (HCC)     between 2189-6137    Obstructive sleep apnea     CPAP    Paresthesias     Plantar fasciitis     Rectocele     Seizures (HCC)     Had ghem due to low sugar levels    Tinea corporis        Past Surgical History:  Past Surgical History:    Procedure Laterality Date    ARTHROSCOPY ANKLE Right 9/1/2017    Procedure: ANKLE ARTHOSCOPY WITH EXTENSIVE SYNOVECTOMY, OPEN ARTHOTOY LATERAL ANKLE WITH ANKLE STABILIZATION, BROSTOM REPAIR, INTERNAL BRACE AUGMENTATION, PERONEAL TENDON SYNOVECTOMY;  Surgeon: Donnell Hansen DPM;  Location: [facility];  Service: Podiatry    COLONOSCOPY  02/27/2017    GASTRIC BYPASS  07/2020    HEMORRHOID SURGERY  08/2021    HYSTERECTOMY  2015    [place] COLONOSCOPY FLX DX W/COLLJ SPEC WHEN PFRMD N/A 2/27/2017    Procedure: COLONOSCOPY;  Surgeon: Armani Lara MD;  Location: [facility];  Service: Gastroenterology    WV ESOPHAGOGASTRODUODENOSCOPY TRANSORAL DIAGNOSTIC N/A 9/7/2016    Procedure: EGD AND COLONOSCOPY;  Surgeon: Armani Lara MD;  Location: [facility];  Service: Gastroenterology    TONSILLECTOMY      TUBAL LIGATION      UPPER GASTROINTESTINAL ENDOSCOPY  2016       Family History:  Family History   Problem Relation Age of Onset    Anxiety disorder Mother     Depression Mother     Cancer Mother     Endometrial cancer Mother         under afe 50    Stroke Mother     Thyroid disease Mother     Seizures Brother         Epilepsy    Other Brother         Suicide risk    Cancer Maternal Grandmother 50        lymphatic cancer    Other Other         cardiac disorder    Diabetes Other     Hypertension Other     Neuropathy Other     Thyroid disease Other     Breast cancer Paternal Grandmother 82    No Known Problems Father     No Known Problems Daughter     Cancer Maternal Grandfather     No Known Problems Paternal Grandfather     No Known Problems Daughter     No Known Problems Son     No Known Problems Brother     Leukemia Cousin     Cancer Cousin         lympatic       Social History:  Social History     Socioeconomic History    Marital status: Legally      Spouse name: Not on file    Number of children: Not on file    Years of education: Not on file    Highest education level: Not on file   Occupational History    Not on  file   Tobacco Use    Smoking status: Former     Current packs/day: 0.00     Average packs/day: 0.5 packs/day for 10.0 years (5.0 ttl pk-yrs)     Types: Cigarettes     Quit date: 2013     Years since quittin.0    Smokeless tobacco: Never   Vaping Use    Vaping status: Never Used   Substance and Sexual Activity    Alcohol use: Not Currently     Comment: occasionally, maybe 1-2 times a month    Drug use: Yes     Types: Marijuana    Sexual activity: Yes     Partners: Female, Male     Birth control/protection: Surgical     Comment: Had hysterectomy   Other Topics Concern    Not on file   Social History Narrative    Not on file     Social Determinants of Health     Financial Resource Strain: Not on file   Food Insecurity: Not on file   Transportation Needs: Not on file   Physical Activity: Not on file   Stress: Not on file   Social Connections: Not on file   Intimate Partner Violence: Not on file   Housing Stability: Not on file       Objective     Vitals:    10/03/24 0856   BP: 120/82   Pulse: 91   Resp: 17   Temp: 99.9 °F (37.7 °C)   SpO2: 99%     Wt Readings from Last 3 Encounters:   10/03/24 64.4 kg (142 lb)   24 61.7 kg (136 lb)   24 62.1 kg (136 lb 12.8 oz)       Physical Exam  Vitals and nursing note reviewed.   Constitutional:       Appearance: Normal appearance.   HENT:      Head: Normocephalic and atraumatic.      Right Ear: Tympanic membrane, ear canal and external ear normal.      Left Ear: Tympanic membrane, ear canal and external ear normal.      Nose: Nose normal.      Mouth/Throat:      Mouth: Mucous membranes are moist.   Eyes:      Conjunctiva/sclera: Conjunctivae normal.   Cardiovascular:      Rate and Rhythm: Normal rate and regular rhythm.      Heart sounds: Normal heart sounds.   Pulmonary:      Effort: Pulmonary effort is normal.      Breath sounds: Normal breath sounds.   Abdominal:      General: Bowel sounds are normal.      Palpations: Abdomen is soft.   Musculoskeletal:     "     General: Normal range of motion.      Cervical back: Normal range of motion and neck supple.   Skin:     General: Skin is warm and dry.      Capillary Refill: Capillary refill takes less than 2 seconds.   Neurological:      General: No focal deficit present.      Mental Status: She is alert and oriented to person, place, and time.   Psychiatric:         Mood and Affect: Mood normal.         Behavior: Behavior normal.         Thought Content: Thought content normal.         Judgment: Judgment normal.         Pertinent Laboratory/Diagnostic Studies:  Lab Results   Component Value Date    BUN 19 09/17/2024    CREATININE 0.63 09/17/2024    CALCIUM 9.3 09/17/2024    K 3.8 09/17/2024    CO2 26 09/17/2024     09/17/2024     Lab Results   Component Value Date    ALT 19 09/17/2024    AST 16 09/17/2024    ALKPHOS 85 09/17/2024       Lab Results   Component Value Date    WBC 10.96 (H) 09/17/2024    HGB 14.5 09/17/2024    HCT 43.3 09/17/2024    MCV 95 09/17/2024     09/17/2024       Lab Results   Component Value Date    TSH 2.10 08/26/2023       No results found for: \"CHOL\"  Lab Results   Component Value Date    TRIG 346 (H) 07/11/2019     Lab Results   Component Value Date    HDL 28 (L) 07/11/2019     Lab Results   Component Value Date    LDLCALC 51 07/11/2019     Lab Results   Component Value Date    HGBA1C 5.3 09/13/2023       Results for orders placed or performed during the hospital encounter of 09/27/24   Echo complete w/ contrast if indicated    Collection Time: 09/27/24  9:26 AM   Result Value Ref Range    BSA 1.63 m2    A4C EF 62 %    LVIDd 5.00 cm    LVIDS 3.30 cm    IVSd 0.60 cm    LVPWd 1.00 cm    FS 34 28 - 44    MV E' Tissue Velocity Septal 11 cm/s    LA Volume Index (BP) 24.4 mL/m2    E/A ratio 1.29     E wave deceleration time 238 ms    MV Peak E Eric 110 cm/s    MV Peak A Eric 0.85 m/s    RVID d 3.2 cm    Tricuspid annular plane systolic excursion 1.70 cm    LA size 3.1 cm    LA length (A2C) " "4.80 cm    LA volume (BP) 40 mL    RAA A4C 10.3 cm2    MV stenosis pressure 1/2 time 69 ms    MV valve area p 1/2 method 3.19     TR Peak Eric 1.8 m/s    Triscuspid Valve Regurgitation Peak Gradient 13.0 mmHg    Ao root 2.80 cm    Asc Ao 3.2 cm    Tricuspid valve peak regurgitation velocity 1.79 m/s    Left ventricular stroke volume (2D) 77.00 mL    IVS 0.6 cm    LEFT VENTRICLE SYSTOLIC VOLUME (MOD BIPLANE) 2D 44 mL    LV DIASTOLIC VOLUME (MOD BIPLANE) 2D 121 mL    Left Atrium Area-systolic Four Chamber 12.9 cm2    Left Atrium Area-systolic Apical Two Chamber 17.2 cm2    LVSV, 2D 77 mL    LV EF 60     Est. RA pres 3.0 mmHg    Right Ventricular Peak Systolic Pressure 16.00 mmHg       Orders Placed This Encounter   Procedures    US Second Sight limited    influenza vaccine, recombinant, PF, 0.5 mL IM (Flublok)    Peripheral Smear    CBC and differential    Vitamin D 25 hydroxy    Ambulatory Referral to Rheumatology    Ambulatory Referral to Endocrinology       ALLERGIES:  Allergies   Allergen Reactions    Aspirin Anaphylaxis    Bee Venom Swelling and Wheezing    Penicillins Anaphylaxis and Rash    Bee Pollen     Ceclor [Cefaclor] Itching    Dust Mite Extract Other (See Comments)     congestion    Grass Pollen(K-O-R-T-Swt Osito) Other (See Comments)     congestion    Lactose - Food Allergy GI Intolerance    Other Other (See Comments)     congestion    Pollen Extract     Amoxicillin Rash    Doxycycline Rash    Erythromycin Rash    Nickel Rash       Current Medications     Current Outpatient Medications   Medication Sig Dispense Refill    albuterol (PROVENTIL HFA,VENTOLIN HFA) 90 mcg/act inhaler 2 PUFFS UP TO 4 TIMES A DAY AS NEEDED FOR WHEEZING 18 g 1    BD Integra Syringe 25G X 1\" 3 ML MISC DRAW UP 1 ML OF TORADOL AND INJECT INTO MUSCLE.      Biotin 1000 MCG tablet Take 10,000 mcg by mouth      Blood Glucose Monitoring Suppl (OneTouch Verio Reflect) w/Device KIT Check blood sugars twice daily. Please substitute with appropriate " alternative as covered by patient's insurance. Dx: E11.65 1 kit 0    Blood Glucose Monitoring Suppl (OneTouch Verio Reflect) w/Device KIT Check blood sugars twice daily. Please substitute with appropriate alternative as covered by patient's insurance. Dx: E11.65 1 kit 0    Botulinum Toxin Type A 200 units SOLR Inject 155 units into face and neck IM every 90 days      Continuous Glucose Sensor (FreeStyle Larry 14 Day Sensor) MISC Use 1 sensor q 14 days to check sugar levels 2 each 3    DULoxetine (CYMBALTA) 60 mg delayed release capsule Take 1 capsule (60 mg total) by mouth daily (Patient taking differently: Take 60 mg by mouth 2 (two) times a day) 30 capsule 2    glucose blood (OneTouch Verio) test strip Check blood sugars twice daily. Please substitute with appropriate alternative as covered by patient's insurance. Dx: E11.65 200 each 3    glucose blood (OneTouch Verio) test strip Check blood sugars twice daily. Please substitute with appropriate alternative as covered by patient's insurance. Dx: E11.65 200 each 3    HYDROcodone-acetaminophen (NORCO) 7.5-325 mg per tablet Take 1 tablet by mouth every 4 (four) hours as needed for pain      HYDROcodone-Acetaminophen (VICODIN PO)       ketorolac (TORADOL) 30 mg/mL injection INFUSE 1 ML (30 MG TOTAL) INTO A VENOUS CATHETER EVERY 12 (TWELVE) HOURS.      lactulose (CHRONULAC) 10 g/15 mL solution       LORazepam (ATIVAN) 1 mg tablet Take 1 mg by mouth daily as needed      meloxicam (MOBIC) 15 mg tablet Take 7.5 mg by mouth daily      methocarbamol (ROBAXIN) 750 mg tablet Take 750 mg by mouth 2 (two) times a day      methylphenidate (RITALIN) 10 mg tablet Take 10 mg by mouth daily as needed    0    methylphenidate (RITALIN) 20 MG tablet Take 20 mg by mouth 2 (two) times a day Pt reports taking tid  0    Multiple Vitamin (MULTIVITAMIN ADULT PO) Take 1 tablet by mouth every morning      nystatin (MYCOSTATIN) powder Apply topically 2 (two) times a day 60 g 0    OneTouch  Delica Lancets 33G MISC Check blood sugars twice daily. Please substitute with appropriate alternative as covered by patient's insurance. Dx: E11.65 200 each 3    OneTouch Delica Lancets 33G MISC Check blood sugars twice daily. Please substitute with appropriate alternative as covered by patient's insurance. Dx: E11.65 200 each 3    pantoprazole (PROTONIX) 40 mg tablet TAKE 1 TABLET BY MOUTH TWICE A DAY 30 MINUTES BEFORE BREAKFAST & DINNER      pregabalin (Lyrica) 150 mg capsule Take 1 capsule (150 mg total) by mouth 3 (three) times a day 90 capsule 1    rizatriptan (MAXALT) 10 MG tablet TAKE 1 TABLET (10 MG TOTAL) BY MOUTH ONCE AS NEEDED FOR MIGRAINE 9 tablet 1    SUMAtriptan Succinate 6 MG/0.5ML SOAJ Inject 0.5 mL (6 mg total) under the skin as needed (headache) 9 Cartridge 1    traZODone (DESYREL) 100 mg tablet       EPINEPHrine (EPIPEN) 0.3 mg/0.3 mL SOAJ Inject 0.3 mL (0.3 mg total) into a muscle once for 1 dose 0.6 mL 0     Current Facility-Administered Medications   Medication Dose Route Frequency Provider Last Rate Last Admin    cyanocobalamin injection 1,000 mcg  1,000 mcg Intramuscular Q30 Days Ronny Brown MD   1,000 mcg at 06/23/23 1524         Health Maintenance     Health Maintenance   Topic Date Due    Cervical Cancer Screening  08/13/2016    Pneumococcal Vaccine: Pediatrics (0 to 5 Years) and At-Risk Patients (6 to 64 Years) (2 of 2 - PCV) 09/05/2018    Breast Cancer Screening: Mammogram  01/22/2022    PT PLAN OF CARE  04/12/2023    Annual Physical  05/01/2024    COVID-19 Vaccine (4 - 2023-24 season) 09/01/2024    Depression Screening  09/06/2025    Colorectal Cancer Screening  12/01/2026    DTaP,Tdap,and Td Vaccines (2 - Td or Tdap) 03/29/2028    Zoster Vaccine (1 of 2) 09/12/2028    RSV Vaccine Age 60+ Years (1 - 1-dose 60+ series) 09/12/2038    HIV Screening  Completed    Hepatitis C Screening  Completed    Influenza Vaccine  Completed    RSV Vaccine age 0-20 Months  Aged Out    HIB  Vaccine  Aged Out    IPV Vaccine  Aged Out    Hepatitis A Vaccine  Aged Out    Meningococcal ACWY Vaccine  Aged Out    HPV Vaccine  Aged Out     Immunization History   Administered Date(s) Administered    COVID-19 MODERNA VACC 0.5 ML IM 02/04/2021, 03/01/2021, 01/16/2022    Hib (PRP-OMP) 09/01/2003, 11/01/2003, 05/25/2004    INFLUENZA 10/06/2020, 10/15/2022    Influenza Quadrivalent Preservative Free 3 years and older IM 10/30/2014    Influenza Quadrivalent, 6-35 Months IM 11/02/2015, 10/11/2016, 09/05/2017    Influenza Recombinant Preservative Free Im 10/03/2024    Influenza, injectable, quadrivalent, preservative free 0.5 mL 10/15/2020, 11/23/2021    MMR 09/01/1979, 09/01/1982    Pneumococcal Polysaccharide PPV23 09/05/2017    TD (adult) Preservative Free 05/01/2004    Td (adult), adsorbed 05/25/2004    Tdap 03/29/2018    Tuberculin Skin Test-PPD Intradermal 08/22/2005, 10/14/2020, 12/08/2021, 12/17/2021          FINA Luna

## 2024-10-04 ENCOUNTER — TELEPHONE (OUTPATIENT)
Dept: FAMILY MEDICINE CLINIC | Facility: CLINIC | Age: 44
End: 2024-10-04

## 2024-10-07 PROBLEM — E78.2 MIXED HYPERLIPIDEMIA: Status: RESOLVED | Noted: 2018-01-24 | Resolved: 2024-10-07

## 2024-10-07 PROBLEM — N83.292 OTHER OVARIAN CYST, LEFT SIDE: Status: ACTIVE | Noted: 2021-12-06

## 2024-10-07 PROBLEM — K43.2 INCISIONAL HERNIA: Status: ACTIVE | Noted: 2020-11-23

## 2024-10-07 PROBLEM — E16.1 REACTIVE HYPOGLYCEMIA: Status: ACTIVE | Noted: 2021-01-26

## 2024-10-07 PROBLEM — T50.902A INTENTIONAL OVERDOSE OF DRUG IN TABLET FORM (HCC): Status: RESOLVED | Noted: 2018-06-12 | Resolved: 2024-10-07

## 2024-10-07 PROBLEM — E55.9 VITAMIN D DEFICIENCY: Status: ACTIVE | Noted: 2024-10-07

## 2024-10-07 PROBLEM — M06.00 SERONEGATIVE RHEUMATOID ARTHRITIS (HCC): Status: ACTIVE | Noted: 2022-10-10

## 2024-10-07 PROBLEM — Z90.3 POSTGASTRECTOMY MALABSORPTION: Status: ACTIVE | Noted: 2020-10-27

## 2024-10-07 PROBLEM — R90.89 ABNORMAL BRAIN MRI: Status: ACTIVE | Noted: 2022-06-16

## 2024-10-07 PROBLEM — K91.2 POSTSURGICAL MALABSORPTION: Status: ACTIVE | Noted: 2020-10-27

## 2024-10-07 PROBLEM — R29.90 MULTIPLE NEUROLOGICAL SYMPTOMS: Status: ACTIVE | Noted: 2022-06-16

## 2024-10-08 LAB
APOB+LDLR+PCSK9 GENE MUT ANL BLD/T: NOT DETECTED
BRCA1+BRCA2 DEL+DUP + FULL MUT ANL BLD/T: NOT DETECTED
MLH1+MSH2+MSH6+PMS2 GN DEL+DUP+FUL M: NOT DETECTED

## 2024-10-09 DIAGNOSIS — E16.2 HYPOGLYCEMIA: Primary | ICD-10-CM

## 2024-10-09 RX ORDER — ACYCLOVIR 400 MG/1
1 TABLET ORAL ONCE
Qty: 1 EACH | Refills: 0 | Status: SHIPPED | OUTPATIENT
Start: 2024-10-09 | End: 2024-10-09

## 2024-10-10 NOTE — TELEPHONE ENCOUNTER
PA Dexcom G7  SUBMITTED     via    [x]CMM-KEY: [id number]  []Surescripts-Case ID #    []Availity-Auth ID #  NDC #    []Faxed to plan   []Other website    []Phone call Case ID #      Office notes sent, clinical questions answered. Awaiting determination    Turnaround time for your insurance to make a decision on your Prior Authorization can take 7-21 business days.

## 2024-10-17 ENCOUNTER — APPOINTMENT (OUTPATIENT)
Dept: LAB | Facility: HOSPITAL | Age: 44
End: 2024-10-17
Payer: COMMERCIAL

## 2024-10-17 ENCOUNTER — HOSPITAL ENCOUNTER (OUTPATIENT)
Facility: HOSPITAL | Age: 44
End: 2024-10-17
Payer: COMMERCIAL

## 2024-10-17 VITALS — WEIGHT: 142 LBS | HEIGHT: 62 IN | BODY MASS INDEX: 26.13 KG/M2

## 2024-10-17 DIAGNOSIS — R79.89 LOW SERUM CORTISOL LEVEL: ICD-10-CM

## 2024-10-17 DIAGNOSIS — E55.9 VITAMIN D DEFICIENCY: ICD-10-CM

## 2024-10-17 DIAGNOSIS — R50.9 FEVER OF UNKNOWN ORIGIN: ICD-10-CM

## 2024-10-17 DIAGNOSIS — Z90.3 POSTGASTRECTOMY MALABSORPTION: ICD-10-CM

## 2024-10-17 DIAGNOSIS — Z12.31 ENCOUNTER FOR SCREENING MAMMOGRAM FOR MALIGNANT NEOPLASM OF BREAST: ICD-10-CM

## 2024-10-17 DIAGNOSIS — R50.9 FEVER OF UNKNOWN ORIGIN (FUO): ICD-10-CM

## 2024-10-17 DIAGNOSIS — K91.2 POSTGASTRECTOMY MALABSORPTION: ICD-10-CM

## 2024-10-17 LAB
25(OH)D3 SERPL-MCNC: 38.8 NG/ML (ref 30–100)
EOSINOPHIL # BLD AUTO: 0.19 THOUSAND/UL (ref 0–0.61)
EOSINOPHIL NFR BLD MANUAL: 2 % (ref 0–6)
ERYTHROCYTE [DISTWIDTH] IN BLOOD BY AUTOMATED COUNT: 12.5 % (ref 11.6–15.1)
FERRITIN SERPL-MCNC: 18 NG/ML (ref 11–307)
HCT VFR BLD AUTO: 42.4 % (ref 34.8–46.1)
HGB BLD-MCNC: 13.9 G/DL (ref 11.5–15.4)
IRON SATN MFR SERPL: 45 % (ref 15–50)
IRON SERPL-MCNC: 212 UG/DL (ref 50–212)
LYMPHOCYTES # BLD AUTO: 2.04 THOUSAND/UL (ref 0.6–4.47)
LYMPHOCYTES # BLD AUTO: 21 %
MCH RBC QN AUTO: 31.9 PG (ref 26.8–34.3)
MCHC RBC AUTO-ENTMCNC: 32.8 G/DL (ref 31.4–37.4)
MCV RBC AUTO: 97 FL (ref 82–98)
MONOCYTES # BLD AUTO: 0.58 THOUSAND/UL (ref 0–1.22)
MONOCYTES NFR BLD AUTO: 6 % (ref 4–12)
NEUTS SEG # BLD: 6.9 THOUSAND/UL (ref 1.81–6.82)
NEUTS SEG NFR BLD AUTO: 71 %
NRBC BLD AUTO-RTO: 0 /100 WBCS
PLATELET # BLD AUTO: 243 THOUSANDS/UL (ref 149–390)
PLATELET BLD QL SMEAR: ADEQUATE
PMV BLD AUTO: 9.8 FL (ref 8.9–12.7)
RBC # BLD AUTO: 4.36 MILLION/UL (ref 3.81–5.12)
RBC MORPH BLD: NORMAL
TIBC SERPL-MCNC: 468 UG/DL (ref 250–450)
TOTAL CELLS COUNTED SPEC: 100
UIBC SERPL-MCNC: 256 UG/DL (ref 155–355)
WBC # BLD AUTO: 9.72 THOUSAND/UL (ref 4.31–10.16)

## 2024-10-17 PROCEDURE — 85007 BL SMEAR W/DIFF WBC COUNT: CPT

## 2024-10-17 PROCEDURE — 77067 SCR MAMMO BI INCL CAD: CPT

## 2024-10-17 PROCEDURE — 83550 IRON BINDING TEST: CPT

## 2024-10-17 PROCEDURE — 77063 BREAST TOMOSYNTHESIS BI: CPT

## 2024-10-17 PROCEDURE — 82306 VITAMIN D 25 HYDROXY: CPT

## 2024-10-17 PROCEDURE — 83540 ASSAY OF IRON: CPT

## 2024-10-17 PROCEDURE — 36415 COLL VENOUS BLD VENIPUNCTURE: CPT

## 2024-10-17 PROCEDURE — 82728 ASSAY OF FERRITIN: CPT

## 2024-10-18 PROBLEM — R50.9 FEVER OF UNKNOWN ORIGIN (FUO): Status: RESOLVED | Noted: 2024-08-26 | Resolved: 2024-10-18

## 2024-10-29 ENCOUNTER — OFFICE VISIT (OUTPATIENT)
Dept: FAMILY MEDICINE CLINIC | Facility: CLINIC | Age: 44
End: 2024-10-29
Payer: COMMERCIAL

## 2024-10-29 VITALS
DIASTOLIC BLOOD PRESSURE: 60 MMHG | TEMPERATURE: 98.9 F | HEIGHT: 62 IN | WEIGHT: 138 LBS | BODY MASS INDEX: 25.4 KG/M2 | OXYGEN SATURATION: 97 % | RESPIRATION RATE: 17 BRPM | HEART RATE: 76 BPM | SYSTOLIC BLOOD PRESSURE: 108 MMHG

## 2024-10-29 DIAGNOSIS — R53.83 OTHER FATIGUE: Primary | ICD-10-CM

## 2024-10-29 DIAGNOSIS — R50.9 FEVER OF UNKNOWN ORIGIN: ICD-10-CM

## 2024-10-29 DIAGNOSIS — K91.2 POSTGASTRECTOMY MALABSORPTION: ICD-10-CM

## 2024-10-29 DIAGNOSIS — R79.89 LOW SERUM CORTISOL LEVEL: ICD-10-CM

## 2024-10-29 DIAGNOSIS — N30.00 ACUTE CYSTITIS WITHOUT HEMATURIA: ICD-10-CM

## 2024-10-29 DIAGNOSIS — E16.2 HYPOGLYCEMIA: ICD-10-CM

## 2024-10-29 DIAGNOSIS — Z90.3 POSTGASTRECTOMY MALABSORPTION: ICD-10-CM

## 2024-10-29 PROBLEM — R10.9 FLANK PAIN: Status: RESOLVED | Noted: 2023-03-28 | Resolved: 2024-10-29

## 2024-10-29 PROBLEM — D72.829 LEUKOCYTOSIS: Status: RESOLVED | Noted: 2018-06-12 | Resolved: 2024-10-29

## 2024-10-29 PROBLEM — R68.84 JAW PAIN: Status: RESOLVED | Noted: 2023-08-08 | Resolved: 2024-10-29

## 2024-10-29 PROBLEM — S99.911A INJURY OF RIGHT ANKLE: Status: RESOLVED | Noted: 2017-08-29 | Resolved: 2024-10-29

## 2024-10-29 PROCEDURE — 99214 OFFICE O/P EST MOD 30 MIN: CPT | Performed by: NURSE PRACTITIONER

## 2024-10-29 RX ORDER — ACYCLOVIR 400 MG/1
1 TABLET ORAL
Qty: 3 EACH | Refills: 3 | Status: SHIPPED | OUTPATIENT
Start: 2024-10-29

## 2024-10-29 NOTE — PROGRESS NOTES
FAMILY PRACTICE OFFICE VISIT       NAME: Tram Perez  AGE: 44 y.o. SEX: female       : 1980        MRN: [id number]    DATE: 10/30/2024  TIME: 1:59 PM    Assessment and Plan   1. Other fatigue  -     PTH, intact; Future  2. Hypoglycemia  Assessment & Plan:  Trial dexcom g7 to monitor levels  Refer to dietician at bariatric surgery    Orders:  -     Continuous Glucose Sensor (Dexcom G7 Sensor); Use 1 Device every 10 days  -     Sodium, urine, random; Future  -     Ambulatory Referral to Nutrition Services; Future  3. Acute cystitis without hematuria  -     Urine culture; Future  4. Postgastrectomy malabsorption  -     Ambulatory Referral to Nutrition Services; Future  5. Low serum cortisol level  Assessment & Plan:  Refer to endo for eval to r/o adrenal insufficiency    6. Fever of unknown origin  Assessment & Plan:  Monitor          There are no Patient Instructions on file for this visit.        Chief Complaint     Chief Complaint   Patient presents with    Results     Pt being seen to go over test results and stated blood sugars are dropping        History of Present Illness   Tram Perez is a 44 y.o.-year-old female who is here for continued hypoglycemia  Bs as low as 40 to 50's  Lost another 8 pounds since last here  Dizzy and lightheaded  Can go from sitting to standing and feels lighteaded  Last am cortisol was 6.7  Multiple joint pains  Getting abdominal pains, does admit to IBS  Can get sweating  Occasional nausea  Craving salt, pretzels  No diarrhea  Eating enough protein  Eating protein bar  Eating yogurt  Protein drink  Still getting fevers as well      Review of Systems   Review of Systems   Constitutional:  Positive for activity change, appetite change, fatigue and fever. Negative for chills.   HENT:  Negative for congestion, postnasal drip and rhinorrhea.    Eyes:  Negative for photophobia and visual disturbance.   Respiratory:  Negative for cough, shortness of breath and wheezing.     Cardiovascular:  Negative for chest pain and palpitations.   Gastrointestinal:  Negative for constipation, diarrhea, nausea and vomiting.   Genitourinary:  Negative for dysuria, frequency and hematuria.   Musculoskeletal:  Positive for arthralgias and myalgias.   Skin:  Negative for rash.   Neurological:  Positive for dizziness, weakness and light-headedness. Negative for numbness and headaches.   Hematological:  Negative for adenopathy.   Psychiatric/Behavioral:  Negative for dysphoric mood, sleep disturbance and suicidal ideas. The patient is nervous/anxious.        Active Problem List     Patient Active Problem List   Diagnosis    Anxiety    Asthma    Fatigue    Fibromyalgia    Irritable bowel syndrome    Lipoma of abdominal wall    Low back pain    Arthropathy of lumbar facet joint    Paresthesia    Plantar fasciitis    Rectocele    Knee pain    History of hysterectomy    Seasonal allergies    Chondromalacia patellae    Moderate episode of recurrent major depressive disorder (HCC)    Migraine without aura    GERD (gastroesophageal reflux disease)    Bradycardia    Eczema of right external ear    Intertrigo    Hypoglycemia    Sciatic leg pain    ADHD    History of seizure    Overweight (BMI 25.0-29.9)    Rash    Concussion wth loss of consciousness of 30 minutes or less    Fever of unknown origin    Joint pain    Low serum cortisol level    Abnormal brain MRI    Incisional hernia    Postgastrectomy malabsorption    Reactive hypoglycemia    Multiple neurological symptoms    Seronegative rheumatoid arthritis (HCC)    Other ovarian cyst, left side    Vitamin D deficiency    Acute cystitis without hematuria         Past Medical History:  Past Medical History:   Diagnosis Date    Acid reflux     Allergic     Anxiety     Asthma     Depression     Diabetes mellitus (HCC)     Pre    Diverticulitis     Epileptic seizures (HCC)     Medication related    Gestational diabetes mellitus     Headache(784.0)     Migraines     Hypertension     Inflammatory bowel disease     Lateral epicondylitis     Malignant tumor of cervix (HCC)     between 3855-5502    Obstructive sleep apnea     CPAP    Paresthesias     Plantar fasciitis     Rectocele     Seizures (HCC)     Had ghem due to low sugar levels    Tinea corporis        Past Surgical History:  Past Surgical History:   Procedure Laterality Date    ARTHROSCOPY ANKLE Right 9/1/2017    Procedure: ANKLE ARTHOSCOPY WITH EXTENSIVE SYNOVECTOMY, OPEN ARTHOTOY LATERAL ANKLE WITH ANKLE STABILIZATION, BROSTOM REPAIR, INTERNAL BRACE AUGMENTATION, PERONEAL TENDON SYNOVECTOMY;  Surgeon: Donnell Hansen DPM;  Location: [facility];  Service: Podiatry    COLONOSCOPY  02/27/2017    GASTRIC BYPASS  07/2020    HEMORRHOID SURGERY  08/2021    HYSTERECTOMY  2015    [place] COLONOSCOPY FLX DX W/COLLJ SPEC WHEN PFRMD N/A 2/27/2017    Procedure: COLONOSCOPY;  Surgeon: Armani Lara MD;  Location: [facility];  Service: Gastroenterology    KY ESOPHAGOGASTRODUODENOSCOPY TRANSORAL DIAGNOSTIC N/A 9/7/2016    Procedure: EGD AND COLONOSCOPY;  Surgeon: Armani Lara MD;  Location: [facility];  Service: Gastroenterology    TONSILLECTOMY      TUBAL LIGATION      UPPER GASTROINTESTINAL ENDOSCOPY  2016       Family History:  Family History   Problem Relation Age of Onset    Anxiety disorder Mother     Depression Mother     Cancer Mother     Endometrial cancer Mother         under afe 50    Stroke Mother     Thyroid disease Mother     No Known Problems Father     No Known Problems Daughter     No Known Problems Daughter     Cancer Maternal Grandmother 50        lymphatic cancer    Cancer Maternal Grandfather     Breast cancer Paternal Grandmother 82    No Known Problems Paternal Grandfather     Seizures Brother         Epilepsy    Other Brother         Suicide risk    No Known Problems Brother     No Known Problems Son     Leukemia Cousin     Cancer Cousin         lympatic    Other Other         cardiac disorder    Diabetes Other      Hypertension Other     Neuropathy Other     Thyroid disease Other        Social History:  Social History     Socioeconomic History    Marital status: Legally      Spouse name: Not on file    Number of children: Not on file    Years of education: Not on file    Highest education level: Not on file   Occupational History    Not on file   Tobacco Use    Smoking status: Former     Current packs/day: 0.00     Average packs/day: 0.5 packs/day for 10.0 years (5.0 ttl pk-yrs)     Types: Cigarettes     Quit date: 2013     Years since quittin.1    Smokeless tobacco: Never   Vaping Use    Vaping status: Never Used   Substance and Sexual Activity    Alcohol use: Not Currently     Comment: occasionally, maybe 1-2 times a month    Drug use: Yes     Types: Marijuana    Sexual activity: Yes     Partners: Female, Male     Birth control/protection: Surgical     Comment: Had hysterectomy   Other Topics Concern    Not on file   Social History Narrative    Not on file     Social Determinants of Health     Financial Resource Strain: Not on file   Food Insecurity: Not on file   Transportation Needs: Not on file   Physical Activity: Not on file   Stress: Not on file   Social Connections: Not on file   Intimate Partner Violence: Not on file   Housing Stability: Not on file       Objective     Vitals:    10/29/24 1546   BP: 108/60   Pulse: 76   Resp: 17   Temp: 98.9 °F (37.2 °C)   SpO2: 97%     Wt Readings from Last 3 Encounters:   10/29/24 62.6 kg (138 lb)   10/17/24 64.4 kg (142 lb)   10/03/24 64.4 kg (142 lb)       Physical Exam  Vitals and nursing note reviewed.   Constitutional:       Appearance: Normal appearance.   HENT:      Head: Normocephalic and atraumatic.      Right Ear: Tympanic membrane, ear canal and external ear normal.      Left Ear: Tympanic membrane, ear canal and external ear normal.      Nose: Nose normal.      Mouth/Throat:      Mouth: Mucous membranes are moist.   Eyes:      Conjunctiva/sclera:  "Conjunctivae normal.   Cardiovascular:      Rate and Rhythm: Normal rate and regular rhythm.      Heart sounds: Normal heart sounds.   Pulmonary:      Effort: Pulmonary effort is normal.      Breath sounds: Normal breath sounds.   Abdominal:      General: Bowel sounds are normal.      Palpations: Abdomen is soft.   Musculoskeletal:         General: Normal range of motion.      Cervical back: Normal range of motion and neck supple.   Skin:     General: Skin is warm and dry.      Capillary Refill: Capillary refill takes less than 2 seconds.   Neurological:      General: No focal deficit present.      Mental Status: She is alert and oriented to person, place, and time.   Psychiatric:         Mood and Affect: Mood normal.         Behavior: Behavior normal.         Thought Content: Thought content normal.         Judgment: Judgment normal.         Pertinent Laboratory/Diagnostic Studies:  Lab Results   Component Value Date    BUN 19 09/17/2024    CREATININE 0.63 09/17/2024    CALCIUM 9.3 09/17/2024    K 3.8 09/17/2024    CO2 26 09/17/2024     09/17/2024     Lab Results   Component Value Date    ALT 19 09/17/2024    AST 16 09/17/2024    ALKPHOS 85 09/17/2024       Lab Results   Component Value Date    WBC 9.72 10/17/2024    HGB 13.9 10/17/2024    HCT 42.4 10/17/2024    MCV 97 10/17/2024     10/17/2024       Lab Results   Component Value Date    TSH 2.10 08/26/2023       No results found for: \"CHOL\"  Lab Results   Component Value Date    TRIG 346 (H) 07/11/2019     Lab Results   Component Value Date    HDL 28 (L) 07/11/2019     Lab Results   Component Value Date    LDLCALC 51 07/11/2019     Lab Results   Component Value Date    HGBA1C 5.3 09/13/2023       Results for orders placed or performed in visit on 10/17/24   Peripheral Smear    Collection Time: 10/17/24  9:30 AM   Result Value Ref Range    Total Counted 100     Segmented % 71 %    Lymphocytes % 21 %    Monocytes % 6 4 - 12 %    Eosinophils % 2 0 - 6 % "    Absolute Neutrophils 6.90 (H) 1.81 - 6.82 Thousand/uL    Absolute Lymphocytes 2.04 0.60 - 4.47 Thousand/uL    Absolute Monocytes 0.58 0.00 - 1.22 Thousand/uL    Absolute Eosinophils 0.19 0.00 - 0.61 Thousand/uL    RBC Morphology Normal     Platelet Estimate Adequate Adequate   CBC and differential    Collection Time: 10/17/24  9:30 AM   Result Value Ref Range    WBC 9.72 4.31 - 10.16 Thousand/uL    RBC 4.36 3.81 - 5.12 Million/uL    Hemoglobin 13.9 11.5 - 15.4 g/dL    Hematocrit 42.4 34.8 - 46.1 %    MCV 97 82 - 98 fL    MCH 31.9 26.8 - 34.3 pg    MCHC 32.8 31.4 - 37.4 g/dL    RDW 12.5 11.6 - 15.1 %    MPV 9.8 8.9 - 12.7 fL    Platelets 243 149 - 390 Thousands/uL    nRBC 0 /100 WBCs   Vitamin D 25 hydroxy    Collection Time: 10/17/24  9:30 AM   Result Value Ref Range    Vit D, 25-Hydroxy 38.8 30.0 - 100.0 ng/mL   TIBC Panel (incl. Iron, TIBC, % Iron Saturation)    Collection Time: 10/17/24  9:30 AM   Result Value Ref Range    Iron Saturation 45 15 - 50 %    TIBC 468 (H) 250 - 450 ug/dL    Iron 212 50 - 212 ug/dL    UIBC 256 155 - 355 ug/dL   Ferritin    Collection Time: 10/17/24  9:30 AM   Result Value Ref Range    Ferritin 18 11 - 307 ng/mL       Orders Placed This Encounter   Procedures    Urine culture    PTH, intact    Sodium, urine, random    Ambulatory Referral to Nutrition Services       ALLERGIES:  Allergies   Allergen Reactions    Aspirin Anaphylaxis    Bee Venom Swelling and Wheezing    Penicillins Anaphylaxis and Rash    Bee Pollen     Ceclor [Cefaclor] Itching    Dust Mite Extract Other (See Comments)     congestion    Grass Pollen(K-O-R-T-Swt Osito) Other (See Comments)     congestion    Lactose - Food Allergy GI Intolerance    Other Other (See Comments)     congestion    Pollen Extract     Amoxicillin Rash    Doxycycline Rash    Erythromycin Rash    Nickel Rash       Current Medications     Current Outpatient Medications   Medication Sig Dispense Refill    albuterol (PROVENTIL HFA,VENTOLIN HFA) 90  "mcg/act inhaler 2 PUFFS UP TO 4 TIMES A DAY AS NEEDED FOR WHEEZING 18 g 1    BD Integra Syringe 25G X 1\" 3 ML MISC DRAW UP 1 ML OF TORADOL AND INJECT INTO MUSCLE.      Biotin 1000 MCG tablet Take 10,000 mcg by mouth      Blood Glucose Monitoring Suppl (OneTouch Verio Reflect) w/Device KIT Check blood sugars twice daily. Please substitute with appropriate alternative as covered by patient's insurance. Dx: E11.65 1 kit 0    Blood Glucose Monitoring Suppl (OneTouch Verio Reflect) w/Device KIT Check blood sugars twice daily. Please substitute with appropriate alternative as covered by patient's insurance. Dx: E11.65 1 kit 0    Botulinum Toxin Type A 200 units SOLR Inject 155 units into face and neck IM every 90 days      Continuous Glucose Sensor (Dexcom G7 Sensor) Use 1 Device every 10 days 3 each 3    Continuous Glucose Sensor (FreeStyle Larry 14 Day Sensor) MISC Use 1 sensor q 14 days to check sugar levels 2 each 3    DULoxetine (CYMBALTA) 60 mg delayed release capsule Take 1 capsule (60 mg total) by mouth daily (Patient taking differently: Take 60 mg by mouth 2 (two) times a day) 30 capsule 2    glucose blood (OneTouch Verio) test strip Check blood sugars twice daily. Please substitute with appropriate alternative as covered by patient's insurance. Dx: E11.65 200 each 3    glucose blood (OneTouch Verio) test strip Check blood sugars twice daily. Please substitute with appropriate alternative as covered by patient's insurance. Dx: E11.65 200 each 3    HYDROcodone-acetaminophen (NORCO) 7.5-325 mg per tablet Take 1 tablet by mouth every 4 (four) hours as needed for pain      HYDROcodone-Acetaminophen (VICODIN PO)       ketorolac (TORADOL) 30 mg/mL injection INFUSE 1 ML (30 MG TOTAL) INTO A VENOUS CATHETER EVERY 12 (TWELVE) HOURS.      lactulose (CHRONULAC) 10 g/15 mL solution       LORazepam (ATIVAN) 1 mg tablet Take 1 mg by mouth daily as needed      meloxicam (MOBIC) 15 mg tablet Take 7.5 mg by mouth daily      " methocarbamol (ROBAXIN) 750 mg tablet Take 750 mg by mouth 2 (two) times a day      methylphenidate (RITALIN) 10 mg tablet Take 10 mg by mouth daily as needed    0    methylphenidate (RITALIN) 20 MG tablet Take 20 mg by mouth 2 (two) times a day Pt reports taking tid  0    Multiple Vitamin (MULTIVITAMIN ADULT PO) Take 1 tablet by mouth every morning      nystatin (MYCOSTATIN) powder Apply topically 2 (two) times a day 60 g 0    OneTouch Delica Lancets 33G MISC Check blood sugars twice daily. Please substitute with appropriate alternative as covered by patient's insurance. Dx: E11.65 200 each 3    OneTouch Delica Lancets 33G MISC Check blood sugars twice daily. Please substitute with appropriate alternative as covered by patient's insurance. Dx: E11.65 200 each 3    pantoprazole (PROTONIX) 40 mg tablet TAKE 1 TABLET BY MOUTH TWICE A DAY 30 MINUTES BEFORE BREAKFAST & DINNER      pregabalin (Lyrica) 150 mg capsule Take 1 capsule (150 mg total) by mouth 3 (three) times a day 90 capsule 1    rizatriptan (MAXALT) 10 MG tablet TAKE 1 TABLET (10 MG TOTAL) BY MOUTH ONCE AS NEEDED FOR MIGRAINE 9 tablet 1    SUMAtriptan Succinate 6 MG/0.5ML SOAJ Inject 0.5 mL (6 mg total) under the skin as needed (headache) 9 Cartridge 1    traZODone (DESYREL) 100 mg tablet       EPINEPHrine (EPIPEN) 0.3 mg/0.3 mL SOAJ Inject 0.3 mL (0.3 mg total) into a muscle once for 1 dose 0.6 mL 0     Current Facility-Administered Medications   Medication Dose Route Frequency Provider Last Rate Last Admin    cyanocobalamin injection 1,000 mcg  1,000 mcg Intramuscular Q30 Days Ronny Brown MD   1,000 mcg at 06/23/23 1524         Health Maintenance     Health Maintenance   Topic Date Due    Cervical Cancer Screening  08/13/2016    Pneumococcal Vaccine: Pediatrics (0 to 5 Years) and At-Risk Patients (6 to 64 Years) (2 of 2 - PCV) 09/05/2018    Annual Physical  05/01/2024    COVID-19 Vaccine (4 - 2023-24 season) 09/01/2024    Depression  Screening  09/06/2025    Breast Cancer Screening: Mammogram  10/17/2025    Colorectal Cancer Screening  12/01/2026    DTaP,Tdap,and Td Vaccines (2 - Td or Tdap) 03/29/2028    Zoster Vaccine (1 of 2) 09/12/2028    RSV Vaccine Age 60+ Years (1 - 1-dose 60+ series) 09/12/2038    HIV Screening  Completed    Hepatitis C Screening  Completed    Influenza Vaccine  Completed    RSV Vaccine age 0-20 Months  Aged Out    HIB Vaccine  Aged Out    IPV Vaccine  Aged Out    Hepatitis A Vaccine  Aged Out    Meningococcal ACWY Vaccine  Aged Out    HPV Vaccine  Aged Out     Immunization History   Administered Date(s) Administered    COVID-19 MODERNA VACC 0.5 ML IM 02/04/2021, 03/01/2021, 01/16/2022    Hib (PRP-OMP) 09/01/2003, 11/01/2003, 05/25/2004    INFLUENZA 10/06/2020, 10/15/2022    Influenza Quadrivalent Preservative Free 3 years and older IM 10/30/2014    Influenza Quadrivalent, 6-35 Months IM 11/02/2015, 10/11/2016, 09/05/2017    Influenza Recombinant Preservative Free Im 10/03/2024    Influenza, injectable, quadrivalent, preservative free 0.5 mL 10/15/2020, 11/23/2021    MMR 09/01/1979, 09/01/1982    Pneumococcal Polysaccharide PPV23 09/05/2017    TD (adult) Preservative Free 05/01/2004    Td (adult), adsorbed 05/25/2004    Tdap 03/29/2018    Tuberculin Skin Test-PPD Intradermal 08/22/2005, 10/14/2020, 12/08/2021, 12/17/2021          FINA Luna

## 2024-11-11 ENCOUNTER — HOSPITAL ENCOUNTER (OUTPATIENT)
Dept: RADIOLOGY | Facility: HOSPITAL | Age: 44
Discharge: HOME | End: 2024-11-11
Payer: COMMERCIAL

## 2024-11-11 DIAGNOSIS — R50.9 FEVER OF UNKNOWN ORIGIN (FUO): ICD-10-CM

## 2024-11-11 DIAGNOSIS — M25.59 PAIN IN OTHER JOINT: ICD-10-CM

## 2024-11-11 PROCEDURE — 76882 US LMTD JT/FCL EVL NVASC XTR: CPT

## 2024-11-13 ENCOUNTER — HOSPITAL ENCOUNTER (INPATIENT)
Facility: HOSPITAL | Age: 44
LOS: 1 days | Discharge: HOME | DRG: 424 | End: 2024-11-14
Attending: EMERGENCY MEDICINE | Admitting: STUDENT IN AN ORGANIZED HEALTH CARE EDUCATION/TRAINING PROGRAM
Payer: COMMERCIAL

## 2024-11-13 DIAGNOSIS — E16.2 HYPOGLYCEMIA: ICD-10-CM

## 2024-11-13 DIAGNOSIS — R53.83 FATIGUE: Primary | ICD-10-CM

## 2024-11-13 DIAGNOSIS — E16.1 REACTIVE HYPOGLYCEMIA: ICD-10-CM

## 2024-11-13 DIAGNOSIS — Z90.3 POSTGASTRECTOMY MALABSORPTION: ICD-10-CM

## 2024-11-13 DIAGNOSIS — K91.2 POSTGASTRECTOMY MALABSORPTION: ICD-10-CM

## 2024-11-13 LAB
ALBUMIN SERPL BCG-MCNC: 4.2 G/DL (ref 3.5–5)
ALP SERPL-CCNC: 73 U/L (ref 34–104)
ALT SERPL W P-5'-P-CCNC: 20 U/L (ref 7–52)
ANION GAP SERPL CALCULATED.3IONS-SCNC: 6 MMOL/L (ref 4–13)
AST SERPL W P-5'-P-CCNC: 15 U/L (ref 13–39)
BASOPHILS # BLD AUTO: 0.04 THOUSANDS/ÂΜL (ref 0–0.1)
BASOPHILS NFR BLD AUTO: 0 % (ref 0–1)
BILIRUB SERPL-MCNC: 0.54 MG/DL (ref 0.2–1)
BUN SERPL-MCNC: 10 MG/DL (ref 5–25)
CALCIUM SERPL-MCNC: 8.5 MG/DL (ref 8.4–10.2)
CHLORIDE SERPL-SCNC: 108 MMOL/L (ref 96–108)
CO2 SERPL-SCNC: 25 MMOL/L (ref 21–32)
CORTIS SERPL-MCNC: 4.7 UG/DL
CREAT SERPL-MCNC: 0.6 MG/DL (ref 0.6–1.3)
EOSINOPHIL # BLD AUTO: 0.12 THOUSAND/ÂΜL (ref 0–0.61)
EOSINOPHIL NFR BLD AUTO: 1 % (ref 0–6)
ERYTHROCYTE [DISTWIDTH] IN BLOOD BY AUTOMATED COUNT: 12.2 % (ref 11.6–15.1)
GFR SERPL CREATININE-BSD FRML MDRD: 109 ML/MIN/1.73SQ M
GLUCOSE SERPL-MCNC: 111 MG/DL (ref 65–140)
GLUCOSE SERPL-MCNC: 124 MG/DL (ref 65–140)
GLUCOSE SERPL-MCNC: 209 MG/DL (ref 65–140)
GLUCOSE SERPL-MCNC: 74 MG/DL (ref 65–140)
GLUCOSE SERPL-MCNC: 81 MG/DL (ref 65–140)
GLUCOSE SERPL-MCNC: 82 MG/DL (ref 65–140)
GLUCOSE SERPL-MCNC: 86 MG/DL (ref 65–140)
GLUCOSE SERPL-MCNC: 87 MG/DL (ref 65–140)
GLUCOSE SERPL-MCNC: 91 MG/DL (ref 65–140)
GLUCOSE SERPL-MCNC: 95 MG/DL (ref 65–140)
HCT VFR BLD AUTO: 37.5 % (ref 34.8–46.1)
HGB BLD-MCNC: 12.6 G/DL (ref 11.5–15.4)
IMM GRANULOCYTES # BLD AUTO: 0.03 THOUSAND/UL (ref 0–0.2)
IMM GRANULOCYTES NFR BLD AUTO: 0 % (ref 0–2)
INSULIN SERPL-ACNC: 12.08 UIU/ML
LYMPHOCYTES # BLD AUTO: 2.33 THOUSANDS/ÂΜL (ref 0.6–4.47)
LYMPHOCYTES NFR BLD AUTO: 22 % (ref 14–44)
MCH RBC QN AUTO: 32 PG (ref 26.8–34.3)
MCHC RBC AUTO-ENTMCNC: 33.6 G/DL (ref 31.4–37.4)
MCV RBC AUTO: 95 FL (ref 82–98)
MONOCYTES # BLD AUTO: 1 THOUSAND/ÂΜL (ref 0.17–1.22)
MONOCYTES NFR BLD AUTO: 9 % (ref 4–12)
NEUTROPHILS # BLD AUTO: 7.24 THOUSANDS/ÂΜL (ref 1.85–7.62)
NEUTS SEG NFR BLD AUTO: 68 % (ref 43–75)
NRBC BLD AUTO-RTO: 0 /100 WBCS
PLATELET # BLD AUTO: 213 THOUSANDS/UL (ref 149–390)
PMV BLD AUTO: 9.6 FL (ref 8.9–12.7)
POTASSIUM SERPL-SCNC: 4 MMOL/L (ref 3.5–5.3)
PROT SERPL-MCNC: 6.2 G/DL (ref 6.4–8.4)
RBC # BLD AUTO: 3.94 MILLION/UL (ref 3.81–5.12)
SODIUM SERPL-SCNC: 139 MMOL/L (ref 135–147)
WBC # BLD AUTO: 10.76 THOUSAND/UL (ref 4.31–10.16)

## 2024-11-13 PROCEDURE — 99285 EMERGENCY DEPT VISIT HI MDM: CPT | Performed by: EMERGENCY MEDICINE

## 2024-11-13 PROCEDURE — 85025 COMPLETE CBC W/AUTO DIFF WBC: CPT

## 2024-11-13 PROCEDURE — 84681 ASSAY OF C-PEPTIDE: CPT

## 2024-11-13 PROCEDURE — 80053 COMPREHEN METABOLIC PANEL: CPT

## 2024-11-13 PROCEDURE — 84305 ASSAY OF SOMATOMEDIN: CPT

## 2024-11-13 PROCEDURE — 99254 IP/OBS CNSLTJ NEW/EST MOD 60: CPT | Performed by: INTERNAL MEDICINE

## 2024-11-13 PROCEDURE — 83519 RIA NONANTIBODY: CPT

## 2024-11-13 PROCEDURE — 82533 TOTAL CORTISOL: CPT

## 2024-11-13 PROCEDURE — 36415 COLL VENOUS BLD VENIPUNCTURE: CPT

## 2024-11-13 PROCEDURE — 99223 1ST HOSP IP/OBS HIGH 75: CPT | Performed by: STUDENT IN AN ORGANIZED HEALTH CARE EDUCATION/TRAINING PROGRAM

## 2024-11-13 PROCEDURE — 82948 REAGENT STRIP/BLOOD GLUCOSE: CPT

## 2024-11-13 PROCEDURE — 83525 ASSAY OF INSULIN: CPT

## 2024-11-13 PROCEDURE — 99283 EMERGENCY DEPT VISIT LOW MDM: CPT

## 2024-11-13 RX ORDER — DEXTROSE MONOHYDRATE 25 G/50ML
25 INJECTION, SOLUTION INTRAVENOUS
Status: DISCONTINUED | OUTPATIENT
Start: 2024-11-13 | End: 2024-11-13

## 2024-11-13 RX ORDER — PANTOPRAZOLE SODIUM 40 MG/1
40 TABLET, DELAYED RELEASE ORAL
Status: DISCONTINUED | OUTPATIENT
Start: 2024-11-14 | End: 2024-11-14 | Stop reason: HOSPADM

## 2024-11-13 RX ORDER — ACARBOSE 25 MG/1
25 TABLET ORAL
Qty: 90 TABLET | Refills: 0 | Status: SHIPPED | OUTPATIENT
Start: 2024-11-13

## 2024-11-13 RX ORDER — METHYLPHENIDATE HYDROCHLORIDE 10 MG/1
20 TABLET ORAL DAILY PRN
Refills: 0 | Status: DISCONTINUED | OUTPATIENT
Start: 2024-11-13 | End: 2024-11-14 | Stop reason: HOSPADM

## 2024-11-13 RX ORDER — TRAZODONE HYDROCHLORIDE 100 MG/1
100 TABLET ORAL
Status: DISCONTINUED | OUTPATIENT
Start: 2024-11-13 | End: 2024-11-14 | Stop reason: HOSPADM

## 2024-11-13 RX ORDER — DEXTROSE MONOHYDRATE 25 G/50ML
25 INJECTION, SOLUTION INTRAVENOUS
Status: DISCONTINUED | OUTPATIENT
Start: 2024-11-13 | End: 2024-11-14 | Stop reason: HOSPADM

## 2024-11-13 RX ORDER — DULOXETIN HYDROCHLORIDE 60 MG/1
60 CAPSULE, DELAYED RELEASE ORAL 2 TIMES DAILY
Status: DISCONTINUED | OUTPATIENT
Start: 2024-11-13 | End: 2024-11-14 | Stop reason: HOSPADM

## 2024-11-13 RX ORDER — METHYLPHENIDATE HYDROCHLORIDE 10 MG/1
30 TABLET ORAL DAILY PRN
Status: DISCONTINUED | OUTPATIENT
Start: 2024-11-13 | End: 2024-11-13

## 2024-11-13 RX ORDER — METHYLPHENIDATE HYDROCHLORIDE 10 MG/1
10 TABLET ORAL DAILY PRN
Refills: 0 | Status: DISCONTINUED | OUTPATIENT
Start: 2024-11-13 | End: 2024-11-14 | Stop reason: HOSPADM

## 2024-11-13 RX ORDER — PANTOPRAZOLE SODIUM 40 MG/1
40 TABLET, DELAYED RELEASE ORAL
Status: DISCONTINUED | OUTPATIENT
Start: 2024-11-13 | End: 2024-11-13

## 2024-11-13 RX ORDER — METHYLPHENIDATE HYDROCHLORIDE 10 MG/1
30 TABLET ORAL DAILY
Status: DISCONTINUED | OUTPATIENT
Start: 2024-11-13 | End: 2024-11-14 | Stop reason: HOSPADM

## 2024-11-13 RX ADMIN — PREGABALIN 150 MG: 25 CAPSULE ORAL at 20:57

## 2024-11-13 RX ADMIN — DULOXETINE 60 MG: 60 CAPSULE, DELAYED RELEASE ORAL at 18:20

## 2024-11-13 RX ADMIN — TRAZODONE HYDROCHLORIDE 100 MG: 100 TABLET ORAL at 21:01

## 2024-11-13 NOTE — ASSESSMENT & PLAN NOTE
with autonomic and severe neuroglycopenic symptoms  - in setting of previous gastric bypass (2020), suggests postprandial hyperinsulinemic hypoglycemia    Discussed pathophysiology of hypoglycemia and complications with recurrent episodes.   Discussed the importance of small frequent meals, at least 5-6 meals a day. Diet should include high-fiber, complex carbohydrate, and protein-rich foods. Educated on risk of eating simple carbohydrates and how they could result in excessive insulin secretion.   Educated on adding corn-starch (at least 1 tablespoon) to meals as a thickening agent (can be added in yogurt or water)  Discussed possible medical therapy, specifically Acarbose, as first-line therapy for post-gastric surgery hypoglycemia. Educated on mechanism of action.     Plan:   - Recommend starting Acarbose 25 mg with breakfast, lunch, and dinner.  - High Protein Bariatric Diet  --- If patient develops another true hypoglycemic event (<55 mg/dL) with symptoms, recommend collecting STAT Critical Labs: insulin, c-peptide, plasma glucose, beta-hydroxybutyrate, and cortisol level---  - will collect morning cortisol level as well     Endocrinology will continue to follow

## 2024-11-13 NOTE — ED PROVIDER NOTES
Time reflects when diagnosis was documented in both MDM as applicable and the Disposition within this note       Time User Action Codes Description Comment    11/13/2024  3:24 PM Bradley Cao [R53.83] Fatigue     11/13/2024  3:24 PM Bradley Cao [E16.2] Hypoglycemia     11/13/2024  3:24 PM Bradley Cao [K91.2,  Z90.3] Postgastrectomy malabsorption     11/13/2024  3:24 PM Bradley Cao [E16.1] Reactive hypoglycemia           ED Disposition       ED Disposition   Admit    Condition   Stable    Date/Time   Wed Nov 13, 2024  3:37 PM    Comment   Case was discussed with Dr. Kerry Caballero MD and the patient's admission status was agreed to be Admission Status: inpatient status to the service of Dr. Caballero .               Assessment & Plan       Medical Decision Making  This patient presents with symptoms and labs consistent with acute hypoglycemia, with recurrent episodes of hypoglycemia, unclear etiology. Considered other etiologies of acute hypoglycemia to include drugs (anti-hyperglycemics, alcohol, beta blockers, ACE-I, APAP) or drug related error (missed meal, incorrect dosing, intentional overdose), systemic illness (sepsis, renal / hepatic failure, adrenal insufficiency), malignancy, or post-op complications from her prior Gastric bypass.  Discussed case with on-call endocrinologist, recommendations appreciated.  Discussed case with [facility] who agreed that patient would benefit from admission and inpatient evaluation by endocrinology.  Discussed case with patient and  at bedside who agreed with plan.      Amount and/or Complexity of Data Reviewed  Labs: ordered.    Risk  Decision regarding hospitalization.        ED Course as of 11/13/24 1613   Wed Nov 13, 2024   1520 Discussed case with on-call endocrinologist, Dr. Caitlin Dillard MD. recommended insulin, C-peptide, cortisol, IGF-I, and binding protein levels.  Excepted consult and would be able to see patient in  [facility].       Medications - No data to display    ED Risk Strat Scores                                               History of Present Illness       Chief Complaint   Patient presents with    Hypoglycemia - no symptoms     Pt states has a BS monitor BS have been going low PCP is trying to figure why pt is awaiting an appt with endocrinologist        Past Medical History:   Diagnosis Date    Acid reflux     Allergic     Anxiety     Asthma     Depression     Diabetes mellitus (HCC)     Pre    Diverticulitis     Epileptic seizures (HCC)     Medication related    Gestational diabetes mellitus     Headache(784.0)     Migraines    Hypertension     Inflammatory bowel disease     Lateral epicondylitis     Malignant tumor of cervix (HCC)     between 2607-0809    Obstructive sleep apnea     CPAP    Paresthesias     Plantar fasciitis     Rectocele     Seizures (HCC)     Had ghem due to low sugar levels    Tinea corporis       Past Surgical History:   Procedure Laterality Date    ARTHROSCOPY ANKLE Right 9/1/2017    Procedure: ANKLE ARTHOSCOPY WITH EXTENSIVE SYNOVECTOMY, OPEN ARTHOTOY LATERAL ANKLE WITH ANKLE STABILIZATION, BROSTOM REPAIR, INTERNAL BRACE AUGMENTATION, PERONEAL TENDON SYNOVECTOMY;  Surgeon: Donnell Hansen DPM;  Location: [facility];  Service: Podiatry    COLONOSCOPY  02/27/2017    GASTRIC BYPASS  07/2020    HEMORRHOID SURGERY  08/2021    HYSTERECTOMY  2015    SD COLONOSCOPY FLX DX W/COLLJ SPEC WHEN PFRMD N/A 2/27/2017    Procedure: COLONOSCOPY;  Surgeon: Armani Lara MD;  Location: [facility];  Service: Gastroenterology    SD ESOPHAGOGASTRODUODENOSCOPY TRANSORAL DIAGNOSTIC N/A 9/7/2016    Procedure: EGD AND COLONOSCOPY;  Surgeon: Armani Lara MD;  Location: [facility];  Service: Gastroenterology    TONSILLECTOMY      TUBAL LIGATION      UPPER GASTROINTESTINAL ENDOSCOPY  2016      Family History   Problem Relation Age of Onset    Anxiety disorder Mother     Depression Mother     Cancer Mother      Endometrial cancer Mother         under afe 50    Stroke Mother     Thyroid disease Mother     No Known Problems Father     No Known Problems Daughter     No Known Problems Daughter     Cancer Maternal Grandmother 50        lymphatic cancer    Cancer Maternal Grandfather     Breast cancer Paternal Grandmother 82    No Known Problems Paternal Grandfather     Seizures Brother         Epilepsy    Other Brother         Suicide risk    No Known Problems Brother     No Known Problems Son     Leukemia Cousin     Cancer Cousin         lympatic    Other Other         cardiac disorder    Diabetes Other     Hypertension Other     Neuropathy Other     Thyroid disease Other       Social History     Tobacco Use    Smoking status: Former     Current packs/day: 0.00     Average packs/day: 0.5 packs/day for 10.0 years (5.0 ttl pk-yrs)     Types: Cigarettes     Quit date: 2013     Years since quittin.1    Smokeless tobacco: Never   Vaping Use    Vaping status: Never Used   Substance Use Topics    Alcohol use: Not Currently     Comment: occasionally, maybe 1-2 times a month    Drug use: Yes     Types: Marijuana      E-Cigarette/Vaping    E-Cigarette Use Never User       E-Cigarette/Vaping Substances    Nicotine No     THC No     CBD No     Flavoring No     Other No     Unknown No       I have reviewed and agree with the history as documented.     Pt is a 44 y.o. female who presents to the ED on 2024. Patient presents with weakness, lightheadedness, inability to stand after episode of hypoglycemia caught on home monitor.  Patient states that she has been having recurrent hypoglycemia since  when she received a gastric bypass (10/27/2020).  States that during these episodes she feels lightheaded, weak, tired, and is even had episodes of seizures.  Today, she noted feeling lightheaded and weak checked her Shirley glucometer and found her blood sugar to be 63.  She called EMS, who arrived due to recheck and found  that she was hypoglycemic to the low 40s, administered 100 mL of D10.  Patient states that prior to episode she was attempting to get her energy back up by eating sandwiches.  Denies taking insulin, denies any chest pain, shortness of breath, nasal congestion, nausea, vomiting, polyuria, enuresis, diarrhea, any sick contacts.  Upon observation of her glucometer steph, hypoglycemic episodes usually occur between the hours of 9 PM to 3 AM.  Within the last month she has had 37 hypoglycemic events.  Upon independent chart review, patient has been having frequent episodes of hypoglycemia, last PCP visit was given referral to outpatient endocrinology, which she has not scheduled yet.  Has history of malabsorption with history of gastric bypass. Most recent CT abdomen pelvis showed no identified acute abnormalities, adrenal glands unremarkable. Repeat point-of-care glucose showed blood sugar of 111.      Hyperglycemia - no symptoms  Associated symptoms: dizziness, fatigue and weakness    Associated symptoms: no abdominal pain, no fever, no nausea, no polyuria, no shortness of breath and no vomiting        Review of Systems   Constitutional:  Positive for activity change and fatigue. Negative for chills and fever.   HENT:  Negative for congestion.    Respiratory:  Negative for cough, chest tightness, shortness of breath and wheezing.    Gastrointestinal:  Negative for abdominal distention, abdominal pain, blood in stool, diarrhea, nausea and vomiting.   Endocrine: Negative for polyuria.   Genitourinary:  Negative for difficulty urinating, dyspareunia and frequency.   Skin:  Negative for color change.   Neurological:  Positive for dizziness, weakness and light-headedness. Negative for seizures and syncope.           Objective       ED Triage Vitals   Temperature Pulse Blood Pressure Respirations SpO2 Patient Position - Orthostatic VS   11/13/24 1425 11/13/24 1411 11/13/24 1411 11/13/24 1411 11/13/24 1411 11/13/24 1411   98.6  °F (37 °C) 60 139/84 18 99 % Lying      Temp src Heart Rate Source BP Location FiO2 (%) Pain Score    -- 11/13/24 1411 11/13/24 1411 -- 11/13/24 1425     Monitor Right arm  No Pain      Vitals      Date and Time Temp Pulse SpO2 Resp BP Pain Score FACES Pain Rating User   11/13/24 1425 98.6 °F (37 °C) 65 97 % 16 107/59 No Pain -- JDT   11/13/24 1411 -- 60 99 % 18 139/84 -- -- CF            Physical Exam  Vitals and nursing note reviewed.   Constitutional:       General: She is not in acute distress.     Appearance: She is well-developed.   HENT:      Head: Normocephalic and atraumatic.   Eyes:      Conjunctiva/sclera: Conjunctivae normal.   Cardiovascular:      Rate and Rhythm: Normal rate and regular rhythm.      Heart sounds: No murmur heard.  Pulmonary:      Effort: Pulmonary effort is normal. No respiratory distress.      Breath sounds: Normal breath sounds.   Abdominal:      Palpations: Abdomen is soft.      Tenderness: There is no abdominal tenderness.   Musculoskeletal:         General: No swelling.      Cervical back: Neck supple.   Skin:     General: Skin is warm and dry.      Capillary Refill: Capillary refill takes less than 2 seconds.   Neurological:      Mental Status: She is alert.   Psychiatric:         Mood and Affect: Mood normal.         Results Reviewed       Procedure Component Value Units Date/Time    Fingerstick Glucose (POCT) [[id number]]  (Normal) Collected: 11/13/24 1558    Lab Status: Final result Specimen: Blood Updated: 11/13/24 1559     POC Glucose 124 mg/dl     Cortisol [[id number]] Collected: 11/13/24 1537    Lab Status: In process Specimen: Blood from Arm, Left Updated: 11/13/24 1543    Insulin, random [[id number]] Collected: 11/13/24 1537    Lab Status: In process Specimen: Blood from Arm, Left Updated: 11/13/24 1543    Insulin-like growth factor 1 (IGF-1) [[id number]] Collected: 11/13/24 1537    Lab Status: In process Specimen: Blood from Arm, Left Updated: 11/13/24 1543    IGF Binding  Protein - 3 [[id number]] Collected: 11/13/24 1537    Lab Status: In process Specimen: Blood from Arm, Left Updated: 11/13/24 1543    C-peptide [[id number]] Collected: 11/13/24 1537    Lab Status: In process Specimen: Blood from Arm, Left Updated: 11/13/24 1543    Fingerstick Glucose (POCT) [[id number]]  (Normal) Collected: 11/13/24 1528    Lab Status: Final result Specimen: Blood Updated: 11/13/24 1529     POC Glucose 82 mg/dl     Comprehensive metabolic panel [[id number]]  (Abnormal) Collected: 11/13/24 1448    Lab Status: Final result Specimen: Blood from Arm, Left Updated: 11/13/24 1512     Sodium 139 mmol/L      Potassium 4.0 mmol/L      Chloride 108 mmol/L      CO2 25 mmol/L      ANION GAP 6 mmol/L      BUN 10 mg/dL      Creatinine 0.60 mg/dL      Glucose 81 mg/dL      Calcium 8.5 mg/dL      AST 15 U/L      ALT 20 U/L      Alkaline Phosphatase 73 U/L      Total Protein 6.2 g/dL      Albumin 4.2 g/dL      Total Bilirubin 0.54 mg/dL      eGFR 109 ml/min/1.73sq m     Narrative:      National Kid[employer] Disease Foundation guidelines for Chronic Kidney Disease (CKD):     Stage 1 with normal or high GFR (GFR > 90 mL/min/1.73 square meters)    Stage 2 Mild CKD (GFR = 60-89 mL/min/1.73 square meters)    Stage 3A Moderate CKD (GFR = 45-59 mL/min/1.73 square meters)    Stage 3B Moderate CKD (GFR = 30-44 mL/min/1.73 square meters)    Stage 4 Severe CKD (GFR = 15-29 mL/min/1.73 square meters)    Stage 5 End Stage CKD (GFR <15 mL/min/1.73 square meters)  Note: GFR calculation is accurate only with a steady state creatinine    CBC and differential [[id number]]  (Abnormal) Collected: 11/13/24 1448    Lab Status: Final result Specimen: Blood from Arm, Left Updated: 11/13/24 1503     WBC 10.76 Thousand/uL      RBC 3.94 Million/uL      Hemoglobin 12.6 g/dL      Hematocrit 37.5 %      MCV 95 fL      MCH 32.0 pg      MCHC 33.6 g/dL      RDW 12.2 %      MPV 9.6 fL      Platelets 213 Thousands/uL      nRBC 0 /100 WBCs      Segmented % 68 %   "    Immature Grans % 0 %      Lymphocytes % 22 %      Monocytes % 9 %      Eosinophils Relative 1 %      Basophils Relative 0 %      Absolute Neutrophils 7.24 Thousands/µL      Absolute Immature Grans 0.03 Thousand/uL      Absolute Lymphocytes 2.33 Thousands/µL      Absolute Monocytes 1.00 Thousand/µL      Eosinophils Absolute 0.12 Thousand/µL      Basophils Absolute 0.04 Thousands/µL     Fingerstick Glucose (POCT) [[id number]]  (Normal) Collected: 11/13/24 1416    Lab Status: Final result Specimen: Blood Updated: 11/13/24 1417     POC Glucose 111 mg/dl             No orders to display       Procedures    ED Medication and Procedure Management   Prior to Admission Medications   Prescriptions Last Dose Informant Patient Reported? Taking?   BD Integra Syringe 25G X 1\" 3 ML MISC  Self Yes No   Sig: DRAW UP 1 ML OF TORADOL AND INJECT INTO MUSCLE.   Biotin 1000 MCG tablet  Self Yes No   Sig: Take 10,000 mcg by mouth   Blood Glucose Monitoring Suppl (OneTouch Verio Reflect) w/Device KIT   No No   Sig: Check blood sugars twice daily. Please substitute with appropriate alternative as covered by patient's insurance. Dx: E11.65   Blood Glucose Monitoring Suppl (OneTouch Verio Reflect) w/Device KIT   No No   Sig: Check blood sugars twice daily. Please substitute with appropriate alternative as covered by patient's insurance. Dx: E11.65   Botulinum Toxin Type A 200 units SOLR  Self Yes No   Sig: Inject 155 units into face and neck IM every 90 days   Continuous Glucose Sensor (Dexcom G7 Sensor)   No No   Sig: Use 1 Device every 10 days   Continuous Glucose Sensor (FreeStyle Larry 14 Day Sensor) MISC   No No   Sig: Use 1 sensor q 14 days to check sugar levels   DULoxetine (CYMBALTA) 60 mg delayed release capsule  Self No No   Sig: Take 1 capsule (60 mg total) by mouth daily   Patient taking differently: Take 60 mg by mouth 2 (two) times a day   EPINEPHrine (EPIPEN) 0.3 mg/0.3 mL SOAJ   No No   Sig: Inject 0.3 mL (0.3 mg total) " into a muscle once for 1 dose   HYDROcodone-Acetaminophen (VICODIN PO)   Yes No   HYDROcodone-acetaminophen (NORCO) 7.5-325 mg per tablet   Yes No   Sig: Take 1 tablet by mouth every 4 (four) hours as needed for pain   LORazepam (ATIVAN) 1 mg tablet   Yes No   Sig: Take 1 mg by mouth daily as needed   Multiple Vitamin (MULTIVITAMIN ADULT PO)  Self Yes No   Sig: Take 1 tablet by mouth every morning   OneTouch Delica Lancets 33G MISC   No No   Sig: Check blood sugars twice daily. Please substitute with appropriate alternative as covered by patient's insurance. Dx: E11.65   OneTouch Delica Lancets 33G MISC   No No   Sig: Check blood sugars twice daily. Please substitute with appropriate alternative as covered by patient's insurance. Dx: E11.65   SUMAtriptan Succinate 6 MG/0.5ML SOAJ  Self No No   Sig: Inject 0.5 mL (6 mg total) under the skin as needed (headache)   albuterol (PROVENTIL HFA,VENTOLIN HFA) 90 mcg/act inhaler  Self No No   Si PUFFS UP TO 4 TIMES A DAY AS NEEDED FOR WHEEZING   glucose blood (OneTouch Verio) test strip   No No   Sig: Check blood sugars twice daily. Please substitute with appropriate alternative as covered by patient's insurance. Dx: E11.65   glucose blood (OneTouch Verio) test strip   No No   Sig: Check blood sugars twice daily. Please substitute with appropriate alternative as covered by patient's insurance. Dx: E11.65   ketorolac (TORADOL) 30 mg/mL injection  Self Yes No   Sig: INFUSE 1 ML (30 MG TOTAL) INTO A VENOUS CATHETER EVERY 12 (TWELVE) HOURS.   lactulose (CHRONULAC) 10 g/15 mL solution  Self Yes No   meloxicam (MOBIC) 15 mg tablet   Yes No   Sig: Take 7.5 mg by mouth daily   methocarbamol (ROBAXIN) 750 mg tablet   Yes No   Sig: Take 750 mg by mouth 2 (two) times a day   methylphenidate (RITALIN) 10 mg tablet  Self Yes No   Sig: Take 10 mg by mouth daily as needed     methylphenidate (RITALIN) 20 MG tablet  Self Yes No   Sig: Take 20 mg by mouth 2 (two) times a day Pt reports  taking tid   nystatin (MYCOSTATIN) powder   No No   Sig: Apply topically 2 (two) times a day   pantoprazole (PROTONIX) 40 mg tablet  Self Yes No   Sig: TAKE 1 TABLET BY MOUTH TWICE A DAY 30 MINUTES BEFORE BREAKFAST & DINNER   pregabalin (Lyrica) 150 mg capsule  Self No No   Sig: Take 1 capsule (150 mg total) by mouth 3 (three) times a day   rizatriptan (MAXALT) 10 MG tablet  Self No No   Sig: TAKE 1 TABLET (10 MG TOTAL) BY MOUTH ONCE AS NEEDED FOR MIGRAINE   traZODone (DESYREL) 100 mg tablet  Self Yes No      Facility-Administered Medications Last Administration Doses Remaining   cyanocobalamin injection 1,000 mcg 6/23/2023  3:24 PM         Patient's Medications   Discharge Prescriptions    No medications on file     No discharge procedures on file.  ED SEPSIS DOCUMENTATION   Time reflects when diagnosis was documented in both MDM as applicable and the Disposition within this note       Time User Action Codes Description Comment    11/13/2024  3:24 PM Bradley Cao [R53.83] Fatigue     11/13/2024  3:24 PM Bradley Cao [E16.2] Hypoglycemia     11/13/2024  3:24 PM Bradley Cao [K91.2,  Z90.3] Postgastrectomy malabsorption     11/13/2024  3:24 PM Bradley Cao [E16.1] Reactive hypoglycemia                  Bradley Cao MD  11/13/24 9335

## 2024-11-13 NOTE — CONSULTS
Consultation - Endocrinology   Name: Tram Perez 44 y.o. female I MRN: [id number]  Unit/Bed#: ED-26 I Date of Admission: 11/13/2024   Date of Service: 11/13/2024 I Hospital Day: 0  Inpatient consult to Endocrinology  Consult performed by: Caitlin Dillard MD  Consult ordered by: Tien Guillen MD        Physician Requesting Evaluation: Kerry Caballero MD   Reason for Evaluation / Principal Problem: Hypoglycemia    Assessment & Plan  Hypoglycemia  with autonomic and severe neuroglycopenic symptoms  - in setting of previous gastric bypass (2020), suggests postprandial hyperinsulinemic hypoglycemia    Discussed pathophysiology of hypoglycemia and complications with recurrent episodes.   Discussed the importance of small frequent meals, at least 5-6 meals a day. Diet should include high-fiber, complex carbohydrate, and protein-rich foods. Educated on risk of eating simple carbohydrates and how they could result in excessive insulin secretion.   Educated on adding corn-starch (at least 1 tablespoon) to meals as a thickening agent (can be added in yogurt or water)  Discussed possible medical therapy, specifically Acarbose, as first-line therapy for post-gastric surgery hypoglycemia. Educated on mechanism of action.     Plan:   - Recommend starting Acarbose 25 mg with breakfast, lunch, and dinner.  - High Protein Bariatric Diet  --- If patient develops another true hypoglycemic event (<55 mg/dL) with symptoms, recommend collecting STAT Critical Labs: insulin, c-peptide, plasma glucose, beta-hydroxybutyrate, and cortisol level---  - will collect morning cortisol level as well     Endocrinology will continue to follow        History of Present Illness   Tram Perez is a 44 y.o. female with history of asthma, IBS, migraine, vitamin D deficiency, and gastric bypass who presented on 11/13 with low blood glucose.    She had gastric bypass surgery on 7/2020, Laparoscopic Vi-N-Y Gastric bypass surgery.  She developed  hypoglycemia soon after.  Initially she was manage by nutritional services.  She went to dietitian and was instructed on meals to have.  She was also told to have small frequent meals throughout the day, limit the amount of carbohydrates, and continue to check her glucose.  She more recently was given a freestyle rissa to monitor her glucose levels.   She will call us frequent hypoglycemia episodes at least an hour after eating and anytime she fasts.  She typically manages them with crackers, peanut butter, juice.   She has had multiple hospitalizations for hypoglycemia.  She has had multiple syncopal episodes and has even developed seizures as a result of low blood glucose levels.   She has never been given any medication for hypoglycemia and has not seen an endocrinologist.     She denies history of liver disease, kidney disease, insulin use, steroid use, alcohol use.     Review of Systems   Constitutional:  Negative for activity change, appetite change, chills, diaphoresis, fatigue and fever.   HENT:  Negative for ear pain, sore throat, trouble swallowing and voice change.    Eyes:  Negative for pain and visual disturbance.   Respiratory:  Negative for cough and shortness of breath.    Cardiovascular:  Negative for chest pain and palpitations.   Gastrointestinal:  Negative for abdominal pain and vomiting.   Endocrine: Negative for cold intolerance, heat intolerance, polydipsia, polyphagia and polyuria.   Genitourinary:  Negative for dysuria and hematuria.   Musculoskeletal:  Negative for arthralgias and back pain.   Skin:  Negative for color change and rash.   Neurological:  Negative for tremors, seizures, syncope and speech difficulty.   Psychiatric/Behavioral:  Negative for decreased concentration. The patient is not nervous/anxious and is not hyperactive.    All other systems reviewed and are negative.    I have reviewed the patient's PMH, PSH, Social History, Family History, Meds, and Allergies  Social  "History     Tobacco Use    Smoking status: Former     Current packs/day: 0.00     Average packs/day: 0.5 packs/day for 10.0 years (5.0 ttl pk-yrs)     Types: Cigarettes     Quit date: 2013     Years since quittin.1    Smokeless tobacco: Never   Vaping Use    Vaping status: Never Used   Substance and Sexual Activity    Alcohol use: Not Currently     Comment: occasionally, maybe 1-2 times a month    Drug use: Yes     Types: Marijuana    Sexual activity: Yes     Partners: Female, Male     Birth control/protection: Surgical     Comment: Had hysterectomy     Prior to Admission Medications   Prescriptions Last Dose Informant Patient Reported? Taking?   BD Integra Syringe 25G X 1\" 3 ML MISC  Self Yes No   Sig: DRAW UP 1 ML OF TORADOL AND INJECT INTO MUSCLE.   Biotin 1000 MCG tablet  Self Yes No   Sig: Take 10,000 mcg by mouth   Blood Glucose Monitoring Suppl (OneTouch Verio Reflect) w/Device KIT   No No   Sig: Check blood sugars twice daily. Please substitute with appropriate alternative as covered by patient's insurance. Dx: E11.65   Blood Glucose Monitoring Suppl (OneTouch Verio Reflect) w/Device KIT   No No   Sig: Check blood sugars twice daily. Please substitute with appropriate alternative as covered by patient's insurance. Dx: E11.65   Botulinum Toxin Type A 200 units SOLR  Self Yes No   Sig: Inject 155 units into face and neck IM every 90 days   Continuous Glucose Sensor (Dexcom G7 Sensor)   No No   Sig: Use 1 Device every 10 days   Continuous Glucose Sensor (FreeStyle Larry 14 Day Sensor) MISC   No No   Sig: Use 1 sensor q 14 days to check sugar levels   DULoxetine (CYMBALTA) 60 mg delayed release capsule  Self No No   Sig: Take 1 capsule (60 mg total) by mouth daily   Patient taking differently: Take 60 mg by mouth 2 (two) times a day   EPINEPHrine (EPIPEN) 0.3 mg/0.3 mL SOAJ   No No   Sig: Inject 0.3 mL (0.3 mg total) into a muscle once for 1 dose   HYDROcodone-Acetaminophen (VICODIN PO)   Yes No "   HYDROcodone-acetaminophen (NORCO) 7.5-325 mg per tablet   Yes No   Sig: Take 1 tablet by mouth every 4 (four) hours as needed for pain   LORazepam (ATIVAN) 1 mg tablet   Yes No   Sig: Take 1 mg by mouth daily as needed   Multiple Vitamin (MULTIVITAMIN ADULT PO)  Self Yes No   Sig: Take 1 tablet by mouth every morning   OneTouch Delica Lancets 33G MISC   No No   Sig: Check blood sugars twice daily. Please substitute with appropriate alternative as covered by patient's insurance. Dx: E11.65   OneTouch Delica Lancets 33G MISC   No No   Sig: Check blood sugars twice daily. Please substitute with appropriate alternative as covered by patient's insurance. Dx: E11.65   SUMAtriptan Succinate 6 MG/0.5ML SOAJ  Self No No   Sig: Inject 0.5 mL (6 mg total) under the skin as needed (headache)   albuterol (PROVENTIL HFA,VENTOLIN HFA) 90 mcg/act inhaler  Self No No   Si PUFFS UP TO 4 TIMES A DAY AS NEEDED FOR WHEEZING   glucose blood (OneTouch Verio) test strip   No No   Sig: Check blood sugars twice daily. Please substitute with appropriate alternative as covered by patient's insurance. Dx: E11.65   glucose blood (OneTouch Verio) test strip   No No   Sig: Check blood sugars twice daily. Please substitute with appropriate alternative as covered by patient's insurance. Dx: E11.65   ketorolac (TORADOL) 30 mg/mL injection  Self Yes No   Sig: INFUSE 1 ML (30 MG TOTAL) INTO A VENOUS CATHETER EVERY 12 (TWELVE) HOURS.   lactulose (CHRONULAC) 10 g/15 mL solution  Self Yes No   meloxicam (MOBIC) 15 mg tablet   Yes No   Sig: Take 7.5 mg by mouth daily   methocarbamol (ROBAXIN) 750 mg tablet   Yes No   Sig: Take 750 mg by mouth 2 (two) times a day   methylphenidate (RITALIN) 10 mg tablet  Self Yes No   Sig: Take 10 mg by mouth daily as needed     methylphenidate (RITALIN) 20 MG tablet  Self Yes No   Sig: Take 20 mg by mouth 2 (two) times a day Pt reports taking tid   nystatin (MYCOSTATIN) powder   No No   Sig: Apply topically 2 (two)  times a day   pantoprazole (PROTONIX) 40 mg tablet  Self Yes No   Sig: TAKE 1 TABLET BY MOUTH TWICE A DAY 30 MINUTES BEFORE BREAKFAST & DINNER   pregabalin (Lyrica) 150 mg capsule  Self No No   Sig: Take 1 capsule (150 mg total) by mouth 3 (three) times a day   rizatriptan (MAXALT) 10 MG tablet  Self No No   Sig: TAKE 1 TABLET (10 MG TOTAL) BY MOUTH ONCE AS NEEDED FOR MIGRAINE   traZODone (DESYREL) 100 mg tablet  Self Yes No      Facility-Administered Medications Last Administration Doses Remaining   cyanocobalamin injection 1,000 mcg 6/23/2023  3:24 PM           Objective :  Temp:  [98.6 °F (37 °C)] 98.6 °F (37 °C)  HR:  [60-65] 65  BP: (107-139)/(59-84) 107/59  Resp:  [16-18] 16  SpO2:  [97 %-99 %] 97 %  O2 Device: None (Room air)    Physical Exam  Constitutional:       Appearance: Normal appearance.   HENT:      Head: Normocephalic and atraumatic.      Nose: Nose normal.      Mouth/Throat:      Mouth: Mucous membranes are moist.      Pharynx: Oropharynx is clear.   Eyes:      Extraocular Movements: Extraocular movements intact.      Conjunctiva/sclera: Conjunctivae normal.   Neck:      Thyroid: No thyroid mass, thyromegaly or thyroid tenderness.   Cardiovascular:      Rate and Rhythm: Normal rate and regular rhythm.      Pulses: Normal pulses.   Pulmonary:      Effort: Pulmonary effort is normal. No respiratory distress.   Abdominal:      General: Abdomen is flat.   Musculoskeletal:         General: No swelling. Normal range of motion.      Cervical back: Normal range of motion and neck supple.   Skin:     General: Skin is warm and dry.   Neurological:      Mental Status: She is alert and oriented to person, place, and time.   Psychiatric:         Mood and Affect: Mood normal.         Behavior: Behavior normal.         Thought Content: Thought content normal.         Judgment: Judgment normal.         Lab Results: I have reviewed the following results:CBC/BMP:   .     11/13/24  1448   WBC 10.76*   HGB 12.6   HCT  37.5      SODIUM 139   K 4.0      CO2 25   BUN 10   CREATININE 0.60   GLUC 81    , Creatinine Clearance: Estimated Creatinine Clearance: 103.9 mL/min (by C-G formula based on SCr of 0.6 mg/dL).

## 2024-11-13 NOTE — ASSESSMENT & PLAN NOTE
Reports at least 1 year of recurrent hypoglycemia, no clear pattern but usually does happen at night.  Does not seem to be associated with meals.  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  Discussed this with patient, her  can  the prescription and bring to hospital.  I sent to her preferred pharmacy.  Follow-up labs ordered by endocrinology  Check glucose every 2 hours for now

## 2024-11-13 NOTE — H&P
H&P - Hospitalist   Name: Tram Perez 44 y.o. female I MRN: [id number]  Unit/Bed#: ED-26 I Date of Admission: 11/13/2024   Date of Service: 11/13/2024 I Hospital Day: 0     Assessment & Plan  Hypoglycemia  Reports at least 1 year of recurrent hypoglycemia, no clear pattern but usually does happen at night.  Does not seem to be associated with meals.  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  Discussed this with patient, her  can  the prescription and bring to hospital.  I sent to her preferred pharmacy.  Follow-up labs ordered by endocrinology  Check glucose every 2 hours for now  Fibromyalgia  Continue home Lyrica 150 mg 3 times daily  Continue home Cymbalta 60 mg twice daily  ADHD  Continue home Ritalin 30 mg every morning, 30 mg q. afternoon as needed      VTE Pharmacologic Prophylaxis: VTE Score: 2 Low Risk (Score 0-2) - Encourage Ambulation.  Code Status: Level 1 - Full Code   Discussion with family: Patient declined call to .     Anticipated Length of Stay: Patient will be admitted on an inpatient basis with an anticipated length of stay of greater than 2 midnights secondary to hypoglycemia.    History of Present Illness   Chief Complaint: Symptomatic hypoglycemia    Tram Perez is a 44 y.o. female with a PMH of Vi-en-Y bypass in 2020, recurrent hypoglycemia, ADHD, fibromyalgia who presents with hypoglycemia.  Patient has had recurrent hypoglycemia for at least the last year, with episodes of blood sugars less than 40 and associated with seizures.  When she typically gets her symptoms she has lethargy, tremors, diaphoresis.  She says it happens almost every night where she gets hypoglycemic and wakes up drenched in sweat.  Today, she was preparing to take  "a nap and checked her blood sugar, it was reading \" low\" on her CGM as she could not raise it to monitor how much food or liquid she drank, so she presented to the ER.  She does not associate these hypoglycemic episodes with meals.    Review of Systems    Historical Information   Past Medical History:   Diagnosis Date    Acid reflux     Allergic     Anxiety     Asthma     Depression     Diabetes mellitus (HCC)     Pre    Diverticulitis     Epileptic seizures (HCC)     Medication related    Gestational diabetes mellitus     Headache(784.0)     Migraines    Hypertension     Inflammatory bowel disease     Lateral epicondylitis     Malignant tumor of cervix (HCC)     between 4916-8699    Obstructive sleep apnea     CPAP    Paresthesias     Plantar fasciitis     Rectocele     Seizures (HCC)     Had ghem due to low sugar levels    Tinea corporis      Past Surgical History:   Procedure Laterality Date    ARTHROSCOPY ANKLE Right 9/1/2017    Procedure: ANKLE ARTHOSCOPY WITH EXTENSIVE SYNOVECTOMY, OPEN ARTHOTOY LATERAL ANKLE WITH ANKLE STABILIZATION, BROSTOM REPAIR, INTERNAL BRACE AUGMENTATION, PERONEAL TENDON SYNOVECTOMY;  Surgeon: Donnell Hansen DPM;  Location: [facility];  Service: Podiatry    COLONOSCOPY  02/27/2017    GASTRIC BYPASS  07/2020    HEMORRHOID SURGERY  08/2021    HYSTERECTOMY  2015    ME COLONOSCOPY FLX DX W/COLLJ SPEC WHEN PFRMD N/A 2/27/2017    Procedure: COLONOSCOPY;  Surgeon: Armani Lara MD;  Location: [facility];  Service: Gastroenterology    ME ESOPHAGOGASTRODUODENOSCOPY TRANSORAL DIAGNOSTIC N/A 9/7/2016    Procedure: EGD AND COLONOSCOPY;  Surgeon: Armani Lara MD;  Location: [facility];  Service: Gastroenterology    TONSILLECTOMY      TUBAL LIGATION      UPPER GASTROINTESTINAL ENDOSCOPY  2016     Social History     Tobacco Use    Smoking status: Former     Current packs/day: 0.00     Average packs/day: 0.5 packs/day for 10.0 years (5.0 ttl pk-yrs)     Types: Cigarettes     Quit date: 9/7/2013     " "Years since quittin.1    Smokeless tobacco: Never   Vaping Use    Vaping status: Never Used   Substance and Sexual Activity    Alcohol use: Not Currently     Comment: occasionally, maybe 1-2 times a month    Drug use: Yes     Types: Marijuana    Sexual activity: Yes     Partners: Female, Male     Birth control/protection: Surgical     Comment: Had hysterectomy     E-Cigarette/Vaping    E-Cigarette Use Never User      E-Cigarette/Vaping Substances    Nicotine No     THC No     CBD No     Flavoring No     Other No     Unknown No        Social History:  Marital Status: Legally    Occupation:   Patient Pre-hospital Living Situation: Home  Patient Pre-hospital Level of Mobility: walks  Patient Pre-hospital Diet Restrictions:     Meds/Allergies   I have reviewed home medications with patient personally.  Prior to Admission medications    Medication Sig Start Date End Date Taking? Authorizing Provider   acarbose (PRECOSE) 25 mg tablet Take 1 tablet (25 mg total) by mouth 3 (three) times a day with meals 24  Yes Marlon Gatica, DO   albuterol (PROVENTIL HFA,VENTOLIN HFA) 90 mcg/act inhaler 2 PUFFS UP TO 4 TIMES A DAY AS NEEDED FOR WHEEZING 22   Zuleyka Mcdonald MD   BD Integra Syringe 25G X 1\" 3 ML MISC DRAW UP 1 ML OF TORADOL AND INJECT INTO MUSCLE. 22   Historical Provider, MD   Biotin 1000 MCG tablet Take 10,000 mcg by mouth    Historical Provider, MD   Blood Glucose Monitoring Suppl (OneTouch Verio Reflect) w/Device KIT Check blood sugars twice daily. Please substitute with appropriate alternative as covered by patient's insurance. Dx: E11.65 23   Robert Bryant MD   Blood Glucose Monitoring Suppl (OneTouch Verio Reflect) w/Device KIT Check blood sugars twice daily. Please substitute with appropriate alternative as covered by patient's insurance. Dx: E11.65 24   Robert Bryant MD   Botulinum Toxin Type A 200 units SOLR Inject 155 units into face and neck " IM every 90 days 8/7/18   Historical Provider, MD   Continuous Glucose Sensor (Dexcom G7 Sensor) Use 1 Device every 10 days 10/29/24   FINA Luna   Continuous Glucose Sensor (FreeStyle Larry 14 Day Sensor) MISC Use 1 sensor q 14 days to check sugar levels 9/18/24   Ronny Brown MD   DULoxetine (CYMBALTA) 60 mg delayed release capsule Take 1 capsule (60 mg total) by mouth daily  Patient taking differently: Take 60 mg by mouth 2 (two) times a day 2/17/22   Zuleyka Mcdonald MD   EPINEPHrine (EPIPEN) 0.3 mg/0.3 mL SOAJ Inject 0.3 mL (0.3 mg total) into a muscle once for 1 dose 5/1/23 8/8/23  Ronny Brown MD   glucose blood (OneTouch Verio) test strip Check blood sugars twice daily. Please substitute with appropriate alternative as covered by patient's insurance. Dx: E11.65 8/8/23   Robert Bryant MD   glucose blood (OneTouch Verio) test strip Check blood sugars twice daily. Please substitute with appropriate alternative as covered by patient's insurance. Dx: E11.65 8/27/24   Robert Bryant MD   HYDROcodone-acetaminophen (NORCO) 7.5-325 mg per tablet Take 1 tablet by mouth every 4 (four) hours as needed for pain    Historical Provider, MD   HYDROcodone-Acetaminophen (VICODIN PO)     Historical Provider, MD   ketorolac (TORADOL) 30 mg/mL injection INFUSE 1 ML (30 MG TOTAL) INTO A VENOUS CATHETER EVERY 12 (TWELVE) HOURS. 11/18/22   Historical Provider, MD   lactulose (CHRONULAC) 10 g/15 mL solution  5/16/22   Historical Provider, MD   LORazepam (ATIVAN) 1 mg tablet Take 1 mg by mouth daily as needed 9/17/24   Historical Provider, MD   meloxicam (MOBIC) 15 mg tablet Take 7.5 mg by mouth daily    Historical Provider, MD   methocarbamol (ROBAXIN) 750 mg tablet Take 750 mg by mouth 2 (two) times a day    Historical Provider, MD   methylphenidate (RITALIN) 10 mg tablet Take 10 mg by mouth daily as needed   5/24/19   Historical Provider, MD   methylphenidate (RITALIN) 20  MG tablet Take 20 mg by mouth 2 (two) times a day Pt reports taking tid 5/14/19   Historical Provider, MD   Multiple Vitamin (MULTIVITAMIN ADULT PO) Take 1 tablet by mouth every morning    Historical Provider, MD   nystatin (MYCOSTATIN) powder Apply topically 2 (two) times a day 9/27/23   MD Marcos Feliciano Lancets 33G MISC Check blood sugars twice daily. Please substitute with appropriate alternative as covered by patient's insurance. Dx: E11.65 8/8/23   Robert Mandaeism Annette, MD   OneTouch Delica Lancets 33G MISC Check blood sugars twice daily. Please substitute with appropriate alternative as covered by patient's insurance. Dx: E11.65 8/27/24   Robert Bryant MD   pantoprazole (PROTONIX) 40 mg tablet TAKE 1 TABLET BY MOUTH TWICE A DAY 30 MINUTES BEFORE BREAKFAST & DINNER 9/28/20   Historical Provider, MD   pregabalin (Lyrica) 150 mg capsule Take 1 capsule (150 mg total) by mouth 3 (three) times a day 2/17/22   Zuleyka Mdconald MD   rizatriptan (MAXALT) 10 MG tablet TAKE 1 TABLET (10 MG TOTAL) BY MOUTH ONCE AS NEEDED FOR MIGRAINE 9/28/20   Zuleyka Mcdonald MD   SUMAtriptan Succinate 6 MG/0.5ML SOAJ Inject 0.5 mL (6 mg total) under the skin as needed (headache) 4/9/20   Zuleyka Mcdonald MD   traZODone (DESYREL) 100 mg tablet  9/9/21   Historical Provider, MD     Allergies   Allergen Reactions    Aspirin Anaphylaxis    Bee Venom Swelling and Wheezing    Penicillins Anaphylaxis and Rash    Bee Pollen     Ceclor [Cefaclor] Itching    Dust Mite Extract Other (See Comments)     congestion    Grass Pollen(K-O-R-T-Swt Osito) Other (See Comments)     congestion    Lactose - Food Allergy GI Intolerance    Other Other (See Comments)     congestion    Pollen Extract     Amoxicillin Rash    Doxycycline Rash    Erythromycin Rash    Nickel Rash       Objective :  Temp:  [98.6 °F (37 °C)] 98.6 °F (37 °C)  HR:  [60-65] 65  BP: (107-139)/(59-84) 107/59  Resp:  [16-18] 16  SpO2:  [97 %-99 %] 97  %  O2 Device: None (Room air)    Physical Exam  Constitutional:       General: She is not in acute distress.     Appearance: She is normal weight. She is not ill-appearing.   HENT:      Mouth/Throat:      Mouth: Mucous membranes are moist.      Pharynx: Oropharynx is clear.   Cardiovascular:      Rate and Rhythm: Normal rate and regular rhythm.      Heart sounds: No murmur heard.  Pulmonary:      Effort: Pulmonary effort is normal. No respiratory distress.      Breath sounds: Normal breath sounds.   Abdominal:      General: Abdomen is flat. There is no distension.      Palpations: Abdomen is soft.   Musculoskeletal:      Right lower leg: No edema.      Left lower leg: No edema.   Skin:     General: Skin is warm and dry.   Neurological:      General: No focal deficit present.      Mental Status: She is alert. Mental status is at baseline.          Lines/Drains:            Lab Results: I have reviewed the following results:  Results from last 7 days   Lab Units 11/13/24  1448   WBC Thousand/uL 10.76*   HEMOGLOBIN g/dL 12.6   HEMATOCRIT % 37.5   PLATELETS Thousands/uL 213   SEGS PCT % 68   LYMPHO PCT % 22   MONO PCT % 9   EOS PCT % 1     Results from last 7 days   Lab Units 11/13/24  1448   SODIUM mmol/L 139   POTASSIUM mmol/L 4.0   CHLORIDE mmol/L 108   CO2 mmol/L 25   BUN mg/dL 10   CREATININE mg/dL 0.60   ANION GAP mmol/L 6   CALCIUM mg/dL 8.5   ALBUMIN g/dL 4.2   TOTAL BILIRUBIN mg/dL 0.54   ALK PHOS U/L 73   ALT U/L 20   AST U/L 15   GLUCOSE RANDOM mg/dL 81         Results from last 7 days   Lab Units 11/13/24  1703 11/13/24  1558 11/13/24  1528 11/13/24  1416   POC GLUCOSE mg/dl 209* 124 82 111     Lab Results   Component Value Date    HGBA1C 5.3 09/13/2023    HGBA1C 5.3 07/02/2022    HGBA1C 5.0 03/08/2022                 Administrative Statements       ** Please Note: This note has been constructed using a voice recognition system. **

## 2024-11-13 NOTE — ED ATTENDING ATTESTATION
11/13/2024  I, Tien Guillen MD, saw and evaluated the patient. I have discussed the patient with the resident/non-physician practitioner and agree with the resident's/non-physician practitioner's findings, Plan of Care, and MDM as documented in the resident's/non-physician practitioner's note, except where noted. All available labs and Radiology studies were reviewed.  I was present for key portions of any procedure(s) performed by the resident/non-physician practitioner and I was immediately available to provide assistance.       At this point I agree with the current assessment done in the Emergency Department.  I have conducted an independent evaluation of this patient a history and physical is as follows:  Briefly, 44-year-old female presenting with recurrent hypoglycemia.  Patient is been having recurrent hypoglycemia with unclear etiology for some time, worse over the past several days.  Patient states she has been eating multiple small meals throughout the day, eats peanut butter immediately before going to bed, still has been having recurrent hypoglycemia.  She is not taking insulin, sulfonylureas, or any other antihyperglycemic medications.  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  Denies fevers, trauma, other symptoms.  Patient was hypoglycemic this morning, ate a sandwich, was still hypoglycemic afterwards, called EMS, found to be hypoglycemic by same.  Given D10 by EMS with resolution of hypoglycemia.  On examination, abdomen nontender, heart sounds normal, lungs clear to auscultation, mucous membranes moist.  Patient given food in ER, no recurrent hypoglycemia throughout ER course, labs overall reassuring.  Discussed case with endocrinology on-call, patient admitted for further testing, hemodynamically stable and comfortable at that time.  ED Course         Critical Care  Time  Procedures

## 2024-11-14 VITALS
RESPIRATION RATE: 18 BRPM | DIASTOLIC BLOOD PRESSURE: 57 MMHG | OXYGEN SATURATION: 96 % | BODY MASS INDEX: 26.5 KG/M2 | HEIGHT: 62 IN | SYSTOLIC BLOOD PRESSURE: 101 MMHG | WEIGHT: 144 LBS | TEMPERATURE: 98.7 F | HEART RATE: 61 BPM

## 2024-11-14 LAB
ANION GAP SERPL CALCULATED.3IONS-SCNC: 6 MMOL/L (ref 4–13)
BUN SERPL-MCNC: 16 MG/DL (ref 5–25)
C PEPTIDE SERPL-MCNC: 2.7 NG/ML (ref 1.1–4.4)
CALCIUM SERPL-MCNC: 8.5 MG/DL (ref 8.4–10.2)
CHLORIDE SERPL-SCNC: 109 MMOL/L (ref 96–108)
CO2 SERPL-SCNC: 24 MMOL/L (ref 21–32)
CORTIS AM PEAK SERPL-MCNC: 8 UG/DL (ref 6.7–22.6)
CREAT SERPL-MCNC: 0.65 MG/DL (ref 0.6–1.3)
GFR SERPL CREATININE-BSD FRML MDRD: 106 ML/MIN/1.73SQ M
GLUCOSE SERPL-MCNC: 103 MG/DL (ref 65–140)
GLUCOSE SERPL-MCNC: 107 MG/DL (ref 65–140)
GLUCOSE SERPL-MCNC: 108 MG/DL (ref 65–140)
GLUCOSE SERPL-MCNC: 109 MG/DL (ref 65–140)
GLUCOSE SERPL-MCNC: 119 MG/DL (ref 65–140)
GLUCOSE SERPL-MCNC: 143 MG/DL (ref 65–140)
GLUCOSE SERPL-MCNC: 87 MG/DL (ref 65–140)
GLUCOSE SERPL-MCNC: 89 MG/DL (ref 65–140)
POTASSIUM SERPL-SCNC: 4 MMOL/L (ref 3.5–5.3)
SODIUM SERPL-SCNC: 139 MMOL/L (ref 135–147)

## 2024-11-14 PROCEDURE — 82533 TOTAL CORTISOL: CPT | Performed by: INTERNAL MEDICINE

## 2024-11-14 PROCEDURE — 80377 DRUG/SUBSTANCE NOS 7/MORE: CPT | Performed by: INTERNAL MEDICINE

## 2024-11-14 PROCEDURE — 82948 REAGENT STRIP/BLOOD GLUCOSE: CPT

## 2024-11-14 PROCEDURE — 80048 BASIC METABOLIC PNL TOTAL CA: CPT | Performed by: INTERNAL MEDICINE

## 2024-11-14 PROCEDURE — 99239 HOSP IP/OBS DSCHRG MGMT >30: CPT | Performed by: INTERNAL MEDICINE

## 2024-11-14 RX ADMIN — DULOXETINE 60 MG: 60 CAPSULE, DELAYED RELEASE ORAL at 09:22

## 2024-11-14 RX ADMIN — PREGABALIN 150 MG: 25 CAPSULE ORAL at 09:22

## 2024-11-14 RX ADMIN — PANTOPRAZOLE SODIUM 40 MG: 40 TABLET, DELAYED RELEASE ORAL at 05:31

## 2024-11-14 NOTE — DISCHARGE SUMMARY
Discharge Summary - Hospitalist   Name: Tarm Perez 44 y.o. female I MRN: [id number]  Unit/Bed#: -01 I Date of Admission: 11/13/2024   Date of Service: 11/14/2024 I Hospital Day: 1     Assessment & Plan  Hypoglycemia  Reports at least 1 year of recurrent hypoglycemia, no clear pattern but usually does happen at night.  Does not seem to be associated with meals.  Has a history of Vi-en-Y bypass in 2020  Most likely represents late dumping syndrome    Plan  Recommending starting acarbose 25 mg 3 times daily with meals  Follow-up with endocrinology in 1 month  Fibromyalgia  Continue home Lyrica 150 mg 3 times daily  Continue home Cymbalta 60 mg twice daily  ADHD  Continue home Ritalin 30 mg every morning, 30 mg q. afternoon as needed     Medical Problems       Resolved Problems  Date Reviewed: 11/13/2024   None       Discharging Physician / Practitioner: Lexa Bergman MD  PCP: FINA Luna  Admission Date:   Admission Orders (From admission, onward)       Ordered        11/13/24 1537  INPATIENT ADMISSION  Once                          Discharge Date: 11/14/24    Consultations During Hospital Stay:  Endocrinology    Procedures Performed:   None    Significant Findings / Test Results:   None    Incidental Findings:   None    Test Results Pending at Discharge (will require follow up):   None     Outpatient Tests Requested:  None    Complications: None    Reason for Admission: Hypoglycemia    Hospital Course:   Tram Perez is a 44 y.o. female patient with history of Vi-en-Y bypass in 2020 who originally presented to the hospital on 11/13/2024 due to hypoglycemia episodes that occur at night.  She reports eating small meals every hour.  Endocrinology was consulted with recommendation to start acarbose 25 mg 3 times a day.  We also made recommendations about adjusting her diet to include more fat and protein.  She reported being on the wait list to see endocrinology outpatient by both [facility] and [facility].   "She did not have any hypoglycemic episodes while admitted.  We suggest she continue with endocrinology recommendation for a carbose 25 mg 3 times a day and follow-up outpatient.      Condition at Discharge: good    Discharge Day Visit / Exam:   Subjective:    Patient seen and examined at bedside.    No overnight events.  No new symptoms.  There were no hypoglycemic episodes last night.    The lowest global blood sugar she got was 89.  She expressed understanding of recommendations and to follow-up outpatient.    Vitals: Blood Pressure: 101/57 (11/14/24 0737)  Pulse: 61 (11/14/24 0737)  Temperature: 98.7 °F (37.1 °C) (11/14/24 0737)  Temp Source: Oral (11/14/24 0737)  Respirations: 18 (11/14/24 0737)  Height: 5' 2\" (157.5 cm) (11/13/24 2232)  Weight - Scale: 65.3 kg (144 lb) (11/13/24 2232)  SpO2: 96 % (11/14/24 0737)  Physical Exam  Vitals and nursing note reviewed.   Constitutional:       General: She is not in acute distress.     Appearance: She is well-developed.   HENT:      Head: Normocephalic and atraumatic.   Eyes:      Conjunctiva/sclera: Conjunctivae normal.   Cardiovascular:      Rate and Rhythm: Normal rate and regular rhythm.      Heart sounds: No murmur heard.  Pulmonary:      Effort: Pulmonary effort is normal. No respiratory distress.      Breath sounds: Normal breath sounds.   Abdominal:      Palpations: Abdomen is soft.      Tenderness: There is no abdominal tenderness.   Musculoskeletal:         General: No swelling.   Skin:     General: Skin is warm and dry.      Capillary Refill: Capillary refill takes less than 2 seconds.   Neurological:      Mental Status: She is alert.   Psychiatric:         Mood and Affect: Mood normal.          Discussion with Family: Patient declined call to .     Discharge instructions/Information to patient and family:   See after visit summary for information provided to patient and family.      Provisions for Follow-Up Care:  See after visit summary for " information related to follow-up care and any pertinent home health orders.      Mobility at time of Discharge:   Basic Mobility Inpatient Raw Score: 24  JH-HLM Goal: 8: Walk 250 feet or more  JH-HLM Achieved: 7: Walk 25 feet or more  HLM Goal NOT achieved. Continue to encourage mobility in post discharge setting.     Disposition:   Home    Planned Readmission: No    Discharge Medications:  See after visit summary for reconciled discharge medications provided to patient and/or family.      Administrative Statements   Discharge Statement:  I have spent a total time of 30 minutes in caring for this patient on the day of the visit/encounter. .    **Please Note: This note may have been constructed using a voice recognition system**

## 2024-11-14 NOTE — ASSESSMENT & PLAN NOTE
Reports at least 1 year of recurrent hypoglycemia, no clear pattern but usually does happen at night.  Does not seem to be associated with meals.  Has a history of Vi-en-Y bypass in 2020  Most likely represents late dumping syndrome    Plan  Recommending starting acarbose 25 mg 3 times daily with meals  Follow-up with endocrinology in 1 month

## 2024-11-14 NOTE — UTILIZATION REVIEW
NOTIFICATION OF INPATIENT ADMISSION   AUTHORIZATION REQUEST   SERVICING FACILITY:   [place] [address]  Tax ID: [id number]  NPI: [id number]   ATTENDING PROVIDER:  Attending Name and NPI#: Waleska Gerber Md [[id number]]  Address: [address] [place] [address]  Phone: [phone]     ADMISSION INFORMATION:  Place of Service: Inpatient [facility]  Place of Service Code: 21  Inpatient Admission Date/Time: 11/13/24  3:37 PM  Discharge Date/Time: No discharge date for patient encounter.  Admitting Diagnosis Code/Description:  Reactive hypoglycemia [E16.1]  Fatigue [R53.83]  Hypoglycemia [E16.2]  Postgastrectomy malabsorption [K91.2, Z90.3]     UTILIZATION REVIEW CONTACT:  Sari Ashton Utilization   Network Utilization Review Department  Phone: [phone]  Fax: [phone]  Email: [email or web address]  Contact for approvals/pending authorizations, clinical reviews, and discharge.     PHYSICIAN ADVISORY SERVICES:  Medical Necessity Denial & Cssg-sh-Woes Review  Phone: [phone]  Fax: [phone]  Email: [email or web address]     DISCHARGE SUPPORT TEAM:  For Patients Discharge Needs & Updates  Phone: [phone] opt. 2 Fax: [phone]  Email: [email or web address]

## 2024-11-14 NOTE — UTILIZATION REVIEW
Initial Clinical Review    Admission: Date/Time/Statement:   Admission Orders (From admission, onward)       Ordered        11/13/24 1537  INPATIENT ADMISSION  Once                          Orders Placed This Encounter   Procedures    INPATIENT ADMISSION     Standing Status:   Standing     Number of Occurrences:   1     Level of Care:   Med Surg [16]     Estimated length of stay:   More than 2 Midnights     Certification:   I certify that inpatient services are medically necessary for this patient for a duration of greater than two midnights. See H&P and MD Progress Notes for additional information about the patient's course of treatment.     ED Arrival Information       Expected   -    Arrival   11/13/2024 13:44    Acuity   Urgent              Means of arrival   Ambulance    Escorted by   [facility]    Service   Hospitalist    Admission type   Emergency              Arrival complaint   *EMS/SUB*             Chief Complaint   Patient presents with    Hypoglycemia - no symptoms     Pt states has a BS monitor BS have been going low PCP is trying to figure why pt is awaiting an appt with endocrinologist        Initial Presentation: 44 y.o. female to ED by EMS presents w symptomatic hypoglycemia. PMH Vi-en-Y bypass in 2020, Fibromyalgia, ADHD, HX hypoglycemia resulting in SZ reporting hypoglycemia on a nightly basis in 40s. Reports home continuous glucose monitor kept reading low w an inability to raise her level OP despite eating small meals hourly. Inpatient admission for evaluation & treatment of  Hypoglycemia   EXAM  POC glucose 111  ENDO consult rec starting acarbose 25 mg 3 times daily with meals -not on formulary.  will bring to hospital; AM labs;  glucose check Q 2HR; cont home meds  ENDO  Usually feels symptoms of low blood sugars, and sometimes she does not feel symptoms, she uses CGM rissa 2 sensor but going to switch to Dexcom G7   Not tried acarabose or any other medications  Discussion of OP  meals, blood sugars food/ caffeine avoidance affecting blood sugar  Start bariatric diet with double proteins   Recommend to  start Acarabose 25 mg three times daily with meals   Discussed to use corn starch mix with yogurt at night as well as with meals to avoid reactive hypoglycemia   Discussed to use CGM regularly for safety and avoidence of hypoglycemia   Obtain AM cortisol, to R/o adrenal insufficiency   Monitor blood sugars Q 2 hours   If blood sugar drops < 55 mg.dl, with symptoms, draw critical sample for insulin, C peptide, glucose, B- hydroxy butyrate and cortisol   Anticipated Length of Stay/Certification Statement: Patient will be admitted on an inpatient basis with an anticipated length of stay of greater than 2 midnights secondary to hypoglycemia.     Date: 11/14/2024   Day 2:   No overnight events.  No new symptoms.  There were no hypoglycemic episodes last night.    The lowest global blood sugar she got was 89.    Endocrinology  consulted with recommendation to start acarbose 25 mg 3 times a day. Recommendations about adjusting her diet to include more fat and protein. She reported being on the wait list to see endocrinology outpatient by both [facility] and [facility]. She did not have any hypoglycemic episodes while admitted. We suggest she continue with endocrinology recommendation for a carbose 25 mg 3 times a day and follow-up outpatient.   ED Treatment-Medication Administration from 11/13/2024 1343 to 11/13/2024 2017         Date/Time Order Dose Route Action     11/13/2024 1820 DULoxetine (CYMBALTA) delayed release capsule 60 mg 60 mg Oral Given            Scheduled Medications:  DULoxetine, 60 mg, Oral, BID  methylphenidate, 30 mg, Oral, Daily  NON FORMULARY, 25 mg, Oral, TID AC  pantoprazole, 40 mg, Oral, Early Morning  pregabalin, 150 mg, Oral, TID  traZODone, 100 mg, Oral, HS      Continuous IV Infusions:     PRN Meds:  dextrose, 25 mL, Intravenous, Q1H PRN  methylphenidate, 10 mg, Oral, Daily  PRN  methylphenidate, 20 mg, Oral, Daily PRN      ED Triage Vitals   Temperature Pulse Respirations Blood Pressure SpO2 Pain Score   11/13/24 1425 11/13/24 1411 11/13/24 1411 11/13/24 1411 11/13/24 1411 11/13/24 1425   98.6 °F (37 °C) 60 18 139/84 99 % No Pain     Weight (last 2 days)       Date/Time Weight    11/13/24 2232 65.3 (144)    11/13/24 1425 65.3 (144)            Vital Signs (last 3 days)       Date/Time Temp Pulse Resp BP MAP (mmHg) SpO2 O2 Device Patient Position - Orthostatic VS [place] Coma Scale Score Pain    11/14/24 0750 -- -- -- -- -- -- -- -- -- No Pain    11/14/24 0737 98.7 °F (37.1 °C) 61 18 101/57 75 96 % None (Room air) Lying -- --    11/13/24 2232 -- -- -- 135/70 -- -- -- -- -- --    11/13/24 2018 98.2 °F (36.8 °C) 56 18 135/70 97 100 % None (Room air) Lying 15 No Pain    11/13/24 1427 -- -- -- -- -- -- -- -- 15 --    11/13/24 1425 98.6 °F (37 °C) 65 16 107/59 -- 97 % -- -- -- No Pain    11/13/24 1411 -- 60 18 139/84 106 99 % None (Room air) Lying -- --              Pertinent Labs/Diagnostic Test Results:   Radiology:  No orders to display     Cardiology:  No orders to display     GI:  No orders to display           Results from last 7 days   Lab Units 11/13/24  1448   WBC Thousand/uL 10.76*   HEMOGLOBIN g/dL 12.6   HEMATOCRIT % 37.5   PLATELETS Thousands/uL 213   TOTAL NEUT ABS Thousands/µL 7.24         Results from last 7 days   Lab Units 11/14/24  0521 11/13/24  1448   SODIUM mmol/L 139 139   POTASSIUM mmol/L 4.0 4.0   CHLORIDE mmol/L 109* 108   CO2 mmol/L 24 25   ANION GAP mmol/L 6 6   BUN mg/dL 16 10   CREATININE mg/dL 0.65 0.60   EGFR ml/min/1.73sq m 106 109   CALCIUM mg/dL 8.5 8.5     Results from last 7 days   Lab Units 11/13/24  1448   AST U/L 15   ALT U/L 20   ALK PHOS U/L 73   TOTAL PROTEIN g/dL 6.2*   ALBUMIN g/dL 4.2   TOTAL BILIRUBIN mg/dL 0.54     Results from last 7 days   Lab Units 11/14/24  1219 11/14/24  1008 11/14/24  0804 11/14/24  0601 11/14/24  0402 11/14/24  0158  "11/13/24  2356 11/13/24  2200 11/13/24  2004 11/13/24  1903 11/13/24  1809 11/13/24  1703   POC GLUCOSE mg/dl 89 143* 103 108 87 109 91 95 87 86 74 209*     Results from last 7 days   Lab Units 11/14/24  0521 11/13/24  1448   GLUCOSE RANDOM mg/dL 107 81             No results found for: \"BETA-HYDROXYBUTYRATE\"                                                                                                                                         Past Medical History:   Diagnosis Date    Acid reflux     Allergic     Anxiety     Asthma     Depression     Diabetes mellitus (HCC)     Pre    Diverticulitis     Epileptic seizures (HCC)     Medication related    Gestational diabetes mellitus     Headache(784.0)     Migraines    Hypertension     Inflammatory bowel disease     Lateral epicondylitis     Malignant tumor of cervix (HCC)     between 8020-8056    Obstructive sleep apnea     CPAP    Paresthesias     Plantar fasciitis     Rectocele     Seizures (HCC)     Had ghem due to low sugar levels    Tinea corporis      Present on Admission:   Hypoglycemia   Fibromyalgia   ADHD      Admitting Diagnosis: Reactive hypoglycemia [E16.1]  Fatigue [R53.83]  Hypoglycemia [E16.2]  Postgastrectomy malabsorption [K91.2, Z90.3]  Age/Sex: 44 y.o. female    Network Utilization Review Department  ATTENTION: Please call with any questions or concerns to [phone] and carefully listen to the prompts so that you are directed to the right person. All voicemails are confidential.   For Discharge needs, contact Care Management DC Support Team at [phone] opt. 2  Send all requests for admission clinical reviews, approved or denied determinations and any other requests to dedicated fax number below belonging to the campus where the patient is receiving treatment. List of dedicated fax numbers for the Facilities:  FACILITY NAME UR FAX NUMBER   ADMISSION DENIALS (Administrative/Medical Necessity) [phone]   DISCHARGE SUPPORT TEAM (NETWORK) " [phone]   PARENT CHILD HEALTH (Maternity/NICU/Pediatrics) [phone]   [facility] [phone]   [facility] [phone]   [facility] [phone]   [facility] [phone]   [facility] [phone]   General acute hospital [phone]   General acute hospital [phone]   [facility] [phone]   [facility] [phone]   [facility] [phone]   [facility] [phone]   [facility] [phone]

## 2024-11-14 NOTE — DISCHARGE INSTR - AVS FIRST PAGE
Dear Tram Perez,     It was our pleasure to care for you here at [facility].  It is our hope that we were always able to exceed the expected standards for your care during your stay.  You were hospitalized due to hypoglycemia.  You were cared for on the first floor by Lexa Bergman MD under the service of Waleska Gerber MD with the [facility] Hospitalist Group who covers for your primary care physician (PCP), FINA Luna, while you were hospitalized.  If you have any questions or concerns related to this hospitalization, you may contact us at .  For follow up as well as any medication refills, we recommend that you follow up with your primary care physician.  A registered nurse will reach out to you by phone within a few days after your discharge to answer any additional questions that you may have after going home.  However, at this time we provide for you here, the most important instructions / recommendations at discharge:       Notable Medication Adjustments -   Please take Acarbose 25 mg three times a day.  Testing Required after Discharge -   None  Important follow up information -   Please follow-up with your PCP within a week of discharge  Please follow up with Endocrinology in 1 month. Referral given.  Other Instructions -   Please take all your medications regularly as directed  Recommend eating small meals with 15 to 20 g of carbohydrates and double portion of protein.  Avoid caffeine and alcohol.    Use cornstarch mixed with yogurt at night as well as with meals to avoid hypoglycemia.  If symptoms return/persist contact your PCP if unable then visit to nearest ER    Please review this entire after visit summary as additional general instructions including medication list, appointments, activity, diet, any pertinent wound care, and other additional recommendations from your care team that may be provided for you.      Sincerely,    Lexa Bergman MD

## 2024-11-15 ENCOUNTER — TRANSITIONAL CARE MANAGEMENT (OUTPATIENT)
Dept: FAMILY MEDICINE CLINIC | Facility: CLINIC | Age: 44
End: 2024-11-15

## 2024-11-15 LAB
IGF BP3 SERPL-MCNC: 2218 UG/L (ref 2343–5612)
IGF-I SERPL-MCNC: 131 NG/ML (ref 70–225)

## 2024-11-15 NOTE — UTILIZATION REVIEW
NOTIFICATION OF ADMISSION DISCHARGE   This is a Notification of Discharge from [facility]. Please be advised that this patient has been discharge from our facility. Below you will find the admission and discharge date and time including the patient’s disposition.   UTILIZATION REVIEW CONTACT:  Sari Ashton  Utilization   Network Utilization Review Department  Phone: [phone] x carefully listen to the prompts. All voicemails are confidential.  Email: [email or web address]     ADMISSION INFORMATION  PRESENTATION DATE: 11/13/2024  2:06 PM  OBERVATION ADMISSION DATE: N/A  INPATIENT ADMISSION DATE: 11/13/24  3:37 PM   DISCHARGE DATE: 11/14/2024  3:21 PM   DISPOSITION:Home/Self Care    Network Utilization Review Department  ATTENTION: Please call with any questions or concerns to [phone] and carefully listen to the prompts so that you are directed to the right person. All voicemails are confidential.   For Discharge needs, contact Care Management DC Support Team at [phone] opt. 2  Send all requests for admission clinical reviews, approved or denied determinations and any other requests to dedicated fax number below belonging to the campus where the patient is receiving treatment. List of dedicated fax numbers for the Facilities:  FACILITY NAME UR FAX NUMBER   ADMISSION DENIALS (Administrative/Medical Necessity) [phone]   DISCHARGE SUPPORT TEAM ([facility]) [phone]   PARENT CHILD HEALTH (Maternity/NICU/Pediatrics) [phone]   [facility] [phone]   [facility] [phone]   [facility] [phone]   [facility] [phone]   [facility] [phone]   [facility] [phone]   [facility] [phone]   [facility] [phone]    [facility] [phone]   [facility] [phone]   [facility] [phone]   [facility] [phone]

## 2024-11-18 ENCOUNTER — TELEPHONE (OUTPATIENT)
Dept: FAMILY MEDICINE CLINIC | Facility: CLINIC | Age: 44
End: 2024-11-18

## 2024-11-18 ENCOUNTER — RESULTS FOLLOW-UP (OUTPATIENT)
Dept: FAMILY MEDICINE CLINIC | Facility: CLINIC | Age: 44
End: 2024-11-18

## 2024-11-18 ENCOUNTER — OFFICE VISIT (OUTPATIENT)
Dept: FAMILY MEDICINE CLINIC | Facility: CLINIC | Age: 44
End: 2024-11-18
Payer: COMMERCIAL

## 2024-11-18 VITALS
RESPIRATION RATE: 18 BRPM | TEMPERATURE: 99.4 F | DIASTOLIC BLOOD PRESSURE: 86 MMHG | HEIGHT: 62 IN | BODY MASS INDEX: 26.68 KG/M2 | HEART RATE: 67 BPM | OXYGEN SATURATION: 99 % | SYSTOLIC BLOOD PRESSURE: 120 MMHG | WEIGHT: 145 LBS

## 2024-11-18 DIAGNOSIS — Z90.3 POSTGASTRECTOMY MALABSORPTION: Primary | ICD-10-CM

## 2024-11-18 DIAGNOSIS — K21.9 GASTROESOPHAGEAL REFLUX DISEASE WITHOUT ESOPHAGITIS: ICD-10-CM

## 2024-11-18 DIAGNOSIS — K91.2 POSTGASTRECTOMY MALABSORPTION: Primary | ICD-10-CM

## 2024-11-18 DIAGNOSIS — Z98.84 HISTORY OF BARIATRIC SURGERY: ICD-10-CM

## 2024-11-18 DIAGNOSIS — E16.1 REACTIVE HYPOGLYCEMIA: ICD-10-CM

## 2024-11-18 PROCEDURE — 99496 TRANSJ CARE MGMT HIGH F2F 7D: CPT | Performed by: NURSE PRACTITIONER

## 2024-11-18 NOTE — TELEPHONE ENCOUNTER
Patient was in the office for a visit with Windy and was asking about a clinical code for her dexcom to share results with provider. Do you know anything about this? Please advise.

## 2024-11-18 NOTE — PROGRESS NOTES
Transition of Care Visit  Name: Tram Perez      : 1980      MRN: [id number]  Encounter Provider: FINA Luna  Encounter Date: 2024   Encounter department: [facility]    Assessment & Plan  Postgastrectomy malabsorption    Orders:    Ambulatory Referral to Weight Management; Future    Reactive hypoglycemia    Orders:    Ambulatory Referral to Weight Management; Future    Gastroesophageal reflux disease without esophagitis  Stable at present           History of bariatric surgery    Orders:    Ambulatory Referral to Weight Management; Future         History of Present Illness     Transitional Care Management Review:   Tram Perez is a 44 y.o. female here for TCM follow up.     During the TCM phone call patient stated:  TCM Call       Date and time call was made  11/15/2024 10:35 AM    Patient was hospitialized at  [facility]    Date of Admission  24    Date of discharge  24    Diagnosis  Hypoglycemia    Disposition  Home    Were the patients medications reviewed and updated  No          TCM Call       Should patient be enrolled in anticoag monitoring?  No    Scheduled for follow up?  Yes    Did you obtain your prescribed medications  Yes    Do you need help managing your prescriptions or medications  No    Is transportation to your appointment needed  No    I have advised the patient to call PCP with any new or worsening symptoms  Siomara Iyer,     Living Arrangements  Spouse or Significiant other    Are you recieving any outpatient services  No    Are you recieving home care services  No    Are you using any community resources  No    Current waiver services  No    Have you fallen in the last 12 months  Yes    How many times  3    Interperter language line needed  No          Here for hospital follow up  Went to ED for hypoglycemia  Had BS down to 50's and symptomatic  Tram Perez is a 44 y.o. female  "patient with history of Vi-en-Y bypass in 2020 who originally presented to the hospital on 11/13/2024 due to hypoglycemia episodes that occur at night.  She reports eating small meals every hour.  Endocrinology was consulted with recommendation to start acarbose 25 mg 3 times a day.  We also made recommendations about adjusting her diet to include more fat and protein.  She reported being on the wait list to see endocrinology outpatient by both [facility] and [facility].  She did not have any hypoglycemic episodes while admitted.  We suggest she continue with endocrinology recommendation for a carbose 25 mg 3 times a day and follow-up outpatient.    Has appt dec 16 with endo    She is slowly noticing some improvement        Review of Systems   Constitutional:  Positive for fatigue. Negative for fever.   HENT:  Negative for congestion, postnasal drip and rhinorrhea.    Eyes:  Negative for photophobia and visual disturbance.   Respiratory:  Negative for cough, shortness of breath and wheezing.    Cardiovascular:  Negative for chest pain and palpitations.   Gastrointestinal:  Negative for constipation, diarrhea, nausea and vomiting.   Genitourinary:  Negative for dysuria and frequency.   Musculoskeletal:  Negative for arthralgias and myalgias.   Skin:  Negative for rash.   Neurological:  Negative for dizziness and headaches.   Hematological:  Negative for adenopathy.   Psychiatric/Behavioral:  Negative for dysphoric mood and sleep disturbance. The patient is not nervous/anxious.      Objective   /86 (BP Location: Left arm, Patient Position: Sitting, Cuff Size: Standard)   Pulse 67   Temp 99.4 °F (37.4 °C) (Tympanic)   Resp 18   Ht 5' 2\" (1.575 m)   Wt 65.8 kg (145 lb)   SpO2 99%   BMI 26.52 kg/m²     Physical Exam  Vitals and nursing note reviewed.   Constitutional:       Appearance: Normal appearance.   HENT:      Head: Normocephalic and atraumatic.      Right Ear: Tympanic membrane, ear canal and external ear " normal.      Left Ear: Tympanic membrane, ear canal and external ear normal.      Nose: Nose normal.      Mouth/Throat:      Mouth: Mucous membranes are moist.   Eyes:      Conjunctiva/sclera: Conjunctivae normal.   Cardiovascular:      Rate and Rhythm: Normal rate and regular rhythm.      Heart sounds: Normal heart sounds.   Pulmonary:      Effort: Pulmonary effort is normal.      Breath sounds: Normal breath sounds.   Abdominal:      General: Bowel sounds are normal.      Palpations: Abdomen is soft.   Musculoskeletal:         General: Normal range of motion.      Cervical back: Normal range of motion and neck supple.   Skin:     General: Skin is warm and dry.      Capillary Refill: Capillary refill takes less than 2 seconds.   Neurological:      General: No focal deficit present.      Mental Status: She is alert and oriented to person, place, and time.   Psychiatric:         Mood and Affect: Mood normal.         Behavior: Behavior normal.       Medications have been reviewed by provider in current encounter

## 2024-11-21 ENCOUNTER — TELEPHONE (OUTPATIENT)
Age: 44
End: 2024-11-21

## 2024-11-21 LAB
ACETOHEXAMIDE SERPL-MCNC: NEGATIVE UG/ML (ref 20–60)
CHLORPROPAMIDE SERPL-MCNC: NEGATIVE UG/ML (ref 75–250)
GLIMEPIRIDE SERPL-MCNC: NEGATIVE NG/ML (ref 80–250)
GLIPIZIDE SERPL-MCNC: NEGATIVE NG/ML (ref 200–1000)
GLYBURIDE SERPL-MCNC: NEGATIVE NG/ML
NATEGLINIDE SERPL-MCNC: NEGATIVE NG/ML
REPAGLINIDE SERPL-MCNC: NEGATIVE NG/ML
TOLAZAMIDE SERPL-MCNC: NEGATIVE UG/ML
TOLBUTAMIDE SERPL-MCNC: NEGATIVE UG/ML (ref 40–100)

## 2024-11-21 NOTE — TELEPHONE ENCOUNTER
Patient called to reschedule surgical appointment. Warm transferred to [facility] for further assistance.

## 2024-11-26 ENCOUNTER — PREP FOR PROCEDURE (OUTPATIENT)
Dept: BARIATRICS | Facility: CLINIC | Age: 44
End: 2024-11-26

## 2024-11-26 ENCOUNTER — OFFICE VISIT (OUTPATIENT)
Dept: BARIATRICS | Facility: CLINIC | Age: 44
End: 2024-11-26
Payer: COMMERCIAL

## 2024-11-26 VITALS
BODY MASS INDEX: 26.5 KG/M2 | SYSTOLIC BLOOD PRESSURE: 120 MMHG | WEIGHT: 144 LBS | HEIGHT: 62 IN | TEMPERATURE: 99.4 F | HEART RATE: 73 BPM | DIASTOLIC BLOOD PRESSURE: 70 MMHG | OXYGEN SATURATION: 98 %

## 2024-11-26 DIAGNOSIS — M79.7 FIBROMYALGIA: ICD-10-CM

## 2024-11-26 DIAGNOSIS — E16.1 REACTIVE HYPOGLYCEMIA: ICD-10-CM

## 2024-11-26 DIAGNOSIS — Z48.815 ENCOUNTER FOR SURGICAL AFTERCARE FOLLOWING SURGERY OF DIGESTIVE SYSTEM: Primary | ICD-10-CM

## 2024-11-26 DIAGNOSIS — M06.00 SERONEGATIVE RHEUMATOID ARTHRITIS (HCC): ICD-10-CM

## 2024-11-26 DIAGNOSIS — K91.2 POSTSURGICAL MALABSORPTION: ICD-10-CM

## 2024-11-26 DIAGNOSIS — Z98.84 HISTORY OF BARIATRIC SURGERY: ICD-10-CM

## 2024-11-26 DIAGNOSIS — Z90.3 POSTGASTRECTOMY MALABSORPTION: ICD-10-CM

## 2024-11-26 DIAGNOSIS — K91.2 POSTGASTRECTOMY MALABSORPTION: ICD-10-CM

## 2024-11-26 DIAGNOSIS — Z98.84 BARIATRIC SURGERY STATUS: ICD-10-CM

## 2024-11-26 DIAGNOSIS — Z98.84 BARIATRIC SURGERY STATUS: Primary | ICD-10-CM

## 2024-11-26 DIAGNOSIS — Z87.898 HISTORY OF SEIZURE: ICD-10-CM

## 2024-11-26 PROCEDURE — 99204 OFFICE O/P NEW MOD 45 MIN: CPT | Performed by: PHYSICIAN ASSISTANT

## 2024-11-26 NOTE — PROGRESS NOTES
Date of surgery: 2020  Procedure: RNY  Performing surgeon: [facility]     Initial Weight - 266.3 lbs.  Current Weight - 144.0 lbs.  Kev Weight - 144.0 lbs.  Total Body Weight Loss (EWL) - 122.4 lbs.  EWL% - 94%  TWB% - 46%

## 2024-11-26 NOTE — PROGRESS NOTES
"Assessment/Plan:  ADDENDUM: will start with UGI first rather than EGD given hesitancy with long periods of fasting. Follow up after with surgeon to discuss results and if EGD will be necessary     Patient ID: Tram Perez is a 44 y.o. female.     Bariatric Surgery Status    -s/p Vi-En-Y Gastric Bypass with Dr. Carlson at [facility] in 2020. Presents to the office today as new to establish care.    Postop complications include infected wound that required packing for a few weeks, now well healed.   Initial weight of 263 lbs, ZACHERY current weight of 144 lbs     Patient with significant, intermittent reactive hypoglycemia over the last 1-2 years. She has had previous seizures due to hypoglycemia. Symptoms include lethargy tremors and diaphoresis and admits to having episodes of hypoglycemia on a nightly basis. Patient currently wearing a continuous glucose monitor.  having multiple episodes of low blood sugars in 40 mg/dl range , which has worsened in last few month. She has seen dietitian and has been following low carb diet however despite this continues to get symptoms. She currently cannot drive due to her low sugar spells.     She was recently admitted for this, and was seen by endocrinology and started on acarbose 25 mg 3 times daily. She states she also was advised to continue with frequent snacks throughout the day. She has appt with outpatient endo coming up in a few weeks. Also getting tested to rule out addisons disease.     Patient referred here to establish care and see one of our dieticians.     Overall tolerating diet okay however states at least once a day will have trouble eating, feeling like food or drinks \"get stuck\". Of note, her last EGD was done in 2021, showing small anastomotic ulcer and widely patent anastomosis. She continues to take protonix daily.     Patient would like to limit the idea of a reversal as much as possible, and would like to explore all options first " before considering this.     PLAN:  - EGD to evaluate anatomty, given symptoms and history of previous ulcer. Follow up after with surgeon to review   - continue PPI   - schedule follow up with RD   - get labs done     Continued/Maintain healthy weight loss with good nutrition intakes.  Adequate hydration with at least 64oz. fluid intake.  Follow diet as discussed.  Follow vitamin and mineral recommendations as reviewed with you.  Exercise as tolerated.    Colonoscopy referral made: utd  Mammo: utd     Follow-up after EGD. We kindly ask that your arrive 15 minutes before your scheduled appointment time with your provider to allow our staff to room you, get your vital signs and update your chart.    Get lab work done . Please call the office if you need a script.  It is recommended to check with your insurance BEFORE getting labs done to make sure they are covered by your policy.      Call our office if you have any problems with abdominal pain especially associated with fever, chills, nausea, vomiting or any other concerns.    All  Post-bariatric surgery patients should be aware that very small quantities of any alcohol can cause impairment and it is very possible not to feel the effect. The effect can be in the system for several hours.  It is also a stomach irritant.     It is advised to AVOID alcohol, Nonsteroidal antiinflammatory drugs (NSAIDS) and nicotine of all forms . Any of these can cause stomach irritation/pain.    Discussed the effects of alcohol on a bariatric patient and the increased impairment risk.     Keep up the good work!     Postsurgical Malabsorption   -At risk for malabsorption of vitamins/minerals secondary to malabsorption and restriction of intake from bariatric surgery  -NOT Currently taking adequate postop bariatric surgery vitamin supplementation- states she was taking in the past however   -Next set of bariatric labs ordered for approximately 1 month  -Patient received education about  the importance of adhering to a lifelong supplementation regimen to avoid vitamin/mineral deficiencies      Diagnoses and all orders for this visit:    Encounter for surgical aftercare following surgery of digestive system  -     Zinc; Future  -     Vitamin D 25 hydroxy; Future  -     Vitamin B12; Future  -     Vitamin B1, whole blood; Future  -     Vitamin A; Future  -     PTH, intact; Future  -     CBC and Platelet; Future  -     Comprehensive metabolic panel; Future  -     Ferritin; Future  -     Folate; Future  -     TIBC Panel (incl. Iron, TIBC, % Iron Saturation); Future    Postgastrectomy malabsorption  -     Ambulatory Referral to Weight Management  -     Zinc; Future  -     Vitamin D 25 hydroxy; Future  -     Vitamin B12; Future  -     Vitamin B1, whole blood; Future  -     Vitamin A; Future  -     PTH, intact; Future  -     CBC and Platelet; Future  -     Comprehensive metabolic panel; Future  -     Ferritin; Future  -     Folate; Future  -     TIBC Panel (incl. Iron, TIBC, % Iron Saturation); Future    Reactive hypoglycemia  -     Ambulatory Referral to Weight Management  -     Zinc; Future  -     Vitamin D 25 hydroxy; Future  -     Vitamin B12; Future  -     Vitamin B1, whole blood; Future  -     Vitamin A; Future  -     PTH, intact; Future  -     CBC and Platelet; Future  -     Comprehensive metabolic panel; Future  -     Ferritin; Future  -     Folate; Future  -     TIBC Panel (incl. Iron, TIBC, % Iron Saturation); Future    History of bariatric surgery  -     Ambulatory Referral to Weight Management  -     Zinc; Future  -     Vitamin D 25 hydroxy; Future  -     Vitamin B12; Future  -     Vitamin B1, whole blood; Future  -     Vitamin A; Future  -     PTH, intact; Future  -     CBC and Platelet; Future  -     Comprehensive metabolic panel; Future  -     Ferritin; Future  -     Folate; Future  -     TIBC Panel (incl. Iron, TIBC, % Iron Saturation); Future    Bariatric surgery status  -     Zinc;  Future  -     Vitamin D 25 hydroxy; Future  -     Vitamin B12; Future  -     Vitamin B1, whole blood; Future  -     Vitamin A; Future  -     PTH, intact; Future  -     CBC and Platelet; Future  -     Comprehensive metabolic panel; Future  -     Ferritin; Future  -     Folate; Future  -     TIBC Panel (incl. Iron, TIBC, % Iron Saturation); Future  -     FL UPPER GI UGI; Future    Postsurgical malabsorption  -     Zinc; Future  -     Vitamin D 25 hydroxy; Future  -     Vitamin B12; Future  -     Vitamin B1, whole blood; Future  -     Vitamin A; Future  -     PTH, intact; Future  -     CBC and Platelet; Future  -     Comprehensive metabolic panel; Future  -     Ferritin; Future  -     Folate; Future  -     TIBC Panel (incl. Iron, TIBC, % Iron Saturation); Future    Seronegative rheumatoid arthritis (HCC)  -     Zinc; Future  -     Vitamin D 25 hydroxy; Future  -     Vitamin B12; Future  -     Vitamin B1, whole blood; Future  -     Vitamin A; Future  -     PTH, intact; Future  -     CBC and Platelet; Future  -     Comprehensive metabolic panel; Future  -     Ferritin; Future  -     Folate; Future  -     TIBC Panel (incl. Iron, TIBC, % Iron Saturation); Future    Fibromyalgia  -     Zinc; Future  -     Vitamin D 25 hydroxy; Future  -     Vitamin B12; Future  -     Vitamin B1, whole blood; Future  -     Vitamin A; Future  -     PTH, intact; Future  -     CBC and Platelet; Future  -     Comprehensive metabolic panel; Future  -     Ferritin; Future  -     Folate; Future  -     TIBC Panel (incl. Iron, TIBC, % Iron Saturation); Future    History of seizure  -     Zinc; Future  -     Vitamin D 25 hydroxy; Future  -     Vitamin B12; Future  -     Vitamin B1, whole blood; Future  -     Vitamin A; Future  -     PTH, intact; Future  -     CBC and Platelet; Future  -     Comprehensive metabolic panel; Future  -     Ferritin; Future  -     Folate; Future  -     TIBC Panel (incl. Iron, TIBC, % Iron Saturation); Future      "    Subjective:      Patient ID: Tram Perez is a 44 y.o. female.    -s/p Vi-En-Y Gastric Bypass with Dr. Carlson at [facility] in 2020. Presents to the office today as new to establish care.    Initial: 266  Current: 144  EWL: (Weight loss is ahead of schedule at this post surgical period.)  Kev: current   Current BMI is Body mass index is 26.34 kg/m².    Tolerating a regular diet-occasional dysphagia   Eating at least 60 grams of protein per day-yes  Drinking at least 64 ounces of fluid per day-yes  Using nicotine-no  Using alcohol-no  Supplements:  none currently     EWL is 94%, which places the patient ahead of schedule for expected post surgical weight loss at this time.     The following portions of the patient's history were reviewed and updated as appropriate: allergies, current medications, past family history, past medical history, past social history, past surgical history and problem list.    Review of Systems   Constitutional: Negative.    Respiratory: Negative.     Cardiovascular: Negative.    Gastrointestinal: Negative.    Neurological: Negative.    Psychiatric/Behavioral: Negative.           Objective:    /70 (BP Location: Left arm, Patient Position: Sitting, Cuff Size: Large)   Pulse 73   Temp 99.4 °F (37.4 °C) (Tympanic)   Ht 5' 2\" (1.575 m)   Wt 65.3 kg (144 lb)   SpO2 98%   BMI 26.34 kg/m²      Physical Exam  Vitals and nursing note reviewed.   Constitutional:       Appearance: Normal appearance.   HENT:      Head: Normocephalic and atraumatic.   Eyes:      Extraocular Movements: Extraocular movements intact.      Pupils: Pupils are equal, round, and reactive to light.   Cardiovascular:      Rate and Rhythm: Normal rate.   Pulmonary:      Effort: Pulmonary effort is normal.   Musculoskeletal:         General: Normal range of motion.      Cervical back: Normal range of motion.   Skin:     General: Skin is warm and dry.   Neurological:      General: No focal " deficit present.      Mental Status: She is alert and oriented to person, place, and time.   Psychiatric:         Mood and Affect: Mood normal.

## 2024-11-26 NOTE — PATIENT INSTRUCTIONS
Follow-up after EGD. We kindly ask that your arrive 15 minutes before your scheduled appointment time with your provider to allow our staff to room you, get your vital signs and update your chart.    Get lab work done . Please call the office if you need a script.  It is recommended to check with your insurance BEFORE getting labs done to make sure they are covered by your policy.      Call our office if you have any problems with abdominal pain especially associated with fever, chills, nausea, vomiting or any other concerns.    All  Post-bariatric surgery patients should be aware that very small quantities of any alcohol can cause impairment and it is very possible not to feel the effect. The effect can be in the system for several hours.  It is also a stomach irritant.     It is advised to AVOID alcohol, Nonsteroidal antiinflammatory drugs (NSAIDS) and nicotine of all forms . Any of these can cause stomach irritation/pain.    Discussed the effects of alcohol on a bariatric patient and the increased impairment risk.     Keep up the good work!

## 2024-11-28 PROBLEM — N30.00 ACUTE CYSTITIS WITHOUT HEMATURIA: Status: RESOLVED | Noted: 2024-10-29 | Resolved: 2024-11-28

## 2024-11-28 PROBLEM — R50.9 FEVER OF UNKNOWN ORIGIN: Status: RESOLVED | Noted: 2024-08-26 | Resolved: 2024-11-28

## 2024-12-08 DIAGNOSIS — K91.2 POSTGASTRECTOMY MALABSORPTION: ICD-10-CM

## 2024-12-08 DIAGNOSIS — E16.2 HYPOGLYCEMIA: ICD-10-CM

## 2024-12-08 DIAGNOSIS — Z90.3 POSTGASTRECTOMY MALABSORPTION: ICD-10-CM

## 2024-12-10 RX ORDER — ACARBOSE 25 MG/1
25 TABLET ORAL
Qty: 90 TABLET | Refills: 3 | Status: SHIPPED | OUTPATIENT
Start: 2024-12-10 | End: 2024-12-16

## 2024-12-10 NOTE — TELEPHONE ENCOUNTER
Requested medication(s) are due for refill today: Yes  Patient has already received a courtesy refill: No  Other reason request has been forwarded to provider: per protocol. Prescribed in hospital

## 2024-12-11 ENCOUNTER — TELEPHONE (OUTPATIENT)
Dept: BARIATRICS | Facility: CLINIC | Age: 44
End: 2024-12-11

## 2024-12-16 ENCOUNTER — OFFICE VISIT (OUTPATIENT)
Dept: ENDOCRINOLOGY | Facility: CLINIC | Age: 44
End: 2024-12-16
Payer: COMMERCIAL

## 2024-12-16 VITALS
WEIGHT: 145.8 LBS | HEART RATE: 108 BPM | HEIGHT: 62 IN | SYSTOLIC BLOOD PRESSURE: 130 MMHG | BODY MASS INDEX: 26.83 KG/M2 | DIASTOLIC BLOOD PRESSURE: 94 MMHG

## 2024-12-16 DIAGNOSIS — K91.2 HYPOGLYCEMIA FOLLOWING GASTROINTESTINAL SURGERY: Primary | ICD-10-CM

## 2024-12-16 DIAGNOSIS — K91.2 POSTGASTRECTOMY MALABSORPTION: ICD-10-CM

## 2024-12-16 DIAGNOSIS — Z90.3 POSTGASTRECTOMY MALABSORPTION: ICD-10-CM

## 2024-12-16 PROCEDURE — 95251 CONT GLUC MNTR ANALYSIS I&R: CPT | Performed by: INTERNAL MEDICINE

## 2024-12-16 PROCEDURE — 99215 OFFICE O/P EST HI 40 MIN: CPT | Performed by: INTERNAL MEDICINE

## 2024-12-16 RX ORDER — ACARBOSE 25 MG/1
TABLET ORAL
Qty: 120 TABLET | Refills: 3 | Status: SHIPPED | OUTPATIENT
Start: 2024-12-16

## 2024-12-16 NOTE — ASSESSMENT & PLAN NOTE
History of iron deficiency and vitamin D deficiency  Receives iron infusions  Currently not supplementing with vitamin D.      Recommended to restart vitamin D supplementation as her most recent vitamin D 25 level showed she was well supplemented.  Orders:  •  acarbose (PRECOSE) 25 mg tablet; Take 25 mg with breakfast and with lunch, and 50 mg with dinner.   Negative

## 2024-12-16 NOTE — PATIENT INSTRUCTIONS
Complete labs while fasting and between 7 and 9 am  Increase your dinner time acarbose dose to 50 mg prior to food

## 2024-12-16 NOTE — PROGRESS NOTES
"  Assessment & Plan  Hypoglycemia following gastrointestinal surgery  Improvement in hypoglycemic episodes - now less frequent and severe since starting acarbose therapy.    Continue with acarbose therapy-25 mg prior to breakfast and lunch, and 50 mg prior to dinner  Dietary changes-increase fiber intake, complex carbs, and protein intake.  Limit simple carbs.  Continue with cornstarch 1 hour prior to bedtime.  Rule out adrenal insufficiency with morning cortisol and ACTH level.  Will follow CMP ordered by other provider  Follow-up 3-4 months  Orders:  •  Ambulatory Referral to Endocrinology  •  Ambulatory Referral to Endocrinology  •  ACTH; Future  •  Cortisol Level,7-9 AM Specimen; Future  •  acarbose (PRECOSE) 25 mg tablet; Take 25 mg with breakfast and with lunch, and 50 mg with dinner.    Postgastrectomy malabsorption  History of iron deficiency and vitamin D deficiency  Receives iron infusions  Currently not supplementing with vitamin D.      Recommended to restart vitamin D supplementation as her most recent vitamin D 25 level showed she was well supplemented.  Orders:  •  acarbose (PRECOSE) 25 mg tablet; Take 25 mg with breakfast and with lunch, and 50 mg with dinner.          HPI:   Tram Perez -  44 y.o. female with history of gastric bypass surgery complicated by postprandial bariatric hypoglycemia, vitamin D deficiency and iron deficiency, that arrives to clinic for hospital follow-up of postprandial bariatric hypoglycemia.     Admitted from November 13-14, 2024 for management of hypoglycemia.  At that time, reported glucose in the 40s.  During admission, was started on acarbose 25 mg prior to meals.  Reports experiencing increased flatulence which she manages with Gas-X.  Also, consuming 1-1/2-2 tablespoons of cornstarch mixed in yogurt 1 hour prior to bedtime.    Hypoglycemic symptoms include exhaustion, slowed speech and movements, nausea, and \"zoning out\" to the point that she requires assistance " from .  Symptoms improve with consumption of juice.    Since starting acarbose, hypoglycemic episodes have decreased in frequency, occurring 1-2 times per week.  Occur 1-2 hours after food.  Also, hypoglycemic readings are now within 50-60 mg/dL range. Prior to medication, hypoglycemic episodes occurred 5-7 times per week and sometimes more than 1 time per day, and glucose will drop to the 40s.  Reports hypoglycemic episodes tend to happen after dinnertime (5 PM) and overnight.  Monitors sugars with Dexcom G7 CGM sensor which confirmed hypoglycemic events after 5 PM.  Patient also has a glucometer at home to confirm lows.  Of note, patient recently increased her dinnertime acarbose to 50 mg, of which she has noted also an improvement.    Diet-3 meals and 2 snacks per day.  6-7 AM breakfast- yogurt without cornstarch.  10-11 AM snack - crackers.  12-2 PM lunch -leftovers from dinner, turkey sandwich.  4-6 PM dinner-chicken or pork with rice or potatoes or noodles and in addition to this vegetables.  Also, occasional takeout or pizza.  6-9 PM snack-protein bar or wheat thins.  1 hour prior to bedtime-consumes yogurt with cornstarch.  Patient reports he is unable to do frequent small meals.  Of note, patient prepares 1 gallon of herbal tea with half a cup of sugar daily, which she consumes during the day.  Patient consumes to prevent her sugars from dropping.  Denies increased frequency of hypoglycemic events when consuming the herbal tea.    Reports experiencing other symptoms unrelated to hypoglycemic events which include frequent headaches, lightheadedness, overall fatigue, nausea, left abdominal pain and salt cravings.    Does not currently supplement with vitamin D.      Patient has no other complaints at this time.      Subjective:  Review of Systems   Constitutional:  Positive for fatigue. Negative for appetite change, diaphoresis and unexpected weight change.   Eyes:  Negative for photophobia and visual  disturbance.   Respiratory:  Negative for chest tightness and shortness of breath.    Cardiovascular:  Negative for chest pain, palpitations and leg swelling.   Gastrointestinal:  Positive for nausea. Negative for abdominal pain, constipation, diarrhea and vomiting.   Endocrine: Negative for polydipsia, polyphagia and polyuria.   Genitourinary:  Negative for difficulty urinating, enuresis, frequency and hematuria.   Skin:  Negative for color change and wound.   Neurological:  Positive for light-headedness and headaches. Negative for tremors and weakness.   Psychiatric/Behavioral:  Positive for confusion.         Patient Active Problem List   Diagnosis   • Anxiety   • Asthma   • Fatigue   • Fibromyalgia   • Irritable bowel syndrome   • Lipoma of abdominal wall   • Low back pain   • Arthropathy of lumbar facet joint   • Paresthesia   • Plantar fasciitis   • Rectocele   • Knee pain   • History of hysterectomy   • Seasonal allergies   • Chondromalacia patellae   • Moderate episode of recurrent major depressive disorder (HCC)   • Migraine without aura   • GERD (gastroesophageal reflux disease)   • Bradycardia   • Eczema of right external ear   • Intertrigo   • Hypoglycemia   • Sciatic leg pain   • ADHD   • History of seizure   • Overweight (BMI 25.0-29.9)   • Rash   • Concussion wth loss of consciousness of 30 minutes or less   • Joint pain   • Low serum cortisol level   • Abnormal brain MRI   • Incisional hernia   • Postgastrectomy malabsorption   • Reactive hypoglycemia   • Multiple neurological symptoms   • Seronegative rheumatoid arthritis (HCC)   • Other ovarian cyst, left side   • Vitamin D deficiency        Current Outpatient Medications   Medication Sig Dispense Refill   • acarbose (PRECOSE) 25 mg tablet Take 25 mg with breakfast and with lunch, and 50 mg with dinner. 120 tablet 3   • Acetaminophen 500 MG PACK Take 500 mg by mouth every 6 (six) hours as needed     • albuterol (PROVENTIL HFA,VENTOLIN HFA) 90  "mcg/act inhaler 2 PUFFS UP TO 4 TIMES A DAY AS NEEDED FOR WHEEZING 18 g 1   • BD Integra Syringe 25G X 1\" 3 ML MISC DRAW UP 1 ML OF TORADOL AND INJECT INTO MUSCLE.     • Biotin 1000 MCG tablet Take 10,000 mcg by mouth     • Blood Glucose Monitoring Suppl (OneTouch Verio Reflect) w/Device KIT Check blood sugars twice daily. Please substitute with appropriate alternative as covered by patient's insurance. Dx: E11.65 1 kit 0   • Botulinum Toxin Type A 200 units SOLR Inject 155 units into face and neck IM every 90 days     • Continuous Glucose Sensor (Dexcom G7 Sensor) Use 1 Device every 10 days 3 each 3   • Continuous Glucose Sensor (FreeStyle Larry 14 Day Sensor) MISC Use 1 sensor q 14 days to check sugar levels 2 each 3   • DULoxetine (CYMBALTA) 60 mg delayed release capsule Take 1 capsule (60 mg total) by mouth daily 30 capsule 2   • glucose blood (OneTouch Verio) test strip Check blood sugars twice daily. Please substitute with appropriate alternative as covered by patient's insurance. Dx: E11.65 200 each 3   • HYDROcodone-acetaminophen (NORCO) 7.5-325 mg per tablet Take 1 tablet by mouth every 4 (four) hours as needed for pain     • LORazepam (ATIVAN) 1 mg tablet Take 1 mg by mouth daily as needed     • methylphenidate (RITALIN) 20 MG tablet Take 20 mg by mouth 2 (two) times a day Pt reports taking tid  0   • Multiple Vitamin (MULTIVITAMIN ADULT PO) Take 1 tablet by mouth every morning     • nystatin (MYCOSTATIN) powder Apply topically 2 (two) times a day 60 g 0   • OneTouch Delica Lancets 33G MISC Check blood sugars twice daily. Please substitute with appropriate alternative as covered by patient's insurance. Dx: E11.65 200 each 3   • OneTouch Delica Lancets 33G MISC Check blood sugars twice daily. Please substitute with appropriate alternative as covered by patient's insurance. Dx: E11.65 200 each 3   • pantoprazole (PROTONIX) 40 mg tablet TAKE 1 TABLET BY MOUTH TWICE A DAY 30 MINUTES BEFORE BREAKFAST & DINNER   "   • pregabalin (Lyrica) 150 mg capsule Take 1 capsule (150 mg total) by mouth 3 (three) times a day 90 capsule 1   • rizatriptan (MAXALT) 10 MG tablet TAKE 1 TABLET (10 MG TOTAL) BY MOUTH ONCE AS NEEDED FOR MIGRAINE 9 tablet 1   • SUMAtriptan Succinate 6 MG/0.5ML SOAJ Inject 0.5 mL (6 mg total) under the skin as needed (headache) 9 Cartridge 1   • traZODone (DESYREL) 100 mg tablet      • Blood Glucose Monitoring Suppl (OneTouch Verio Reflect) w/Device KIT Check blood sugars twice daily. Please substitute with appropriate alternative as covered by patient's insurance. Dx: E11.65 (Patient not taking: Reported on 12/16/2024) 1 kit 0   • EPINEPHrine (EPIPEN) 0.3 mg/0.3 mL SOAJ Inject 0.3 mL (0.3 mg total) into a muscle once for 1 dose 0.6 mL 0   • glucose blood (OneTouch Verio) test strip Check blood sugars twice daily. Please substitute with appropriate alternative as covered by patient's insurance. Dx: E11.65 (Patient not taking: Reported on 12/16/2024) 200 each 3   • HYDROcodone-Acetaminophen (VICODIN PO)  (Patient not taking: Reported on 11/26/2024)     • methylphenidate (RITALIN) 10 mg tablet Take 10 mg by mouth daily as needed   (Patient not taking: Reported on 11/26/2024)  0     Current Facility-Administered Medications   Medication Dose Route Frequency Provider Last Rate Last Admin   • cyanocobalamin injection 1,000 mcg  1,000 mcg Intramuscular Q30 Days Ronny Brown MD   1,000 mcg at 06/23/23 1524        Allergies   Allergen Reactions   • Aspirin Anaphylaxis   • Bee Venom Swelling and Wheezing   • Penicillins Anaphylaxis and Rash   • Bee Pollen    • Ceclor [Cefaclor] Itching   • Dust Mite Extract Other (See Comments)     congestion   • Grass Pollen(K-O-R-T-Swt Osito) Other (See Comments)     congestion   • Lactose - Food Allergy GI Intolerance   • Other Other (See Comments)     congestion   • Pollen Extract    • Amoxicillin Rash   • Doxycycline Rash   • Erythromycin Rash   • Nickel Rash        The  "following portions of the patient's history were reviewed and updated as appropriate: allergies, current medications, past family history, past medical history, past social history, past surgical history and problem list.             Objective:  /94   Pulse (!) 108   Ht 5' 2\" (1.575 m)   Wt 66.1 kg (145 lb 12.8 oz)   BMI 26.67 kg/m²      Body mass index is 26.67 kg/m².     Physical Exam  Vitals reviewed.   Constitutional:       Appearance: Normal appearance. She is overweight. She is not ill-appearing or diaphoretic.   HENT:      Head: Normocephalic and atraumatic.   Eyes:      General: No scleral icterus.     Conjunctiva/sclera: Conjunctivae normal.   Cardiovascular:      Rate and Rhythm: Normal rate and regular rhythm.   Pulmonary:      Effort: Pulmonary effort is normal. No respiratory distress.   Abdominal:      General: There is no distension.   Musculoskeletal:         General: Normal range of motion.      Cervical back: Normal range of motion and neck supple.      Right lower leg: No edema.      Left lower leg: No edema.   Skin:     General: Skin is warm and dry.      Capillary Refill: Capillary refill takes less than 2 seconds.      Coloration: Skin is not jaundiced or pale.   Neurological:      Mental Status: She is alert and oriented to person, place, and time. Mental status is at baseline.   Psychiatric:         Mood and Affect: Mood normal.         Behavior: Behavior normal.          Labs:  I have personally reviewed the following labs.   Latest Reference Range & Units 10/17/24 09:30 11/13/24 14:48 11/13/24 15:37 11/14/24 05:21 11/14/24 10:11   Sodium 135 - 147 mmol/L  139  139    Potassium 3.5 - 5.3 mmol/L  4.0  4.0    Chloride 96 - 108 mmol/L  108  109 (H)    Carbon Dioxide 21 - 32 mmol/L  25  24    ANION GAP 4 - 13 mmol/L  6  6    BUN 5 - 25 mg/dL  10  16    Creatinine 0.60 - 1.30 mg/dL  0.60  0.65    GLUCOSE 65 - 140 mg/dL  81  107    Calcium 8.4 - 10.2 mg/dL  8.5  8.5    AST 13 - 39 U/L  " 15      ALT 7 - 52 U/L  20      ALK PHOS 34 - 104 U/L  73      Total Protein 6.4 - 8.4 g/dL  6.2 (L)      Albumin 3.5 - 5.0 g/dL  4.2      Total Bilirubin 0.20 - 1.00 mg/dL  0.54      GFR, Calculated ml/min/1.73sq m  109  106    VITAMIN D, 25-HYDROXY 30.0 - 100.0 ng/mL 38.8       Cortisol - AM 6.7 - 22.6 ug/dL     8.0   Cortisol, Random ug/dL   4.7     INSULIN-LIKE GROWTH FACTOR-1 70 - 225 ng/mL   131     IGF BINDING PROTEIN 3 2343 - 5612 ug/L   2218 (L)     ACETOHEXAMIDE 20 - 60 ug/mL    Negative    INSULIN uIU/mL   12.08     C-PEPTIDE 1.1 - 4.4 ng/mL   2.7     (H): Data is abnormally high  (L): Data is abnormally low     Imaging:  I have personally reviewed the following imaging.  Narrative & Impression   CT CHEST, ABDOMEN AND PELVIS WITH IV CONTRAST     INDICATION: Fever of unknown origin.     COMPARISON: None.     TECHNIQUE: CT examination of the chest, abdomen and pelvis was performed. Multiplanar 2D reformatted images were created from the source data.     This examination, like all CT scans performed in the [facility], was performed utilizing techniques to minimize radiation dose exposure, including the use of iterative reconstruction and automated exposure control. Radiation dose length   product (DLP) for this visit: 637.89 mGy-cm     IV Contrast: 100 mL of iohexol (OMNIPAQUE) 50 mL of iohexol (OMNIPAQUE)  Enteric Contrast: Not administered.     FINDINGS:     CHEST     LUNGS: Mild linear scarring within the medial right middle lobe. No lobar consolidation or suspicious focal lesions.     PLEURA: Unremarkable.     HEART/GREAT VESSELS: Heart is unremarkable for patient's age. No thoracic aortic aneurysm.     MEDIASTINUM AND TAIWO: Residual thymic tissue is present, without clearly defined focal lesions. This is of uncertain clinical significance. No lymphadenopathy. The esophagus and central airways are grossly unremarkable.     CHEST WALL AND LOWER NECK: Unremarkable.     ABDOMEN      LIVER/BILIARY TREE: Unremarkable.     GALLBLADDER: No calcified gallstones. No pericholecystic inflammatory change.     SPLEEN: There are 2 tiny hypodensities within the inferior spleen, most likely representing benign cysts .     PANCREAS: Unremarkable.     ADRENAL GLANDS: Unremarkable.     KIDNEYS/URETERS: No hydronephrosis. No suspicious focal lesions. There is a well-circumscribed hypodensity within the lateral interpolar left kidney, too small for definitive characterization. While nonspecific, this appearance statistically favors   benign cyst(s). The left ureter is unremarkable.     STOMACH AND BOWEL: Prior gastric bypass surgery. There is no evidence of obstruction or inflammation involving the proximal or distal anastomosis. The distal small bowel loops and colon are unremarkable.     APPENDIX: No findings to suggest appendicitis.     ABDOMINOPELVIC CAVITY: No ascites. No pneumoperitoneum. No lymphadenopathy.     VESSELS: Unremarkable for patient's age.     PELVIS     REPRODUCTIVE ORGANS: Unremarkable for patient's age.     URINARY BLADDER: Unremarkable.     ABDOMINAL WALL/INGUINAL REGIONS: Unremarkable.     BONES: No acute fracture or suspicious osseous lesion.     IMPRESSION:     No identifiable acute abnormality to account for the patient's clinical presentation.          Zurdo Freire MD  Endocrinology Fellow, PGY-4

## 2024-12-16 NOTE — ASSESSMENT & PLAN NOTE
Improvement in hypoglycemic episodes - now less frequent and severe since starting acarbose therapy.    Continue with acarbose therapy-25 mg prior to breakfast and lunch, and 50 mg prior to dinner  Dietary changes-increase fiber intake, complex carbs, and protein intake.  Limit simple carbs.  Continue with cornstarch 1 hour prior to bedtime.  Rule out adrenal insufficiency with morning cortisol and ACTH level.  Will follow CMP ordered by other provider  Follow-up 3-4 months  Orders:  •  Ambulatory Referral to Endocrinology  •  Ambulatory Referral to Endocrinology  •  ACTH; Future  •  Cortisol Level,7-9 AM Specimen; Future  •  acarbose (PRECOSE) 25 mg tablet; Take 25 mg with breakfast and with lunch, and 50 mg with dinner.

## 2024-12-27 ENCOUNTER — NURSE TRIAGE (OUTPATIENT)
Age: 44
End: 2024-12-27

## 2024-12-27 ENCOUNTER — PATIENT MESSAGE (OUTPATIENT)
Dept: ENDOCRINOLOGY | Facility: CLINIC | Age: 44
End: 2024-12-27

## 2024-12-27 DIAGNOSIS — K91.2 HYPOGLYCEMIA FOLLOWING GASTROINTESTINAL SURGERY: ICD-10-CM

## 2024-12-27 DIAGNOSIS — K91.2 POSTGASTRECTOMY MALABSORPTION: ICD-10-CM

## 2024-12-27 DIAGNOSIS — Z90.3 POSTGASTRECTOMY MALABSORPTION: ICD-10-CM

## 2024-12-27 NOTE — TELEPHONE ENCOUNTER
"Reason for Disposition   Evening (after bedtime snack) blood glucose < 100 mg/dL (5.6 mmol/L) and more than once in past week    Answer Assessment - Initial Assessment Questions  1. SYMPTOMS: \"What symptoms are you concerned about?\"      When sugars are dropping she feels clammy, shaky, hot, brain fog   2. ONSET:  \"When did the symptoms start?\"      Tuesday 12/24/24  3. BLOOD GLUCOSE: \"What is your blood glucose level?\"       Currently @ 96  4. USUAL RANGE: \"What is your blood glucose level usually?\" (e.g., usual fasting morning value, usual evening value)      -  5. TYPE 1 or 2:  \"Do you know what type of diabetes you have?\"  (e.g., Type 1, Type 2, Gestational; doesn't know)       -  6. INSULIN: \"Do you take insulin?\" \"What type of insulin(s) do you use? What is the mode of delivery? (syringe, pen; injection or pump) \"When did you last give yourself an insulin dose?\" (i.e., time or hours/minutes ago) \"How much did you give?\" (i.e., how many units)      NO  7. DIABETES PILLS: \"Do you take any pills for your diabetes?\" If Yes, ask: \"What is the name of the medicine(s) that you take for high blood sugar?\"      Acarbose 25mg  8. OTHER SYMPTOMS: \"Do you have any symptoms?\" (e.g., fever, frequent urination, difficulty breathing, vomiting)      Denies   9. LOW BLOOD GLUCOSE TREATMENT: \"What have you done so far to treat the low blood glucose level?\"      Drink orange juice, consume snacks/ meals  11. ALONE: \"Are you alone right now or is someone with you?\"         With someone    Protocols used: Diabetes - Low Blood Sugar-Adult-[place]    "

## 2024-12-27 NOTE — TELEPHONE ENCOUNTER
Patient calling in with concerns about her Acarbose 25mg medication. States that she seemed ok when first started the medication but now feels that it is causing her to go hypoglycemic, especially later in the day. Patient states her blood glucose has dropped into the 45-65 range recently with the most recent episode being Tuesday 12/24/24 where she was close to going to the emergency room but was able to resolve symptoms with home care. States when her sugars are in that range she feels shaky, clammy, hot, sweaty and has brain fog. Currently has no symptoms and blood glucose is at 96. She does wear a Dexcom. She is unsure if she needs to see MD as soon as available to adjust medication? Please advise- Patient asking for call back. Advised Patient that if she becomes symptomatic with low blood sugars to try home care, if home care is not effective to go to emergency department- Patient verbalized understanding.

## 2024-12-27 NOTE — TELEPHONE ENCOUNTER
Can we review dexcom data?  And also can be confirm the times she has had symptomatic low blood sugars the past few days including 12/24?  Thanks.

## 2024-12-31 ENCOUNTER — PATIENT MESSAGE (OUTPATIENT)
Dept: ENDOCRINOLOGY | Facility: CLINIC | Age: 44
End: 2024-12-31

## 2024-12-31 RX ORDER — ACARBOSE 25 MG/1
TABLET ORAL
Qty: 120 TABLET | Refills: 3 | Status: SHIPPED | OUTPATIENT
Start: 2024-12-31

## 2024-12-31 NOTE — TELEPHONE ENCOUNTER
Patient called and stated all of her logs were sent through [employer]. Reports can be found 12/27 patient message. Please advise and call patient back ASAP. She is still experiencing lows.

## 2024-12-31 NOTE — TELEPHONE ENCOUNTER
Spoke with Tram via phone -patient sent CGM report.  Has been on acarbose 25 mg with breakfast, 25 mg with lunch, 50 mg with dinner.    Device used Dexcom G7  Indication: Hypoglycemia, history gastric bypass surgery  More than 72 hours of data was reviewed. Report to be scanned to chart.   Date Range: December 2-31, 2024  Analysis of data:   -% time CGM used: 97%  -Average Glucose: 115 mg/dL  -SD : 29 mg/dL  -Time in Target Range: 96%  -Time Above Range: 3% high, 0% very high  -Time Below Range: 1% low, less than 1% very low  Interpretation of data: Has hypoglycemia below 65, most of the time after dinner time (after 5 PM) and overnight while sleeping.  Of note, occasional episodes of hypoglycemia after lunchtime.  Of note, at around 9 PM, has frequent glucose spikes above 180, after which experiences hypoglycemia episodes while sleeping.      Recommendations discussed:  Change acarbose-25 mg with breakfast, 50 mg with lunch, 50 mg with dinner  Recommended to avoid consumption of homemade iced tea that contains regular table sugar.  Can do sugar-free substitute and to monitor how her sugars respond.  Continue with cornstarch and yogurt at bedtime.  Recommended that if overnight hypoglycemia continues, can increase the amount of cornstarch by half or 1 tablespoon and monitor how her sugars respond.  Recommended to touch base in 1-2 weeks to see how her sugars are or sooner if frequent hypoglycemic episodes continue to occur

## 2025-01-07 ENCOUNTER — TELEPHONE (OUTPATIENT)
Dept: ENDOCRINOLOGY | Facility: CLINIC | Age: 45
End: 2025-01-07

## 2025-01-07 NOTE — TELEPHONE ENCOUNTER
PA for Acarbose SUBMITTED to [employer]    via    []CMM-KEY:   [x]Surescripts-Case ID # [id number]   []Availity-Auth ID # NDC #   []Faxed to plan   []Other website   []Phone call Case ID #     [x]PA sent as URGENT    All office notes, labs and other pertaining documents and studies sent. Clinical questions answered. Awaiting determination from insurance company.     Turnaround time for your insurance to make a decision on your Prior Authorization can take 7-21 business days.

## 2025-01-09 NOTE — TELEPHONE ENCOUNTER
PA for Acarbose 25mg tab APPROVED     Date(s) approved 01/07/2025-10/07/2025      Patient advised by          [][facility]  []Phone call   [x]LMOM  []L/M to call office as no active Communication consent on file  []Unable to leave detailed message as VM not approved on Communication consent       Pharmacy advised by    [x]Fax  []Phone call    Approval letter scanned into [employer] Yes

## 2025-01-13 NOTE — PATIENT COMMUNICATION
Patient calling after receiving [employer] message. She states her Dexcom is set to alarm at 70, and the doctor told her to go to ED for low BG if she's symptomatic. She is asking what number her blood sugar would have to be to go to ED. She will also need a script sent to [facility] on file for the increased dose of Acarbose.   Patient will be sending Dexcom report to the office for review.

## 2025-01-14 ENCOUNTER — TELEPHONE (OUTPATIENT)
Dept: ENDOCRINOLOGY | Facility: CLINIC | Age: 45
End: 2025-01-14

## 2025-01-14 DIAGNOSIS — K91.2 HYPOGLYCEMIA FOLLOWING GASTROINTESTINAL SURGERY: ICD-10-CM

## 2025-01-14 DIAGNOSIS — Z90.3 POSTGASTRECTOMY MALABSORPTION: ICD-10-CM

## 2025-01-14 DIAGNOSIS — K91.2 POSTGASTRECTOMY MALABSORPTION: ICD-10-CM

## 2025-01-14 RX ORDER — ACARBOSE 25 MG/1
TABLET ORAL
Qty: 270 TABLET | Refills: 3 | Status: SHIPPED | OUTPATIENT
Start: 2025-01-14

## 2025-01-15 NOTE — TELEPHONE ENCOUNTER
Spoke with Tram via phone-at this time, recommending increasing acarbose to 75 mg prior to each meal.  Discussed with patient that a hypoglycemia/low glucose at or below 55 would be the glucose level I would recommend she arrives at the nearest hospital for management, and this would also be my recommendation especially if symptomatic or if she becomes unresponsive.  Patient confirmed understanding.    Discussed with Alexa that if acarbose at increased dose does not help, we might need to pursue alternate therapy which might include diazoxide.  She confirmed understanding.

## 2025-01-20 ENCOUNTER — NURSE TRIAGE (OUTPATIENT)
Age: 45
End: 2025-01-20

## 2025-01-20 ENCOUNTER — OFFICE VISIT (OUTPATIENT)
Dept: FAMILY MEDICINE CLINIC | Facility: CLINIC | Age: 45
End: 2025-01-20
Payer: COMMERCIAL

## 2025-01-20 VITALS
HEART RATE: 62 BPM | WEIGHT: 146.6 LBS | HEIGHT: 62 IN | TEMPERATURE: 98.2 F | OXYGEN SATURATION: 99 % | RESPIRATION RATE: 16 BRPM | BODY MASS INDEX: 26.98 KG/M2 | SYSTOLIC BLOOD PRESSURE: 130 MMHG | DIASTOLIC BLOOD PRESSURE: 78 MMHG

## 2025-01-20 DIAGNOSIS — R55 SYNCOPE AND COLLAPSE: ICD-10-CM

## 2025-01-20 DIAGNOSIS — T78.40XD ALLERGY, SUBSEQUENT ENCOUNTER: ICD-10-CM

## 2025-01-20 DIAGNOSIS — J01.40 ACUTE NON-RECURRENT PANSINUSITIS: Primary | ICD-10-CM

## 2025-01-20 DIAGNOSIS — I95.1 ORTHOSTATIC HYPOTENSION: ICD-10-CM

## 2025-01-20 PROCEDURE — 93000 ELECTROCARDIOGRAM COMPLETE: CPT | Performed by: FAMILY MEDICINE

## 2025-01-20 PROCEDURE — 99214 OFFICE O/P EST MOD 30 MIN: CPT | Performed by: FAMILY MEDICINE

## 2025-01-20 RX ORDER — KETOROLAC TROMETHAMINE 30 MG/ML
INJECTION, SOLUTION INTRAMUSCULAR; INTRAVENOUS
COMMUNITY
Start: 2025-01-15

## 2025-01-20 RX ORDER — ACYCLOVIR 400 MG/1
TABLET ORAL
COMMUNITY

## 2025-01-20 RX ORDER — EPINEPHRINE 0.3 MG/.3ML
0.3 INJECTION SUBCUTANEOUS ONCE
Qty: 0.6 ML | Refills: 0 | Status: SHIPPED | OUTPATIENT
Start: 2025-01-20 | End: 2025-01-20

## 2025-01-20 RX ORDER — KETOROLAC TROMETHAMINE 30 MG/ML
INJECTION, SOLUTION INTRAMUSCULAR; INTRAVENOUS
Qty: 1 ML | Status: CANCELLED | OUTPATIENT
Start: 2025-01-20

## 2025-01-20 RX ORDER — AZITHROMYCIN 250 MG/1
TABLET, FILM COATED ORAL
Qty: 6 TABLET | Refills: 0 | Status: SHIPPED | OUTPATIENT
Start: 2025-01-20 | End: 2025-01-25

## 2025-01-20 NOTE — PROGRESS NOTES
Name: Tram Perez      : 1980      MRN: [id number]  Encounter Provider: Reno Flores MD  Encounter Date: 2025   Encounter department: [facility]  :  Assessment & Plan  Allergy, subsequent encounter  Refill requested  Orders:    EPINEPHrine (EPIPEN) 0.3 mg/0.3 mL SOAJ; Inject 0.3 mL (0.3 mg total) into a muscle once for 1 dose    Acute non-recurrent pansinusitis  History of recurrent sinusitis with sinus pressure, PND, and sinus tenderness. Started 1 week ago. Multiple allergies. Start z pack which she tolerated in the past. Symptoms could have also caused positional lightheadedness  Orders:    azithromycin (Zithromax) 250 mg tablet; Take 2 tablets (500 mg total) by mouth daily for 1 day, THEN 1 tablet (250 mg total) daily for 4 days.    Syncope and collapse  Became lightheaded this am at 4 am after using the restroom. Hit her head on the sick while trying to pull her pants up and again a few minutes later while walking out the bathroom. Second time, she hit her head on the corner of the tub. Not on AC but does self administer Toradol. No LOC. Last dose was 5 days ago. Advise pt to avoid NSAIDS for 24 hours in the event of a bleed. No focal neurological findings to suggest this or warrant head imaging. Positive orthostatic pressures. Hydration advised especially following bout of diarrhea yesterday. Concussion protocol. Labs ordered. EKG showed sinus bradycardia with a HR of 56. Possibly micturition syncope vs vasovagal syncope vs sinus pressure causing lightheadedness vs dehydration.  Labs ordered and f/u in 1 week   Orders:    POCT ECG    CBC and differential; Future    Comprehensive metabolic panel; Future    Magnesium; Future    Phosphorus; Future    Orthostatic hypotension                History of Present Illness   Presents following a fall  Started at 4 am this am  Woke up out of sleep and rushed to the restroom. As she was getting up to pull her pants, she felt  "lightheaded and hit her head on the sink in from of her. She tried walking out of the bathroom and stumbled seh went o tthe batrhoom and terri she got up to put her pants up she felt lightadead and hit her head on the sink   Hydrated well. Experienced occasional diarrhea in the setting of IBS C. Has loose stools yesterday and incontinence today   No nausea, palpitaiton, or chest pain  BS herlinda following the fall   BS from 4 am to now in the 200-300   No food but did take acarbose   Not on blood thinners  History of 4 concussions since 2023     Dizziness  Associated symptoms include abdominal pain, congestion and headaches. Pertinent negatives include no chest pain, fever, nausea or vomiting.     Review of Systems   Constitutional:  Negative for fever.   HENT:  Positive for congestion, postnasal drip and sinus pressure.    Respiratory:  Negative for chest tightness and shortness of breath.    Cardiovascular:  Negative for chest pain and palpitations.   Gastrointestinal:  Positive for abdominal pain and diarrhea. Negative for nausea and vomiting.   Neurological:  Positive for dizziness, syncope and headaches.       Objective   /78 (BP Location: Right arm, Patient Position: Sitting, Cuff Size: Large)   Pulse 62   Temp 98.2 °F (36.8 °C) (Tympanic)   Resp 16   Ht 5' 2\" (1.575 m)   Wt 66.5 kg (146 lb 9.6 oz)   SpO2 99%   BMI 26.81 kg/m²      Physical Exam  Constitutional:       General: She is not in acute distress.     Appearance: Normal appearance. She is not ill-appearing or toxic-appearing.      Comments: Tired appearing    HENT:      Head: Normocephalic and atraumatic.      Nose:      Right Sinus: Maxillary sinus tenderness and frontal sinus tenderness present.      Left Sinus: Maxillary sinus tenderness and frontal sinus tenderness present.      Mouth/Throat:      Mouth: Mucous membranes are moist.   Eyes:      General:         Right eye: No discharge.         Left eye: No discharge.      Extraocular " Movements: Extraocular movements intact.      Pupils: Pupils are equal, round, and reactive to light.   Cardiovascular:      Rate and Rhythm: Normal rate and regular rhythm.      Heart sounds: No murmur heard.  Pulmonary:      Effort: Pulmonary effort is normal. No respiratory distress.      Breath sounds: Normal breath sounds. No stridor. No wheezing, rhonchi or rales.   Abdominal:      General: There is no distension.      Palpations: Abdomen is soft. There is no mass.      Tenderness: There is abdominal tenderness (generalized). There is no guarding or rebound.      Hernia: No hernia is present.   Musculoskeletal:      Cervical back: Rigidity present. No tenderness. Muscular tenderness (right) present. No spinous process tenderness. Normal range of motion.   Lymphadenopathy:      Cervical: No cervical adenopathy.   Neurological:      Mental Status: She is oriented to person, place, and time.      Cranial Nerves: No cranial nerve deficit.      Motor: No weakness or tremor.      Coordination: Romberg sign negative.      Gait: Gait normal.   Psychiatric:         Behavior: Behavior normal.

## 2025-01-20 NOTE — TELEPHONE ENCOUNTER
"Pt calling.  She has ongoing dizziness that she is being followed for.  This morning she fell twice in the bathroom, striking her head on the bathtub.  She did not lose consciousness.  She is not on blood thinners. Right now her BS is 273 after taking her acarbose.  Pt is A&O x 4.  She has a 6/10 HA.  Appt made for this afternoon.  ED precautions given.     Reason for Disposition   Patient wants to be seen    Answer Assessment - Initial Assessment Questions  1. MECHANISM: \"How did the injury happen?\" For falls, ask: \"What height did you fall from?\" and \"What surface did you fall against?\"       I fell   2. ONSET: \"When did the injury happen?\" (e.g., minutes, hours ago)       *No Answer*  3. NEUROLOGIC SYMPTOMS: \"Was there any loss of consciousness?\" \"Are there any other neurological symptoms?\"       LOC   4. MENTAL STATUS: \"Does the person know who they are, who you are, and where they are?\"       A&O x 3   5. LOCATION: \"What part of the head was hit?\"       Scalp   6. SCALP APPEARANCE: \"What does the scalp look like? Is it bleeding now?\" If Yes, ask: \"Is it difficult to stop?\"       no  7. SIZE: For cuts, bruises, or swelling, ask: \"How large is it?\" (e.g., inches or centimeters)       no  8. PAIN: \"Is there any pain?\" If Yes, ask: \"How bad is it?\" (Scale 0-10; or none, mild, moderate, severe)      HA 6/10  9. TETANUS: For any breaks in the skin, ask: \"When was the last tetanus booster?\"      no  10. BLOOD THINNERS: \"Do you take any blood thinners?\" (e.g., aspirin, clopidogrel / Plavix, coumadin, heparin). Notes: Other strong blood thinners include: Arixtra (fondaparinux), Eliquis (apixaban), Pradaxa (dabigatran), and Xarelto (rivaroxaban).        no  11. OTHER SYMPTOMS: \"Do you have any other symptoms?\" (e.g., neck pain, vomiting)        no  12. PREGNANCY: \"Is there any chance you are pregnant?\" \"When was your last menstrual period?\"        no    Protocols used: Head Injury-Adult-[place]    "

## 2025-01-22 DIAGNOSIS — K91.2 HYPOGLYCEMIA FOLLOWING GASTROINTESTINAL SURGERY: Primary | ICD-10-CM

## 2025-02-04 NOTE — PROGRESS NOTES
OFFICE VISIT - BARIATRIC SURGERY  Tram Perez 44 y.o. female MRN: [id number]  Unit/Bed#:  Encounter: [id number]      HPI:  Tram Perez is a 44 y.o. female status post Vi-En-Y Gastric Bypass with Dr. Carlson at [facility] in 7/2020. Comes to the office today for UGI review.    Subjective     Patient states that for the last 1-1/2 to 2 years she has experienced a feeling of food getting stuck after eating.  She adds that she was unable to get the upper GI as well as the endoscopy done due to the fact that she cannot remain n.p.o. for extended periods of time given her reactive hypoglycemia.  She is willing to have the studies done, however they need to be done early in the morning.  He also has that she occasionally experiences pain in her left abdomen at the site of her previous hernia repair.    Review of Systems   Constitutional:  Negative for chills and fever.   HENT:  Positive for trouble swallowing. Negative for ear pain and sore throat.    Eyes:  Negative for pain and visual disturbance.   Respiratory:  Negative for cough and shortness of breath.    Cardiovascular:  Negative for chest pain and palpitations.   Gastrointestinal:  Positive for abdominal pain. Negative for vomiting.   Genitourinary:  Negative for dysuria and hematuria.   Musculoskeletal:  Negative for arthralgias and back pain.   Skin:  Negative for color change and rash.   Neurological:  Negative for seizures and syncope.   All other systems reviewed and are negative.      Historical Information   Past Medical History:   Diagnosis Date    Acid reflux     Allergic     Anxiety     Asthma     Depression     Diabetes mellitus (HCC)     Pre    Diverticulitis     Epileptic seizures (HCC)     Medication related    Gestational diabetes mellitus     Headache(784.0)     Migraines    Hypertension     Inflammatory bowel disease     Lateral epicondylitis     Malignant tumor of cervix (HCC)     between 0301-5275    Obstructive sleep apnea   "   CPAP    Paresthesias     Peptic ulceration     Plantar fasciitis     Reactive hypoglycemia     Rectocele     Seizures (HCC)     Had ghem due to low sugar levels    Tinea corporis      Past Surgical History:   Procedure Laterality Date    ARTHROSCOPY ANKLE Right 2017    Procedure: ANKLE ARTHOSCOPY WITH EXTENSIVE SYNOVECTOMY, OPEN ARTHOTOY LATERAL ANKLE WITH ANKLE STABILIZATION, BROSTOM REPAIR, INTERNAL BRACE AUGMENTATION, PERONEAL TENDON SYNOVECTOMY;  Surgeon: Donnell Hansen DPM;  Location: [facility];  Service: Podiatry    COLONOSCOPY  2017    GASTRIC BYPASS  2020    HEMORRHOID SURGERY  2021    HERNIA REPAIR      HYSTERECTOMY      MT COLONOSCOPY FLX DX W/COLLJ SPEC WHEN PFRMD N/A 2017    Procedure: COLONOSCOPY;  Surgeon: Armani Lara MD;  [facility];  Service: Gastroenterology    MT ESOPHAGOGASTRODUODENOSCOPY TRANSORAL DIAGNOSTIC N/A 2016    Procedure: EGD AND COLONOSCOPY;  Surgeon: Armani Lara MD;  [facility];  Service: Gastroenterology    TONSILLECTOMY      TUBAL LIGATION      UPPER GASTROINTESTINAL ENDOSCOPY       Social History   Social History     Substance and Sexual Activity   Alcohol Use Not Currently    Comment: occasionally, maybe 1-2 times a month     Social History     Substance and Sexual Activity   Drug Use Not Currently    Types: Marijuana     Social History     Tobacco Use   Smoking Status Former    Current packs/day: 0.00    Average packs/day: 0.5 packs/day for 10.0 years (5.0 ttl pk-yrs)    Types: Cigarettes    Quit date: 2013    Years since quittin.4   Smokeless Tobacco Never       Objective       Current Vitals:   Blood Pressure: 98/74 (25)  Pulse: 100 (25)  Temperature: 97.8 °F (36.6 °C) (25)  Temp Source: Tympanic (25)  Height: 5' 2\" (157.5 cm) (25)  Weight - Scale: 64.4 kg (142 lb) (25)  SpO2: 99 % (25)    Invasive Devices       None     "               Physical Exam  Vitals reviewed.   Constitutional:       General: She is not in acute distress.     Appearance: Normal appearance. She is not ill-appearing.   HENT:      Head: Normocephalic and atraumatic.      Nose: Nose normal.      Mouth/Throat:      Mouth: Mucous membranes are moist.      Pharynx: Oropharynx is clear.   Eyes:      Extraocular Movements: Extraocular movements intact.      Conjunctiva/sclera: Conjunctivae normal.   Cardiovascular:      Rate and Rhythm: Normal rate.   Pulmonary:      Effort: Pulmonary effort is normal. No respiratory distress.   Abdominal:      General: There is no distension.      Palpations: Abdomen is soft.      Tenderness: There is no abdominal tenderness. There is no guarding or rebound.      Hernia: No hernia is present.   Musculoskeletal:         General: No deformity. Normal range of motion.      Cervical back: Normal range of motion and neck supple.   Skin:     General: Skin is warm and dry.      Coloration: Skin is not jaundiced.   Neurological:      General: No focal deficit present.      Mental Status: She is alert and oriented to person, place, and time.   Psychiatric:         Mood and Affect: Mood normal.         Thought Content: Thought content normal.           Pathology, and Other Studies: Results Review Statement: No pertinent imaging studies reviewed.      Assessment/PLAN:    Tram Perez is a 44 y.o. female status post Vi-En-Y Gastric Bypass with Dr. Calrson at [facility] in 7/2020 . Comes to the office today for UGI review    UGI        EGD        Pathology from EGD         MANOMETRY/pH Study        GASTRIC EMPTYING STUDY      --------------------------------------------------------------------  Discussed with the patient that we can schedule her endoscopy as well as the upper GI early in the morning.  Given her pain that her previous hernia site, we will plan to obtain a CT of the abdomen and pelvis for further evaluation.   Patient to follow-up in clinic after these tests have resulted for further discussion of the next steps.    Plan:  - CT abd/pel  - UGI  - EGD  - Follow up in clinic after these have resulted for further discussion of the next steps              Josue Mas MD  Bariatric Surgery  2/5/2025  12:35 PM

## 2025-02-04 NOTE — H&P (VIEW-ONLY)
OFFICE VISIT - BARIATRIC SURGERY  Tram Perez 44 y.o. female MRN: [id number]  Unit/Bed#:  Encounter: [id number]      HPI:  Tram Perez is a 44 y.o. female status post Vi-En-Y Gastric Bypass with Dr. Carlson at [facility] in 7/2020. Comes to the office today for UGI review.    Subjective     Patient states that for the last 1-1/2 to 2 years she has experienced a feeling of food getting stuck after eating.  She adds that she was unable to get the upper GI as well as the endoscopy done due to the fact that she cannot remain n.p.o. for extended periods of time given her reactive hypoglycemia.  She is willing to have the studies done, however they need to be done early in the morning.  He also has that she occasionally experiences pain in her left abdomen at the site of her previous hernia repair.    Review of Systems   Constitutional:  Negative for chills and fever.   HENT:  Positive for trouble swallowing. Negative for ear pain and sore throat.    Eyes:  Negative for pain and visual disturbance.   Respiratory:  Negative for cough and shortness of breath.    Cardiovascular:  Negative for chest pain and palpitations.   Gastrointestinal:  Positive for abdominal pain. Negative for vomiting.   Genitourinary:  Negative for dysuria and hematuria.   Musculoskeletal:  Negative for arthralgias and back pain.   Skin:  Negative for color change and rash.   Neurological:  Negative for seizures and syncope.   All other systems reviewed and are negative.      Historical Information   Past Medical History:   Diagnosis Date    Acid reflux     Allergic     Anxiety     Asthma     Depression     Diabetes mellitus (HCC)     Pre    Diverticulitis     Epileptic seizures (HCC)     Medication related    Gestational diabetes mellitus     Headache(784.0)     Migraines    Hypertension     Inflammatory bowel disease     Lateral epicondylitis     Malignant tumor of cervix (HCC)     between 6473-6112    Obstructive sleep apnea   "   CPAP    Paresthesias     Peptic ulceration     Plantar fasciitis     Reactive hypoglycemia     Rectocele     Seizures (HCC)     Had ghem due to low sugar levels    Tinea corporis      Past Surgical History:   Procedure Laterality Date    ARTHROSCOPY ANKLE Right 2017    Procedure: ANKLE ARTHOSCOPY WITH EXTENSIVE SYNOVECTOMY, OPEN ARTHOTOY LATERAL ANKLE WITH ANKLE STABILIZATION, BROSTOM REPAIR, INTERNAL BRACE AUGMENTATION, PERONEAL TENDON SYNOVECTOMY;  Surgeon: Donnell Hansen DPM;  Location[facility];  Service: Podiatry    COLONOSCOPY  2017    GASTRIC BYPASS  2020    HEMORRHOID SURGERY  2021    HERNIA REPAIR      HYSTERECTOMY      AZ COLONOSCOPY FLX DX W/COLLJ SPEC WHEN PFRMD N/A 2017    Procedure: COLONOSCOPY;  Surgeon: Armani Lara MD;  [facility];  Service: Gastroenterology    AZ ESOPHAGOGASTRODUODENOSCOPY TRANSORAL DIAGNOSTIC N/A 2016    Procedure: EGD AND COLONOSCOPY;  Surgeon: Armani Lara MD;  [facility];  Service: Gastroenterology    TONSILLECTOMY      TUBAL LIGATION      UPPER GASTROINTESTINAL ENDOSCOPY       Social History   Social History     Substance and Sexual Activity   Alcohol Use Not Currently    Comment: occasionally, maybe 1-2 times a month     Social History     Substance and Sexual Activity   Drug Use Not Currently    Types: Marijuana     Social History     Tobacco Use   Smoking Status Former    Current packs/day: 0.00    Average packs/day: 0.5 packs/day for 10.0 years (5.0 ttl pk-yrs)    Types: Cigarettes    Quit date: 2013    Years since quittin.4   Smokeless Tobacco Never       Objective       Current Vitals:   Blood Pressure: 98/74 (25)  Pulse: 100 (25)  Temperature: 97.8 °F (36.6 °C) (25)  Temp Source: Tympanic (25)  Height: 5' 2\" (157.5 cm) (25)  Weight - Scale: 64.4 kg (142 lb) (25)  SpO2: 99 % (25)    Invasive Devices       None     "               Physical Exam  Vitals reviewed.   Constitutional:       General: She is not in acute distress.     Appearance: Normal appearance. She is not ill-appearing.   HENT:      Head: Normocephalic and atraumatic.      Nose: Nose normal.      Mouth/Throat:      Mouth: Mucous membranes are moist.      Pharynx: Oropharynx is clear.   Eyes:      Extraocular Movements: Extraocular movements intact.      Conjunctiva/sclera: Conjunctivae normal.   Cardiovascular:      Rate and Rhythm: Normal rate.   Pulmonary:      Effort: Pulmonary effort is normal. No respiratory distress.   Abdominal:      General: There is no distension.      Palpations: Abdomen is soft.      Tenderness: There is no abdominal tenderness. There is no guarding or rebound.      Hernia: No hernia is present.   Musculoskeletal:         General: No deformity. Normal range of motion.      Cervical back: Normal range of motion and neck supple.   Skin:     General: Skin is warm and dry.      Coloration: Skin is not jaundiced.   Neurological:      General: No focal deficit present.      Mental Status: She is alert and oriented to person, place, and time.   Psychiatric:         Mood and Affect: Mood normal.         Thought Content: Thought content normal.           Pathology, and Other Studies: Results Review Statement: No pertinent imaging studies reviewed.      Assessment/PLAN:    Tram Perez is a 44 y.o. female status [facility] . Comes to the office today for UGI review    UGI        EGD        Pathology from EGD         MANOMETRY/pH Study        GASTRIC EMPTYING STUDY      --------------------------------------------------------------------  Discussed with the patient that we can schedule her endoscopy as well as the upper GI early in the morning.  Given her pain that her previous hernia site, we will plan to obtain a CT of the abdomen and pelvis for further evaluation.   Patient to follow-up in clinic after these tests have resulted for further discussion of the next steps.    Plan:  - CT abd/pel  - UGI  - EGD  - Follow up in clinic after these have resulted for further discussion of the next steps              Josue Mas MD  Bariatric Surgery  2/5/2025  12:35 PM

## 2025-02-05 ENCOUNTER — OFFICE VISIT (OUTPATIENT)
Dept: BARIATRICS | Facility: CLINIC | Age: 45
End: 2025-02-05
Payer: COMMERCIAL

## 2025-02-05 ENCOUNTER — PREP FOR PROCEDURE (OUTPATIENT)
Dept: BARIATRICS | Facility: CLINIC | Age: 45
End: 2025-02-05

## 2025-02-05 VITALS
SYSTOLIC BLOOD PRESSURE: 98 MMHG | HEIGHT: 62 IN | HEART RATE: 100 BPM | DIASTOLIC BLOOD PRESSURE: 74 MMHG | OXYGEN SATURATION: 99 % | WEIGHT: 142 LBS | TEMPERATURE: 97.8 F | BODY MASS INDEX: 26.13 KG/M2

## 2025-02-05 DIAGNOSIS — Z98.84 BARIATRIC SURGERY STATUS: Primary | ICD-10-CM

## 2025-02-05 DIAGNOSIS — Z98.84 HISTORY OF BARIATRIC SURGERY: Primary | ICD-10-CM

## 2025-02-05 PROCEDURE — 99213 OFFICE O/P EST LOW 20 MIN: CPT | Performed by: SURGERY

## 2025-02-05 NOTE — PROGRESS NOTES
Date of surgery: 2020  Procedure: RNY gastric bypass  Performing surgeon: [facility]    Initial Weight - 266.3 lbs.  Current Weight - 142.0 lbs.  Kev Weight - 142.0 lbs.  Total Body Weight Loss (EWL) - 124.3 lbs.  EWL% - 95%  TWB% - 47%

## 2025-02-06 DIAGNOSIS — Z90.3 POSTGASTRECTOMY MALABSORPTION: Primary | ICD-10-CM

## 2025-02-06 DIAGNOSIS — E16.2 HYPOGLYCEMIA: ICD-10-CM

## 2025-02-06 DIAGNOSIS — K91.2 POSTGASTRECTOMY MALABSORPTION: Primary | ICD-10-CM

## 2025-02-06 RX ORDER — ACYCLOVIR 400 MG/1
1 TABLET ORAL
Qty: 9 EACH | Refills: 1 | Status: SHIPPED | OUTPATIENT
Start: 2025-02-06

## 2025-02-06 RX ORDER — BLOOD SUGAR DIAGNOSTIC
STRIP MISCELLANEOUS
Qty: 200 EACH | Refills: 1 | Status: SHIPPED | OUTPATIENT
Start: 2025-02-06

## 2025-02-07 ENCOUNTER — APPOINTMENT (OUTPATIENT)
Dept: LAB | Facility: AMBULARY SURGERY CENTER | Age: 45
End: 2025-02-07
Payer: COMMERCIAL

## 2025-02-07 DIAGNOSIS — K91.2 POSTGASTRECTOMY MALABSORPTION: ICD-10-CM

## 2025-02-07 DIAGNOSIS — Z48.815 ENCOUNTER FOR SURGICAL AFTERCARE FOLLOWING SURGERY OF DIGESTIVE SYSTEM: ICD-10-CM

## 2025-02-07 DIAGNOSIS — M79.7 FIBROMYALGIA: ICD-10-CM

## 2025-02-07 DIAGNOSIS — Z98.84 HISTORY OF BARIATRIC SURGERY: ICD-10-CM

## 2025-02-07 DIAGNOSIS — E16.2 HYPOGLYCEMIA: ICD-10-CM

## 2025-02-07 DIAGNOSIS — M06.00 SERONEGATIVE RHEUMATOID ARTHRITIS (HCC): ICD-10-CM

## 2025-02-07 DIAGNOSIS — Z98.84 BARIATRIC SURGERY STATUS: ICD-10-CM

## 2025-02-07 DIAGNOSIS — E16.1 REACTIVE HYPOGLYCEMIA: ICD-10-CM

## 2025-02-07 DIAGNOSIS — Z90.3 POSTGASTRECTOMY MALABSORPTION: ICD-10-CM

## 2025-02-07 DIAGNOSIS — K91.2 POSTSURGICAL MALABSORPTION: ICD-10-CM

## 2025-02-07 DIAGNOSIS — R53.83 OTHER FATIGUE: ICD-10-CM

## 2025-02-07 DIAGNOSIS — Z87.898 HISTORY OF SEIZURE: ICD-10-CM

## 2025-02-07 DIAGNOSIS — N30.00 ACUTE CYSTITIS WITHOUT HEMATURIA: ICD-10-CM

## 2025-02-07 DIAGNOSIS — K91.2 HYPOGLYCEMIA FOLLOWING GASTROINTESTINAL SURGERY: ICD-10-CM

## 2025-02-07 DIAGNOSIS — R55 SYNCOPE AND COLLAPSE: ICD-10-CM

## 2025-02-07 LAB
25(OH)D3 SERPL-MCNC: 28.7 NG/ML (ref 30–100)
ALBUMIN SERPL BCG-MCNC: 4.7 G/DL (ref 3.5–5)
ALP SERPL-CCNC: 73 U/L (ref 34–104)
ALT SERPL W P-5'-P-CCNC: 44 U/L (ref 7–52)
ANION GAP SERPL CALCULATED.3IONS-SCNC: 9 MMOL/L (ref 4–13)
AST SERPL W P-5'-P-CCNC: 20 U/L (ref 13–39)
BASOPHILS # BLD AUTO: 0.04 THOUSANDS/ΜL (ref 0–0.1)
BASOPHILS NFR BLD AUTO: 1 % (ref 0–1)
BILIRUB SERPL-MCNC: 0.54 MG/DL (ref 0.2–1)
BUN SERPL-MCNC: 21 MG/DL (ref 5–25)
CALCIUM SERPL-MCNC: 9.3 MG/DL (ref 8.4–10.2)
CHLORIDE SERPL-SCNC: 103 MMOL/L (ref 96–108)
CO2 SERPL-SCNC: 27 MMOL/L (ref 21–32)
CORTIS AM PEAK SERPL-MCNC: 17.1 UG/DL (ref 6.7–22.6)
CREAT SERPL-MCNC: 0.73 MG/DL (ref 0.6–1.3)
EOSINOPHIL # BLD AUTO: 0.06 THOUSAND/ΜL (ref 0–0.61)
EOSINOPHIL NFR BLD AUTO: 1 % (ref 0–6)
ERYTHROCYTE [DISTWIDTH] IN BLOOD BY AUTOMATED COUNT: 11.9 % (ref 11.6–15.1)
EST. AVERAGE GLUCOSE BLD GHB EST-MCNC: 100 MG/DL
FERRITIN SERPL-MCNC: 203 NG/ML (ref 11–307)
FOLATE SERPL-MCNC: 8.5 NG/ML
GFR SERPL CREATININE-BSD FRML MDRD: 99 ML/MIN/1.73SQ M
GLUCOSE SERPL-MCNC: 144 MG/DL (ref 65–140)
HBA1C MFR BLD: 5.1 %
HCT VFR BLD AUTO: 46.2 % (ref 34.8–46.1)
HGB BLD-MCNC: 15.4 G/DL (ref 11.5–15.4)
IMM GRANULOCYTES # BLD AUTO: 0.02 THOUSAND/UL (ref 0–0.2)
IMM GRANULOCYTES NFR BLD AUTO: 0 % (ref 0–2)
IRON SATN MFR SERPL: 39 % (ref 15–50)
IRON SERPL-MCNC: 114 UG/DL (ref 50–212)
LYMPHOCYTES # BLD AUTO: 1.39 THOUSANDS/ΜL (ref 0.6–4.47)
LYMPHOCYTES NFR BLD AUTO: 21 % (ref 14–44)
MAGNESIUM SERPL-MCNC: 2.1 MG/DL (ref 1.9–2.7)
MCH RBC QN AUTO: 31.7 PG (ref 26.8–34.3)
MCHC RBC AUTO-ENTMCNC: 33.3 G/DL (ref 31.4–37.4)
MCV RBC AUTO: 95 FL (ref 82–98)
MONOCYTES # BLD AUTO: 0.5 THOUSAND/ΜL (ref 0.17–1.22)
MONOCYTES NFR BLD AUTO: 8 % (ref 4–12)
NEUTROPHILS # BLD AUTO: 4.7 THOUSANDS/ΜL (ref 1.85–7.62)
NEUTS SEG NFR BLD AUTO: 69 % (ref 43–75)
NRBC BLD AUTO-RTO: 0 /100 WBCS
PHOSPHATE SERPL-MCNC: 3.4 MG/DL (ref 2.7–4.5)
PLATELET # BLD AUTO: 200 THOUSANDS/UL (ref 149–390)
PMV BLD AUTO: 10.8 FL (ref 8.9–12.7)
POTASSIUM SERPL-SCNC: 3.6 MMOL/L (ref 3.5–5.3)
PROT SERPL-MCNC: 6.5 G/DL (ref 6.4–8.4)
PTH-INTACT SERPL-MCNC: 29 PG/ML (ref 12–88)
RBC # BLD AUTO: 4.86 MILLION/UL (ref 3.81–5.12)
SODIUM SERPL-SCNC: 139 MMOL/L (ref 135–147)
SODIUM UR-SCNC: 43 MMOL/L
TIBC SERPL-MCNC: 294 UG/DL (ref 250–450)
TRANSFERRIN SERPL-MCNC: 210 MG/DL (ref 203–362)
UIBC SERPL-MCNC: 180 UG/DL (ref 155–355)
VIT B12 SERPL-MCNC: 728 PG/ML (ref 180–914)
WBC # BLD AUTO: 6.71 THOUSAND/UL (ref 4.31–10.16)

## 2025-02-07 PROCEDURE — 82306 VITAMIN D 25 HYDROXY: CPT

## 2025-02-07 PROCEDURE — 84590 ASSAY OF VITAMIN A: CPT

## 2025-02-07 PROCEDURE — 80053 COMPREHEN METABOLIC PANEL: CPT

## 2025-02-07 PROCEDURE — 82728 ASSAY OF FERRITIN: CPT

## 2025-02-07 PROCEDURE — 84300 ASSAY OF URINE SODIUM: CPT

## 2025-02-07 PROCEDURE — 84425 ASSAY OF VITAMIN B-1: CPT

## 2025-02-07 PROCEDURE — 83970 ASSAY OF PARATHORMONE: CPT

## 2025-02-07 PROCEDURE — 83540 ASSAY OF IRON: CPT

## 2025-02-07 PROCEDURE — 87077 CULTURE AEROBIC IDENTIFY: CPT

## 2025-02-07 PROCEDURE — 87186 SC STD MICRODIL/AGAR DIL: CPT

## 2025-02-07 PROCEDURE — 36415 COLL VENOUS BLD VENIPUNCTURE: CPT

## 2025-02-07 PROCEDURE — 87086 URINE CULTURE/COLONY COUNT: CPT

## 2025-02-07 PROCEDURE — 84100 ASSAY OF PHOSPHORUS: CPT

## 2025-02-07 PROCEDURE — 84630 ASSAY OF ZINC: CPT

## 2025-02-07 PROCEDURE — 83550 IRON BINDING TEST: CPT

## 2025-02-07 PROCEDURE — 85025 COMPLETE CBC W/AUTO DIFF WBC: CPT

## 2025-02-07 PROCEDURE — 82533 TOTAL CORTISOL: CPT

## 2025-02-07 PROCEDURE — 83036 HEMOGLOBIN GLYCOSYLATED A1C: CPT

## 2025-02-07 PROCEDURE — 83735 ASSAY OF MAGNESIUM: CPT

## 2025-02-07 PROCEDURE — 82746 ASSAY OF FOLIC ACID SERUM: CPT

## 2025-02-07 PROCEDURE — 82607 VITAMIN B-12: CPT

## 2025-02-07 PROCEDURE — 82024 ASSAY OF ACTH: CPT

## 2025-02-08 LAB — ACTH PLAS-MCNC: 21.4 PG/ML (ref 7.2–63.3)

## 2025-02-09 LAB — BACTERIA UR CULT: ABNORMAL

## 2025-02-10 ENCOUNTER — PATIENT MESSAGE (OUTPATIENT)
Dept: ENDOCRINOLOGY | Facility: CLINIC | Age: 45
End: 2025-02-10

## 2025-02-10 LAB — BACTERIA UR CULT: ABNORMAL

## 2025-02-10 RX ORDER — SODIUM CHLORIDE 9 MG/ML
125 INJECTION, SOLUTION INTRAVENOUS CONTINUOUS
Status: CANCELLED | OUTPATIENT
Start: 2025-02-10

## 2025-02-11 ENCOUNTER — RESULTS FOLLOW-UP (OUTPATIENT)
Dept: FAMILY MEDICINE CLINIC | Facility: CLINIC | Age: 45
End: 2025-02-11

## 2025-02-11 ENCOUNTER — TELEPHONE (OUTPATIENT)
Dept: ENDOCRINOLOGY | Facility: CLINIC | Age: 45
End: 2025-02-11

## 2025-02-11 LAB — ZINC SERPL-MCNC: 61 UG/DL (ref 44–115)

## 2025-02-12 ENCOUNTER — HOSPITAL ENCOUNTER (OUTPATIENT)
Dept: GASTROENTEROLOGY | Facility: HOSPITAL | Age: 45
Setting detail: OUTPATIENT SURGERY
Discharge: HOME | End: 2025-02-12
Attending: SURGERY
Payer: COMMERCIAL

## 2025-02-12 ENCOUNTER — ANESTHESIA EVENT (OUTPATIENT)
Dept: GASTROENTEROLOGY | Facility: HOSPITAL | Age: 45
End: 2025-02-12
Payer: COMMERCIAL

## 2025-02-12 ENCOUNTER — ANESTHESIA (OUTPATIENT)
Dept: GASTROENTEROLOGY | Facility: HOSPITAL | Age: 45
End: 2025-02-12
Payer: COMMERCIAL

## 2025-02-12 ENCOUNTER — RESULTS FOLLOW-UP (OUTPATIENT)
Dept: ENDOCRINOLOGY | Facility: CLINIC | Age: 45
End: 2025-02-12

## 2025-02-12 VITALS
HEART RATE: 64 BPM | BODY MASS INDEX: 25.76 KG/M2 | OXYGEN SATURATION: 100 % | DIASTOLIC BLOOD PRESSURE: 58 MMHG | RESPIRATION RATE: 18 BRPM | SYSTOLIC BLOOD PRESSURE: 100 MMHG | HEIGHT: 62 IN | TEMPERATURE: 98 F | WEIGHT: 140 LBS

## 2025-02-12 DIAGNOSIS — Z98.84 BARIATRIC SURGERY STATUS: ICD-10-CM

## 2025-02-12 LAB — GLUCOSE SERPL-MCNC: 104 MG/DL (ref 65–140)

## 2025-02-12 PROCEDURE — 82948 REAGENT STRIP/BLOOD GLUCOSE: CPT

## 2025-02-12 PROCEDURE — 43239 EGD BIOPSY SINGLE/MULTIPLE: CPT | Performed by: SURGERY

## 2025-02-12 PROCEDURE — 88305 TISSUE EXAM BY PATHOLOGIST: CPT | Performed by: PATHOLOGY

## 2025-02-12 RX ORDER — SODIUM CHLORIDE 9 MG/ML
125 INJECTION, SOLUTION INTRAVENOUS CONTINUOUS
Status: DISCONTINUED | OUTPATIENT
Start: 2025-02-12 | End: 2025-02-16 | Stop reason: HOSPADM

## 2025-02-12 RX ORDER — PROPOFOL 10 MG/ML
INJECTION, EMULSION INTRAVENOUS AS NEEDED
Status: DISCONTINUED | OUTPATIENT
Start: 2025-02-12 | End: 2025-02-12

## 2025-02-12 RX ORDER — SODIUM CHLORIDE, SODIUM LACTATE, POTASSIUM CHLORIDE, CALCIUM CHLORIDE 600; 310; 30; 20 MG/100ML; MG/100ML; MG/100ML; MG/100ML
INJECTION, SOLUTION INTRAVENOUS CONTINUOUS PRN
Status: DISCONTINUED | OUTPATIENT
Start: 2025-02-12 | End: 2025-02-12

## 2025-02-12 RX ORDER — LIDOCAINE HYDROCHLORIDE 20 MG/ML
INJECTION, SOLUTION EPIDURAL; INFILTRATION; INTRACAUDAL; PERINEURAL AS NEEDED
Status: DISCONTINUED | OUTPATIENT
Start: 2025-02-12 | End: 2025-02-12

## 2025-02-12 RX ADMIN — PROPOFOL 50 MG: 10 INJECTION, EMULSION INTRAVENOUS at 07:32

## 2025-02-12 RX ADMIN — PROPOFOL 30 MG: 10 INJECTION, EMULSION INTRAVENOUS at 07:33

## 2025-02-12 RX ADMIN — PROPOFOL 150 MG: 10 INJECTION, EMULSION INTRAVENOUS at 07:30

## 2025-02-12 RX ADMIN — SODIUM CHLORIDE, SODIUM LACTATE, POTASSIUM CHLORIDE, AND CALCIUM CHLORIDE: .6; .31; .03; .02 INJECTION, SOLUTION INTRAVENOUS at 07:28

## 2025-02-12 RX ADMIN — LIDOCAINE HYDROCHLORIDE 80 MG: 20 INJECTION, SOLUTION EPIDURAL; INFILTRATION; INTRACAUDAL at 07:30

## 2025-02-12 NOTE — INTERVAL H&P NOTE
H&P reviewed. After examining the patient I find no changes in the patients condition since the H&P had been written.    Vitals:    02/12/25 0659   BP: 116/59   Pulse: 68   Resp: 18   Temp: 98.7 °F (37.1 °C)   SpO2: 96%

## 2025-02-12 NOTE — ANESTHESIA POSTPROCEDURE EVALUATION
Post-Op Assessment Note    CV Status:  Stable    Pain management: adequate       Mental Status:  Sleepy   Hydration Status:  Euvolemic   PONV Controlled:  Controlled   Airway Patency:  Patent  Two or more mitigation strategies used for obstructive sleep apnea   Post Op Vitals Reviewed: Yes    No anethesia notable event occurred.    Staff: Anesthesiologist, CRNA           Last Filed PACU Vitals:  Vitals Value Taken Time   Temp     Pulse     BP     Resp     SpO2

## 2025-02-12 NOTE — ANESTHESIA POSTPROCEDURE EVALUATION
Post-Op Assessment Note    CV Status:  Stable    Pain management: adequate       Mental Status:  Alert and awake   Hydration Status:  Euvolemic   PONV Controlled:  Controlled   Airway Patency:  Patent     Post Op Vitals Reviewed: Yes    No anethesia notable event occurred.    Staff: Anesthesiologist           Last Filed PACU Vitals:  Vitals Value Taken Time   Temp 98 °F (36.7 °C) 02/12/25 0752   Pulse 64 02/12/25 0752   /58 02/12/25 0752   Resp 18 02/12/25 0752   SpO2 100 % 02/12/25 0752       Modified Carlton:     Vitals Value Taken Time   Activity 2 02/12/25 0753   Respiration 2 02/12/25 0753   Circulation 2 02/12/25 0753   Consciousness 2 02/12/25 0753   Oxygen Saturation 2 02/12/25 0753     Modified Carlton Score: 10

## 2025-02-12 NOTE — ANESTHESIA PREPROCEDURE EVALUATION
Procedure:  EGD    Relevant Problems   CARDIO   (+) Migraine without aura      GI/HEPATIC   (+) GERD (gastroesophageal reflux disease)   (+) Reactive hypoglycemia      GYN   (+) History of hysterectomy      MUSCULOSKELETAL   (+) Chondromalacia patellae   (+) Fibromyalgia   (+) Low back pain   (+) Sciatic leg pain   (+) Seronegative rheumatoid arthritis (HCC)      NEURO/PSYCH   (+) Anxiety   (+) Fibromyalgia   (+) Migraine without aura   (+) Moderate episode of recurrent major depressive disorder (HCC)   (+) Paresthesia      PULMONARY   (+) Asthma      Obstetrics/Gynecology   (+) Other ovarian cyst, left side      Surgery/Wound/Pain   (+) Postgastrectomy malabsorption        Physical Exam    Airway    Mallampati score: II         Dental   No notable dental hx     Cardiovascular  Cardiovascular exam normal    Pulmonary  Pulmonary exam normal     Other Findings  post-pubertal.      Anesthesia Plan  ASA Score- 2     Anesthesia Type- IV sedation with anesthesia with ASA Monitors.         Additional Monitors:     Airway Plan:            Plan Factors-Exercise tolerance (METS): >4 METS.    Chart reviewed.        Patient is not a current smoker.      Obstructive sleep apnea risk education given perioperatively.        Induction-     Postoperative Plan-         Informed Consent- Anesthetic plan and risks discussed with patient.        NPO Status:  Vitals Value Taken Time   Date of last liquid 02/11/25 02/12/25 0651   Time of last liquid 2100 02/12/25 0651   Date of last solid 02/11/25 02/12/25 0651   Time of last solid 2100 02/12/25 0651

## 2025-02-13 LAB
VIT A SERPL-MCNC: 36.1 UG/DL (ref 20.1–62)
VIT B1 BLD-SCNC: 115.5 NMOL/L (ref 66.5–200)

## 2025-02-14 PROCEDURE — 88305 TISSUE EXAM BY PATHOLOGIST: CPT | Performed by: PATHOLOGY

## 2025-02-19 ENCOUNTER — PATIENT MESSAGE (OUTPATIENT)
Dept: ENDOCRINOLOGY | Facility: CLINIC | Age: 45
End: 2025-02-19

## 2025-02-19 DIAGNOSIS — Z90.3 POSTGASTRECTOMY MALABSORPTION: ICD-10-CM

## 2025-02-19 DIAGNOSIS — E16.2 HYPOGLYCEMIA: ICD-10-CM

## 2025-02-19 DIAGNOSIS — K91.2 POSTGASTRECTOMY MALABSORPTION: ICD-10-CM

## 2025-02-19 DIAGNOSIS — K21.9 GASTROESOPHAGEAL REFLUX DISEASE WITHOUT ESOPHAGITIS: Primary | ICD-10-CM

## 2025-02-20 RX ORDER — ACYCLOVIR 400 MG/1
1 TABLET ORAL
Qty: 9 EACH | Refills: 0 | OUTPATIENT
Start: 2025-02-20

## 2025-02-20 RX ORDER — ACYCLOVIR 400 MG/1
1 TABLET ORAL
Qty: 3 EACH | Refills: 0 | OUTPATIENT
Start: 2025-02-20

## 2025-02-20 RX ORDER — PANTOPRAZOLE SODIUM 40 MG/1
40 TABLET, DELAYED RELEASE ORAL DAILY
Qty: 30 TABLET | Refills: 1 | Status: SHIPPED | OUTPATIENT
Start: 2025-02-20

## 2025-03-17 ENCOUNTER — OFFICE VISIT (OUTPATIENT)
Dept: FAMILY MEDICINE CLINIC | Facility: CLINIC | Age: 45
End: 2025-03-17
Payer: COMMERCIAL

## 2025-03-17 VITALS
BODY MASS INDEX: 25.76 KG/M2 | TEMPERATURE: 98 F | DIASTOLIC BLOOD PRESSURE: 80 MMHG | WEIGHT: 140 LBS | OXYGEN SATURATION: 98 % | HEIGHT: 62 IN | HEART RATE: 80 BPM | SYSTOLIC BLOOD PRESSURE: 120 MMHG | RESPIRATION RATE: 18 BRPM

## 2025-03-17 DIAGNOSIS — J06.9 ACUTE UPPER RESPIRATORY INFECTION: Primary | ICD-10-CM

## 2025-03-17 DIAGNOSIS — R05.9 COUGH, UNSPECIFIED TYPE: ICD-10-CM

## 2025-03-17 DIAGNOSIS — R35.0 FREQUENCY OF URINATION: ICD-10-CM

## 2025-03-17 PROBLEM — R21 RASH: Status: RESOLVED | Noted: 2023-06-23 | Resolved: 2025-03-17

## 2025-03-17 PROBLEM — M25.569 KNEE PAIN: Status: RESOLVED | Noted: 2018-01-24 | Resolved: 2025-03-17

## 2025-03-17 PROBLEM — L30.4 INTERTRIGO: Status: RESOLVED | Noted: 2021-11-23 | Resolved: 2025-03-17

## 2025-03-17 PROBLEM — F07.81 POSTCONCUSSION SYNDROME: Status: ACTIVE | Noted: 2025-03-17

## 2025-03-17 PROBLEM — M50.10 CERVICAL DISC DISORDER WITH RADICULOPATHY: Status: ACTIVE | Noted: 2025-03-17

## 2025-03-17 PROBLEM — E61.1 IRON DEFICIENCY: Status: ACTIVE | Noted: 2024-11-20

## 2025-03-17 PROBLEM — M25.50 JOINT PAIN: Status: RESOLVED | Noted: 2024-10-03 | Resolved: 2025-03-17

## 2025-03-17 LAB
SARS-COV-2 AG UPPER RESP QL IA: NEGATIVE
SL AMB  POCT GLUCOSE, UA: NORMAL
SL AMB LEUKOCYTE ESTERASE,UA: NORMAL
SL AMB POCT BILIRUBIN,UA: NORMAL
SL AMB POCT BLOOD,UA: NORMAL
SL AMB POCT CLARITY,UA: NORMAL
SL AMB POCT COLOR,UA: YELLOW
SL AMB POCT KETONES,UA: NORMAL
SL AMB POCT NITRITE,UA: NORMAL
SL AMB POCT PH,UA: 7
SL AMB POCT RAPID FLU A: NEGATIVE
SL AMB POCT RAPID FLU B: NEGATIVE
SL AMB POCT SPECIFIC GRAVITY,UA: 1.01
SL AMB POCT URINE PROTEIN: NORMAL
SL AMB POCT UROBILINOGEN: NORMAL
VALID CONTROL: NORMAL

## 2025-03-17 PROCEDURE — 87077 CULTURE AEROBIC IDENTIFY: CPT | Performed by: NURSE PRACTITIONER

## 2025-03-17 PROCEDURE — 99214 OFFICE O/P EST MOD 30 MIN: CPT | Performed by: NURSE PRACTITIONER

## 2025-03-17 PROCEDURE — 87804 INFLUENZA ASSAY W/OPTIC: CPT | Performed by: NURSE PRACTITIONER

## 2025-03-17 PROCEDURE — 87811 SARS-COV-2 COVID19 W/OPTIC: CPT | Performed by: NURSE PRACTITIONER

## 2025-03-17 PROCEDURE — 81002 URINALYSIS NONAUTO W/O SCOPE: CPT | Performed by: NURSE PRACTITIONER

## 2025-03-17 PROCEDURE — 87186 SC STD MICRODIL/AGAR DIL: CPT | Performed by: NURSE PRACTITIONER

## 2025-03-17 PROCEDURE — 87086 URINE CULTURE/COLONY COUNT: CPT | Performed by: NURSE PRACTITIONER

## 2025-03-17 RX ORDER — METHYLPREDNISOLONE 4 MG/1
TABLET ORAL
Qty: 21 EACH | Refills: 0 | Status: SHIPPED | OUTPATIENT
Start: 2025-03-17

## 2025-03-17 RX ORDER — SULFAMETHOXAZOLE AND TRIMETHOPRIM 800; 160 MG/1; MG/1
1 TABLET ORAL EVERY 12 HOURS SCHEDULED
Qty: 20 TABLET | Refills: 0 | Status: SHIPPED | OUTPATIENT
Start: 2025-03-17 | End: 2025-03-27

## 2025-03-17 NOTE — PROGRESS NOTES
Name: Tram Perez      : 1980      MRN: [id number]  Encounter Provider: FINA Luna  Encounter Date: 3/17/2025   Encounter department: [facility]    Assessment & Plan  Acute upper respiratory infection    Orders:    sulfamethoxazole-trimethoprim (BACTRIM DS) 800-160 mg per tablet; Take 1 tablet by mouth every 12 (twelve) hours for 10 days    methylPREDNISolone 4 MG tablet therapy pack; Use as directed on package    Cough, unspecified type    Orders:    POCT rapid flu A and B    POCT Rapid Covid Ag    Frequency of urination    Orders:    POCT urine dip    Urine culture      Recent Results (from the past week)   POCT rapid flu A and B    Collection Time: 25 11:06 AM   Result Value Ref Range    RAPID FLU A negative     RAPID FLU B negative    POCT Rapid Covid Ag    Collection Time: 25 11:07 AM   Result Value Ref Range    POCT SARS-CoV-2 Ag Negative Negative    VALID CONTROL Valid    POCT urine dip    Collection Time: 25 11:07 AM   Result Value Ref Range    LEUKOCYTE ESTERASE,UA -     NITRITE,UA -     SL AMB POCT UROBILINOGEN -     POCT URINE PROTEIN -      PH,UA 7.0     BLOOD,UA -     SPECIFIC GRAVITY,UA 1.010     KETONES,UA -     BILIRUBIN,UA -     GLUCOSE, UA -      COLOR,UA yellow     CLARITY,UA cloudy          Depression Screening and Follow-up Plan: Patient's depression screening was positive with a PHQ-9 score of 14.   Patient with underlying depression and was advised to continue current medications as prescribed.         History of Present Illness     Here for illness  A few weeks of illness  Two weeks ago  was sick and then she got sick right after  Using nyquil and dayquil  Getting fevers  Cough, congestion, thick mucous  Fatigue, low energy  Feeling like she has a bladder infection  Left ear pain  Swollen glands        Review of Systems   Constitutional:  Positive for chills, fatigue and fever.   HENT:  Positive for congestion, ear pain,  postnasal drip, rhinorrhea, sinus pressure and sinus pain. Negative for sore throat.    Eyes:  Negative for photophobia and visual disturbance.   Respiratory:  Positive for cough, chest tightness and shortness of breath. Negative for wheezing.    Cardiovascular:  Negative for chest pain and palpitations.   Gastrointestinal:  Negative for constipation, diarrhea, nausea and vomiting.   Genitourinary:  Positive for dysuria, frequency and urgency. Negative for difficulty urinating, flank pain, hematuria and pelvic pain.   Musculoskeletal:  Negative for arthralgias and myalgias.   Skin:  Negative for rash.   Neurological:  Positive for headaches. Negative for dizziness and light-headedness.   Hematological:  Negative for adenopathy.   Psychiatric/Behavioral:  Negative for dysphoric mood and sleep disturbance. The patient is not nervous/anxious.      Past Medical History:   Diagnosis Date    Acid reflux     Allergic     Anxiety     Asthma     Depression     Diabetes mellitus (HCC)     Pre    Diverticulitis     Epileptic seizures (HCC)     Medication related    Gestational diabetes mellitus     Headache(784.0)     Migraines    Hypertension     Inflammatory bowel disease     Lateral epicondylitis     Malignant tumor of cervix (HCC)     between 1033-3696    Obstructive sleep apnea     CPAP    Paresthesias     Peptic ulceration     Plantar fasciitis     Reactive hypoglycemia     Rectocele     Seizures (HCC)     Had ghem due to low sugar levels    Tinea corporis      Past Surgical History:   Procedure Laterality Date    ARTHROSCOPY ANKLE Right 09/01/2017    Procedure: ANKLE ARTHOSCOPY WITH EXTENSIVE SYNOVECTOMY, OPEN ARTHOTOY LATERAL ANKLE WITH ANKLE STABILIZATION, BROSTOM REPAIR, INTERNAL BRACE AUGMENTATION, PERONEAL TENDON SYNOVECTOMY;  Surgeon: Donnell Hansen DPM;  Location: [facility] OR;  Service: Podiatry    COLONOSCOPY  02/27/2017    GASTRIC BYPASS  07/2020    HEMORRHOID SURGERY  08/2021    HERNIA REPAIR  2020     HYSTERECTOMY  2015    MA COLONOSCOPY FLX DX W/COLLJ SPEC WHEN PFRMD N/A 2017    Procedure: COLONOSCOPY;  Surgeon: Armani Lara MD;  Location: [facility];  Service: Gastroenterology    MA ESOPHAGOGASTRODUODENOSCOPY TRANSORAL DIAGNOSTIC N/A 2016    Procedure: EGD AND COLONOSCOPY;  Surgeon: Armani Lara MD;  Location: [facility];  Service: Gastroenterology    TONSILLECTOMY      TUBAL LIGATION      UPPER GASTROINTESTINAL ENDOSCOPY       Family History   Problem Relation Age of Onset    Anxiety disorder Mother     Depression Mother     Cancer Mother     Endometrial cancer Mother         under afe 50    Stroke Mother     Thyroid disease Mother     Diabetes type II Mother     Hypertension Mother     Thyroid disease unspecified Mother         Cancer had it removed.    Thyroid cancer Mother     Alcohol abuse Mother     Asthma Mother     Mental illness Mother     Substance Abuse Mother     Hypertension Father     Alcohol abuse Father     Depression Father     Substance Abuse Father     Depression Daughter     No Known Problems Daughter     Cancer Maternal Grandmother 50        lymphatic cancer    Cancer Maternal Grandfather     Breast cancer Paternal Grandmother 82    No Known Problems Paternal Grandfather     Seizures Brother         Epilepsy    Depression Brother     Asthma Brother     Learning disabilities Brother     Mental illness Brother     Asthma Son     Depression Son     Leukemia Cousin     Cancer Cousin         lympatic    Diabetes Other     Hypertension Other     Neuropathy Other     Thyroid disease Other      Social History     Tobacco Use    Smoking status: Former     Current packs/day: 0.00     Average packs/day: 0.5 packs/day for 10.0 years (5.0 ttl pk-yrs)     Types: Cigarettes     Quit date: 2013     Years since quittin.5    Smokeless tobacco: Never   Vaping Use    Vaping status: Never Used   Substance and Sexual Activity    Alcohol use: Not Currently     Comment: occasionally, maybe  "1-2 times a month    Drug use: Not Currently     Types: Marijuana    Sexual activity: Yes     Partners: Female, Male     Birth control/protection: Surgical     Comment: Had hysterectomy     Current Outpatient Medications on File Prior to Visit   Medication Sig    acarbose (PRECOSE) 25 mg tablet Take 75 mg with breakfast, 75 mg with lunch, and 75 mg with dinner.    Acetaminophen 500 MG PACK Take 500 mg by mouth every 6 (six) hours as needed    albuterol (PROVENTIL HFA,VENTOLIN HFA) 90 mcg/act inhaler 2 PUFFS UP TO 4 TIMES A DAY AS NEEDED FOR WHEEZING    BD Integra Syringe 25G X 1\" 3 ML MISC DRAW UP 1 ML OF TORADOL AND INJECT INTO MUSCLE.    Biotin 1000 MCG tablet Take 10,000 mcg by mouth    Blood Glucose Monitoring Suppl (OneTouch Verio Reflect) w/Device KIT Check blood sugars twice daily. Please substitute with appropriate alternative as covered by patient's insurance. Dx: E11.65    Botulinum Toxin Type A 200 units SOLR Inject 155 units into face and neck IM every 90 days    Continuous Glucose Sensor (Dexcom G7 Sensor) Use 1 Device every 10 days    Continuous Glucose Sensor (Dexcom G7 Sensor) Use 1 Device every 10 days    DULoxetine (CYMBALTA) 60 mg delayed release capsule Take 1 capsule (60 mg total) by mouth daily    glucose blood (OneTouch Verio) test strip Check blood sugars twice daily. Please substitute with appropriate alternative as covered by patient's insurance. Dx: E11.65    HYDROcodone-acetaminophen (NORCO) 7.5-325 mg per tablet Take 1 tablet by mouth every 4 (four) hours as needed for pain    ketorolac (TORADOL) 30 mg/mL injection     LORazepam (ATIVAN) 1 mg tablet Take 1 mg by mouth daily as needed    methylphenidate (RITALIN) 10 mg tablet Take 10 mg by mouth daily as needed    methylphenidate (RITALIN) 20 MG tablet Take 20 mg by mouth 2 (two) times a day Pt reports taking tid    Multiple Vitamin (MULTIVITAMIN ADULT PO) Take 1 tablet by mouth every morning    nystatin (MYCOSTATIN) powder Apply " topically 2 (two) times a day    OneTouch Delica Lancets 33G MISC Check blood sugars twice daily. Please substitute with appropriate alternative as covered by patient's insurance. Dx: E11.65    OneTouch Delica Lancets 33G MISC Check blood sugars twice daily. Please substitute with appropriate alternative as covered by patient's insurance. Dx: E11.65    pantoprazole (PROTONIX) 40 mg tablet Take 1 tablet (40 mg total) by mouth daily    pregabalin (Lyrica) 150 mg capsule Take 1 capsule (150 mg total) by mouth 3 (three) times a day    rizatriptan (MAXALT) 10 MG tablet TAKE 1 TABLET (10 MG TOTAL) BY MOUTH ONCE AS NEEDED FOR MIGRAINE    SUMAtriptan Succinate 6 MG/0.5ML SOAJ Inject 0.5 mL (6 mg total) under the skin as needed (headache)    traZODone (DESYREL) 100 mg tablet     EPINEPHrine (EPIPEN) 0.3 mg/0.3 mL SOAJ Inject 0.3 mL (0.3 mg total) into a muscle once for 1 dose    [DISCONTINUED] Blood Glucose Monitoring Suppl (OneTouch Verio Reflect) w/Device KIT Check blood sugars twice daily. Please substitute with appropriate alternative as covered by patient's insurance. Dx: E11.65 (Patient not taking: Reported on 12/16/2024)    [DISCONTINUED] Continuous Glucose Sensor (FreeStyle Larry 14 Day Sensor) MISC Use 1 sensor q 14 days to check sugar levels (Patient not taking: Reported on 2/5/2025)    [DISCONTINUED] glucose blood (OneTouch Verio) test strip Check blood sugars twice daily. Please substitute with appropriate alternative as covered by patient's insurance. Dx: E11.65 (Patient not taking: Reported on 12/16/2024)    [DISCONTINUED] HYDROcodone-Acetaminophen (VICODIN PO)  (Patient not taking: Reported on 11/26/2024)     Allergies   Allergen Reactions    Aspirin Anaphylaxis    Bee Venom Swelling and Wheezing    Penicillins Anaphylaxis and Rash    Bee Pollen     Ceclor [Cefaclor] Itching    Dust Mite Extract Other (See Comments)     congestion    Grass Pollen(K-O-R-T-Swt Osito) Other (See Comments)     congestion    Lactose  "- Food Allergy GI Intolerance    Other Other (See Comments)     congestion    Pollen Extract     Amoxicillin Rash    Doxycycline Rash    Erythromycin Rash    Nickel Rash     Immunization History   Administered Date(s) Administered    COVID-19 MODERNA VACC 0.5 ML IM 02/04/2021, 03/01/2021, 01/16/2022    Hib (PRP-OMP) 09/01/2003, 11/01/2003, 05/25/2004    INFLUENZA 10/06/2020, 10/15/2022    Influenza Quadrivalent Preservative Free 3 years and older IM 10/30/2014    Influenza Quadrivalent, 6-35 Months IM 11/02/2015, 10/11/2016, 09/05/2017    Influenza Recombinant Preservative Free Im 10/03/2024    Influenza, injectable, quadrivalent, preservative free 0.5 mL 10/15/2020, 11/23/2021    MMR 09/01/1979, 09/01/1982    Pneumococcal Polysaccharide PPV23 09/05/2017    TD (adult) Preservative Free 05/01/2004    Td (adult), adsorbed 05/25/2004    Tdap 03/29/2018    Tuberculin Skin Test-PPD Intradermal 08/22/2005, 10/14/2020, 12/08/2021, 12/17/2021     Objective   /80 (BP Location: Left arm, Patient Position: Sitting, Cuff Size: Standard)   Pulse 80   Temp 98 °F (36.7 °C) (Tympanic)   Resp 18   Ht 5' 2\" (1.575 m)   Wt 63.5 kg (140 lb)   SpO2 98%   BMI 25.61 kg/m²     Physical Exam  Vitals and nursing note reviewed.   Constitutional:       Appearance: Normal appearance.   HENT:      Head: Normocephalic and atraumatic.      Right Ear: Ear canal and external ear normal. Tympanic membrane is injected.      Left Ear: Ear canal and external ear normal. Tympanic membrane is injected.      Nose: Congestion and rhinorrhea present.      Mouth/Throat:      Pharynx: Posterior oropharyngeal erythema present.   Eyes:      Conjunctiva/sclera: Conjunctivae normal.   Cardiovascular:      Rate and Rhythm: Normal rate and regular rhythm.      Heart sounds: Normal heart sounds.   Pulmonary:      Effort: Pulmonary effort is normal.      Breath sounds: Normal breath sounds.   Musculoskeletal:         General: Normal range of motion.      " Cervical back: Normal range of motion and neck supple.   Skin:     General: Skin is warm and dry.      Capillary Refill: Capillary refill takes less than 2 seconds.   Neurological:      General: No focal deficit present.      Mental Status: She is alert and oriented to person, place, and time.   Psychiatric:         Mood and Affect: Mood normal.         Behavior: Behavior normal.     Depression Screening Follow-up Plan: Patient's depression screening was positive with a PHQ-9 score of 14. Patient with underlying depression and was advised to continue current medications as prescribed. Patient assessed for underlying major depression. They have no active suicidal ideations. Brief counseling provided and recommend additional follow-up/re-evaluation next office visit.

## 2025-03-19 ENCOUNTER — PATIENT MESSAGE (OUTPATIENT)
Dept: FAMILY MEDICINE CLINIC | Facility: CLINIC | Age: 45
End: 2025-03-19

## 2025-03-19 LAB — BACTERIA UR CULT: ABNORMAL

## 2025-03-20 ENCOUNTER — NURSE TRIAGE (OUTPATIENT)
Age: 45
End: 2025-03-20

## 2025-03-20 NOTE — TELEPHONE ENCOUNTER
"Reason for Disposition  • Fever comes and goes (intermittent) and lasts > 3 weeks    Answer Assessment - Initial Assessment Questions  1. TEMPERATURE: \"What is the most recent temperature?\"  \"How was it measured?\"       101.3 this morning, mouth thermometer   2. ONSET: \"When did the fever start?\"       3/4  3. CHILLS: \"Do you have chills?\" If yes: \"How bad are they?\"  (e.g., none, mild, moderate, severe)      Yes   4. OTHER SYMPTOMS: \"Do you have any other symptoms besides the fever?\"  (e.g., abdomen pain, cough, diarrhea, earache, headache, sore throat, urination pain)      Ear ache symptom continue, no improvement since antibiotic started on 3/17, post nasal drip and mucus, lethargic, cough up sticky green mucus    5. CAUSE: If there are no symptoms, ask: \"What do you think is causing the fever?\"       Dx of ear infection  7. TREATMENT: \"What have you done so far to treat this fever?\" (e.g., OTC fever medicines)      Bactrim since 3/17    Protocols used: Fever-Adult-[place]    "

## 2025-03-20 NOTE — TELEPHONE ENCOUNTER
FOLLOW UP: ASAP    REASON FOR CONVERSATION: Fever    SYMPTOMS: right ear ache continues, sinus symptoms, fever for 2,5 weeks, today 101.3     OTHER: Patient has been on antibiotic. Please follow up.     DISPOSITION: See Today or Tomorrow in Office

## 2025-03-20 NOTE — TELEPHONE ENCOUNTER
Per Windy-Take Tylenol every six hours for the fever x couple of days.  If fevers persist go to ER for a STAT CT to evaluate further.  Patient understood recommendations.

## 2025-03-24 DIAGNOSIS — J06.9 ACUTE UPPER RESPIRATORY INFECTION: Primary | ICD-10-CM

## 2025-03-24 RX ORDER — CLINDAMYCIN HYDROCHLORIDE 150 MG/1
150 CAPSULE ORAL EVERY 8 HOURS SCHEDULED
Qty: 21 CAPSULE | Refills: 0 | Status: SHIPPED | OUTPATIENT
Start: 2025-03-24 | End: 2025-03-31

## 2025-03-31 NOTE — PROGRESS NOTES
Pre-charting for Appointment      Reason for being seen today: Discuss menopause concerns     Any current testing/imaging done prior to exam today:

## 2025-04-03 ENCOUNTER — OFFICE VISIT (OUTPATIENT)
Dept: OBGYN CLINIC | Facility: CLINIC | Age: 45
End: 2025-04-03
Payer: COMMERCIAL

## 2025-04-03 VITALS — DIASTOLIC BLOOD PRESSURE: 62 MMHG | HEIGHT: 62 IN | SYSTOLIC BLOOD PRESSURE: 114 MMHG | BODY MASS INDEX: 25.61 KG/M2

## 2025-04-03 DIAGNOSIS — N95.1 MENOPAUSAL SYMPTOMS: Primary | ICD-10-CM

## 2025-04-03 DIAGNOSIS — N39.3 STRESS INCONTINENCE: ICD-10-CM

## 2025-04-03 PROCEDURE — 99203 OFFICE O/P NEW LOW 30 MIN: CPT | Performed by: PHYSICIAN ASSISTANT

## 2025-04-03 RX ORDER — ESTRADIOL 0.05 MG/D
1 PATCH, EXTENDED RELEASE TRANSDERMAL 2 TIMES WEEKLY
Qty: 8 PATCH | Refills: 3 | Status: SHIPPED | OUTPATIENT
Start: 2025-04-03

## 2025-04-03 NOTE — PATIENT INSTRUCTIONS
Rx for Vivelle-Dot patch sent to pharmacy.    Follow up for stress incontinence consult.    Call with problems.   PA/NP only visit

## 2025-04-03 NOTE — PROGRESS NOTES
Name: Tram Perez      : 1980      MRN: [id number]  Encounter Provider: Janell Lemus PA-C  Encounter Date: 4/3/2025   Encounter department: [facility]  :  Assessment & Plan  Menopausal symptoms    Orders:    estradiol (Vivelle-Dot) 0.05 MG/24HR; Place 1 patch on the skin 2 (two) times a week    Stress incontinence         Discussed HRT with patient.  Will only need estrogen as she does not have a uterus.  We will follow closely, along with her endocrinologist.  Will avoid oral HRT secondary to hx of Vi en Y and absorption issues.  Rx for estradiol patch sent to pharmacy with refills.  Patient will see Dr Ramirez to discuss options regarding stress incontinence.  F/u for yearly gyn exam and med check in 3 mos.  Call with problems in the interim.    History of Present Illness   Patient is here to discuss menopause symptoms.  She is new to our office today.  It has been years since her last gyn visit.  States she is doing well overall.  S/p hysterectomy about 10 years ago for recurrent pre-cancerous changes to the cervix and multiple LEEPs.  Has not had a Pap since.  They attempted to perform hysterectomy vaginally, but patient had too many adhesions; needed to switch to abdominal laparoscopy.  Patient has been having menopausal symptoms for years, but they have been getting worse over the last 2 years.  Experiencing hot flashes, night sweats, cold bouts, anxiety and depression symptoms, and sleep disturbance.  S/p Vi en Y surgery and has had issues with hypoglycemia ever since.  Was tested for Courtland's, which was negative.  Her endocrinologist believes that hormones are playing a role.  Patient also complains of mild to moderate stress incontinence; no issues with fecal incontinence.  Does Kegels and pelvic floor exercises regularly, and saw pelvic floor PT.  Her urologist feels she may need a surgical fix.  Patient denies bowel changes, pelvic pain, bloating,  "abdominal pain, n/v, and change in appetite.  Thyroid is normal.  Up to date on her colonoscopy.      Tram Perez is a 44 y.o. female who presents to discuss menopause symptoms.  History obtained from: patient    Review of Systems   Constitutional:  Positive for diaphoresis (Hot flashes; night sweats). Negative for appetite change and unexpected weight change.   Gastrointestinal:  Negative for abdominal distention, abdominal pain, constipation, diarrhea, nausea and vomiting.   Genitourinary:  Negative for difficulty urinating, dysuria, frequency, genital sores, hematuria, menstrual problem, pelvic pain, urgency, vaginal bleeding, vaginal discharge and vaginal pain.   Psychiatric/Behavioral:  Positive for sleep disturbance.         Anxiety/depression          Objective   /62 (BP Location: Left arm, Patient Position: Sitting, Cuff Size: Adult)   Ht 5' 2\" (1.575 m)   BMI 25.61 kg/m²      Physical Exam  Vitals reviewed.   Constitutional:       Appearance: Normal appearance. She is well-developed and normal weight.   Neurological:      Mental Status: She is alert and oriented to person, place, and time.   Psychiatric:         Mood and Affect: Mood normal.         Behavior: Behavior normal. Behavior is cooperative.         Thought Content: Thought content normal.         Judgment: Judgment normal.           "

## 2025-04-06 ENCOUNTER — RESULTS FOLLOW-UP (OUTPATIENT)
Dept: FAMILY MEDICINE CLINIC | Facility: CLINIC | Age: 45
End: 2025-04-06

## 2025-04-07 ENCOUNTER — OFFICE VISIT (OUTPATIENT)
Dept: FAMILY MEDICINE CLINIC | Facility: CLINIC | Age: 45
End: 2025-04-07
Payer: COMMERCIAL

## 2025-04-07 VITALS
HEIGHT: 62 IN | TEMPERATURE: 98 F | DIASTOLIC BLOOD PRESSURE: 86 MMHG | BODY MASS INDEX: 25.98 KG/M2 | WEIGHT: 141.2 LBS | RESPIRATION RATE: 18 BRPM | OXYGEN SATURATION: 99 % | HEART RATE: 85 BPM | SYSTOLIC BLOOD PRESSURE: 120 MMHG

## 2025-04-07 DIAGNOSIS — H65.06 RECURRENT ACUTE SEROUS OTITIS MEDIA OF BOTH EARS: Primary | ICD-10-CM

## 2025-04-07 DIAGNOSIS — R10.13 EPIGASTRIC PAIN: ICD-10-CM

## 2025-04-07 PROCEDURE — 99214 OFFICE O/P EST MOD 30 MIN: CPT | Performed by: NURSE PRACTITIONER

## 2025-04-07 RX ORDER — OFLOXACIN 3 MG/ML
10 SOLUTION AURICULAR (OTIC) DAILY
Qty: 5 ML | Refills: 0 | Status: SHIPPED | OUTPATIENT
Start: 2025-04-07 | End: 2025-04-14

## 2025-04-07 NOTE — PROGRESS NOTES
Name: Tram Perez      : 1980      MRN: [id number]  Encounter Provider: FINA Luna  Encounter Date: 2025   [facility]    Assessment & Plan  Recurrent acute serous otitis media of both ears    Orders:    ofloxacin (FLOXIN) 0.3 % otic solution; Administer 10 drops into both ears daily for 7 days    Lipase; Future    Comprehensive metabolic panel; Future    CBC and differential; Future    Epigastric pain  R/o pancreatitis  Go back to GI for f/u    Orders:    Lipase; Future    Comprehensive metabolic panel; Future    CBC and differential; Future      Depression Screening and Follow-up Plan: Patient's depression screening was positive with a PHQ-9 score of 13.   Patient assessed for underlying major depression. Brief counseling provided and recommend additional follow-up/re-evaluation next office visit. Patient with underlying depression and was advised to continue current medications as prescribed.         History of Present Illness     Here for c/o ear pain  Uti symptoms resolved  Seeing allergist for testing for allergy to antibiotics  Fevers still  102.1 tmax  Fevers for months on and off  Does not feel related to illness  Has had for the past year      Having abd pain upper epigastric pain  Last week ate fatty spare ribs  Less pain now but had worse pain last week  Crampy pain    Patient continues to have low blood sugars on and off  Afraid to drive      Earache   Associated symptoms include abdominal pain. Pertinent negatives include no coughing, diarrhea, headaches, rash, rhinorrhea or vomiting.     Review of Systems   Constitutional:  Positive for fatigue. Negative for fever.   HENT:  Positive for ear pain. Negative for congestion, postnasal drip and rhinorrhea.    Eyes:  Negative for photophobia and visual disturbance.   Respiratory:  Negative for cough, shortness of breath and wheezing.    Cardiovascular:  Negative for chest pain and  palpitations.   Gastrointestinal:  Positive for abdominal pain. Negative for diarrhea, nausea and vomiting.   Genitourinary:  Negative for dysuria and frequency.   Musculoskeletal:  Negative for arthralgias and myalgias.   Skin:  Negative for rash.   Neurological:  Negative for dizziness, light-headedness and headaches.   Hematological:  Negative for adenopathy.   Psychiatric/Behavioral:  Negative for dysphoric mood and sleep disturbance. The patient is not nervous/anxious.      Past Medical History:   Diagnosis Date    Abnormal Pap smear of cervix     Acid reflux     Allergic     Allergic rhinitis     Anxiety     Asthma     Cluster headache 2/16/23    Depression     Diabetes mellitus (HCC)     Pre    Diverticulitis     Epileptic seizures (HCC)     Medication related    Fibromyalgia, primary     Gestational diabetes mellitus     Head injury 2/16/23    Fell at work on concrete and now havw concussion with bad headaches.    Headache(784.0)     Migraines    Headache, tension-type     Inflammatory bowel disease     Lateral epicondylitis     Malignant tumor of cervix (HCC)     between 0439-3507    Migraine     Miscarriage     Obstructive sleep apnea     CPAP    Paresthesias     Peptic ulceration     Plantar fasciitis     Reactive hypoglycemia     Rectocele     Seizures (HCC)     Had ghem due to low sugar levels    Tinea corporis     Wears contact lenses     Wears glasses      Past Surgical History:   Procedure Laterality Date    ADENOIDECTOMY      ARTHROSCOPY ANKLE Right 09/01/2017    Procedure: ANKLE ARTHOSCOPY WITH EXTENSIVE SYNOVECTOMY, OPEN ARTHOTOY LATERAL ANKLE WITH ANKLE STABILIZATION, BROSTOM REPAIR, INTERNAL BRACE AUGMENTATION, PERONEAL TENDON SYNOVECTOMY;  Surgeon: Donnell Hansen DPM;  Location: [facility];  Service: Podiatry    BARIATRIC SURGERY      CERVICAL BIOPSY  W/ LOOP ELECTRODE EXCISION      COLONOSCOPY  02/27/2017    GASTRIC BYPASS  07/2020    HEMORRHOID SURGERY  08/2021    HERNIA REPAIR  2020     HYSTERECTOMY  2015    MO COLONOSCOPY FLX DX W/COLLJ SPEC WHEN PFRMD N/A 02/27/2017    Procedure: COLONOSCOPY;  Surgeon: Armani Lara MD;  Location: [facility];  Service: Gastroenterology    MO CONIZATION CERVIX W/WO D&C RPR KNIFE/LASER      MO ESOPHAGOGASTRODUODENOSCOPY TRANSORAL DIAGNOSTIC N/A 09/07/2016    Procedure: EGD AND COLONOSCOPY;  Surgeon: Armani Lara MD;  Location: [facility];  Service: Gastroenterology    TONSILLECTOMY      TUBAL LIGATION      UPPER GASTROINTESTINAL ENDOSCOPY  2016     Family History   Problem Relation Age of Onset    Anxiety disorder Mother     Depression Mother     Cancer Mother     Endometrial cancer Mother         under afe 50    Stroke Mother     Thyroid disease Mother     Diabetes type II Mother     Hypertension Mother     Thyroid cancer Mother     Alcohol abuse Mother     Asthma Mother     Mental illness Mother     Substance Abuse Mother     Hypertension Father     Alcohol abuse Father     Depression Father     Substance Abuse Father     Mental illness Father     Heart attack Father     Asthma Brother         Epilepsy    Depression Brother     Mental illness Brother     Substance Abuse Brother     Depression Brother     Asthma Brother     Learning disabilities Brother     Mental illness Brother     Substance Abuse Brother     Seizures Brother     Cancer Maternal Grandmother 50        lymphatic cancer    Depression Maternal Grandmother     Cancer Maternal Grandfather     Alcohol abuse Maternal Grandfather     Depression Maternal Grandfather     Mental illness Maternal Grandfather     Breast cancer Paternal Grandmother 82    Cancer Paternal Grandmother         Breast    No Known Problems Paternal Grandfather     Depression Daughter     Anxiety disorder Daughter     Substance Abuse Daughter     Asthma Daughter     Allergies Daughter     Depression Daughter     Anxiety disorder Daughter     Allergies Daughter     Asthma Son     Depression Son     Learning disabilities Son      "Allergies Son     Learning disabilities Maternal Aunt     Learning disabilities Maternal Uncle     Leukemia Cousin     Cancer Cousin     Learning disabilities Cousin     Cancer Cousin         lympatic    Diabetes Other     Hypertension Other     Neuropathy Other     Thyroid disease Other      Social History     Tobacco Use    Smoking status: Former     Current packs/day: 0.00     Average packs/day: 0.5 packs/day for 10.0 years (5.0 ttl pk-yrs)     Types: Cigarettes     Quit date: 2013     Years since quittin.5    Smokeless tobacco: Never   Vaping Use    Vaping status: Never Used   Substance and Sexual Activity    Alcohol use: Not Currently     Comment: occasionally, maybe 1-2 times a month    Drug use: Not Currently     Types: Marijuana     Comment: not anymore    Sexual activity: Yes     Partners: Female, Male     Birth control/protection: Surgical     Comment: Had hysterectomy     Current Outpatient Medications on File Prior to Visit   Medication Sig    acarbose (PRECOSE) 25 mg tablet Take 75 mg with breakfast, 75 mg with lunch, and 75 mg with dinner.    Acetaminophen 500 MG PACK Take 500 mg by mouth every 6 (six) hours as needed    albuterol (ProAir HFA) 90 mcg/act inhaler Inhale 2 puffs every 6 (six) hours as needed for wheezing    albuterol (PROVENTIL HFA,VENTOLIN HFA) 90 mcg/act inhaler 2 PUFFS UP TO 4 TIMES A DAY AS NEEDED FOR WHEEZING    BD Integra Syringe 25G X 1\" 3 ML MISC DRAW UP 1 ML OF TORADOL AND INJECT INTO MUSCLE.    Biotin 1000 MCG tablet Take 10,000 mcg by mouth    Blood Glucose Monitoring Suppl (OneTouch Verio Reflect) w/Device KIT Check blood sugars twice daily. Please substitute with appropriate alternative as covered by patient's insurance. Dx: E11.65    Botulinum Toxin Type A 200 units SOLR Inject 155 units into face and neck IM every 90 days    Continuous Glucose Sensor (Dexcom G7 Sensor) Use 1 Device every 10 days    Continuous Glucose Sensor (Dexcom G7 Sensor) Use 1 Device every 10 " days    DULoxetine (CYMBALTA) 60 mg delayed release capsule Take 1 capsule (60 mg total) by mouth daily    estradiol (Vivelle-Dot) 0.05 MG/24HR Place 1 patch on the skin 2 (two) times a week    glucose blood (OneTouch Verio) test strip Check blood sugars twice daily. Please substitute with appropriate alternative as covered by patient's insurance. Dx: E11.65    HYDROcodone-acetaminophen (NORCO) 7.5-325 mg per tablet Take 1 tablet by mouth every 4 (four) hours as needed for pain    ketorolac (TORADOL) 30 mg/mL injection     LORazepam (ATIVAN) 1 mg tablet Take 1 mg by mouth daily as needed    methylphenidate (RITALIN) 10 mg tablet Take 10 mg by mouth daily as needed    methylphenidate (RITALIN) 20 MG tablet Take 20 mg by mouth 2 (two) times a day Pt reports taking tid    Multiple Vitamin (MULTIVITAMIN ADULT PO) Take 1 tablet by mouth every morning    nystatin (MYCOSTATIN) powder Apply topically 2 (two) times a day    OneTouch Delica Lancets 33G MISC Check blood sugars twice daily. Please substitute with appropriate alternative as covered by patient's insurance. Dx: E11.65    OneTouch Delica Lancets 33G MISC Check blood sugars twice daily. Please substitute with appropriate alternative as covered by patient's insurance. Dx: E11.65    pantoprazole (PROTONIX) 40 mg tablet Take 1 tablet (40 mg total) by mouth daily    pregabalin (Lyrica) 150 mg capsule Take 1 capsule (150 mg total) by mouth 3 (three) times a day    rizatriptan (MAXALT) 10 MG tablet TAKE 1 TABLET (10 MG TOTAL) BY MOUTH ONCE AS NEEDED FOR MIGRAINE    SUMAtriptan Succinate 6 MG/0.5ML SOAJ Inject 0.5 mL (6 mg total) under the skin as needed (headache)    traZODone (DESYREL) 100 mg tablet     EPINEPHrine (EPIPEN) 0.3 mg/0.3 mL SOAJ Inject 0.3 mL (0.3 mg total) into a muscle once for 1 dose     Allergies   Allergen Reactions    Aspirin Anaphylaxis    Bee Venom Swelling and Wheezing    Penicillins Anaphylaxis and Rash    Bee Pollen     Ceclor [Cefaclor] Itching  "   Dust Mite Extract Other (See Comments)     congestion    Grass Pollen(K-O-R-T-Swt Osito) Other (See Comments)     congestion    Lactose - Food Allergy GI Intolerance    Other Other (See Comments)     congestion    Pollen Extract     Amoxicillin Rash    Doxycycline Rash    Erythromycin Rash    Nickel Rash     Immunization History   Administered Date(s) Administered    COVID-19 MODERNA VACC 0.5 ML IM 02/04/2021, 03/01/2021, 01/16/2022    Hib (PRP-OMP) 09/01/2003, 11/01/2003, 05/25/2004    INFLUENZA 10/06/2020, 10/15/2022    Influenza Quadrivalent Preservative Free 3 years and older IM 10/30/2014    Influenza Quadrivalent, 6-35 Months IM 11/02/2015, 10/11/2016, 09/05/2017    Influenza Recombinant Preservative Free Im 10/03/2024    Influenza, injectable, quadrivalent, preservative free 0.5 mL 10/15/2020, 11/23/2021    MMR 09/01/1979, 09/01/1982    Pneumococcal Polysaccharide PPV23 09/05/2017    TD (adult) Preservative Free 05/01/2004    Td (adult), adsorbed 05/25/2004    Tdap 03/29/2018    Tuberculin Skin Test-PPD Intradermal 08/22/2005, 10/14/2020, 12/08/2021, 12/17/2021     Objective   /86 (BP Location: Left arm, Patient Position: Sitting, Cuff Size: Standard)   Pulse 85   Temp 98 °F (36.7 °C) (Temporal)   Resp 18   Ht 5' 2\" (1.575 m)   Wt 64 kg (141 lb 3.2 oz)   SpO2 99%   BMI 25.83 kg/m²     Physical Exam  Vitals and nursing note reviewed.   Constitutional:       Appearance: Normal appearance.   HENT:      Head: Normocephalic and atraumatic.      Right Ear: Tympanic membrane is erythematous.      Left Ear: Tympanic membrane is erythematous.      Nose: Nose normal.      Mouth/Throat:      Mouth: Mucous membranes are moist.   Eyes:      Conjunctiva/sclera: Conjunctivae normal.   Cardiovascular:      Rate and Rhythm: Normal rate and regular rhythm.      Pulses: Normal pulses.   Pulmonary:      Effort: Pulmonary effort is normal.   Abdominal:      Tenderness: There is abdominal tenderness in the " epigastric area.   Musculoskeletal:         General: Normal range of motion.      Cervical back: Normal range of motion.   Skin:     General: Skin is warm and dry.      Capillary Refill: Capillary refill takes less than 2 seconds.   Neurological:      Mental Status: She is alert and oriented to person, place, and time.   Psychiatric:         Mood and Affect: Mood normal.         Behavior: Behavior normal.

## 2025-04-08 PROBLEM — R10.13 EPIGASTRIC PAIN: Status: ACTIVE | Noted: 2025-04-08

## 2025-04-08 PROBLEM — H65.06 RECURRENT ACUTE SEROUS OTITIS MEDIA OF BOTH EARS: Status: ACTIVE | Noted: 2025-04-08

## 2025-04-08 NOTE — ASSESSMENT & PLAN NOTE
R/o pancreatitis  Go back to GI for f/u    Orders:    Lipase; Future    Comprehensive metabolic panel; Future    CBC and differential; Future

## 2025-04-08 NOTE — ASSESSMENT & PLAN NOTE
Orders:    ofloxacin (FLOXIN) 0.3 % otic solution; Administer 10 drops into both ears daily for 7 days    Lipase; Future    Comprehensive metabolic panel; Future    CBC and differential; Future

## 2025-04-10 ENCOUNTER — NURSE TRIAGE (OUTPATIENT)
Age: 45
End: 2025-04-10

## 2025-04-10 ENCOUNTER — PATIENT MESSAGE (OUTPATIENT)
Dept: FAMILY MEDICINE CLINIC | Facility: CLINIC | Age: 45
End: 2025-04-10

## 2025-04-10 DIAGNOSIS — H66.007 RECURRENT ACUTE SUPPURATIVE OTITIS MEDIA WITHOUT SPONTANEOUS RUPTURE OF TYMPANIC MEMBRANE, UNSPECIFIED LATERALITY: Primary | ICD-10-CM

## 2025-04-10 DIAGNOSIS — J39.9 UPPER RESPIRATORY DISEASE: ICD-10-CM

## 2025-04-10 DIAGNOSIS — H92.03 OTALGIA OF BOTH EARS: ICD-10-CM

## 2025-04-10 RX ORDER — LEVOFLOXACIN 500 MG/1
500 TABLET, FILM COATED ORAL EVERY 24 HOURS
Qty: 7 TABLET | Refills: 0 | Status: SHIPPED | OUTPATIENT
Start: 2025-04-10 | End: 2025-04-17

## 2025-04-10 NOTE — TELEPHONE ENCOUNTER
"Regarding: earache  ----- Message from Solange SCHULTE sent at 4/10/2025  1:10 PM EDT -----  Message in My Chart:  \"I'm still in pain with the ear drops and have a temperature still. Help...I'm getting miserable again but I just don't think this is an ER issue. I felt good for like a day then back to this.\"    "

## 2025-04-14 ENCOUNTER — TELEPHONE (OUTPATIENT)
Dept: BARIATRICS | Facility: CLINIC | Age: 45
End: 2025-04-14

## 2025-04-18 ENCOUNTER — APPOINTMENT (OUTPATIENT)
Dept: LAB | Facility: AMBULARY SURGERY CENTER | Age: 45
End: 2025-04-18
Payer: COMMERCIAL

## 2025-04-18 ENCOUNTER — RESULTS FOLLOW-UP (OUTPATIENT)
Dept: FAMILY MEDICINE CLINIC | Facility: CLINIC | Age: 45
End: 2025-04-18

## 2025-04-18 ENCOUNTER — TELEPHONE (OUTPATIENT)
Age: 45
End: 2025-04-18

## 2025-04-18 ENCOUNTER — OFFICE VISIT (OUTPATIENT)
Dept: FAMILY MEDICINE CLINIC | Facility: CLINIC | Age: 45
End: 2025-04-18
Payer: COMMERCIAL

## 2025-04-18 VITALS
WEIGHT: 140 LBS | RESPIRATION RATE: 18 BRPM | TEMPERATURE: 99.8 F | DIASTOLIC BLOOD PRESSURE: 60 MMHG | OXYGEN SATURATION: 100 % | HEIGHT: 62 IN | HEART RATE: 101 BPM | BODY MASS INDEX: 25.76 KG/M2 | SYSTOLIC BLOOD PRESSURE: 120 MMHG

## 2025-04-18 DIAGNOSIS — R10.13 EPIGASTRIC PAIN: ICD-10-CM

## 2025-04-18 DIAGNOSIS — E61.1 IRON DEFICIENCY: ICD-10-CM

## 2025-04-18 DIAGNOSIS — H65.06 RECURRENT ACUTE SEROUS OTITIS MEDIA OF BOTH EARS: Primary | ICD-10-CM

## 2025-04-18 DIAGNOSIS — Z90.3 POSTGASTRECTOMY MALABSORPTION: ICD-10-CM

## 2025-04-18 DIAGNOSIS — Z86.19 FREQUENT INFECTIONS: ICD-10-CM

## 2025-04-18 DIAGNOSIS — K91.2 POSTGASTRECTOMY MALABSORPTION: ICD-10-CM

## 2025-04-18 DIAGNOSIS — H65.06 RECURRENT ACUTE SEROUS OTITIS MEDIA OF BOTH EARS: ICD-10-CM

## 2025-04-18 DIAGNOSIS — H66.3X3 CHRONIC SUPPURATIVE OTITIS MEDIA OF BOTH EARS, UNSPECIFIED OTITIS MEDIA LOCATION: Primary | ICD-10-CM

## 2025-04-18 PROBLEM — R33.9 INCOMPLETE EMPTYING OF BLADDER: Status: ACTIVE | Noted: 2025-04-18

## 2025-04-18 PROBLEM — K59.04 CHRONIC IDIOPATHIC CONSTIPATION: Status: ACTIVE | Noted: 2025-04-18

## 2025-04-18 PROBLEM — K64.9 HEMORRHOID: Status: ACTIVE | Noted: 2025-04-18

## 2025-04-18 PROBLEM — K90.49 FOOD INTOLERANCE: Status: ACTIVE | Noted: 2025-04-18

## 2025-04-18 PROBLEM — K63.5 COLON POLYP: Status: ACTIVE | Noted: 2025-04-18

## 2025-04-18 PROBLEM — K92.1 HEMATOCHEZIA: Status: ACTIVE | Noted: 2025-04-18

## 2025-04-18 LAB
ALBUMIN SERPL BCG-MCNC: 4.5 G/DL (ref 3.5–5)
ALP SERPL-CCNC: 70 U/L (ref 34–104)
ALT SERPL W P-5'-P-CCNC: 40 U/L (ref 7–52)
ANION GAP SERPL CALCULATED.3IONS-SCNC: 14 MMOL/L (ref 4–13)
AST SERPL W P-5'-P-CCNC: 27 U/L (ref 13–39)
BASOPHILS # BLD AUTO: 0.04 THOUSANDS/ÂΜL (ref 0–0.1)
BASOPHILS NFR BLD AUTO: 1 % (ref 0–1)
BILIRUB SERPL-MCNC: 0.64 MG/DL (ref 0.2–1)
BUN SERPL-MCNC: 17 MG/DL (ref 5–25)
CALCIUM SERPL-MCNC: 8.9 MG/DL (ref 8.4–10.2)
CHLORIDE SERPL-SCNC: 104 MMOL/L (ref 96–108)
CO2 SERPL-SCNC: 22 MMOL/L (ref 21–32)
CREAT SERPL-MCNC: 0.69 MG/DL (ref 0.6–1.3)
EOSINOPHIL # BLD AUTO: 0.13 THOUSAND/ÂΜL (ref 0–0.61)
EOSINOPHIL NFR BLD AUTO: 2 % (ref 0–6)
ERYTHROCYTE [DISTWIDTH] IN BLOOD BY AUTOMATED COUNT: 12.5 % (ref 11.6–15.1)
GFR SERPL CREATININE-BSD FRML MDRD: 104 ML/MIN/1.73SQ M
GLUCOSE P FAST SERPL-MCNC: 103 MG/DL (ref 65–99)
HCT VFR BLD AUTO: 43.9 % (ref 34.8–46.1)
HGB BLD-MCNC: 14.6 G/DL (ref 11.5–15.4)
IMM GRANULOCYTES # BLD AUTO: 0.01 THOUSAND/UL (ref 0–0.2)
IMM GRANULOCYTES NFR BLD AUTO: 0 % (ref 0–2)
LIPASE SERPL-CCNC: 25 U/L (ref 11–82)
LYMPHOCYTES # BLD AUTO: 2.31 THOUSANDS/ÂΜL (ref 0.6–4.47)
LYMPHOCYTES NFR BLD AUTO: 31 % (ref 14–44)
MCH RBC QN AUTO: 31.8 PG (ref 26.8–34.3)
MCHC RBC AUTO-ENTMCNC: 33.3 G/DL (ref 31.4–37.4)
MCV RBC AUTO: 96 FL (ref 82–98)
MONOCYTES # BLD AUTO: 0.64 THOUSAND/ÂΜL (ref 0.17–1.22)
MONOCYTES NFR BLD AUTO: 9 % (ref 4–12)
NEUTROPHILS # BLD AUTO: 4.38 THOUSANDS/ÂΜL (ref 1.85–7.62)
NEUTS SEG NFR BLD AUTO: 57 % (ref 43–75)
NRBC BLD AUTO-RTO: 0 /100 WBCS
PLATELET # BLD AUTO: 226 THOUSANDS/UL (ref 149–390)
PMV BLD AUTO: 10.9 FL (ref 8.9–12.7)
POTASSIUM SERPL-SCNC: 3.6 MMOL/L (ref 3.5–5.3)
PROT SERPL-MCNC: 6.4 G/DL (ref 6.4–8.4)
RBC # BLD AUTO: 4.59 MILLION/UL (ref 3.81–5.12)
SODIUM SERPL-SCNC: 140 MMOL/L (ref 135–147)
WBC # BLD AUTO: 7.51 THOUSAND/UL (ref 4.31–10.16)

## 2025-04-18 PROCEDURE — 85025 COMPLETE CBC W/AUTO DIFF WBC: CPT

## 2025-04-18 PROCEDURE — 83690 ASSAY OF LIPASE: CPT

## 2025-04-18 PROCEDURE — 80053 COMPREHEN METABOLIC PANEL: CPT

## 2025-04-18 PROCEDURE — 99214 OFFICE O/P EST MOD 30 MIN: CPT | Performed by: NURSE PRACTITIONER

## 2025-04-18 PROCEDURE — 36415 COLL VENOUS BLD VENIPUNCTURE: CPT

## 2025-04-18 RX ORDER — CEPHALEXIN 250 MG/5ML
500 POWDER, FOR SUSPENSION ORAL EVERY 8 HOURS SCHEDULED
Qty: 300 ML | Refills: 0 | Status: SHIPPED | OUTPATIENT
Start: 2025-04-18 | End: 2025-04-28

## 2025-04-18 RX ORDER — PREDNISOLONE SODIUM PHOSPHATE 15 MG/5ML
15 SOLUTION ORAL 2 TIMES DAILY
Qty: 50 ML | Refills: 0 | Status: SHIPPED | OUTPATIENT
Start: 2025-04-18 | End: 2025-04-23

## 2025-04-18 NOTE — PROGRESS NOTES
Name: Tarm Perez      : 1980      MRN: [id number]  Encounter Provider: FINA Luna  Encounter Date: 2025   Encounter department: [facility]    Assessment & Plan  Recurrent acute serous otitis media of both ears    Orders:    cephalexin (KEFLEX) 250 mg/5 mL suspension; Take 10 mL (500 mg total) by mouth every 8 (eight) hours for 10 days    prednisoLONE (ORAPRED) 15 mg/5 mL oral solution; Take 5 mL (15 mg total) by mouth 2 (two) times a day for 5 days    Frequent infections    Orders:    Ambulatory Referral to Allergy; Future    Postgastrectomy malabsorption       cont vitamins    Iron deficiency    Orders:    Iron Panel (Includes Ferritin, Iron Sat%, Iron, and TIBC); Future         History of Present Illness     Continues to have abd pain and spasm  Sick still, never got completely better  Sinus pain and pressure  Ha  Both  ear pain  Fevers  When taken antibiotic started to feel better but then wore off  Not sure full absorption due to bariatric status  Feels inflammed  Tried antibiotic ear drops as well in past  First started with bactrim and steroid, then did ofloxacin otic  Then did levaquin and referral to ent      Earache   There is pain in both ears. The current episode started 1 to 4 weeks ago. The problem has been waxing and waning. The maximum temperature recorded prior to her arrival was 102 - 102.9 F. The fever has been present for 5 days or more. The pain is at a severity of 5/10. Associated symptoms include abdominal pain, headaches, rhinorrhea and a sore throat. Pertinent negatives include no coughing, diarrhea, ear discharge, neck pain, rash or vomiting.     Review of Systems   Constitutional:  Positive for fatigue and fever. Negative for chills.   HENT:  Positive for congestion, ear pain, postnasal drip, rhinorrhea and sore throat. Negative for ear discharge.    Eyes:  Negative for photophobia and visual disturbance.   Respiratory:  Negative for cough,  shortness of breath and wheezing.    Cardiovascular:  Negative for chest pain and palpitations.   Gastrointestinal:  Positive for abdominal pain. Negative for diarrhea and vomiting.   Genitourinary:  Negative for dysuria.   Musculoskeletal:  Negative for arthralgias and neck pain.   Skin:  Negative for rash.   Neurological:  Positive for headaches. Negative for dizziness and light-headedness.   Hematological:  Negative for adenopathy.   Psychiatric/Behavioral:  Negative for dysphoric mood and sleep disturbance. The patient is not nervous/anxious.      Past Medical History:   Diagnosis Date    Abnormal Pap smear of cervix     Acid reflux     Allergic     Allergic rhinitis     Anxiety     Asthma     Cluster headache 2/16/23    Depression     Diabetes mellitus (HCC)     Pre    Diverticulitis     Epileptic seizures (HCC)     Medication related    Fibromyalgia, primary     Gestational diabetes mellitus     Head injury 2/16/23    Fell at work on concrete and now havw concussion with bad headaches.    Headache(784.0)     Migraines    Headache, tension-type     Inflammatory bowel disease     Lateral epicondylitis     Malignant tumor of cervix (HCC)     between 1566-6779    Migraine     Miscarriage     Obstructive sleep apnea     CPAP    Paresthesias     Peptic ulceration     Plantar fasciitis     Reactive hypoglycemia     Rectocele     Seizures (HCC)     Had ghem due to low sugar levels    Tinea corporis     Wears contact lenses     Wears glasses      Past Surgical History:   Procedure Laterality Date    ADENOIDECTOMY      ARTHROSCOPY ANKLE Right 09/01/2017    Procedure: ANKLE ARTHOSCOPY WITH EXTENSIVE SYNOVECTOMY, OPEN ARTHOTOY LATERAL ANKLE WITH ANKLE STABILIZATION, BROSTOM REPAIR, INTERNAL BRACE AUGMENTATION, PERONEAL TENDON SYNOVECTOMY;  Surgeon: Donnell Hansen DPM;  Location: [facility];  Service: Podiatry    BARIATRIC SURGERY      CERVICAL BIOPSY  W/ LOOP ELECTRODE EXCISION      COLONOSCOPY  02/27/2017    GASTRIC  BYPASS  07/2020    HEMORRHOID SURGERY  08/2021    HERNIA REPAIR  2020    HYSTERECTOMY  2015    OK COLONOSCOPY FLX DX W/COLLJ SPEC WHEN PFRMD N/A 02/27/2017    Procedure: COLONOSCOPY;  Surgeon: Armani Lara MD;  Location: [facility];  Service: Gastroenterology    OK CONIZATION CERVIX W/WO D&C RPR KNIFE/LASER      OK ESOPHAGOGASTRODUODENOSCOPY TRANSORAL DIAGNOSTIC N/A 09/07/2016    Procedure: EGD AND COLONOSCOPY;  Surgeon: Armani Lara MD;  Location: [facility];  Service: Gastroenterology    TONSILLECTOMY      TUBAL LIGATION      UPPER GASTROINTESTINAL ENDOSCOPY  2016     Family History   Problem Relation Age of Onset    Anxiety disorder Mother     Depression Mother     Cancer Mother     Endometrial cancer Mother         under afe 50    Stroke Mother     Thyroid disease Mother     Diabetes type II Mother     Hypertension Mother     Thyroid cancer Mother     Alcohol abuse Mother     Asthma Mother     Mental illness Mother     Substance Abuse Mother     Hypertension Father     Alcohol abuse Father     Depression Father     Substance Abuse Father     Mental illness Father     Heart attack Father     Asthma Brother         Epilepsy    Depression Brother     Mental illness Brother     Substance Abuse Brother     Depression Brother     Asthma Brother     Learning disabilities Brother     Mental illness Brother     Substance Abuse Brother     Seizures Brother     Cancer Maternal Grandmother 50        lymphatic cancer    Depression Maternal Grandmother     Cancer Maternal Grandfather     Alcohol abuse Maternal Grandfather     Depression Maternal Grandfather     Mental illness Maternal Grandfather     Breast cancer Paternal Grandmother 82    Cancer Paternal Grandmother         Breast    No Known Problems Paternal Grandfather     Depression Daughter     Anxiety disorder Daughter     Substance Abuse Daughter     Asthma Daughter     Allergies Daughter     Depression Daughter     Anxiety disorder Daughter     Allergies Daughter   "   Asthma Son     Depression Son     Learning disabilities Son     Allergies Son     Learning disabilities Maternal Aunt     Learning disabilities Maternal Uncle     Leukemia Cousin     Cancer Cousin     Learning disabilities Cousin     Cancer Cousin         lympatic    Diabetes Other     Hypertension Other     Neuropathy Other     Thyroid disease Other      Social History     Tobacco Use    Smoking status: Former     Current packs/day: 0.00     Average packs/day: 0.5 packs/day for 10.0 years (5.0 ttl pk-yrs)     Types: Cigarettes     Quit date: 2013     Years since quittin.6    Smokeless tobacco: Never   Vaping Use    Vaping status: Never Used   Substance and Sexual Activity    Alcohol use: Not Currently     Comment: occasionally, maybe 1-2 times a month    Drug use: Not Currently     Types: Marijuana     Comment: not anymore    Sexual activity: Yes     Partners: Female, Male     Birth control/protection: Surgical     Comment: Had hysterectomy     Current Outpatient Medications on File Prior to Visit   Medication Sig    acarbose (PRECOSE) 25 mg tablet Take 75 mg with breakfast, 75 mg with lunch, and 75 mg with dinner.    Acetaminophen 500 MG PACK Take 500 mg by mouth every 6 (six) hours as needed    albuterol (ProAir HFA) 90 mcg/act inhaler Inhale 2 puffs every 6 (six) hours as needed for wheezing    albuterol (PROVENTIL HFA,VENTOLIN HFA) 90 mcg/act inhaler 2 PUFFS UP TO 4 TIMES A DAY AS NEEDED FOR WHEEZING    BD Integra Syringe 25G X 1\" 3 ML MISC DRAW UP 1 ML OF TORADOL AND INJECT INTO MUSCLE.    Biotin 1000 MCG tablet Take 10,000 mcg by mouth    Blood Glucose Monitoring Suppl (OneTouch Verio Reflect) w/Device KIT Check blood sugars twice daily. Please substitute with appropriate alternative as covered by patient's insurance. Dx: E11.65    Botulinum Toxin Type A 200 units SOLR Inject 155 units into face and neck IM every 90 days    Continuous Glucose Sensor (Dexcom G7 Sensor) Use 1 Device every 10 days    " Continuous Glucose Sensor (Dexcom G7 Sensor) Use 1 Device every 10 days    DULoxetine (CYMBALTA) 60 mg delayed release capsule Take 1 capsule (60 mg total) by mouth daily    estradiol (Vivelle-Dot) 0.05 MG/24HR Place 1 patch on the skin 2 (two) times a week    glucose blood (OneTouch Verio) test strip Check blood sugars twice daily. Please substitute with appropriate alternative as covered by patient's insurance. Dx: E11.65    HYDROcodone-acetaminophen (NORCO) 7.5-325 mg per tablet Take 1 tablet by mouth every 4 (four) hours as needed for pain    ketorolac (TORADOL) 30 mg/mL injection     LORazepam (ATIVAN) 1 mg tablet Take 1 mg by mouth daily as needed    methylphenidate (RITALIN) 10 mg tablet Take 10 mg by mouth daily as needed    methylphenidate (RITALIN) 20 MG tablet Take 20 mg by mouth 2 (two) times a day Pt reports taking tid    Multiple Vitamin (MULTIVITAMIN ADULT PO) Take 1 tablet by mouth every morning    nystatin (MYCOSTATIN) powder Apply topically 2 (two) times a day    OneTouch Delica Lancets 33G MISC Check blood sugars twice daily. Please substitute with appropriate alternative as covered by patient's insurance. Dx: E11.65    OneTouch Delica Lancets 33G MISC Check blood sugars twice daily. Please substitute with appropriate alternative as covered by patient's insurance. Dx: E11.65    pantoprazole (PROTONIX) 40 mg tablet Take 1 tablet (40 mg total) by mouth daily    pregabalin (Lyrica) 150 mg capsule Take 1 capsule (150 mg total) by mouth 3 (three) times a day    rizatriptan (MAXALT) 10 MG tablet TAKE 1 TABLET (10 MG TOTAL) BY MOUTH ONCE AS NEEDED FOR MIGRAINE    SUMAtriptan Succinate 6 MG/0.5ML SOAJ Inject 0.5 mL (6 mg total) under the skin as needed (headache)    traZODone (DESYREL) 100 mg tablet     EPINEPHrine (EPIPEN) 0.3 mg/0.3 mL SOAJ Inject 0.3 mL (0.3 mg total) into a muscle once for 1 dose    Ergocalciferol 10 MCG (400 UNIT) TABS Take by mouth daily    [] levofloxacin (LEVAQUIN) 500 mg  "tablet Take 1 tablet (500 mg total) by mouth every 24 hours for 7 days     Allergies   Allergen Reactions    Aspirin Anaphylaxis    Bee Venom Swelling and Wheezing    Penicillins Anaphylaxis and Rash    Bee Pollen     Ceclor [Cefaclor] Itching    Cinnamon - Food Allergy Other (See Comments)    Dust Mite Extract Other (See Comments)     congestion    Grass Pollen(K-O-R-T-Swt Osito) Other (See Comments)     congestion    Lactose - Food Allergy GI Intolerance    Molds & Smuts Other (See Comments)    Other Other (See Comments)     congestion    Pollen Extract     Amoxicillin Rash    Doxycycline Rash    Erythromycin Rash    Nickel Rash     Immunization History   Administered Date(s) Administered    COVID-19 MODERNA VACC 0.5 ML IM 02/04/2021, 03/01/2021, 01/16/2022    Hib (PRP-OMP) 09/01/2003, 11/01/2003, 05/25/2004    INFLUENZA 10/06/2020, 10/15/2022    Influenza Quadrivalent Preservative Free 3 years and older IM 10/30/2014    Influenza Quadrivalent, 6-35 Months IM 11/02/2015, 10/11/2016, 09/05/2017    Influenza Recombinant Preservative Free Im 10/03/2024    Influenza, injectable, quadrivalent, preservative free 0.5 mL 10/15/2020, 11/23/2021    MMR 09/01/1979, 09/01/1982    Pneumococcal Polysaccharide PPV23 09/05/2017    TD (adult) Preservative Free 05/01/2004    Td (adult), adsorbed 05/25/2004    Tdap 03/29/2018    Tuberculin Skin Test-PPD Intradermal 08/22/2005, 10/14/2020, 12/08/2021, 12/17/2021     Objective   /60 (BP Location: Left arm, Patient Position: Sitting, Cuff Size: Standard)   Pulse 101   Temp 99.8 °F (37.7 °C) (Tympanic)   Resp 18   Ht 5' 2\" (1.575 m)   Wt 63.5 kg (140 lb)   SpO2 100%   BMI 25.61 kg/m²     Physical Exam  Vitals and nursing note reviewed.   Constitutional:       Appearance: Normal appearance.   HENT:      Head: Normocephalic and atraumatic.      Right Ear: Ear canal and external ear normal. Tympanic membrane is injected.      Left Ear: Ear canal and external ear normal. " Tympanic membrane is injected.      Nose: Congestion and rhinorrhea present.      Mouth/Throat:      Pharynx: Posterior oropharyngeal erythema present.   Eyes:      Conjunctiva/sclera: Conjunctivae normal.   Cardiovascular:      Rate and Rhythm: Normal rate and regular rhythm.      Heart sounds: Normal heart sounds.   Pulmonary:      Effort: Pulmonary effort is normal.      Breath sounds: Normal breath sounds.   Abdominal:      General: Bowel sounds are normal.      Palpations: Abdomen is soft.   Musculoskeletal:         General: Normal range of motion.      Cervical back: Normal range of motion and neck supple.   Skin:     General: Skin is warm and dry.      Capillary Refill: Capillary refill takes less than 2 seconds.   Neurological:      General: No focal deficit present.      Mental Status: She is alert and oriented to person, place, and time.   Psychiatric:         Mood and Affect: Mood normal.         Behavior: Behavior normal.

## 2025-04-18 NOTE — TELEPHONE ENCOUNTER
ENT referral appropriate for STAT order to be seen sooner?  
Patient called states referral for ENT is unable to see her until October, but told her if referral was changed to Urgent or stat that they could see her sooner. Patient would like a call back if referral can be changed.   
Patient is informed.  
Patient

## 2025-04-18 NOTE — ASSESSMENT & PLAN NOTE
Orders:    cephalexin (KEFLEX) 250 mg/5 mL suspension; Take 10 mL (500 mg total) by mouth every 8 (eight) hours for 10 days    prednisoLONE (ORAPRED) 15 mg/5 mL oral solution; Take 5 mL (15 mg total) by mouth 2 (two) times a day for 5 days

## 2025-04-22 ENCOUNTER — TELEPHONE (OUTPATIENT)
Dept: INTERNAL MEDICINE CLINIC | Facility: CLINIC | Age: 45
End: 2025-04-22

## 2025-04-24 ENCOUNTER — OFFICE VISIT (OUTPATIENT)
Dept: FAMILY MEDICINE CLINIC | Facility: CLINIC | Age: 45
End: 2025-04-24
Payer: COMMERCIAL

## 2025-04-24 VITALS
BODY MASS INDEX: 25.4 KG/M2 | SYSTOLIC BLOOD PRESSURE: 120 MMHG | DIASTOLIC BLOOD PRESSURE: 76 MMHG | HEART RATE: 118 BPM | OXYGEN SATURATION: 98 % | RESPIRATION RATE: 18 BRPM | HEIGHT: 62 IN | WEIGHT: 138 LBS | TEMPERATURE: 98.9 F

## 2025-04-24 DIAGNOSIS — H92.03 OTALGIA OF BOTH EARS: Primary | ICD-10-CM

## 2025-04-24 PROBLEM — K59.02 OUTLET DYSFUNCTION CONSTIPATION: Status: ACTIVE | Noted: 2025-04-24

## 2025-04-24 PROCEDURE — 99213 OFFICE O/P EST LOW 20 MIN: CPT | Performed by: NURSE PRACTITIONER

## 2025-04-24 RX ORDER — PREDNISOLONE SODIUM PHOSPHATE 15 MG/5ML
SOLUTION ORAL
Qty: 50 ML | Refills: 0 | Status: SHIPPED | OUTPATIENT
Start: 2025-04-24

## 2025-04-24 NOTE — ASSESSMENT & PLAN NOTE
Orders:    prednisoLONE (ORAPRED) 15 mg/5 mL oral solution; Take 20mg daily for 4 days, then 10mg daily for 4 days then 5 mg daily for 4 days

## 2025-04-24 NOTE — PROGRESS NOTES
Name: Tram Perez      : 1980      MRN: [id number]  Encounter Provider: FINA Luna  Encounter Date: 2025   Encounter department: [facility]    Assessment & Plan  Otalgia of both ears    Orders:    prednisoLONE (ORAPRED) 15 mg/5 mL oral solution; Take 20mg daily for 4 days, then 10mg daily for 4 days then 5 mg daily for 4 days         History of Present Illness     Here for f/u to sinus and ear pain  Did notice improvement while on steroids  When came off them pain returned  Pain resolved on steroids and sinus pain resolved  Change in hearing from ear discomfort  To see ent on may 8th      Earache   Associated symptoms include headaches and rhinorrhea. Pertinent negatives include no coughing, diarrhea, rash, sore throat or vomiting.   Headache    Review of Systems   Constitutional:  Positive for fatigue. Negative for chills and fever.   HENT:  Positive for congestion, ear pain, rhinorrhea, sinus pressure and sinus pain. Negative for sore throat.    Respiratory:  Negative for cough, shortness of breath and wheezing.    Cardiovascular:  Negative for chest pain and palpitations.   Gastrointestinal:  Negative for constipation, diarrhea, nausea and vomiting.   Genitourinary:  Negative for dysuria and frequency.   Musculoskeletal:  Negative for arthralgias and myalgias.   Skin:  Negative for rash.   Neurological:  Positive for headaches. Negative for dizziness and light-headedness.   Hematological:  Negative for adenopathy.   Psychiatric/Behavioral:  Negative for dysphoric mood and sleep disturbance. The patient is not nervous/anxious.      Past Medical History:   Diagnosis Date    Abnormal Pap smear of cervix     Acid reflux     Allergic     Allergic rhinitis     Anxiety     Asthma     Cluster headache 23    Depression     Diabetes mellitus (HCC)     Pre    Diverticulitis     Epileptic seizures (HCC)     Medication related    Fibromyalgia, primary     Gestational diabetes  mellitus     Head injury 2/16/23    Fell at work on concrete and now havw concussion with bad headaches.    Headache(784.0)     Migraines    Headache, tension-type     Inflammatory bowel disease     Lateral epicondylitis     Malignant tumor of cervix (HCC)     between [id number]    Migraine     Miscarriage     Obstructive sleep apnea     CPAP    Paresthesias     Peptic ulceration     Plantar fasciitis     Reactive hypoglycemia     Rectocele     Seizures (HCC)     Had ghem due to low sugar levels    Tinea corporis     Wears contact lenses     Wears glasses      Past Surgical History:   Procedure Laterality Date    ADENOIDECTOMY      ARTHROSCOPY ANKLE Right 09/01/2017    Procedure: ANKLE ARTHOSCOPY WITH EXTENSIVE SYNOVECTOMY, OPEN ARTHOTOY LATERAL ANKLE WITH ANKLE STABILIZATION, BROSTOM REPAIR, INTERNAL BRACE AUGMENTATION, PERONEAL TENDON SYNOVECTOMY;  Surgeon: Donnell Hansen DPM;  Location: [facility];  Service: Podiatry    BARIATRIC SURGERY      CERVICAL BIOPSY  W/ LOOP ELECTRODE EXCISION      COLONOSCOPY  02/27/2017    GASTRIC BYPASS  07/2020    HEMORRHOID SURGERY  08/2021    HERNIA REPAIR  2020    HYSTERECTOMY  2015    ME COLONOSCOPY FLX DX W/COLLJ SPEC WHEN PFRMD N/A 02/27/2017    Procedure: COLONOSCOPY;  Surgeon: Armani Lara MD;  Location: [facility];  Service: Gastroenterology    ME CONIZATION CERVIX W/WO D&C RPR KNIFE/LASER      ME ESOPHAGOGASTRODUODENOSCOPY TRANSORAL DIAGNOSTIC N/A 09/07/2016    Procedure: EGD AND COLONOSCOPY;  Surgeon: Armani Lara MD;  Location: [facility];  Service: Gastroenterology    TONSILLECTOMY      TUBAL LIGATION      UPPER GASTROINTESTINAL ENDOSCOPY  2016     Family History   Problem Relation Age of Onset    Anxiety disorder Mother     Depression Mother     Cancer Mother     Endometrial cancer Mother         under afe 50    Stroke Mother     Thyroid disease Mother     Diabetes type II Mother     Hypertension Mother     Thyroid cancer Mother     Alcohol abuse Mother     Asthma  Mother     Mental illness Mother     Substance Abuse Mother     Hypertension Father     Alcohol abuse Father     Depression Father     Substance Abuse Father     Mental illness Father     Heart attack Father     Asthma Brother         Epilepsy    Depression Brother     Mental illness Brother     Substance Abuse Brother     Depression Brother     Asthma Brother     Learning disabilities Brother     Mental illness Brother     Substance Abuse Brother     Seizures Brother     Cancer Maternal Grandmother 50        lymphatic cancer    Depression Maternal Grandmother     Cancer Maternal Grandfather     Alcohol abuse Maternal Grandfather     Depression Maternal Grandfather     Mental illness Maternal Grandfather     Breast cancer Paternal Grandmother 82    Cancer Paternal Grandmother         Breast    No Known Problems Paternal Grandfather     Depression Daughter     Anxiety disorder Daughter     Substance Abuse Daughter     Asthma Daughter     Allergies Daughter     Depression Daughter     Anxiety disorder Daughter     Allergies Daughter     Asthma Son     Depression Son     Learning disabilities Son     Allergies Son     Learning disabilities Maternal Aunt     Learning disabilities Maternal Uncle     Leukemia Cousin     Cancer Cousin     Learning disabilities Cousin     Cancer Cousin         lympatic    Diabetes Other     Hypertension Other     Neuropathy Other     Thyroid disease Other      Social History     Tobacco Use    Smoking status: Former     Current packs/day: 0.00     Average packs/day: 0.5 packs/day for 10.0 years (5.0 ttl pk-yrs)     Types: Cigarettes     Quit date: 2013     Years since quittin.6    Smokeless tobacco: Never   Vaping Use    Vaping status: Never Used   Substance and Sexual Activity    Alcohol use: Not Currently     Comment: occasionally, maybe 1-2 times a month    Drug use: Not Currently     Types: Marijuana     Comment: not anymore    Sexual activity: Yes     Partners: Female, Male     " Birth control/protection: Surgical     Comment: Had hysterectomy     Current Outpatient Medications on File Prior to Visit   Medication Sig    acarbose (PRECOSE) 25 mg tablet Take 75 mg with breakfast, 75 mg with lunch, and 75 mg with dinner.    Acetaminophen 500 MG PACK Take 500 mg by mouth every 6 (six) hours as needed    albuterol (ProAir HFA) 90 mcg/act inhaler Inhale 2 puffs every 6 (six) hours as needed for wheezing    albuterol (PROVENTIL HFA,VENTOLIN HFA) 90 mcg/act inhaler 2 PUFFS UP TO 4 TIMES A DAY AS NEEDED FOR WHEEZING    BD Integra Syringe 25G X 1\" 3 ML MISC DRAW UP 1 ML OF TORADOL AND INJECT INTO MUSCLE.    Biotin 1000 MCG tablet Take 10,000 mcg by mouth    Blood Glucose Monitoring Suppl (OneTouch Verio Reflect) w/Device KIT Check blood sugars twice daily. Please substitute with appropriate alternative as covered by patient's insurance. Dx: E11.65    Botulinum Toxin Type A 200 units SOLR Inject 155 units into face and neck IM every 90 days    cephalexin (KEFLEX) 250 mg/5 mL suspension Take 10 mL (500 mg total) by mouth every 8 (eight) hours for 10 days    Continuous Glucose Sensor (Dexcom G7 Sensor) Use 1 Device every 10 days    Continuous Glucose Sensor (Dexcom G7 Sensor) Use 1 Device every 10 days    DULoxetine (CYMBALTA) 60 mg delayed release capsule Take 1 capsule (60 mg total) by mouth daily    Ergocalciferol 10 MCG (400 UNIT) TABS Take by mouth daily    estradiol (Vivelle-Dot) 0.05 MG/24HR Place 1 patch on the skin 2 (two) times a week    glucose blood (OneTouch Verio) test strip Check blood sugars twice daily. Please substitute with appropriate alternative as covered by patient's insurance. Dx: E11.65    HYDROcodone-acetaminophen (NORCO) 7.5-325 mg per tablet Take 1 tablet by mouth every 4 (four) hours as needed for pain    ketorolac (TORADOL) 30 mg/mL injection     LORazepam (ATIVAN) 1 mg tablet Take 1 mg by mouth daily as needed    methylphenidate (RITALIN) 10 mg tablet Take 10 mg by mouth " daily as needed    methylphenidate (RITALIN) 20 MG tablet Take 20 mg by mouth 2 (two) times a day Pt reports taking tid    Multiple Vitamin (MULTIVITAMIN ADULT PO) Take 1 tablet by mouth every morning    nystatin (MYCOSTATIN) powder Apply topically 2 (two) times a day    OneTouch Delica Lancets 33G MISC Check blood sugars twice daily. Please substitute with appropriate alternative as covered by patient's insurance. Dx: E11.65    OneTouch Delica Lancets 33G MISC Check blood sugars twice daily. Please substitute with appropriate alternative as covered by patient's insurance. Dx: E11.65    pantoprazole (PROTONIX) 40 mg tablet Take 1 tablet (40 mg total) by mouth daily    pregabalin (Lyrica) 150 mg capsule Take 1 capsule (150 mg total) by mouth 3 (three) times a day    rizatriptan (MAXALT) 10 MG tablet TAKE 1 TABLET (10 MG TOTAL) BY MOUTH ONCE AS NEEDED FOR MIGRAINE    SUMAtriptan Succinate 6 MG/0.5ML SOAJ Inject 0.5 mL (6 mg total) under the skin as needed (headache)    traZODone (DESYREL) 100 mg tablet     EPINEPHrine (EPIPEN) 0.3 mg/0.3 mL SOAJ Inject 0.3 mL (0.3 mg total) into a muscle once for 1 dose    [] prednisoLONE (ORAPRED) 15 mg/5 mL oral solution Take 5 mL (15 mg total) by mouth 2 (two) times a day for 5 days     Allergies   Allergen Reactions    Aspirin Anaphylaxis    Bee Venom Swelling and Wheezing    Penicillins Anaphylaxis and Rash    Bee Pollen     Ceclor [Cefaclor] Itching    Cinnamon - Food Allergy Other (See Comments)    Dust Mite Extract Other (See Comments)     congestion    Grass Pollen(K-O-R-T-Swt Osito) Other (See Comments)     congestion    Lactose - Food Allergy GI Intolerance    Molds & Smuts Other (See Comments)    Other Other (See Comments)     congestion    Pollen Extract     Amoxicillin Rash    Doxycycline Rash    Erythromycin Rash    Nickel Rash     Immunization History   Administered Date(s) Administered    COVID-19 MODERNA VACC 0.5 ML IM 2021, 2021, 2022    Hib  "(PRP-OMP) 09/01/2003, 11/01/2003, 05/25/2004    INFLUENZA 10/06/2020, 10/15/2022    Influenza Quadrivalent Preservative Free 3 years and older IM 10/30/2014    Influenza Quadrivalent, 6-35 Months IM 11/02/2015, 10/11/2016, 09/05/2017    Influenza Recombinant Preservative Free Im 10/03/2024    Influenza, injectable, quadrivalent, preservative free 0.5 mL 10/15/2020, 11/23/2021    MMR 09/01/1979, 09/01/1982    Pneumococcal Polysaccharide PPV23 09/05/2017    TD (adult) Preservative Free 05/01/2004    Td (adult), adsorbed 05/25/2004    Tdap 03/29/2018    Tuberculin Skin Test-PPD Intradermal 08/22/2005, 10/14/2020, 12/08/2021, 12/17/2021     Objective   /76 (BP Location: Left arm, Patient Position: Sitting, Cuff Size: Standard)   Pulse (!) 118   Temp 98.9 °F (37.2 °C) (Tympanic)   Resp 18   Ht 5' 2\" (1.575 m)   Wt 62.6 kg (138 lb)   SpO2 98%   BMI 25.24 kg/m²     Physical Exam  Vitals and nursing note reviewed.   Constitutional:       Appearance: Normal appearance.   HENT:      Head: Normocephalic and atraumatic.      Right Ear: Hearing, ear canal and external ear normal. Tympanic membrane is injected.      Left Ear: Hearing, ear canal and external ear normal. Tympanic membrane is injected.      Nose: Congestion and rhinorrhea present.      Mouth/Throat:      Pharynx: Posterior oropharyngeal erythema present.   Eyes:      Conjunctiva/sclera: Conjunctivae normal.   Cardiovascular:      Rate and Rhythm: Normal rate and regular rhythm.      Heart sounds: Normal heart sounds.   Pulmonary:      Effort: Pulmonary effort is normal.      Breath sounds: Normal breath sounds.   Abdominal:      General: Bowel sounds are normal.      Palpations: Abdomen is soft.   Musculoskeletal:         General: Normal range of motion.      Cervical back: Normal range of motion and neck supple.   Skin:     General: Skin is warm and dry.      Capillary Refill: Capillary refill takes less than 2 seconds.   Neurological:      General: No " focal deficit present.      Mental Status: She is alert and oriented to person, place, and time.   Psychiatric:         Mood and Affect: Mood normal.         Behavior: Behavior normal.

## 2025-04-29 ENCOUNTER — OFFICE VISIT (OUTPATIENT)
Dept: ENDOCRINOLOGY | Facility: CLINIC | Age: 45
End: 2025-04-29
Payer: COMMERCIAL

## 2025-04-29 VITALS
HEIGHT: 62 IN | RESPIRATION RATE: 16 BRPM | BODY MASS INDEX: 26.54 KG/M2 | SYSTOLIC BLOOD PRESSURE: 108 MMHG | DIASTOLIC BLOOD PRESSURE: 72 MMHG | WEIGHT: 144.2 LBS | HEART RATE: 63 BPM | OXYGEN SATURATION: 98 %

## 2025-04-29 DIAGNOSIS — E55.9 VITAMIN D DEFICIENCY: ICD-10-CM

## 2025-04-29 DIAGNOSIS — K91.2 HYPOGLYCEMIA FOLLOWING GASTROINTESTINAL SURGERY: Primary | ICD-10-CM

## 2025-04-29 PROCEDURE — 99214 OFFICE O/P EST MOD 30 MIN: CPT

## 2025-04-29 RX ORDER — ACARBOSE 100 MG/1
100 TABLET ORAL
Qty: 90 TABLET | Refills: 2 | Status: SHIPPED | OUTPATIENT
Start: 2025-04-29

## 2025-04-29 NOTE — PROGRESS NOTES
Name: Tram Perez      : 1980      MRN: [id number]  Encounter Provider: FINA Schmid  Encounter Date: 2025   Encounter department: [facility] [place]    Chief Complaint   Patient presents with    Hypoglycemia     History of Present Illness   History of Present Illness  The patient is a 44-year-old female who presents for hypoglycemia following Vi-en-Y bypass surgery. She was admitted from 2024 to 2024 for management of hypoglycemia. During this admission, she was started on acarbose 25 mg prior to meals. She is now on 75 mg prior to each meal.     She was admitted from 2024 to 2024 for the management of hypoglycemia, during which her blood sugar levels were in the 40s. Since starting acarbose, her hypoglycemic events have improved in both frequency and severity. She consumes 1.5 to 2 tablespoons of cornstarch 1 hour prior to bedtime. She reports experiencing increased flatulence, which she manages with Gas-X.    -Discussed with patient to keep carbohydrate, 15 g per meal, and eat 5-6 meals throughout the day, with proteins  -Also discussed to keep, small meals 3 to 4 hours apart    Assessment & Plan  1. Hypoglycemia.  The Dexcom readings indicate approximately <3 percent lows over the past 2 weeks, which is not excessively concerning. The 30-day report is consistent with these findings, while the 90-day report reveals a higher incidence of lows ~8%. Many of these episodes appear to occur overnight. The dosage of acarbose will be increased to 100 mg with each meal, which is the maximum recommended dose. If the current treatment regimen proves ineffective, consideration will be given to initiating diazoxide therapy.    2. Vitamin D deficiency.  Her vitamin D levels are suboptimal at 28.7 ng/mL. She has been advised to increase her vitamin D intake to 4000 IU daily, with a target level above 40 ng/mL. A repeat vitamin D test  "will be conducted in 3 months. If the levels are not above 40 ng/mL, the dosage may be increased further.    3. Inadequate water intake  She has been advised to increase her water intake to at least 64 ounces daily.     Follow-up  The patient will follow up in approximately 3 to 4 months.    PROCEDURE  The patient underwent Vi-en-Y bypass surgery.          Review of Systems   Constitutional:  Negative for chills and fever.   HENT:  Negative for ear pain and sore throat.    Eyes:  Negative for pain and visual disturbance.   Respiratory:  Negative for cough and shortness of breath.    Cardiovascular:  Negative for chest pain and palpitations.   Gastrointestinal:  Negative for abdominal pain and vomiting.   Genitourinary:  Negative for dysuria and hematuria.   Musculoskeletal:  Negative for arthralgias and back pain.   Skin:  Negative for color change and rash.   Neurological:  Negative for seizures and syncope.   All other systems reviewed and are negative.   as per [facility]       Medical History Reviewed by provider this encounter:     .    Objective   /72 (BP Location: Left arm, Patient Position: Sitting, Cuff Size: Adult)   Pulse 63   Resp 16   Ht 5' 2\" (1.575 m)   Wt 65.4 kg (144 lb 3.2 oz)   SpO2 98%   BMI 26.37 kg/m²      Body mass index is 26.37 kg/m².  Wt Readings from Last 3 Encounters:   04/29/25 65.4 kg (144 lb 3.2 oz)   04/24/25 62.6 kg (138 lb)   04/18/25 63.5 kg (140 lb)     Physical Exam  Vitals reviewed.   HENT:      Head: Normocephalic and atraumatic.   Cardiovascular:      Rate and Rhythm: Normal rate.   Pulmonary:      Effort: Pulmonary effort is normal.   Neurological:      Mental Status: She is alert.       Physical Exam  Lungs were auscultated.          Results  Laboratory Studies  Blood sugar was in the 40s during admission. Morning cortisol was normal. Vitamin D was low at 28.7 in February. Iron levels were good in February.  Labs:   Lab Results   Component Value Date    HGBA1C 5.1 " 02/07/2025    HGBA1C 5.3 09/13/2023    HGBA1C 5.3 07/02/2022     Lab Results   Component Value Date    CREATININE 0.69 04/18/2025    CREATININE 0.73 02/07/2025    CREATININE 0.65 11/14/2024    BUN 17 04/18/2025    K 3.6 04/18/2025     04/18/2025    CO2 22 04/18/2025     GFR, Calculated   Date Value Ref Range Status   10/28/2020 113 >60 mL/min/1.73m2 Final     Comment:     mL/min per 1.73 square meters                                            Normal Function or Mild Renal    Disease (if clinically at risk):  >or=60  Moderately Decreased:                30-59  Severely Decreased:                  15-29  Renal Failure:                         <15                                            -American GFR: multiply reported GFR by 1.16    Please note that the eGFR is based on the CKD-EPI calculation, and is not intended to be used for drug dosing.                                            Note: Calculated GFR may not be an accurate indicator of renal function if the patient's renal function is not in a steady state.    Ordering Provider: PAYAL LYONS  Report Copied to : CHANCE LILLY  Report Copied to : COLBY GÓMEZ     eGFRcr   Date Value Ref Range Status   08/26/2023 113 >59 Final     eGFR   Date Value Ref Range Status   04/18/2025 104 ml/min/1.73sq m Final     Lab Results   Component Value Date    HDL 28 (L) 07/11/2019    TRIG 346 (H) 07/11/2019     Lab Results   Component Value Date    ALT 40 04/18/2025    AST 27 04/18/2025    ALKPHOS 70 04/18/2025     Lab Results   Component Value Date    WTU9VXZXNIKY 0.969 08/26/2024    EDQ1YLDTBWIA 1.650 05/17/2018    VOX1LXPVMTJW 3.260 08/03/2016     Lab Results   Component Value Date    FREET4 4.7 08/03/2016       There are no Patient Instructions on file for this visit.    Discussed with the patient and all questioned fully answered. She will call me if any problems arise.

## 2025-05-06 ENCOUNTER — APPOINTMENT (OUTPATIENT)
Dept: LAB | Facility: CLINIC | Age: 45
End: 2025-05-06
Attending: NURSE PRACTITIONER
Payer: COMMERCIAL

## 2025-05-06 DIAGNOSIS — D84.9 IMMUNODEFICIENCY, UNSPECIFIED (HCC): ICD-10-CM

## 2025-05-06 DIAGNOSIS — E61.1 IRON DEFICIENCY: ICD-10-CM

## 2025-05-06 LAB
ALBUMIN SERPL BCG-MCNC: 4.6 G/DL (ref 3.5–5)
ALP SERPL-CCNC: 64 U/L (ref 34–104)
ALT SERPL W P-5'-P-CCNC: 61 U/L (ref 7–52)
ANION GAP SERPL CALCULATED.3IONS-SCNC: 7 MMOL/L (ref 4–13)
AST SERPL W P-5'-P-CCNC: 20 U/L (ref 13–39)
BASOPHILS # BLD AUTO: 0.03 THOUSANDS/ÂΜL (ref 0–0.1)
BASOPHILS NFR BLD AUTO: 0 % (ref 0–1)
BILIRUB SERPL-MCNC: 0.79 MG/DL (ref 0.2–1)
BUN SERPL-MCNC: 17 MG/DL (ref 5–25)
CALCIUM SERPL-MCNC: 8.9 MG/DL (ref 8.4–10.2)
CHLORIDE SERPL-SCNC: 103 MMOL/L (ref 96–108)
CO2 SERPL-SCNC: 27 MMOL/L (ref 21–32)
CREAT SERPL-MCNC: 0.67 MG/DL (ref 0.6–1.3)
CRP SERPL HS-MCNC: <0.2 MG/L
EOSINOPHIL # BLD AUTO: 0.04 THOUSAND/ÂΜL (ref 0–0.61)
EOSINOPHIL NFR BLD AUTO: 1 % (ref 0–6)
ERYTHROCYTE [DISTWIDTH] IN BLOOD BY AUTOMATED COUNT: 12.5 % (ref 11.6–15.1)
FERRITIN SERPL-MCNC: 155 NG/ML (ref 30–307)
GFR SERPL CREATININE-BSD FRML MDRD: 105 ML/MIN/1.73SQ M
GLUCOSE SERPL-MCNC: 107 MG/DL (ref 65–140)
HCT VFR BLD AUTO: 41.1 % (ref 34.8–46.1)
HGB BLD-MCNC: 13.9 G/DL (ref 11.5–15.4)
IGA SERPL-MCNC: 117 MG/DL (ref 66–433)
IGG SERPL-MCNC: 724 MG/DL (ref 635–1741)
IGM SERPL-MCNC: 97 MG/DL (ref 45–281)
IMM GRANULOCYTES # BLD AUTO: 0.02 THOUSAND/UL (ref 0–0.2)
IMM GRANULOCYTES NFR BLD AUTO: 0 % (ref 0–2)
IRON SATN MFR SERPL: 38 % (ref 15–50)
IRON SERPL-MCNC: 129 UG/DL (ref 50–212)
LYMPHOCYTES # BLD AUTO: 1.81 THOUSANDS/ÂΜL (ref 0.6–4.47)
LYMPHOCYTES NFR BLD AUTO: 24 % (ref 14–44)
MCH RBC QN AUTO: 32.3 PG (ref 26.8–34.3)
MCHC RBC AUTO-ENTMCNC: 33.8 G/DL (ref 31.4–37.4)
MCV RBC AUTO: 95 FL (ref 82–98)
MONOCYTES # BLD AUTO: 0.5 THOUSAND/ÂΜL (ref 0.17–1.22)
MONOCYTES NFR BLD AUTO: 7 % (ref 4–12)
NEUTROPHILS # BLD AUTO: 5.2 THOUSANDS/ÂΜL (ref 1.85–7.62)
NEUTS SEG NFR BLD AUTO: 68 % (ref 43–75)
NRBC BLD AUTO-RTO: 0 /100 WBCS
PLATELET # BLD AUTO: 204 THOUSANDS/UL (ref 149–390)
PMV BLD AUTO: 9.6 FL (ref 8.9–12.7)
POTASSIUM SERPL-SCNC: 3.5 MMOL/L (ref 3.5–5.3)
PROT SERPL-MCNC: 6.6 G/DL (ref 6.4–8.4)
RBC # BLD AUTO: 4.31 MILLION/UL (ref 3.81–5.12)
SODIUM SERPL-SCNC: 137 MMOL/L (ref 135–147)
TIBC SERPL-MCNC: 338.8 UG/DL (ref 250–450)
TRANSFERRIN SERPL-MCNC: 242 MG/DL (ref 203–362)
UIBC SERPL-MCNC: 210 UG/DL (ref 155–355)
WBC # BLD AUTO: 7.6 THOUSAND/UL (ref 4.31–10.16)

## 2025-05-06 PROCEDURE — 83540 ASSAY OF IRON: CPT

## 2025-05-06 PROCEDURE — 36415 COLL VENOUS BLD VENIPUNCTURE: CPT

## 2025-05-06 PROCEDURE — 83550 IRON BINDING TEST: CPT

## 2025-05-06 PROCEDURE — 85025 COMPLETE CBC W/AUTO DIFF WBC: CPT

## 2025-05-06 PROCEDURE — 82728 ASSAY OF FERRITIN: CPT

## 2025-05-06 PROCEDURE — 86581 STRPTCS PNEUM ANTB SEROT IA: CPT

## 2025-05-06 PROCEDURE — 86141 C-REACTIVE PROTEIN HS: CPT

## 2025-05-06 PROCEDURE — 80053 COMPREHEN METABOLIC PANEL: CPT

## 2025-05-06 PROCEDURE — 82784 ASSAY IGA/IGD/IGG/IGM EACH: CPT

## 2025-05-07 ENCOUNTER — OFFICE VISIT (OUTPATIENT)
Dept: OBGYN CLINIC | Facility: CLINIC | Age: 45
End: 2025-05-07
Payer: COMMERCIAL

## 2025-05-07 VITALS
BODY MASS INDEX: 25.73 KG/M2 | HEIGHT: 62 IN | WEIGHT: 139.8 LBS | DIASTOLIC BLOOD PRESSURE: 62 MMHG | SYSTOLIC BLOOD PRESSURE: 108 MMHG

## 2025-05-07 DIAGNOSIS — R61 NIGHT SWEAT: Primary | ICD-10-CM

## 2025-05-07 PROCEDURE — 99213 OFFICE O/P EST LOW 20 MIN: CPT | Performed by: OBSTETRICS & GYNECOLOGY

## 2025-05-07 RX ORDER — ESTRADIOL 0.07 MG/D
1 FILM, EXTENDED RELEASE TRANSDERMAL 2 TIMES WEEKLY
Qty: 8 PATCH | Refills: 4 | Status: SHIPPED | OUTPATIENT
Start: 2025-05-08

## 2025-05-07 RX ORDER — CYCLOBENZAPRINE HCL 10 MG
10 TABLET ORAL
COMMUNITY
Start: 2025-04-30

## 2025-05-07 NOTE — PROGRESS NOTES
Assessment/Plan:     There are no diagnoses linked to this encounter.      44-year-old female  Prophylaxis/platelet response mildly to HRT  History of cervical cancer with hysterectomy TLH bilateral salpingectomy  History of bypass  History of hernia repair  History of migraine  ALFRED mild desire expectant management  Plan  Lifestyle modification reviewed and discussed with patient  Increase dose of estrogen patch  Management options for ALFRED reviewed and discussed with patient patient desired expectant management aware of Kegel exercise bladder diet  To be seen for annual exam      Subjective:      Patient ID: Tram Perez is a 44 y.o. female.    HPI  45 yo female presents to the office today for follow-up on HRT  Patient started taking estrogen patch secondary to hot flashes and night sweats fatigue and menopausal symptoms that started to affected her daily activity  Patient said her symptoms improved mildly  Patient is interested in increasing dose of the medication as his symptoms did not respond completely to the HRT  Risk of estrogen reviewed and discussed with patient in detail stroke, thrombosis, DVT, PE  Patient is aware of all the risk and desire higher dose of HRT  Medication will be sent to pharmacy      Patient also complaining of mild stress urinary incontinence denies any urgency frequency or dysuria  Patient is doing Kegel and physical therapy management option reviewed and discussed with patient desired expectant management for now to return to office if desires surgical management I would then consider urodynamic testing followed by discussion of surgical procedure based on the results of the urodynamic testing       3    [facility] HYPOGLYCEMIA, ASTHMA , CERVICAL CANCER had hysterectomy    Migraine, seizure,   PSH cone, leep and robotic TLH BS,, bypass in , hernia [id number]            The following portions of the patient's history were reviewed and updated as appropriate: allergies,  "current medications, past family history, past medical history, past social history, past surgical history and problem list.    Review of Systems      Objective:      /62 (BP Location: Left arm, Patient Position: Sitting, Cuff Size: Adult)   Ht 5' 2\" (1.575 m)   Wt 63.4 kg (139 lb 12.8 oz)   BMI 25.57 kg/m²          Physical Exam    "

## 2025-05-08 ENCOUNTER — CONSULT (OUTPATIENT)
Dept: MULTI SPECIALTY CLINIC | Facility: CLINIC | Age: 45
End: 2025-05-08
Attending: NURSE PRACTITIONER

## 2025-05-08 VITALS
SYSTOLIC BLOOD PRESSURE: 142 MMHG | OXYGEN SATURATION: 98 % | TEMPERATURE: 98 F | WEIGHT: 139.8 LBS | BODY MASS INDEX: 25.57 KG/M2 | HEART RATE: 71 BPM | DIASTOLIC BLOOD PRESSURE: 89 MMHG

## 2025-05-08 DIAGNOSIS — Z86.69 HISTORY OF MIGRAINE: ICD-10-CM

## 2025-05-08 DIAGNOSIS — Z86.69 HISTORY OF EAR INFECTIONS: ICD-10-CM

## 2025-05-08 DIAGNOSIS — H91.8X3 OTHER SPECIFIED HEARING LOSS OF BOTH EARS: ICD-10-CM

## 2025-05-08 DIAGNOSIS — H92.01 REFERRED OTALGIA OF RIGHT EAR: Primary | ICD-10-CM

## 2025-05-08 DIAGNOSIS — M79.18 MYOFASCIAL PAIN ON RIGHT SIDE: ICD-10-CM

## 2025-05-08 PROBLEM — H65.06 RECURRENT ACUTE SEROUS OTITIS MEDIA OF BOTH EARS: Status: RESOLVED | Noted: 2025-04-08 | Resolved: 2025-05-08

## 2025-05-08 NOTE — PROGRESS NOTES
[facility]  [facility]  [facility] Otolaryngology    Assessment:   1. Referred otalgia of right ear  Comprehensive hearing evaluation      2. Other specified hearing loss of both ears  Comprehensive hearing evaluation      3. Myofascial pain on right side  Ambulatory Referral to Physical Therapy      4. History of ear infections  Ambulatory Referral to Otolaryngology      5. History of migraine            Discussion/Plan:  Reassured ears clear and intact and no evidence of fluid or ear infection.  She has septal deviation to right but no obvious sinus disease.  I strongly suspect TMJ as source of her referred ear pain.  She actually developed some ear pain from pretragal area shooting toward right outer corner of eye after examination of jaw.  Discussed TMJ measures including warm compresses, soft diet for a few days, NSAIDs prn, bite guard, TMJ therapy.  Notify me if this worsens, like increase in pain or nighttime pain as further evaluation may be needed with scope/CT neck, etc. TMJ therapy also prescribed.  She defers scope today, will try this first.  No need for further antibiotics and steroids    This could also be migraine, or related to jaw injury (used to get botox) and she has upcoming neurology appt, which I would keep to discuss.  She has never tried taking a migraine medication during the ear pain, so she can try that in interim as well (maxalt or sumatriptan).  She gets frequent migraines several times per week, so I would definitely discuss with this neurology as well.      Will also check hearing to ensure no underlying associated hearing loss and I can mychart message her with results.    Could also trial nasal steroid in case of allergies exacerbating migraine, but she does not like scented nasal sprays.  Discussed there are ones over the counter that are scent free.        Dictation software was used to dictate this note. It may contain errors with dictating  incorrect words/spelling. Please contact provider directly for any questions.     Thank you for allowing me to participate in the care of your patient.      Tram Perez is a 44 y.o. who presents with a chief complaint of recurrent ear infections of both ears, right over left otalgia, intermittent.    HPI:  Tram is a 43 y/o female new to ENT clinic referred for chronic ear infections of both ears right over left.  Has had 4 antibiotics and 2 oral steroids in the last  5 weeks with temporary relief of symptoms.  Mild hearing loss.      Last week went to allergist and ears looked good per allergist.    Symptoms include: sinus infection (4-5 per year), no ear pain at first, but was told fluid and inflamed.   Now getting intermittent right over left ear pain. Located pretragally and intermittent.    Initially prior to antibiotics, had sinus pressure but no purulent mucus, now has been ok.    H/o migraines used to get botox, takes maxalt pill, tramadol and sumatriptan.    H/o fall in 2023 on chin in parking lot and was getting botox injections into jaw up until 1 year ago, and wonders if this is related.  It was not covered by insurance and was 800 dollars q2-3 months. She is going back to neurology next month to discuss botox again and migraine management.     Admits to Clenching/grinding,  no pain after a meal    Recently had Keflex and orapred on 4/18/2025    H/o bariatric sugaditi    Also mentions over 1 year of intermittent fevers, ranging from 101-102.9  comes and goes.    Thought ears may be playing a role.  Going to see immunology.    Allergies   Allergen Reactions    Aspirin Anaphylaxis    Bee Venom Swelling and Wheezing    Penicillins Anaphylaxis and Rash    Bee Pollen     Ceclor [Cefaclor] Itching    Dust Mite Extract Other (See Comments)     congestion    Grass Pollen(K-O-R-T-Swt Osito) Other (See Comments)     congestion    Lactose - Food Allergy GI Intolerance    Molds & Smuts Other (See Comments)    Other  Other (See Comments)     congestion    Pollen Extract     Amoxicillin Rash    Doxycycline Rash    Erythromycin Rash    Nickel Rash     Past Medical History:   Diagnosis Date    Abnormal Pap smear of cervix     Acid reflux     Allergic     Allergic rhinitis     Anxiety     Asthma     Cluster headache 2/16/23    Depression     Diabetes mellitus (HCC)     Pre    Diverticulitis     Epileptic seizures (HCC)     Medication related    Fibromyalgia, primary     Gestational diabetes mellitus     Head injury 2/16/23    Fell at work on concrete and now havw concussion with bad headaches.    Headache(784.0)     Migraines    Headache, tension-type     Inflammatory bowel disease     Lateral epicondylitis     Malignant tumor of cervix (HCC)     between 4755-0519    Migraine     Miscarriage     Obstructive sleep apnea     CPAP    Paresthesias     Peptic ulceration     Plantar fasciitis     Reactive hypoglycemia     Rectocele     Seizures (HCC)     Had ghem due to low sugar levels    Tinea corporis     Wears contact lenses     Wears glasses      Past Surgical History:   Procedure Laterality Date    ADENOIDECTOMY      ARTHROSCOPY ANKLE Right 09/01/2017    Procedure: ANKLE ARTHOSCOPY WITH EXTENSIVE SYNOVECTOMY, OPEN ARTHOTOY LATERAL ANKLE WITH ANKLE STABILIZATION, BROSTOM REPAIR, INTERNAL BRACE AUGMENTATION, PERONEAL TENDON SYNOVECTOMY;  Surgeon: Donnell Hansen DPM;  Location: [facility];  Service: Podiatry    BARIATRIC SURGERY      CERVICAL BIOPSY  W/ LOOP ELECTRODE EXCISION      COLONOSCOPY  02/27/2017    GASTRIC BYPASS  07/2020    HEMORRHOID SURGERY  08/2021    HERNIA REPAIR  2020    HYSTERECTOMY  2015    NH COLONOSCOPY FLX DX W/COLLJ SPEC WHEN PFRMD N/A 02/27/2017    Procedure: COLONOSCOPY;  Surgeon: Armani Lara MD;  Location: [facility];  Service: Gastroenterology    NH CONIZATION CERVIX W/WO D&C RPR KNIFE/LASER      NH ESOPHAGOGASTRODUODENOSCOPY TRANSORAL DIAGNOSTIC N/A 09/07/2016    Procedure: EGD AND COLONOSCOPY;   Surgeon: Armani Lara MD;  Location:  GI LAB;  Service: Gastroenterology    TONSILLECTOMY      TUBAL LIGATION      UPPER GASTROINTESTINAL ENDOSCOPY  2016     Family History   Problem Relation Age of Onset    Anxiety disorder Mother     Depression Mother     Cancer Mother     Endometrial cancer Mother         under afe 50    Stroke Mother     Thyroid disease Mother     Diabetes type II Mother     Hypertension Mother     Thyroid cancer Mother     Alcohol abuse Mother     Asthma Mother     Mental illness Mother     Substance Abuse Mother     Hypertension Father     Alcohol abuse Father     Depression Father     Substance Abuse Father     Mental illness Father     Heart attack Father     Asthma Brother         Epilepsy    Depression Brother     Mental illness Brother     Substance Abuse Brother     Depression Brother     Asthma Brother     Learning disabilities Brother     Mental illness Brother     Substance Abuse Brother     Seizures Brother     Cancer Maternal Grandmother 50        lymphatic cancer    Depression Maternal Grandmother     Cancer Maternal Grandfather     Alcohol abuse Maternal Grandfather     Depression Maternal Grandfather     Mental illness Maternal Grandfather     Breast cancer Paternal Grandmother 82    Cancer Paternal Grandmother         Breast    No Known Problems Paternal Grandfather     Depression Daughter     Anxiety disorder Daughter     Substance Abuse Daughter     Asthma Daughter     Allergies Daughter     Depression Daughter     Anxiety disorder Daughter     Allergies Daughter     Asthma Son     Depression Son     Learning disabilities Son     Allergies Son     Learning disabilities Maternal Aunt     Learning disabilities Maternal Uncle     Leukemia Cousin     Cancer Cousin     Learning disabilities Cousin     Cancer Cousin         lympatic    Diabetes Other     Hypertension Other     Neuropathy Other     Thyroid disease Other      Current Outpatient Medications on File Prior to Visit  "  Medication Sig Dispense Refill    acarbose (PRECOSE) 100 MG tablet Take 1 tablet (100 mg total) by mouth 3 (three) times a day with meals 90 tablet 2    Acetaminophen 500 MG PACK Take 500 mg by mouth every 6 (six) hours as needed      albuterol (ProAir HFA) 90 mcg/act inhaler Inhale 2 puffs every 6 (six) hours as needed for wheezing 8.5 g 2    albuterol (PROVENTIL HFA,VENTOLIN HFA) 90 mcg/act inhaler 2 PUFFS UP TO 4 TIMES A DAY AS NEEDED FOR WHEEZING 18 g 1    BD Integra Syringe 25G X 1\" 3 ML MISC DRAW UP 1 ML OF TORADOL AND INJECT INTO MUSCLE.      Biotin 1000 MCG tablet Take 10,000 mcg by mouth      Blood Glucose Monitoring Suppl (OneTouch Verio Reflect) w/Device KIT Check blood sugars twice daily. Please substitute with appropriate alternative as covered by patient's insurance. Dx: E11.65 1 kit 0    Botulinum Toxin Type A 200 units SOLR Inject 155 units into face and neck IM every 90 days      Continuous Glucose Sensor (Dexcom G7 Sensor) Use 1 Device every 10 days 3 each 3    Continuous Glucose Sensor (Dexcom G7 Sensor) Use 1 Device every 10 days 9 each 1    cyclobenzaprine (FLEXERIL) 10 mg tablet Take 10 mg by mouth      DULoxetine (CYMBALTA) 60 mg delayed release capsule Take 1 capsule (60 mg total) by mouth daily 30 capsule 2    EPINEPHrine (EPIPEN) 0.3 mg/0.3 mL SOAJ Inject 0.3 mL (0.3 mg total) into a muscle once for 1 dose 0.6 mL 0    Ergocalciferol 10 MCG (400 UNIT) TABS Take by mouth daily      estradiol (Vivelle-Dot) 0.075 MG/24HR Place 1 patch on the skin 2 (two) times a week 8 patch 4    glucose blood (OneTouch Verio) test strip Check blood sugars twice daily. Please substitute with appropriate alternative as covered by patient's insurance. Dx: E11.65 200 each 1    HYDROcodone-acetaminophen (NORCO) 7.5-325 mg per tablet Take 1 tablet by mouth every 4 (four) hours as needed for pain      ketorolac (TORADOL) 30 mg/mL injection       LORazepam (ATIVAN) 1 mg tablet Take 1 mg by mouth daily as needed      " methylphenidate (RITALIN) 10 mg tablet Take 10 mg by mouth daily as needed  0    methylphenidate (RITALIN) 20 MG tablet Take 20 mg by mouth 2 (two) times a day Pt reports taking tid  0    Multiple Vitamin (MULTIVITAMIN ADULT PO) Take 1 tablet by mouth every morning      nystatin (MYCOSTATIN) powder Apply topically 2 (two) times a day 60 g 0    OneTouch Delica Lancets 33G MISC Check blood sugars twice daily. Please substitute with appropriate alternative as covered by patient's insurance. Dx: E11.65 200 each 3    OneTouch Delica Lancets 33G MISC Check blood sugars twice daily. Please substitute with appropriate alternative as covered by patient's insurance. Dx: E11.65 200 each 3    pantoprazole (PROTONIX) 40 mg tablet Take 1 tablet (40 mg total) by mouth daily 30 tablet 1    prednisoLONE (ORAPRED) 15 mg/5 mL oral solution Take 20mg daily for 4 days, then 10mg daily for 4 days then 5 mg daily for 4 days 50 mL 0    pregabalin (Lyrica) 150 mg capsule Take 1 capsule (150 mg total) by mouth 3 (three) times a day 90 capsule 1    rizatriptan (MAXALT) 10 MG tablet TAKE 1 TABLET (10 MG TOTAL) BY MOUTH ONCE AS NEEDED FOR MIGRAINE 9 tablet 1    SUMAtriptan Succinate 6 MG/0.5ML SOAJ Inject 0.5 mL (6 mg total) under the skin as needed (headache) 9 Cartridge 1    traZODone (DESYREL) 100 mg tablet        Current Facility-Administered Medications on File Prior to Visit   Medication Dose Route Frequency Provider Last Rate Last Admin    cyanocobalamin injection 1,000 mcg  1,000 mcg Intramuscular Q30 Days Ronny Brown MD   1,000 mcg at 23 1524     Social History     Tobacco Use    Smoking status: Former     Current packs/day: 0.00     Average packs/day: 0.5 packs/day for 10.0 years (5.0 ttl pk-yrs)     Types: Cigarettes     Quit date: 2013     Years since quittin.6    Smokeless tobacco: Never   Vaping Use    Vaping status: Never Used   Substance Use Topics    Alcohol use: Not Currently     Comment:  occasionally, maybe 1-2 times a month    Drug use: Not Currently     Types: Marijuana     Comment: not anymore           Results reviewed; images from any scan have been personally reviewed:    CT of facial bones and head 2/18/2023 no sinus disease on images reviewed.    Physical exam:     /89 (BP Location: Left arm, Patient Position: Sitting, Cuff Size: Standard)   Pulse 71   Temp 98 °F (36.7 °C) (Temporal)   Wt 63.4 kg (139 lb 12.8 oz)   SpO2 98%   BMI 25.57 kg/m²     Constitutional:  Well developed, well nourished and groomed, in no acute distress.     Eyes:  Extra-ocular movements intact, pupils equally round and reactive to light and accommodation, the lids and conjunctivae are normal in appearance.    Head: Atraumatic, normocephalic, no visible scalp lesions, bony palpation unremarkable without stepoffs, parotid and submandibular salivary glands non-tender to palpation and without masses bilaterally.     Ears:  Auricles normal in appearance bilaterally, mastoid prominence non-tender, external auditory canals clear bilaterally, tympanic membranes intact bilaterally without evidence of middle ear effusion or masses, normal appearing ossicles.     Nose/Sinuses:  External appearance unremarkable, no maxillary or frontal sinus tenderness to palpation bilaterally. Anterior rhinoscopy reveals: normal mucosa b/l, posterior septal deviation to right, mild.    Oral Cavity:  Moist mucus membranes, gums and dentition unremarkable, no oral mucosal masses or lesions, floor of mouth soft, tongue mobile without masses or lesions.     Oropharynx:  Base of tongue soft and without masses, tonsils bilaterally absent,  soft palate mucosa unremarkable.      Neck:  No visible or palpable cervical lesions or lymphadenopathy, thyroid gland is normal in size and symmetry and without masses, normal laryngeal elevation with swallowing.  TMJ not overly tender to palpation, mild masseter tenderness, no temporalis tenderness b/l.  Mild tenderness to SMG, but no abnormalities palpated.    Cardiovascular:  Not examined.  Respiratory:  Normal respiratory effort without evidence of retractions or use of accessory muscles.  Integument:  Normal appearing without observed masses or lesions.  Neurologic:  Cranial nerves II-XII intact bilaterally.  Psychiatric:  Alert and oriented to time, place and person, normal affect.    Procedures        Orders  Orders Placed This Encounter   Procedures    Ambulatory Referral to Physical Therapy     Standing Status:   Future     Expiration Date:   5/8/2026     Referral Priority:   Routine     Referral Type:   Physical Therapy     Referral Reason:   Specialty Services Required     Requested Specialty:   Physical Therapy     Number of Visits Requested:   1     Expiration Date:   5/8/2026    Comprehensive hearing evaluation     Complete hearing and tympanogram     Standing Status:   Future     Expiration Date:   5/8/2026

## 2025-05-14 DIAGNOSIS — R11.0 NAUSEA: Primary | ICD-10-CM

## 2025-05-14 RX ORDER — ONDANSETRON 4 MG/1
4 TABLET, FILM COATED ORAL EVERY 8 HOURS PRN
Qty: 30 TABLET | Refills: 1 | Status: SHIPPED | OUTPATIENT
Start: 2025-05-14

## 2025-05-17 LAB
DEPRECATED S PNEUM14 IGG SER-MCNC: 4.6 UG/ML
DEPRECATED S PNEUM19 IGG SER-MCNC: 0.9 UG/ML
DEPRECATED S PNEUM23 IGG SER-MCNC: 0.5 UG/ML
DEPRECATED S PNEUM3 IGG SER-MCNC: <0.1 UG/ML
DEPRECATED S PNEUM4 IGG SER-MCNC: 0.4 UG/ML
DEPRECATED S PNEUM8 AB SER-MCNC: >80.2 UG/ML
DEPRECATED S PNEUM9 IGG SER-MCNC: 5.5 UG/ML
FLUBV RNA SPEC QL NAA+PROBE: <0.1 UG/ML
S PNEUM DA 18C IGG SER-MCNC: 0.8 UG/ML
S PNEUM DA 19A IGG SER-MCNC: 2.1 UG/ML
S PNEUM DA 6B IGG SER-MCNC: 0.9 UG/ML
STREP PNEUMO TYPE 1: >17.4 UG/ML
STREP PNEUMO TYPE 51: 5.3 UG/ML
STREP PNEUMO TYPE 68: 1.5 UG/ML

## 2025-05-27 ENCOUNTER — OFFICE VISIT (OUTPATIENT)
Dept: FAMILY MEDICINE CLINIC | Facility: CLINIC | Age: 45
End: 2025-05-27
Payer: COMMERCIAL

## 2025-05-27 VITALS
HEART RATE: 79 BPM | BODY MASS INDEX: 25.76 KG/M2 | TEMPERATURE: 98.8 F | OXYGEN SATURATION: 98 % | WEIGHT: 140 LBS | RESPIRATION RATE: 18 BRPM | HEIGHT: 62 IN | DIASTOLIC BLOOD PRESSURE: 70 MMHG | SYSTOLIC BLOOD PRESSURE: 122 MMHG

## 2025-05-27 DIAGNOSIS — J01.01 ACUTE RECURRENT MAXILLARY SINUSITIS: Primary | ICD-10-CM

## 2025-05-27 DIAGNOSIS — E16.2 HYPOGLYCEMIA: ICD-10-CM

## 2025-05-27 PROCEDURE — 99214 OFFICE O/P EST MOD 30 MIN: CPT | Performed by: FAMILY MEDICINE

## 2025-05-27 RX ORDER — GLUCAGON INJECTION, SOLUTION 0.5 MG/.1ML
1 INJECTION, SOLUTION SUBCUTANEOUS DAILY PRN
Qty: 0.2 ML | Refills: 1 | Status: SHIPPED | OUTPATIENT
Start: 2025-05-27 | End: 2025-05-27

## 2025-05-27 RX ORDER — LEVOFLOXACIN 500 MG/1
500 TABLET, FILM COATED ORAL EVERY 24 HOURS
Qty: 5 TABLET | Refills: 0 | Status: SHIPPED | OUTPATIENT
Start: 2025-05-27 | End: 2025-06-01

## 2025-05-27 RX ORDER — POLYETHYLENE GLYCOL 3350 17 G/17G
POWDER, FOR SOLUTION ORAL
COMMUNITY
Start: 2025-05-22

## 2025-05-27 RX ORDER — GLUCAGON INJECTION, SOLUTION 0.5 MG/.1ML
1 INJECTION, SOLUTION SUBCUTANEOUS DAILY PRN
Qty: 0.2 ML | Refills: 1 | Status: SHIPPED | OUTPATIENT
Start: 2025-05-27

## 2025-05-27 RX ORDER — PREDNISOLONE SODIUM PHOSPHATE 15 MG/5ML
20 SOLUTION ORAL DAILY
Qty: 33.5 ML | Refills: 0 | Status: SHIPPED | OUTPATIENT
Start: 2025-05-27 | End: 2025-06-02

## 2025-05-27 NOTE — ASSESSMENT & PLAN NOTE
Established diagnosis and on acarbaose. Has a reading in the 40s. Monitors readings on the DEXCOM and typically low when she is ill. Suggest starting Gvoke for prn use instead of glucose tablets when sugars are less than 50   Orders:    Glucagon (Gvoke HypoPen 1-Pack) 0.5 MG/0.1ML SOAJ; Inject 1 mg under the skin daily as needed (blood sugar less than 50) May repeat in 15 minutes as needed using a new device.

## 2025-05-27 NOTE — PROGRESS NOTES
Name: Tram Perez      : 1980      MRN: [id number]  Encounter Provider: Reno Flores MD  Encounter Date: 2025   Encounter department: [facility]  :  Assessment & Plan  Acute recurrent maxillary sinusitis  Presents with sinus symptoms that started a few days ago. She has similar symptoms several months ago that required 4 rounds of abx and 2 round of steroids. Eventually she needed to see the ENT but symptoms resolved by the time she was seen. No sick contacts or recent travels. Start prednisone burst, Levaquin, and get CT of the sinuses once the infection clears to determine if she has any anatomical issues that could predispose her to infection.   Orders:    CT sinus wo contrast; Future    prednisoLONE (ORAPRED) 15 mg/5 mL oral solution; Take 6.7 mL (20 mg total) by mouth daily for 5 days    levofloxacin (LEVAQUIN) 500 mg tablet; Take 1 tablet (500 mg total) by mouth every 24 hours for 5 days    Hypoglycemia  Established diagnosis and on acarbaose. Has a reading in the 40s. Monitors readings on the DEXCOM and typically low when she is ill. Suggest starting Gvoke for prn use instead of glucose tablets when sugars are less than 50   Orders:    Glucagon (Gvoke HypoPen 1-Pack) 0.5 MG/0.1ML SOAJ; Inject 1 mg under the skin daily as needed (blood sugar less than 50) May repeat in 15 minutes as needed using a new device.           History of Present Illness   Seen today for an acute visit.   Reports sinus congestion, fevers, PND, and facial pain. TMAX 102.6  Had a double ear infection a few weeks ago   Required 4 rounds of abx and 2 round of steroids.   Eventually saw ENT. During the 6 weeks of recurrent sinus symptoms, the BS were low.   Last night BS read 40 on DEXCOM. Took Gatorade and sugar tea.   She slept a lot yesterday  BS today 124. Taking acarbose. Symptoms feel similar to what she felt a few weeks a back. Has multiple medication allergies. Will typically get bactrim, z  "pack, or Levaquin with steroids      Review of Systems   Constitutional:  Positive for fatigue and fever.   HENT:  Positive for congestion, sinus pressure and sinus pain. Negative for sore throat and trouble swallowing.    Neurological:  Positive for headaches.       Objective   /70 (BP Location: Left arm, Patient Position: Sitting, Cuff Size: Standard)   Pulse 79   Temp 98.8 °F (37.1 °C) (Tympanic)   Resp 18   Ht 5' 2\" (1.575 m)   Wt 63.5 kg (140 lb)   SpO2 98%   BMI 25.61 kg/m²      Physical Exam  Vitals reviewed.   Constitutional:       General: She is not in acute distress.     Appearance: Normal appearance. She is ill-appearing. She is not toxic-appearing or diaphoretic.      Comments: Tired appearing    HENT:      Head: Normocephalic and atraumatic.      Right Ear: Tympanic membrane normal.      Left Ear: Tympanic membrane normal.      Nose: Congestion present.      Right Sinus: Maxillary sinus tenderness present.      Left Sinus: Maxillary sinus tenderness (more in the left) present.      Mouth/Throat:      Mouth: Mucous membranes are moist.      Pharynx: No oropharyngeal exudate or posterior oropharyngeal erythema.     Eyes:      Extraocular Movements: Extraocular movements intact.       Cardiovascular:      Rate and Rhythm: Normal rate and regular rhythm.      Heart sounds: No murmur heard.  Pulmonary:      Effort: Pulmonary effort is normal. No respiratory distress.      Breath sounds: Normal breath sounds. No stridor. No wheezing, rhonchi or rales.     Psychiatric:         Mood and Affect: Mood normal.         Behavior: Behavior normal.         Thought Content: Thought content normal.         "

## 2025-05-29 ENCOUNTER — NURSE TRIAGE (OUTPATIENT)
Age: 45
End: 2025-05-29

## 2025-05-29 NOTE — TELEPHONE ENCOUNTER
REASON FOR CONVERSATION: Hypoglycemia - no symptoms    SYMPTOMS: no symptoms w/ hypoglycemia. Stated she does have a headache and is tired but states this is related to recent illness she is getting over.     OTHER HEALTH INFORMATION: Patient is currently on clear liquids only until 1am d/t going for a colonoscopy tomorrow. She takes Acarbose 100mg- 1 tab TID w/meals for hypoglycemia (has not taken any doses today). Has a Dexcom G7 that is connected to the office    PROTOCOL DISPOSITION: Discuss With PCP and Callback by Nurse Within 1 Hour    CARE ADVICE PROVIDED: Continue to monitor bg levels, double check low readings 70 or below with fingerstick and document, call back if becomes worse. Keep apple juice/ root beer close for low bg readings.  Advised when to seek ED tx    PRACTICE FOLLOW-UP: Call patient asap to confirm if she can take her Acarbose 100mg- 1 tab TID while only on clear liquids/advise on low bg readings     Patient calling in with concerns of low bg readings while she is not able to eat d/t colonoscopy tomorrow. States she was alerted with low reading this AM at 58 and double checked with a fingerstick and received the same reading- reports she drank apple juice which brought it back up into the 70-80's. Reports her current bg level via Dexcom is at 72. Patient stated she is due for colonoscopy tomorrow, and is only able to have clear liquids today with no food, she is asking if she can still take her Acarbose 100mg- 1 tab TID today even though she is not able to eat? States she feels this will help with hypoglycemia. She is also asking if provider has any other recommendations to treat low bg levels while she is NPO. States she's concerned bg will drop over night tonight. Per protocol, discuss with provider and call patient back within 1 hour.         Reason for Disposition   Low blood glucose (70 mg/dL [3.9 mmol/L] or below) OR symptomatic, now improved with Care Advice AND cause  "unknown    Answer Assessment - Initial Assessment Questions  1. SYMPTOMS: \"What symptoms are you concerned about?\"    Low bg levels since she is clear liquids, no food due to colonoscopy tomorrow   2. ONSET:  \"When did the symptoms start?\"        3. BLOOD GLUCOSE: \"What is your blood glucose level?\"       Current 72   4. USUAL RANGE: \"What is your blood glucose level usually?\" (e.g., usual fasting morning value, usual evening value)      Unsure   5. TYPE 1 or 2:  \"Do you know what type of diabetes you have?\"  (e.g., Type 1, Type 2, Gestational; doesn't know)       Reactive hypoglycemia   6. INSULIN: \"Do you take insulin?\" \"What type of insulin(s) do you use? What is the mode of delivery? (syringe, pen; injection or pump) \"When did you last give yourself an insulin dose?\" (i.e., time or hours/minutes ago) \"How much did you give?\" (i.e., how many units)      No insulins   7. DIABETES PILLS: \"Do you take any pills for your diabetes?\" If Yes, ask: \"What is the name of the medicine(s) that you take for high blood sugar?\"      Acarbose 100mg- 1 tab TID w/meals   8. OTHER SYMPTOMS: \"Do you have any symptoms?\" (e.g., fever, frequent urination, difficulty breathing, vomiting)      Headache at times, tired   9. LOW BLOOD GLUCOSE TREATMENT: \"What have you done so far to treat the low blood glucose level?\"      Sipping on Apple juice,and Vitamin water.   10. FOOD: \"When did you last eat or drink?\"        On clear liquids- last drank 11:35a  11. ALONE: \"Are you alone right now or is someone with you?\"         Alone    Protocols used: Diabetes - Low Blood Sugar-Adult-OH    "

## 2025-05-29 NOTE — TELEPHONE ENCOUNTER
Covering for Tara Aguilar while she is out of the office. I spoke with the patient to discuss her low blood sugars. She is currently maintaining but has had multiple episodes of low blood sugar today as she is only drinking non-diet fluid in preparation for her colonoscopy today. Given her reactive hypoglycemia and inability to eat any protein today, she has been battling low blood sugars. Recommend taking 50 mg of acarbose now given that she is still consuming sugar in her beverages. She needs to be NPO after midnight. Will forward along to her endocrine team. She is to notify us tomorrow of how she is feeling/her blood sugar status.

## 2025-05-29 NOTE — TELEPHONE ENCOUNTER
Regarding: Alarm word-Sugar in 50's  ----- Message from Tonya LEON sent at 5/29/2025 10:47 AM EDT -----  Patient calling having issues with sugar dropping into the 50's. Patient is prepping for a colonoscopy and is unable to eat. Patient requesting call back ASAP

## 2025-06-02 ENCOUNTER — OFFICE VISIT (OUTPATIENT)
Dept: FAMILY MEDICINE CLINIC | Facility: CLINIC | Age: 45
End: 2025-06-02
Payer: COMMERCIAL

## 2025-06-02 VITALS
HEART RATE: 108 BPM | OXYGEN SATURATION: 98 % | TEMPERATURE: 98.3 F | BODY MASS INDEX: 25.91 KG/M2 | HEIGHT: 62 IN | SYSTOLIC BLOOD PRESSURE: 104 MMHG | WEIGHT: 140.8 LBS | DIASTOLIC BLOOD PRESSURE: 78 MMHG

## 2025-06-02 DIAGNOSIS — K91.2 POSTGASTRECTOMY MALABSORPTION: ICD-10-CM

## 2025-06-02 DIAGNOSIS — R73.01 IMPAIRED FASTING GLUCOSE: ICD-10-CM

## 2025-06-02 DIAGNOSIS — E16.2 HYPOGLYCEMIA: ICD-10-CM

## 2025-06-02 DIAGNOSIS — Z00.00 ANNUAL PHYSICAL EXAM: Primary | ICD-10-CM

## 2025-06-02 DIAGNOSIS — Z90.3 POSTGASTRECTOMY MALABSORPTION: ICD-10-CM

## 2025-06-02 PROBLEM — F33.1 MODERATE EPISODE OF RECURRENT MAJOR DEPRESSIVE DISORDER (HCC): Status: RESOLVED | Noted: 2018-06-11 | Resolved: 2025-06-02

## 2025-06-02 PROCEDURE — 99396 PREV VISIT EST AGE 40-64: CPT | Performed by: NURSE PRACTITIONER

## 2025-06-02 RX ORDER — LINACLOTIDE 72 UG/1
CAPSULE, GELATIN COATED ORAL
COMMUNITY
Start: 2025-05-29

## 2025-06-02 NOTE — PATIENT INSTRUCTIONS
"Patient Education     Routine physical for adults   The Basics   Written by the doctors and editors at [facility]   What is a physical? -- A physical is a routine visit, or \"check-up,\" with your doctor. You might also hear it called a \"wellness visit\" or \"preventive visit.\"  During each visit, the doctor will:   Ask about your physical and mental health   Ask about your habits, behaviors, and lifestyle   Do an exam   Give you vaccines if needed   Talk to you about any medicines you take   Give advice about your health   Answer your questions  Getting regular check-ups is an important part of taking care of your health. It can help your doctor find and treat any problems you have. But it's also important for preventing health problems.  A routine physical is different from a \"sick visit.\" A sick visit is when you see a doctor because of a health concern or problem. Since physicals are scheduled ahead of time, you can think about what you want to ask the doctor.  How often should I get a physical? -- It depends on your age and health. In general, for people age 21 years and older:   If you are younger than 50 years, you might be able to get a physical every 3 years.   If you are 50 years or older, your doctor might recommend a physical every year.  If you have an ongoing health condition, like diabetes or high blood pressure, your doctor will probably want to see you more often.  What happens during a physical? -- In general, each visit will include:   Physical exam - The doctor or nurse will check your height, weight, heart rate, and blood pressure. They will also look at your eyes and ears. They will ask about how you are feeling and whether you have any symptoms that bother you.   Medicines - It's a good idea to bring a list of all the medicines you take to each doctor visit. Your doctor will talk to you about your medicines and answer any questions. Tell them if you are having any side effects that bother you. You " "should also tell them if you are having trouble paying for any of your medicines.   Habits and behaviors - This includes:   Your diet   Your exercise habits   Whether you smoke, drink alcohol, or use drugs   Whether you are sexually active   Whether you feel safe at home  Your doctor will talk to you about things you can do to improve your health and lower your risk of health problems. They will also offer help and support. For example, if you want to quit smoking, they can give you advice and might prescribe medicines. If you want to improve your diet or get more physical activity, they can help you with this, too.   Lab tests, if needed - The tests you get will depend on your age and situation. For example, your doctor might want to check your:   Cholesterol   Blood sugar   Iron level   Vaccines - The recommended vaccines will depend on your age, health, and what vaccines you already had. Vaccines are very important because they can prevent certain serious or deadly infections.   Discussion of screening - \"Screening\" means checking for diseases or other health problems before they cause symptoms. Your doctor can recommend screening based on your age, risk, and preferences. This might include tests to check for:   Cancer, such as breast, prostate, cervical, ovarian, colorectal, prostate, lung, or skin cancer   Sexually transmitted infections, such as chlamydia and gonorrhea   Mental health conditions like depression and anxiety  Your doctor will talk to you about the different types of screening tests. They can help you decide which screenings to have. They can also explain what the results might mean.   Answering questions - The physical is a good time to ask the doctor or nurse questions about your health. If needed, they can refer you to other doctors or specialists, too.  Adults older than 65 years often need other care, too. As you get older, your doctor will talk to you about:   How to prevent falling at " home   Hearing or vision tests   Memory testing   How to take your medicines safely   Making sure that you have the help and support you need at home  All topics are updated as new evidence becomes available and our peer review process is complete.  This topic retrieved from [employer] on: May 02, 2024.  Topic 275249 Version 1.0  Release: 32.4.3 - C32.122  © 2024 UpToDate, Inc. and/or its affiliates. All rights reserved.  Consumer Information Use and Disclaimer   Disclaimer: This generalized information is a limited summary of diagnosis, treatment, and/or medication information. It is not meant to be comprehensive and should be used as a tool to help the user understand and/or assess potential diagnostic and treatment options. It does NOT include all information about conditions, treatments, medications, side effects, or risks that may apply to a specific patient. It is not intended to be medical advice or a substitute for the medical advice, diagnosis, or treatment of a health care provider based on the health care provider's examination and assessment of a patient's specific and unique circumstances. Patients must speak with a health care provider for complete information about their health, medical questions, and treatment options, including any risks or benefits regarding use of medications. This information does not endorse any treatments or medications as safe, effective, or approved for treating a specific patient. UpToDate, Inc. and its affiliates disclaim any warranty or liability relating to this information or the use thereof.The use of this information is governed by the Terms of Use, available at https://www.wolters360imaginguwer.com/en/know/clinical-effectiveness-terms. 2024© UpToDate, Inc. and its affiliates and/or licensors. All rights reserved.  Copyright   © 2024 UpToDate, Inc. and/or its affiliates. All rights reserved.    Patient Education     Routine physical for adults   The Basics   Written by the  "doctors and editors at [facility]   What is a physical? -- A physical is a routine visit, or \"check-up,\" with your doctor. You might also hear it called a \"wellness visit\" or \"preventive visit.\"  During each visit, the doctor will:   Ask about your physical and mental health   Ask about your habits, behaviors, and lifestyle   Do an exam   Give you vaccines if needed   Talk to you about any medicines you take   Give advice about your health   Answer your questions  Getting regular check-ups is an important part of taking care of your health. It can help your doctor find and treat any problems you have. But it's also important for preventing health problems.  A routine physical is different from a \"sick visit.\" A sick visit is when you see a doctor because of a health concern or problem. Since physicals are scheduled ahead of time, you can think about what you want to ask the doctor.  How often should I get a physical? -- It depends on your age and health. In general, for people age 21 years and older:   If you are younger than 50 years, you might be able to get a physical every 3 years.   If you are 50 years or older, your doctor might recommend a physical every year.  If you have an ongoing health condition, like diabetes or high blood pressure, your doctor will probably want to see you more often.  What happens during a physical? -- In general, each visit will include:   Physical exam - The doctor or nurse will check your height, weight, heart rate, and blood pressure. They will also look at your eyes and ears. They will ask about how you are feeling and whether you have any symptoms that bother you.   Medicines - It's a good idea to bring a list of all the medicines you take to each doctor visit. Your doctor will talk to you about your medicines and answer any questions. Tell them if you are having any side effects that bother you. You should also tell them if you are having trouble paying for any of your " "medicines.   Habits and behaviors - This includes:   Your diet   Your exercise habits   Whether you smoke, drink alcohol, or use drugs   Whether you are sexually active   Whether you feel safe at home  Your doctor will talk to you about things you can do to improve your health and lower your risk of health problems. They will also offer help and support. For example, if you want to quit smoking, they can give you advice and might prescribe medicines. If you want to improve your diet or get more physical activity, they can help you with this, too.   Lab tests, if needed - The tests you get will depend on your age and situation. For example, your doctor might want to check your:   Cholesterol   Blood sugar   Iron level   Vaccines - The recommended vaccines will depend on your age, health, and what vaccines you already had. Vaccines are very important because they can prevent certain serious or deadly infections.   Discussion of screening - \"Screening\" means checking for diseases or other health problems before they cause symptoms. Your doctor can recommend screening based on your age, risk, and preferences. This might include tests to check for:   Cancer, such as breast, prostate, cervical, ovarian, colorectal, prostate, lung, or skin cancer   Sexually transmitted infections, such as chlamydia and gonorrhea   Mental health conditions like depression and anxiety  Your doctor will talk to you about the different types of screening tests. They can help you decide which screenings to have. They can also explain what the results might mean.   Answering questions - The physical is a good time to ask the doctor or nurse questions about your health. If needed, they can refer you to other doctors or specialists, too.  Adults older than 65 years often need other care, too. As you get older, your doctor will talk to you about:   How to prevent falling at home   Hearing or vision tests   Memory testing   How to take your " medicines safely   Making sure that you have the help and support you need at home  All topics are updated as new evidence becomes available and our peer review process is complete.  This topic retrieved from [employer] on: May 02, 2024.  Topic 502353 Version 1.0  Release: 32.4.3 - C32.122  © 2024 UpToDate, Inc. and/or its affiliates. All rights reserved.  Consumer Information Use and Disclaimer   Disclaimer: This generalized information is a limited summary of diagnosis, treatment, and/or medication information. It is not meant to be comprehensive and should be used as a tool to help the user understand and/or assess potential diagnostic and treatment options. It does NOT include all information about conditions, treatments, medications, side effects, or risks that may apply to a specific patient. It is not intended to be medical advice or a substitute for the medical advice, diagnosis, or treatment of a health care provider based on the health care provider's examination and assessment of a patient's specific and unique circumstances. Patients must speak with a health care provider for complete information about their health, medical questions, and treatment options, including any risks or benefits regarding use of medications. This information does not endorse any treatments or medications as safe, effective, or approved for treating a specific patient. UpToDate, Inc. and its affiliates disclaim any warranty or liability relating to this information or the use thereof.The use of this information is governed by the Terms of Use, available at https://www.woltersPlayFirstuwer.com/en/know/clinical-effectiveness-terms. 2024© UpToDate, Inc. and its affiliates and/or licensors. All rights reserved.  Copyright   © 2024 UpToDate, Inc. and/or its affiliates. All rights reserved.    Patient Education     Routine physical for adults   The Basics   Written by the doctors and editors at [employer]   What is a physical? -- A physical is a  "routine visit, or \"check-up,\" with your doctor. You might also hear it called a \"wellness visit\" or \"preventive visit.\"  During each visit, the doctor will:   Ask about your physical and mental health   Ask about your habits, behaviors, and lifestyle   Do an exam   Give you vaccines if needed   Talk to you about any medicines you take   Give advice about your health   Answer your questions  Getting regular check-ups is an important part of taking care of your health. It can help your doctor find and treat any problems you have. But it's also important for preventing health problems.  A routine physical is different from a \"sick visit.\" A sick visit is when you see a doctor because of a health concern or problem. Since physicals are scheduled ahead of time, you can think about what you want to ask the doctor.  How often should I get a physical? -- It depends on your age and health. In general, for people age 21 years and older:   If you are younger than 50 years, you might be able to get a physical every 3 years.   If you are 50 years or older, your doctor might recommend a physical every year.  If you have an ongoing health condition, like diabetes or high blood pressure, your doctor will probably want to see you more often.  What happens during a physical? -- In general, each visit will include:   Physical exam - The doctor or nurse will check your height, weight, heart rate, and blood pressure. They will also look at your eyes and ears. They will ask about how you are feeling and whether you have any symptoms that bother you.   Medicines - It's a good idea to bring a list of all the medicines you take to each doctor visit. Your doctor will talk to you about your medicines and answer any questions. Tell them if you are having any side effects that bother you. You should also tell them if you are having trouble paying for any of your medicines.   Habits and behaviors - This includes:   Your diet   Your exercise " "habits   Whether you smoke, drink alcohol, or use drugs   Whether you are sexually active   Whether you feel safe at home  Your doctor will talk to you about things you can do to improve your health and lower your risk of health problems. They will also offer help and support. For example, if you want to quit smoking, they can give you advice and might prescribe medicines. If you want to improve your diet or get more physical activity, they can help you with this, too.   Lab tests, if needed - The tests you get will depend on your age and situation. For example, your doctor might want to check your:   Cholesterol   Blood sugar   Iron level   Vaccines - The recommended vaccines will depend on your age, health, and what vaccines you already had. Vaccines are very important because they can prevent certain serious or deadly infections.   Discussion of screening - \"Screening\" means checking for diseases or other health problems before they cause symptoms. Your doctor can recommend screening based on your age, risk, and preferences. This might include tests to check for:   Cancer, such as breast, prostate, cervical, ovarian, colorectal, prostate, lung, or skin cancer   Sexually transmitted infections, such as chlamydia and gonorrhea   Mental health conditions like depression and anxiety  Your doctor will talk to you about the different types of screening tests. They can help you decide which screenings to have. They can also explain what the results might mean.   Answering questions - The physical is a good time to ask the doctor or nurse questions about your health. If needed, they can refer you to other doctors or specialists, too.  Adults older than 65 years often need other care, too. As you get older, your doctor will talk to you about:   How to prevent falling at home   Hearing or vision tests   Memory testing   How to take your medicines safely   Making sure that you have the help and support you need at home  All " "topics are updated as new evidence becomes available and our peer review process is complete.  This topic retrieved from [employer] on: May 02, 2024.  Topic 970088 Version 1.0  Release: 32.4.3 - C32.122  © 2024 UpToDate, Inc. and/or its affiliates. All rights reserved.  Consumer Information Use and Disclaimer   Disclaimer: This generalized information is a limited summary of diagnosis, treatment, and/or medication information. It is not meant to be comprehensive and should be used as a tool to help the user understand and/or assess potential diagnostic and treatment options. It does NOT include all information about conditions, treatments, medications, side effects, or risks that may apply to a specific patient. It is not intended to be medical advice or a substitute for the medical advice, diagnosis, or treatment of a health care provider based on the health care provider's examination and assessment of a patient's specific and unique circumstances. Patients must speak with a health care provider for complete information about their health, medical questions, and treatment options, including any risks or benefits regarding use of medications. This information does not endorse any treatments or medications as safe, effective, or approved for treating a specific patient. UpToDate, Inc. and its affiliates disclaim any warranty or liability relating to this information or the use thereof.The use of this information is governed by the Terms of Use, available at https://www.woltersPower Surge Electricuwer.com/en/know/clinical-effectiveness-terms. 2024© UpToDate, Inc. and its affiliates and/or licensors. All rights reserved.  Copyright   © 2024 UpToDate, Inc. and/or its affiliates. All rights reserved.    Patient Education     Routine physical for adults   The Basics   Written by the doctors and editors at [employer]   What is a physical? -- A physical is a routine visit, or \"check-up,\" with your doctor. You might also hear it called a " "\"wellness visit\" or \"preventive visit.\"  During each visit, the doctor will:   Ask about your physical and mental health   Ask about your habits, behaviors, and lifestyle   Do an exam   Give you vaccines if needed   Talk to you about any medicines you take   Give advice about your health   Answer your questions  Getting regular check-ups is an important part of taking care of your health. It can help your doctor find and treat any problems you have. But it's also important for preventing health problems.  A routine physical is different from a \"sick visit.\" A sick visit is when you see a doctor because of a health concern or problem. Since physicals are scheduled ahead of time, you can think about what you want to ask the doctor.  How often should I get a physical? -- It depends on your age and health. In general, for people age 21 years and older:   If you are younger than 50 years, you might be able to get a physical every 3 years.   If you are 50 years or older, your doctor might recommend a physical every year.  If you have an ongoing health condition, like diabetes or high blood pressure, your doctor will probably want to see you more often.  What happens during a physical? -- In general, each visit will include:   Physical exam - The doctor or nurse will check your height, weight, heart rate, and blood pressure. They will also look at your eyes and ears. They will ask about how you are feeling and whether you have any symptoms that bother you.   Medicines - It's a good idea to bring a list of all the medicines you take to each doctor visit. Your doctor will talk to you about your medicines and answer any questions. Tell them if you are having any side effects that bother you. You should also tell them if you are having trouble paying for any of your medicines.   Habits and behaviors - This includes:   Your diet   Your exercise habits   Whether you smoke, drink alcohol, or use drugs   Whether you are sexually " "active   Whether you feel safe at home  Your doctor will talk to you about things you can do to improve your health and lower your risk of health problems. They will also offer help and support. For example, if you want to quit smoking, they can give you advice and might prescribe medicines. If you want to improve your diet or get more physical activity, they can help you with this, too.   Lab tests, if needed - The tests you get will depend on your age and situation. For example, your doctor might want to check your:   Cholesterol   Blood sugar   Iron level   Vaccines - The recommended vaccines will depend on your age, health, and what vaccines you already had. Vaccines are very important because they can prevent certain serious or deadly infections.   Discussion of screening - \"Screening\" means checking for diseases or other health problems before they cause symptoms. Your doctor can recommend screening based on your age, risk, and preferences. This might include tests to check for:   Cancer, such as breast, prostate, cervical, ovarian, colorectal, prostate, lung, or skin cancer   Sexually transmitted infections, such as chlamydia and gonorrhea   Mental health conditions like depression and anxiety  Your doctor will talk to you about the different types of screening tests. They can help you decide which screenings to have. They can also explain what the results might mean.   Answering questions - The physical is a good time to ask the doctor or nurse questions about your health. If needed, they can refer you to other doctors or specialists, too.  Adults older than 65 years often need other care, too. As you get older, your doctor will talk to you about:   How to prevent falling at home   Hearing or vision tests   Memory testing   How to take your medicines safely   Making sure that you have the help and support you need at home  All topics are updated as new evidence becomes available and our peer review process " is complete.  This topic retrieved from [employer] on: May 02, 2024.  Topic 542599 Version 1.0  Release: 32.4.3 - C32.122  © 2024 UpToDate, Inc. and/or its affiliates. All rights reserved.  Consumer Information Use and Disclaimer   Disclaimer: This generalized information is a limited summary of diagnosis, treatment, and/or medication information. It is not meant to be comprehensive and should be used as a tool to help the user understand and/or assess potential diagnostic and treatment options. It does NOT include all information about conditions, treatments, medications, side effects, or risks that may apply to a specific patient. It is not intended to be medical advice or a substitute for the medical advice, diagnosis, or treatment of a health care provider based on the health care provider's examination and assessment of a patient's specific and unique circumstances. Patients must speak with a health care provider for complete information about their health, medical questions, and treatment options, including any risks or benefits regarding use of medications. This information does not endorse any treatments or medications as safe, effective, or approved for treating a specific patient. UpToDate, Inc. and its affiliates disclaim any warranty or liability relating to this information or the use thereof.The use of this information is governed by the Terms of Use, available at https://www.woltersWham City Lightsuwer.com/en/know/clinical-effectiveness-terms. 2024© UpToDate, Inc. and its affiliates and/or licensors. All rights reserved.  Copyright   © 2024 UpToDate, Inc. and/or its affiliates. All rights reserved.

## 2025-06-02 NOTE — PROGRESS NOTES
Adult Annual Physical  Name: Tram Perez      : 1980      MRN: [id number]  Encounter Provider: FINA Luna  Encounter Date: 2025   Encounter department: [facility]    :  Assessment & Plan  Annual physical exam         Hypoglycemia    Orders:    Hemoglobin A1C; Future    Postgastrectomy malabsorption    Orders:    Hemoglobin A1C; Future    Impaired fasting glucose    Orders:    Hemoglobin A1C; Future        Preventive Screenings:  - Diabetes Screening: screening up-to-date  - Cholesterol Screening: orders placed   - Hepatitis C screening: screening up-to-date   - HIV screening: screening up-to-date   - Cervical cancer screening: has history of cervical cancer and screening not indicated   - Breast cancer screening: screening up-to-date   - Colon cancer screening: screening up-to-date   - Lung cancer screening: screening not indicated     Counseling/Anticipatory Guidance:    - Dental health: discussed importance of regular tooth brushing, flossing, and dental visits.   - Diet: discussed recommendations for a healthy/well-balanced diet.   - Exercise: the importance of regular exercise/physical activity was discussed. Recommend exercise 3-5 times per week for at least 30 minutes.   - Injury prevention: discussed safety/seat belts, safety helmets, smoke detectors, carbon monoxide detectors, and smoking near bedding or upholstery.          History of Present Illness     Adult Annual Physical:  Patient presents for annual physical. Here for wellness exam    Saw ENT and they think she has TMJ  Still feeling weak and tired  When on antibiotics did not have drop in BS  Now she is off having drop in blood sugars  Endo gave her glucagon pens  Had her colonoscopy on Friday  Had drop in blood sugars with prep  To go for hearing test  .     Diet and Physical Activity:  - Diet/Nutrition: portion control, limited junk food, low fat diet and consuming 3-5 servings of fruits/vegetables daily.  "Eat 5-7 small meals/day  - Exercise: no formal exercise.    General Health:  - Sleep: sleeps poorly, 4-6 hours of sleep on average and unrefreshing sleep.  - Hearing: decreased hearing bilateral ears.  - Vision: most recent eye exam < 1 year ago and wears glasses and contacts.  - Dental: no dental visits for > 1 year, brushes teeth twice daily and floss regularly.    /GYN Health:  - Follows with GYN: yes.   - Menopause: perimenopausal.   - History of STDs: no  - Contraception: tubal ligation, hysterectomy, menopause and male partner had vasectomy.      Advanced Care Planning:  - Has an advanced directive?: no    - Has a durable medical POA?: no    - ACP document given to patient?: no      Review of Systems   Constitutional:  Positive for appetite change and fatigue. Negative for activity change, fever and unexpected weight change.   HENT:  Positive for ear pain. Negative for congestion, postnasal drip and rhinorrhea.    Eyes:  Negative for photophobia and visual disturbance.   Respiratory:  Negative for cough, shortness of breath and wheezing.    Cardiovascular:  Negative for chest pain and palpitations.   Gastrointestinal:  Negative for constipation, diarrhea, nausea and vomiting.   Genitourinary:  Negative for frequency and menstrual problem.   Musculoskeletal:  Negative for arthralgias and myalgias.   Skin:  Negative for rash.   Neurological:  Negative for dizziness, light-headedness and headaches.   Hematological:  Negative for adenopathy.   Psychiatric/Behavioral:  Negative for dysphoric mood and sleep disturbance. The patient is not nervous/anxious.          Objective   /78 (BP Location: Left arm, Patient Position: Sitting, Cuff Size: Large)   Pulse (!) 108   Temp 98.3 °F (36.8 °C) (Temporal)   Ht 5' 2\" (1.575 m)   Wt 63.9 kg (140 lb 12.8 oz)   SpO2 98%   BMI 25.75 kg/m²     Physical Exam  Vitals and nursing note reviewed.   Constitutional:       Appearance: Normal appearance.   HENT:      Head: " Atraumatic.      Right Ear: Tympanic membrane, ear canal and external ear normal.      Left Ear: Tympanic membrane, ear canal and external ear normal.      Nose: Nose normal.      Mouth/Throat:      Mouth: Mucous membranes are moist.     Cardiovascular:      Rate and Rhythm: Normal rate and regular rhythm.      Heart sounds: Normal heart sounds.   Pulmonary:      Effort: Pulmonary effort is normal.      Breath sounds: Normal breath sounds.   Abdominal:      Palpations: Abdomen is soft.     Musculoskeletal:         General: Normal range of motion.      Cervical back: Normal range of motion and neck supple.     Skin:     General: Skin is warm and dry.      Capillary Refill: Capillary refill takes less than 2 seconds.     Neurological:      Mental Status: She is alert and oriented to person, place, and time.     Psychiatric:         Mood and Affect: Mood normal.         Behavior: Behavior normal.         Thought Content: Thought content normal.         Judgment: Judgment normal.       hemog

## 2025-06-14 ENCOUNTER — HOSPITAL ENCOUNTER (OUTPATIENT)
Dept: RADIOLOGY | Facility: HOSPITAL | Age: 45
Discharge: HOME | End: 2025-06-14
Payer: COMMERCIAL

## 2025-06-14 DIAGNOSIS — J01.01 ACUTE RECURRENT MAXILLARY SINUSITIS: ICD-10-CM

## 2025-06-14 PROCEDURE — 70486 CT MAXILLOFACIAL W/O DYE: CPT

## 2025-06-18 ENCOUNTER — OFFICE VISIT (OUTPATIENT)
Dept: FAMILY MEDICINE CLINIC | Facility: CLINIC | Age: 45
End: 2025-06-18
Payer: COMMERCIAL

## 2025-06-18 VITALS
HEIGHT: 62 IN | RESPIRATION RATE: 16 BRPM | HEART RATE: 105 BPM | SYSTOLIC BLOOD PRESSURE: 110 MMHG | OXYGEN SATURATION: 97 % | TEMPERATURE: 99.7 F | WEIGHT: 141.6 LBS | BODY MASS INDEX: 26.06 KG/M2 | DIASTOLIC BLOOD PRESSURE: 70 MMHG

## 2025-06-18 DIAGNOSIS — E55.9 VITAMIN D DEFICIENCY: ICD-10-CM

## 2025-06-18 DIAGNOSIS — R50.9 FEVER, UNSPECIFIED FEVER CAUSE: Primary | ICD-10-CM

## 2025-06-18 LAB
S PYO AG THROAT QL: NEGATIVE
SARS-COV-2 AG UPPER RESP QL IA: NEGATIVE
SL AMB  POCT GLUCOSE, UA: NORMAL
SL AMB LEUKOCYTE ESTERASE,UA: NORMAL
SL AMB POCT BILIRUBIN,UA: NORMAL
SL AMB POCT BLOOD,UA: NORMAL
SL AMB POCT CLARITY,UA: CLEAR
SL AMB POCT COLOR,UA: YELLOW
SL AMB POCT KETONES,UA: NORMAL
SL AMB POCT NITRITE,UA: NORMAL
SL AMB POCT PH,UA: 6
SL AMB POCT SPECIFIC GRAVITY,UA: 1.01
SL AMB POCT URINE PROTEIN: NORMAL
SL AMB POCT UROBILINOGEN: NORMAL
VALID CONTROL: NORMAL

## 2025-06-18 PROCEDURE — 81002 URINALYSIS NONAUTO W/O SCOPE: CPT | Performed by: FAMILY MEDICINE

## 2025-06-18 PROCEDURE — 87086 URINE CULTURE/COLONY COUNT: CPT | Performed by: FAMILY MEDICINE

## 2025-06-18 PROCEDURE — 87070 CULTURE OTHR SPECIMN AEROBIC: CPT | Performed by: FAMILY MEDICINE

## 2025-06-18 PROCEDURE — 99213 OFFICE O/P EST LOW 20 MIN: CPT | Performed by: FAMILY MEDICINE

## 2025-06-18 PROCEDURE — 87811 SARS-COV-2 COVID19 W/OPTIC: CPT | Performed by: FAMILY MEDICINE

## 2025-06-18 PROCEDURE — 87880 STREP A ASSAY W/OPTIC: CPT | Performed by: FAMILY MEDICINE

## 2025-06-18 RX ORDER — MIRTAZAPINE 15 MG/1
15 TABLET, FILM COATED ORAL
COMMUNITY
Start: 2025-06-03

## 2025-06-18 RX ORDER — ERGOCALCIFEROL 1.25 MG/1
50000 CAPSULE, LIQUID FILLED ORAL WEEKLY
Qty: 12 CAPSULE | Refills: 0 | Status: SHIPPED | OUTPATIENT
Start: 2025-06-18

## 2025-06-19 LAB — BACTERIA UR CULT: NORMAL

## 2025-06-20 ENCOUNTER — RESULTS FOLLOW-UP (OUTPATIENT)
Dept: FAMILY MEDICINE CLINIC | Facility: CLINIC | Age: 45
End: 2025-06-20

## 2025-06-20 LAB — BACTERIA THROAT CULT: NORMAL

## 2025-06-23 ENCOUNTER — PATIENT MESSAGE (OUTPATIENT)
Dept: FAMILY MEDICINE CLINIC | Facility: CLINIC | Age: 45
End: 2025-06-23

## 2025-06-23 DIAGNOSIS — R59.1 LYMPHADENOPATHY: ICD-10-CM

## 2025-06-23 DIAGNOSIS — R50.9 FEVER, UNSPECIFIED FEVER CAUSE: Primary | ICD-10-CM

## 2025-06-23 DIAGNOSIS — I88.9 LYMPHADENITIS: ICD-10-CM

## 2025-06-23 RX ORDER — CLARITHROMYCIN 500 MG/1
500 TABLET ORAL EVERY 12 HOURS SCHEDULED
Qty: 20 TABLET | Refills: 0 | Status: SHIPPED | OUTPATIENT
Start: 2025-06-23 | End: 2025-07-03

## 2025-06-26 NOTE — PROGRESS NOTES
Name: Tram Perez      : 1980      MRN: [id number]  Encounter Provider: Robert Kolb DO  Encounter Date: 2025   Encounter department: [facility]    Assessment & Plan  Fever, unspecified fever cause  - Notes 2 days of recurrent fever.  Notes significant fatigue, lack of energy.  Of note has had recurrent sinusitis.  Last episode 2025 she was treated with steroids and Levaquin.  -Recent CT sinus ordered with history of recurrent sinusitis showing clear paranasal sinuses.  -She admits to generalized sinus pressure however not really changed from normal.  She denies any nausea, vomiting, diarrhea.  No constipation.  No urinary discomfort or back pain.  No shortness of breath, wheezing, palpitations, chest pains.  Denies unwanted weight loss or night sweats.  -Has follow-up with ENT and allergist pending.  - Urine dip negative for signs of infection  -Point-of-care testing negative  -Recommend blood work to evaluate  -Discussed symptomatic management, adequate rest and hydration for now.  Follow-up if no improvement.  Orders:  •  POCT urine dip  •  Comprehensive metabolic panel; Future  •  C-reactive protein; Future  •  Sedimentation rate, automated; Future  •  TSH, 3rd generation with Free T4 reflex; Future  •  POCT Rapid Covid Ag  •  POCT rapid ANTIGEN strepA  •  Urine culture  •  Throat culture    Vitamin D deficiency    Orders:  •  ergocalciferol (VITAMIN D2) 50,000 units; Take 1 capsule (50,000 Units total) by mouth once a week         History of Present Illness     Fever  This is a recurrent problem. The current episode started in the past 7 days. The problem occurs daily. The problem has been unchanged. Associated symptoms include fatigue, a fever and headaches. Pertinent negatives include no abdominal pain, arthralgias, chest pain, chills, congestion, coughing, rash, sore throat, vomiting or weakness. Nothing aggravates the symptoms. She has tried acetaminophen for the  symptoms. The treatment provided mild relief.     Review of Systems   Constitutional:  Positive for fatigue and fever. Negative for chills.   HENT:  Positive for sinus pressure. Negative for congestion, ear pain, postnasal drip, rhinorrhea and sore throat.    Eyes:  Negative for pain and visual disturbance.   Respiratory:  Negative for cough, shortness of breath and wheezing.    Cardiovascular:  Negative for chest pain and palpitations.   Gastrointestinal:  Negative for abdominal pain and vomiting.   Endocrine: Negative for polydipsia and polyuria.   Genitourinary:  Negative for dysuria and hematuria.   Musculoskeletal:  Negative for arthralgias and back pain.   Skin:  Negative for color change and rash.   Neurological:  Positive for headaches. Negative for seizures, syncope and weakness.   Psychiatric/Behavioral:  Negative for confusion. The patient is not nervous/anxious.    All other systems reviewed and are negative.    Past Medical History[1]  Past Surgical History[2]  Family History[3]  Social History[4]  Medications[5]  Allergies   Allergen Reactions   • Aspirin Anaphylaxis   • Bee Venom Swelling and Wheezing   • Penicillins Anaphylaxis and Rash   • Bee Pollen    • Ceclor [Cefaclor] Itching   • Dust Mite Extract Other (See Comments)     congestion   • Grass Pollen(K-O-R-T-Swt Osito) Other (See Comments)     congestion   • Lactose - Food Allergy GI Intolerance   • Molds & Smuts Other (See Comments)   • Other Other (See Comments)     congestion   • Pollen Extract    • Amoxicillin Rash   • Doxycycline Rash   • Erythromycin Rash   • Nickel Rash     Immunization History   Administered Date(s) Administered   • COVID-19 MODERNA VACC 0.5 ML IM 02/04/2021, 03/01/2021, 01/16/2022   • Hib (PRP-OMP) 09/01/2003, 11/01/2003, 05/25/2004   • INFLUENZA 10/06/2020, 10/15/2022   • Influenza Quadrivalent Preservative Free 3 years and older IM 10/30/2014   • Influenza Quadrivalent, 6-35 Months IM 11/02/2015, 10/11/2016, 09/05/2017  "  • Influenza Recombinant Preservative Free Im 10/03/2024   • Influenza, injectable, quadrivalent, preservative free 0.5 mL 10/15/2020, 11/23/2021   • MMR 09/01/1979, 09/01/1982   • Pneumococcal Polysaccharide PPV23 09/05/2017   • TD (adult) Preservative Free 05/01/2004   • Td (adult), adsorbed 05/25/2004   • Tdap 03/29/2018   • Tuberculin Skin Test-PPD Intradermal 08/22/2005, 10/14/2020, 12/08/2021, 12/17/2021     Objective   /70 (BP Location: Left arm, Patient Position: Sitting, Cuff Size: Standard)   Pulse 105   Temp 99.7 °F (37.6 °C) (Tympanic)   Resp 16   Ht 5' 2\" (1.575 m)   Wt 64.2 kg (141 lb 9.6 oz)   SpO2 97%   BMI 25.90 kg/m²     Physical Exam  Vitals and nursing note reviewed.   Constitutional:       General: She is not in acute distress.     Appearance: Normal appearance.   HENT:      Head: Normocephalic and atraumatic.      Right Ear: Tympanic membrane and external ear normal.      Left Ear: Tympanic membrane and external ear normal.      Nose: Nose normal. No congestion or rhinorrhea.      Mouth/Throat:      Mouth: Mucous membranes are moist.     Eyes:      Extraocular Movements: Extraocular movements intact.      Conjunctiva/sclera: Conjunctivae normal.      Pupils: Pupils are equal, round, and reactive to light.       Cardiovascular:      Rate and Rhythm: Normal rate and regular rhythm.      Pulses: Normal pulses.      Heart sounds: Normal heart sounds. No murmur heard.  Pulmonary:      Effort: Pulmonary effort is normal.      Breath sounds: Normal breath sounds. No wheezing, rhonchi or rales.   Abdominal:      General: Bowel sounds are normal.      Palpations: Abdomen is soft.      Tenderness: There is no abdominal tenderness. There is no guarding.     Musculoskeletal:         General: Normal range of motion.      Cervical back: Normal range of motion.      Right lower leg: No edema.      Left lower leg: No edema.   Lymphadenopathy:      Cervical: No cervical adenopathy.     Skin:     " General: Skin is warm.      Capillary Refill: Capillary refill takes less than 2 seconds.     Neurological:      General: No focal deficit present.      Mental Status: She is alert and oriented to person, place, and time.     Psychiatric:         Mood and Affect: Mood normal.         Behavior: Behavior normal.                [1]  Past Medical History:  Diagnosis Date   • Abnormal Pap smear of cervix    • Acid reflux    • Allergic    • Allergic rhinitis    • Anxiety    • Asthma    • Cluster headache 2/16/23   • Depression    • Diabetes mellitus (HCC)     Pre   • Diverticulitis    • Epileptic seizures (HCC)     Medication related   • Fibromyalgia, primary    • Gestational diabetes mellitus    • Glands swollen 06/24/2025    under chin   • Head injury 2/16/23    Fell at work on concrete and now havw concussion with bad headaches.   • Headache(784.0)     Migraines   • Headache, tension-type    • Hypertension    • Inflammatory bowel disease    • Lateral epicondylitis    • Malignant tumor of cervix (HCC)     between 2059-4849   • Migraine    • Miscarriage    • Obstructive sleep apnea     CPAP   • Paresthesias    • Peptic ulceration    • Plantar fasciitis    • Reactive hypoglycemia    • Rectocele    • Seizures (HCC)     Had ghem due to low sugar levels   • Tinea corporis    • Wears contact lenses    • Wears glasses    [2]  Past Surgical History:  Procedure Laterality Date   • ADENOIDECTOMY     • ARTHROSCOPY ANKLE Right 09/01/2017    Procedure: ANKLE ARTHOSCOPY WITH EXTENSIVE SYNOVECTOMY, OPEN ARTHOTOY LATERAL ANKLE WITH ANKLE STABILIZATION, BROSTOM REPAIR, INTERNAL BRACE AUGMENTATION, PERONEAL TENDON SYNOVECTOMY;  Surgeon: Donnell Hansen DPM;  Location: [facility] OR;  Service: Podiatry   • BARIATRIC SURGERY     • CERVICAL BIOPSY  W/ LOOP ELECTRODE EXCISION     • COLONOSCOPY  02/27/2017   • GASTRIC BYPASS  07/2020   • HEMORRHOID SURGERY  08/2021   • HERNIA REPAIR  2020   • HYSTERECTOMY  2015   • ID COLONOSCOPY FLX DX  W/COLLJ SPEC WHEN PFRMD N/A 02/27/2017    Procedure: COLONOSCOPY;  Surgeon: Armani Lara MD;  Location: [facility];  Service: Gastroenterology   • IL CONIZATION CERVIX W/WO D&C RPR KNIFE/LASER     • IL ESOPHAGOGASTRODUODENOSCOPY TRANSORAL DIAGNOSTIC N/A 09/07/2016    Procedure: EGD AND COLONOSCOPY;  Surgeon: Armani Lara MD;  Location: [facility];  Service: Gastroenterology   • TONSILLECTOMY     • TUBAL LIGATION     • UPPER GASTROINTESTINAL ENDOSCOPY  2016   [3]  Family History  Problem Relation Name Age of Onset   • Anxiety disorder Mother Uterine    • Depression Mother Uterine    • Cancer Mother Uterine    • Endometrial cancer Mother Uterine         under afe 50   • Stroke Mother Uterine    • Thyroid disease Mother Uterine    • Diabetes type II Mother Uterine    • Hypertension Mother Uterine    • Thyroid cancer Mother Uterine    • Alcohol abuse Mother Uterine    • Asthma Mother Uterine    • Mental illness Mother Uterine    • Substance Abuse Mother Uterine    • Hypertension Father Dad    • Alcohol abuse Father Dad    • Depression Father Dad    • Substance Abuse Father Dad    • Mental illness Father Dad    • Heart attack Father Dad    • Asthma Brother Lauri         Epilepsy   • Depression Brother Lauri    • Mental illness Brother Lauri    • Substance Abuse Brother Lauri    • Alcohol abuse Brother Lauri    • Depression Brother Garett    • Asthma Brother Garett    • Learning disabilities Brother Garett    • Mental illness Brother Garett    • Substance Abuse Brother Garett    • Seizures Brother Garett    • Cancer Maternal Grandmother Reproductive 50        lymphatic cancer   • Depression Maternal Grandmother Reproductive    • Cancer Maternal Grandfather Grandpa    • Alcohol abuse Maternal Grandfather Grandpa    • Depression Maternal Grandfather Grandpa    • Mental illness Maternal Grandfather Grandpa    • Breast cancer Paternal Grandmother Carmelita Fung 82   • Cancer Paternal Grandmother Carmelita Fung         Breast    • No Known Problems Paternal Grandfather     • Depression Daughter Blair    • Anxiety disorder Daughter Blair    • Substance Abuse Daughter Blair    • Asthma Daughter Blair    • Allergies Daughter Blair    • Depression Daughter     • Anxiety disorder Daughter     • Allergies Daughter     • Substance Abuse Daughter     • Asthma Son Son    • Depression Son Son    • Learning disabilities Son Son    • Allergies Son Son    • Learning disabilities Maternal Aunt Kylie    • Learning disabilities Maternal Uncle Derrick    • Leukemia Cousin Tatyana torres    • Cancer Cousin Tatyana torres    • Learning disabilities Cousin Tatyana torres    • Cancer Cousin Tatyana marcano         lympatic   • Learning disabilities Cousin Tatyana marcano    • Diabetes Other     • Hypertension Other     • Neuropathy Other     • Thyroid disease Other     • Learning disabilities Son Kelby    • Learning disabilities Maternal Aunt Kylie    • Learning disabilities Maternal Uncle Derrick    [4]  Social History  Tobacco Use   • Smoking status: Former     Current packs/day: 0.00     Average packs/day: 0.5 packs/day for 10.0 years (5.0 ttl pk-yrs)     Types: Cigarettes     Quit date: 2013     Years since quittin.8   • Smokeless tobacco: Never   Vaping Use   • Vaping status: Never Used   Substance and Sexual Activity   • Alcohol use: Not Currently     Comment: occasionally, maybe 1-2 times a month   • Drug use: Not Currently     Types: Marijuana     Comment: not anymore   • Sexual activity: Yes     Partners: Female, Male     Birth control/protection: Surgical     Comment: Had hysterectomy   [5]  Current Outpatient Medications on File Prior to Visit   Medication Sig   • acarbose (PRECOSE) 100 MG tablet Take 1 tablet (100 mg total) by mouth 3 (three) times a day with meals   • Acetaminophen 500 MG PACK Take 500 mg by mouth every 6 (six) hours as needed   • albuterol (ProAir HFA) 90 mcg/act inhaler Inhale 2 puffs every 6 (six) hours as  "needed for wheezing   • albuterol (PROVENTIL HFA,VENTOLIN HFA) 90 mcg/act inhaler 2 PUFFS UP TO 4 TIMES A DAY AS NEEDED FOR WHEEZING   • BD Integra Syringe 25G X 1\" 3 ML MISC    • Biotin 1000 MCG tablet Take 10,000 mcg by mouth   • Blood Glucose Monitoring Suppl (OneTouch Verio Reflect) w/Device KIT Check blood sugars twice daily. Please substitute with appropriate alternative as covered by patient's insurance. Dx: E11.65   • Botulinum Toxin Type A 200 units SOLR    • Continuous Glucose Sensor (Dexcom G7 Sensor) Use 1 Device every 10 days   • Continuous Glucose Sensor (Dexcom G7 Sensor) Use 1 Device every 10 days   • cyclobenzaprine (FLEXERIL) 10 mg tablet Take 10 mg by mouth   • DULoxetine (CYMBALTA) 60 mg delayed release capsule Take 1 capsule (60 mg total) by mouth daily   • estradiol (Vivelle-Dot) 0.075 MG/24HR Place 1 patch on the skin 2 (two) times a week   • Glucagon (Gvoke HypoPen 1-Pack) 0.5 MG/0.1ML SOAJ Inject 1 mg under the skin daily as needed (blood sugar less than 50) May repeat in 15 minutes as needed using a new device.   • glucose blood (OneTouch Verio) test strip Check blood sugars twice daily. Please substitute with appropriate alternative as covered by patient's insurance. Dx: E11.65   • HYDROcodone-acetaminophen (NORCO) 7.5-325 mg per tablet Take 1 tablet by mouth every 4 (four) hours as needed for pain   • ketorolac (TORADOL) 30 mg/mL injection    • Linzess 72 MCG CAPS    • LORazepam (ATIVAN) 1 mg tablet Take 1 mg by mouth daily as needed   • methylphenidate (RITALIN) 10 mg tablet Take 10 mg by mouth in the morning   • methylphenidate (RITALIN) 20 MG tablet Take 20 mg by mouth in the morning and 20 mg in the evening. Pt reports taking tid.   • mirtazapine (REMERON) 15 mg tablet Take 15 mg by mouth daily at bedtime   • Multiple Vitamin (MULTIVITAMIN ADULT PO) Take 1 tablet by mouth every morning   • nystatin (MYCOSTATIN) powder Apply topically 2 (two) times a day   • ondansetron (ZOFRAN) 4 mg " tablet Take 1 tablet (4 mg total) by mouth every 8 (eight) hours as needed for nausea or vomiting   • OneTouch Delica Lancets 33G MISC Check blood sugars twice daily. Please substitute with appropriate alternative as covered by patient's insurance. Dx: E11.65   • OneTouch Delica Lancets 33G MISC Check blood sugars twice daily. Please substitute with appropriate alternative as covered by patient's insurance. Dx: E11.65   • pantoprazole (PROTONIX) 40 mg tablet Take 1 tablet (40 mg total) by mouth daily   • polyethylene glycol (GLYCOLAX) 17 GM/SCOOP powder    • pregabalin (Lyrica) 150 mg capsule Take 1 capsule (150 mg total) by mouth 3 (three) times a day   • rizatriptan (MAXALT) 10 MG tablet TAKE 1 TABLET (10 MG TOTAL) BY MOUTH ONCE AS NEEDED FOR MIGRAINE   • SUMAtriptan Succinate 6 MG/0.5ML SOAJ Inject 0.5 mL (6 mg total) under the skin as needed (headache)   • traZODone (DESYREL) 100 mg tablet    • EPINEPHrine (EPIPEN) 0.3 mg/0.3 mL SOAJ Inject 0.3 mL (0.3 mg total) into a muscle once for 1 dose

## 2025-06-27 ENCOUNTER — OFFICE VISIT (OUTPATIENT)
Dept: ENDOCRINOLOGY | Facility: CLINIC | Age: 45
End: 2025-06-27
Payer: COMMERCIAL

## 2025-06-27 VITALS
WEIGHT: 143.2 LBS | SYSTOLIC BLOOD PRESSURE: 128 MMHG | DIASTOLIC BLOOD PRESSURE: 70 MMHG | HEIGHT: 62 IN | BODY MASS INDEX: 26.35 KG/M2

## 2025-06-27 DIAGNOSIS — E16.2 HYPOGLYCEMIA: Primary | ICD-10-CM

## 2025-06-27 DIAGNOSIS — Z90.3 POSTGASTRECTOMY MALABSORPTION: ICD-10-CM

## 2025-06-27 DIAGNOSIS — K91.2 POSTGASTRECTOMY MALABSORPTION: ICD-10-CM

## 2025-06-27 PROCEDURE — 99214 OFFICE O/P EST MOD 30 MIN: CPT | Performed by: INTERNAL MEDICINE

## 2025-06-27 RX ORDER — DIAZOXIDE 50 MG/ML
3 SUSPENSION ORAL EVERY 8 HOURS
Qty: 60 ML | Refills: 2 | Status: SHIPPED | OUTPATIENT
Start: 2025-06-27

## 2025-06-27 RX ORDER — ACARBOSE 25 MG/1
TABLET ORAL
Qty: 90 TABLET | Refills: 3 | Status: SHIPPED | OUTPATIENT
Start: 2025-06-27

## 2025-06-27 RX ORDER — ACYCLOVIR 400 MG/1
1 TABLET ORAL
Qty: 9 EACH | Refills: 1 | Status: SHIPPED | OUTPATIENT
Start: 2025-06-27

## 2025-06-27 NOTE — ASSESSMENT & PLAN NOTE
For now we will continue acarbose 100 mg 3 times daily with meals given blood sugars have been running slightly higher in the setting of active infection.  Will monitor over time.  If blood sugars start to decrease as she is feeling better and she starts having more frequent hypoglycemia, we discussed decreasing acarbose to 75 mg 3 times with meals and starting diazoxide 3 times daily which I will send in.  We reviewed mechanism of action as well as side effects to monitor for.  She will let me know next week how things are going in this regard.  I will send in a prescription for diazoxide but she will not take this yet.  Prescription will be sent in so it can be started in anticipation that she may need it next week.  Orders:    diazoxide (PROGLYCEM) 50 mg/mL suspension; Take 1.3 mL (65 mg total) by mouth every 8 (eight) hours    acarbose (PRECOSE) 25 mg tablet; 3 tabs tid with meals    Continuous Glucose Sensor (Dexcom G7 Sensor); Use 1 Device every 10 days

## 2025-06-27 NOTE — PROGRESS NOTES
Name: Tarm Perez      : 1980      MRN: [id number]  Encounter Provider: Ed Escamilla DO  Encounter Date: 2025   Encounter department: [facility] [place]    No chief complaint on file.  :  Assessment & Plan  Hypoglycemia  For now we will continue acarbose 100 mg 3 times daily with meals given blood sugars have been running slightly higher in the setting of active infection.  Will monitor over time.  If blood sugars start to decrease as she is feeling better and she starts having more frequent hypoglycemia, we discussed decreasing acarbose to 75 mg 3 times with meals and starting diazoxide 3 times daily which I will send in.  We reviewed mechanism of action as well as side effects to monitor for.  She will let me know next week how things are going in this regard.  I will send in a prescription for diazoxide but she will not take this yet.  Prescription will be sent in so it can be started in anticipation that she may need it next week.  Orders:    diazoxide (PROGLYCEM) 50 mg/mL suspension; Take 1.3 mL (65 mg total) by mouth every 8 (eight) hours    acarbose (PRECOSE) 25 mg tablet; 3 tabs tid with meals    Continuous Glucose Sensor (Dexcom G7 Sensor); Use 1 Device every 10 days    Postgastrectomy malabsorption    Orders:    Continuous Glucose Sensor (Dexcom G7 Sensor); Use 1 Device every 10 days      Assessment & Plan        Pertinent Medical History         History of Present Illness   History of Present Illness    Tram Perez is a 44 y.o. female who presents for a follow-up appointment for history of hypoglycemia following Vi-en-Y bypass surgery.  She was started on a carbose during admission in the 2024 for hypoglycemia.  She presents today for a follow-up appointment.  She does continue about 1.5-2 tablespoons of cornstarch 1 hour prior to bedtime as well.  In the past, a.m. cortisol ruled out adrenal insufficiency.  Thyroid function has been  "normal.  She has met with a dietitian in the past.  She continues with acarbose 100 mg 3 times daily with meals.  Earlier this month, was having more frequent hypoglycemia despite an increased dose of acarbose and continue dietary monitoring.  The past week or so blood sugars have been running higher and having less hypoglycemia but she is being treated for an infection and believes that it is related more to this.    Review of Systems   All other systems reviewed and are negative.   as per HPI       Medical History Reviewed by provider this encounter:     .    Objective   /70 (BP Location: Left arm, Patient Position: Sitting, Cuff Size: Adult)   Ht 5' 2\" (1.575 m)   Wt 65 kg (143 lb 3.2 oz)   BMI 26.19 kg/m²      Body mass index is 26.19 kg/m².  Wt Readings from Last 3 Encounters:   06/27/25 65 kg (143 lb 3.2 oz)   06/24/25 63.5 kg (140 lb)   06/18/25 64.2 kg (141 lb 9.6 oz)     Physical Exam  Vitals reviewed.   Constitutional:       General: She is not in acute distress.     Appearance: She is well-developed. She is not diaphoretic.   HENT:      Head: Normocephalic and atraumatic.     Eyes:      Conjunctiva/sclera: Conjunctivae normal.      Pupils: Pupils are equal, round, and reactive to light.     Neck:      Thyroid: No thyromegaly.     Cardiovascular:      Rate and Rhythm: Normal rate and regular rhythm.   Pulmonary:      Effort: Pulmonary effort is normal. No respiratory distress.      Breath sounds: Normal breath sounds.   Abdominal:      General: Bowel sounds are normal.      Palpations: Abdomen is soft.     Musculoskeletal:         General: Normal range of motion.      Cervical back: Normal range of motion and neck supple.     Skin:     General: Skin is warm and dry.      Findings: No rash.     Neurological:      Mental Status: She is alert and oriented to person, place, and time.      Motor: No abnormal muscle tone.     Psychiatric:         Behavior: Behavior normal.       Physical " Exam      Results    Labs: I have reviewed pertinent labs including:       Component      Latest Ref Rng 2/7/2025   Hemoglobin A1C      Normal 4.0-5.6%; PreDiabetic 5.7-6.4%; Diabetic >=6.5%; Glycemic control for adults with diabetes <7.0% % 5.1    eAG, EST AVG Glucose      mg/dl 100    ACTH      7.2 - 63.3 pg/mL 21.4    Cortisol - AM      6.7 - 22.6 ug/dL 17.1          There are no Patient Instructions on file for this visit.    Discussed with the patient and all questioned fully answered. She will call me if any problems arise.

## 2025-06-30 DIAGNOSIS — K58.2 IRRITABLE BOWEL SYNDROME WITH BOTH CONSTIPATION AND DIARRHEA: Primary | ICD-10-CM

## 2025-06-30 DIAGNOSIS — K59.04 CHRONIC IDIOPATHIC CONSTIPATION: ICD-10-CM

## 2025-07-02 ENCOUNTER — HOSPITAL ENCOUNTER (OUTPATIENT)
Dept: ULTRASOUND IMAGING | Facility: HOSPITAL | Age: 45
Discharge: HOME | End: 2025-07-02
Attending: FAMILY MEDICINE
Payer: COMMERCIAL

## 2025-07-02 ENCOUNTER — APPOINTMENT (OUTPATIENT)
Dept: LAB | Facility: HOSPITAL | Age: 45
End: 2025-07-02
Payer: COMMERCIAL

## 2025-07-02 ENCOUNTER — HOSPITAL ENCOUNTER (OUTPATIENT)
Dept: CT IMAGING | Facility: HOSPITAL | Age: 45
Discharge: HOME | End: 2025-07-02
Attending: STUDENT IN AN ORGANIZED HEALTH CARE EDUCATION/TRAINING PROGRAM
Payer: COMMERCIAL

## 2025-07-02 DIAGNOSIS — Z90.3 POSTGASTRECTOMY MALABSORPTION: ICD-10-CM

## 2025-07-02 DIAGNOSIS — E16.2 HYPOGLYCEMIA: ICD-10-CM

## 2025-07-02 DIAGNOSIS — Z98.84 HISTORY OF BARIATRIC SURGERY: ICD-10-CM

## 2025-07-02 DIAGNOSIS — R50.9 FEVER, UNSPECIFIED FEVER CAUSE: ICD-10-CM

## 2025-07-02 DIAGNOSIS — K91.2 POSTGASTRECTOMY MALABSORPTION: ICD-10-CM

## 2025-07-02 DIAGNOSIS — E55.9 VITAMIN D DEFICIENCY: ICD-10-CM

## 2025-07-02 DIAGNOSIS — R59.1 LYMPHADENOPATHY: ICD-10-CM

## 2025-07-02 DIAGNOSIS — R73.01 IMPAIRED FASTING GLUCOSE: ICD-10-CM

## 2025-07-02 LAB
25(OH)D3 SERPL-MCNC: 39.2 NG/ML (ref 30–100)
ALBUMIN SERPL BCG-MCNC: 4.4 G/DL (ref 3.5–5)
ALP SERPL-CCNC: 73 U/L (ref 34–104)
ALT SERPL W P-5'-P-CCNC: 92 U/L (ref 7–52)
ANION GAP SERPL CALCULATED.3IONS-SCNC: 8 MMOL/L (ref 4–13)
AST SERPL W P-5'-P-CCNC: 35 U/L (ref 13–39)
BILIRUB SERPL-MCNC: 0.44 MG/DL (ref 0.2–1)
BUN SERPL-MCNC: 16 MG/DL (ref 5–25)
CALCIUM SERPL-MCNC: 8.8 MG/DL (ref 8.4–10.2)
CHLORIDE SERPL-SCNC: 105 MMOL/L (ref 96–108)
CO2 SERPL-SCNC: 27 MMOL/L (ref 21–32)
CREAT SERPL-MCNC: 0.64 MG/DL (ref 0.6–1.3)
CRP SERPL QL: 1 MG/L
ERYTHROCYTE [SEDIMENTATION RATE] IN BLOOD: <1 MM/HOUR (ref 0–20)
EST. AVERAGE GLUCOSE BLD GHB EST-MCNC: 105 MG/DL
GFR SERPL CREATININE-BSD FRML MDRD: 107 ML/MIN/1.73SQ M
GLUCOSE SERPL-MCNC: 126 MG/DL (ref 65–140)
HBA1C MFR BLD: 5.3 %
POTASSIUM SERPL-SCNC: 3.9 MMOL/L (ref 3.5–5.3)
PROT SERPL-MCNC: 6.5 G/DL (ref 6.4–8.4)
SODIUM SERPL-SCNC: 140 MMOL/L (ref 135–147)
TSH SERPL DL<=0.05 MIU/L-ACNC: 1.75 UIU/ML (ref 0.45–4.5)

## 2025-07-02 PROCEDURE — 36415 COLL VENOUS BLD VENIPUNCTURE: CPT

## 2025-07-02 PROCEDURE — 84443 ASSAY THYROID STIM HORMONE: CPT

## 2025-07-02 PROCEDURE — 86140 C-REACTIVE PROTEIN: CPT

## 2025-07-02 PROCEDURE — 85652 RBC SED RATE AUTOMATED: CPT

## 2025-07-02 PROCEDURE — 76536 US EXAM OF HEAD AND NECK: CPT

## 2025-07-02 PROCEDURE — 83036 HEMOGLOBIN GLYCOSYLATED A1C: CPT

## 2025-07-02 PROCEDURE — 80053 COMPREHEN METABOLIC PANEL: CPT

## 2025-07-02 PROCEDURE — 82306 VITAMIN D 25 HYDROXY: CPT

## 2025-07-03 DIAGNOSIS — R79.89 ELEVATED LFTS: Primary | ICD-10-CM

## 2025-07-03 DIAGNOSIS — R50.9 FEVER, UNSPECIFIED FEVER CAUSE: ICD-10-CM

## 2025-07-07 ENCOUNTER — ANNUAL EXAM (OUTPATIENT)
Dept: OBGYN CLINIC | Facility: CLINIC | Age: 45
End: 2025-07-07
Payer: COMMERCIAL

## 2025-07-07 VITALS
HEIGHT: 62 IN | BODY MASS INDEX: 27.97 KG/M2 | DIASTOLIC BLOOD PRESSURE: 84 MMHG | WEIGHT: 152 LBS | SYSTOLIC BLOOD PRESSURE: 124 MMHG

## 2025-07-07 DIAGNOSIS — E16.2 HYPOGLYCEMIA: ICD-10-CM

## 2025-07-07 DIAGNOSIS — Z12.31 ENCOUNTER FOR SCREENING MAMMOGRAM FOR BREAST CANCER: ICD-10-CM

## 2025-07-07 DIAGNOSIS — Z01.411 ENCOUNTER FOR GYNECOLOGICAL EXAMINATION WITH ABNORMAL FINDING: Primary | ICD-10-CM

## 2025-07-07 DIAGNOSIS — R15.9 INCONTINENCE OF FECES, UNSPECIFIED FECAL INCONTINENCE TYPE: ICD-10-CM

## 2025-07-07 DIAGNOSIS — Z11.3 SCREENING FOR STDS (SEXUALLY TRANSMITTED DISEASES): ICD-10-CM

## 2025-07-07 PROCEDURE — 87491 CHLMYD TRACH DNA AMP PROBE: CPT | Performed by: PHYSICIAN ASSISTANT

## 2025-07-07 PROCEDURE — 87591 N.GONORRHOEAE DNA AMP PROB: CPT | Performed by: PHYSICIAN ASSISTANT

## 2025-07-07 PROCEDURE — G0476 HPV COMBO ASSAY CA SCREEN: HCPCS | Performed by: PHYSICIAN ASSISTANT

## 2025-07-07 PROCEDURE — G0145 SCR C/V CYTO,THINLAYER,RESCR: HCPCS | Performed by: PHYSICIAN ASSISTANT

## 2025-07-07 PROCEDURE — 99396 PREV VISIT EST AGE 40-64: CPT | Performed by: PHYSICIAN ASSISTANT

## 2025-07-07 RX ORDER — DIAZOXIDE 50 MG/ML
3 SUSPENSION ORAL EVERY 8 HOURS
Qty: 60 ML | Refills: 2 | Status: SHIPPED | OUTPATIENT
Start: 2025-07-07

## 2025-07-07 NOTE — PATIENT INSTRUCTIONS
Mammogram due October 2025.    Go for blood work.    Follow up with urogynecology.    Continue current dose of estradiol; call when refills needed.

## 2025-07-07 NOTE — PROGRESS NOTES
Name: Tram Perez      : 1980      MRN: [id number]  Encounter Provider: Janell Lemus PA-C  Encounter Date: 2025   Encounter department: [facility] [place]  :  Assessment & Plan  Encounter for gynecological examination with abnormal finding    Orders:    Liquid-based pap, screening    Screening for STDs (sexually transmitted diseases)    Orders:    Chlamydia/GC amplified DNA by PCR    HIV 1/2 AG/AB w Reflex [facility] for 2 yr old and above; Future    RPR-Syphilis Screening (Total Syphilis IGG/IGM); Future    Hepatitis panel, acute; Future    Incontinence of feces, unspecified fecal incontinence type    Orders:    Ambulatory Referral to Urogynecology; Future    Encounter for screening mammogram for breast cancer    Orders:    Mammo screening bilateral w 3d and cad; Future    Vaginal Pap and GC/chlamydia screening done.  Orders for HIV, syphilis, and hepatitis screening entered.  We will call with results.  Order for mammogram entered.  Referral to urogynecology placed.  Continue current dose of estradiol; call when refills needed.  If no problems, patient to return in 1 year for routine gyn care.    History of Present Illness   Patient is here for yearly gyn exam.  States she is doing well overall.  S/p hysterectomy secondary to abnormal Paps.  Doing well with increased dose of estradiol; symptoms under good control.  Has been having issues with worsening fecal incontinence; has a history of constipation.  Has done pelvic floor PT in the past; would like referral to urogynecology.  Patient has been sexually active with a new female partner in addition to her ; requests STD screening.  Denies bladder changes, vaginal bleeding, pelvic pain, bloating, abdominal pain, n/v, change in appetite, and thyroid disease.    Mammogram due in October.  Patient is performing self-breast exam.  Denies new masses, skin changes, nipple discharge, and pain/tenderness.      Tram Perez is  "a 44 y.o. female who presents for yearly gyn exam.  History obtained from: patient    Review of Systems   Constitutional:  Negative for appetite change and unexpected weight change.   Cardiovascular:         No masses, skin changes, nipple discharge, and pain/tenderness.   Gastrointestinal:  Positive for constipation. Negative for abdominal distention, abdominal pain, diarrhea, nausea and vomiting.   Genitourinary:  Negative for difficulty urinating, dysuria, frequency, genital sores, hematuria, menstrual problem, pelvic pain, urgency, vaginal bleeding, vaginal discharge and vaginal pain.          Objective   /84 (BP Location: Left arm, Patient Position: Sitting, Cuff Size: Adult)   Ht 5' 2\" (1.575 m)   Wt 68.9 kg (152 lb)   BMI 27.80 kg/m²      Physical Exam  Vitals reviewed. Exam conducted with a chaperone present.   Constitutional:       Appearance: Normal appearance. She is well-developed.   Neck:      Thyroid: No thyromegaly.   Pulmonary:      Effort: Pulmonary effort is normal.   Chest:   Breasts:     Breasts are symmetrical.      Right: Normal. No swelling, bleeding, inverted nipple, mass, nipple discharge, skin change or tenderness.      Left: Normal. No swelling, bleeding, inverted nipple, mass, nipple discharge, skin change or tenderness.   Abdominal:      General: There is no distension.      Palpations: Abdomen is soft.      Tenderness: There is no abdominal tenderness.   Genitourinary:     General: Normal vulva.      Pubic Area: No rash.       Labia:         Right: No rash, tenderness, lesion or injury.         Left: No rash, tenderness, lesion or injury.       Vagina: Normal. No vaginal discharge, erythema, tenderness or bleeding.      Uterus: Absent.       Adnexa: Right adnexa normal and left adnexa normal.        Right: No mass, tenderness or fullness.          Left: No mass, tenderness or fullness.        Comments: Cervix and uterus surgically absent.    Musculoskeletal:      Cervical back: " Neck supple.   Lymphadenopathy:      Cervical: No cervical adenopathy.      Upper Body:      Right upper body: No supraclavicular or axillary adenopathy.      Left upper body: No supraclavicular or axillary adenopathy.      Lower Body: No right inguinal adenopathy. No left inguinal adenopathy.     Skin:     General: Skin is warm and dry.     Neurological:      Mental Status: She is alert and oriented to person, place, and time.     Psychiatric:         Behavior: Behavior normal. Behavior is cooperative.         Thought Content: Thought content normal.         Judgment: Judgment normal.

## 2025-07-07 NOTE — TELEPHONE ENCOUNTER
Can we please let patient know that ultrasound results have not come back yet.  We will reach out when they finalized and reviewed.  Thank you.

## 2025-07-08 LAB
C TRACH DNA SPEC QL NAA+PROBE: NEGATIVE
HPV HR 12 DNA CVX QL NAA+PROBE: NEGATIVE
HPV16 DNA CVX QL NAA+PROBE: NEGATIVE
HPV18 DNA CVX QL NAA+PROBE: NEGATIVE
N GONORRHOEA DNA SPEC QL NAA+PROBE: NEGATIVE

## 2025-07-10 ENCOUNTER — RESULTS FOLLOW-UP (OUTPATIENT)
Dept: FAMILY MEDICINE CLINIC | Facility: CLINIC | Age: 45
End: 2025-07-10

## 2025-07-10 LAB
LAB AP GYN PRIMARY INTERPRETATION: NORMAL
Lab: NORMAL

## 2025-07-10 NOTE — RESULT ENCOUNTER NOTE
Klever Ugalde,    Ultrasound results have come back.  The lymph nodes imaged look normal with no concerning features.  This is very reassuring.  Please have me know if you have any other questions.    Dr. UGALDE

## 2025-07-11 ENCOUNTER — APPOINTMENT (OUTPATIENT)
Dept: LAB | Facility: CLINIC | Age: 45
End: 2025-07-11
Payer: COMMERCIAL

## 2025-07-11 ENCOUNTER — OFFICE VISIT (OUTPATIENT)
Dept: FAMILY MEDICINE CLINIC | Facility: CLINIC | Age: 45
End: 2025-07-11
Payer: COMMERCIAL

## 2025-07-11 VITALS
OXYGEN SATURATION: 99 % | WEIGHT: 151 LBS | RESPIRATION RATE: 17 BRPM | HEART RATE: 108 BPM | SYSTOLIC BLOOD PRESSURE: 122 MMHG | DIASTOLIC BLOOD PRESSURE: 80 MMHG | BODY MASS INDEX: 27.79 KG/M2 | HEIGHT: 62 IN | TEMPERATURE: 100.4 F

## 2025-07-11 DIAGNOSIS — Z98.84 HISTORY OF BARIATRIC SURGERY: ICD-10-CM

## 2025-07-11 DIAGNOSIS — D84.9 IMMUNODEFICIENCY, UNSPECIFIED (HCC): ICD-10-CM

## 2025-07-11 DIAGNOSIS — R50.9 FUO (FEVER OF UNKNOWN ORIGIN): Primary | ICD-10-CM

## 2025-07-11 DIAGNOSIS — R50.9 FEVER, UNSPECIFIED FEVER CAUSE: ICD-10-CM

## 2025-07-11 DIAGNOSIS — R53.82 CHRONIC FATIGUE: ICD-10-CM

## 2025-07-11 DIAGNOSIS — R29.90 MULTIPLE NEUROLOGICAL SYMPTOMS: ICD-10-CM

## 2025-07-11 DIAGNOSIS — R50.9 FUO (FEVER OF UNKNOWN ORIGIN): ICD-10-CM

## 2025-07-11 DIAGNOSIS — Z11.3 SCREENING FOR STDS (SEXUALLY TRANSMITTED DISEASES): ICD-10-CM

## 2025-07-11 DIAGNOSIS — R79.89 ELEVATED LFTS: ICD-10-CM

## 2025-07-11 PROCEDURE — 86780 TREPONEMA PALLIDUM: CPT

## 2025-07-11 PROCEDURE — 87469 BABESIA MICROTI AMP PRB: CPT

## 2025-07-11 PROCEDURE — 83521 IG LIGHT CHAINS FREE EACH: CPT

## 2025-07-11 PROCEDURE — 99214 OFFICE O/P EST MOD 30 MIN: CPT | Performed by: NURSE PRACTITIONER

## 2025-07-11 PROCEDURE — 87478 BORRELIA MIYAMOTOI AMP PRB: CPT

## 2025-07-11 PROCEDURE — 84165 PROTEIN E-PHORESIS SERUM: CPT

## 2025-07-11 PROCEDURE — 36415 COLL VENOUS BLD VENIPUNCTURE: CPT

## 2025-07-11 PROCEDURE — 86618 LYME DISEASE ANTIBODY: CPT

## 2025-07-11 PROCEDURE — 84166 PROTEIN E-PHORESIS/URINE/CSF: CPT

## 2025-07-11 PROCEDURE — 80074 ACUTE HEPATITIS PANEL: CPT

## 2025-07-11 PROCEDURE — 87389 HIV-1 AG W/HIV-1&-2 AB AG IA: CPT

## 2025-07-11 PROCEDURE — 87484 EHRLICHA CHAFFEENSIS AMP PRB: CPT

## 2025-07-11 PROCEDURE — 87468 ANAPLSMA PHGCYTOPHLM AMP PRB: CPT

## 2025-07-11 RX ORDER — LINACLOTIDE 72 UG/1
CAPSULE, GELATIN COATED ORAL
COMMUNITY
Start: 2025-06-30 | End: 2025-07-24

## 2025-07-12 LAB
B BURGDOR IGG+IGM SER QL IA: NEGATIVE
HAV IGM SER QL: NORMAL
HBV CORE IGM SER QL: NORMAL
HBV SURFACE AG SER QL: NORMAL
HCV AB SER QL: NORMAL
HIV 1+2 AB+HIV1 P24 AG SERPL QL IA: NORMAL
KAPPA LC FREE SER-MCNC: 11.4 MG/L (ref 3.3–19.4)
KAPPA LC FREE/LAMBDA FREE SER: 1.52 {RATIO} (ref 0.26–1.65)
LAMBDA LC FREE SERPL-MCNC: 7.5 MG/L (ref 5.7–26.3)
TREPONEMA PALLIDUM IGG+IGM AB [PRESENCE] IN SERUM OR PLASMA BY IMMUNOASSAY: NORMAL

## 2025-07-14 DIAGNOSIS — E16.2 HYPOGLYCEMIA: ICD-10-CM

## 2025-07-14 DIAGNOSIS — T78.40XD ALLERGY, SUBSEQUENT ENCOUNTER: ICD-10-CM

## 2025-07-14 DIAGNOSIS — K21.9 GASTROESOPHAGEAL REFLUX DISEASE WITHOUT ESOPHAGITIS: ICD-10-CM

## 2025-07-14 LAB
ALBUMIN SERPL ELPH-MCNC: 4.33 G/DL (ref 3.2–5.1)
ALBUMIN SERPL ELPH-MCNC: 69.8 % (ref 48–70)
ALBUMIN UR ELPH-MCNC: 100 %
ALPHA1 GLOB MFR UR ELPH: 0 %
ALPHA1 GLOB SERPL ELPH-MCNC: 0.21 G/DL (ref 0.15–0.47)
ALPHA1 GLOB SERPL ELPH-MCNC: 3.4 % (ref 1.8–7)
ALPHA2 GLOB MFR UR ELPH: 0 %
ALPHA2 GLOB SERPL ELPH-MCNC: 0.4 G/DL (ref 0.42–1.04)
ALPHA2 GLOB SERPL ELPH-MCNC: 6.5 % (ref 5.9–14.9)
B-GLOBULIN MFR UR ELPH: 0 %
BETA GLOB ABNORMAL SERPL ELPH-MCNC: 0.37 G/DL (ref 0.31–0.57)
BETA1 GLOB SERPL ELPH-MCNC: 5.9 % (ref 4.7–7.7)
BETA2 GLOB SERPL ELPH-MCNC: 3 % (ref 3.1–7.9)
BETA2+GAMMA GLOB SERPL ELPH-MCNC: 0.19 G/DL (ref 0.2–0.58)
GAMMA GLOB ABNORMAL SERPL ELPH-MCNC: 0.71 G/DL (ref 0.4–1.66)
GAMMA GLOB MFR UR ELPH: 0 %
GAMMA GLOB SERPL ELPH-MCNC: 11.4 % (ref 6.9–22.3)
IGG/ALB SER: 2.31 {RATIO} (ref 1.1–1.8)
PROT SERPL-MCNC: 6.2 G/DL (ref 6.4–8.4)
PROT UR-MCNC: 5.9 MG/DL

## 2025-07-14 PROCEDURE — 84165 PROTEIN E-PHORESIS SERUM: CPT | Performed by: PATHOLOGY

## 2025-07-14 PROCEDURE — 84166 PROTEIN E-PHORESIS/URINE/CSF: CPT | Performed by: PATHOLOGY

## 2025-07-15 ENCOUNTER — TELEPHONE (OUTPATIENT)
Dept: ENDOCRINOLOGY | Facility: CLINIC | Age: 45
End: 2025-07-15

## 2025-07-16 LAB
A PHAGOCYTOPH DNA BLD QL NAA+PROBE: NOT DETECTED
B MICROTI DNA BLD QL NAA+PROBE: NOT DETECTED
B MIYAMOTOI FLAB SPEC QL NAA+PROBE: NOT DETECTED
E CHAFFEENSIS DNA BLD QL NAA+PROBE: NOT DETECTED

## 2025-07-16 RX ORDER — GLUCAGON INJECTION, SOLUTION 0.5 MG/.1ML
1 INJECTION, SOLUTION SUBCUTANEOUS DAILY PRN
Qty: 0.2 ML | Refills: 0 | Status: SHIPPED | OUTPATIENT
Start: 2025-07-16

## 2025-07-16 RX ORDER — PANTOPRAZOLE SODIUM 40 MG/1
40 TABLET, DELAYED RELEASE ORAL DAILY
Qty: 30 TABLET | Refills: 0 | Status: SHIPPED | OUTPATIENT
Start: 2025-07-16 | End: 2025-07-24 | Stop reason: SDUPTHER

## 2025-07-16 RX ORDER — EPINEPHRINE 0.3 MG/.3ML
0.3 INJECTION SUBCUTANEOUS ONCE
Qty: 0.6 ML | Refills: 0 | Status: SHIPPED | OUTPATIENT
Start: 2025-07-16 | End: 2025-07-24

## 2025-07-17 NOTE — TELEPHONE ENCOUNTER
PA for diazoxide (PROGLYCEM) 50 mg  APPROVED     Date(s) approved 7/17/25-7/17/26    Case #    Patient advised by          [x]MyChart Message  []Phone call   []LMOM  []L/M to call office as no active Communication consent on file  []Unable to leave detailed message as VM not approved on Communication consent       Pharmacy advised by    [x]Fax  []Phone call  []Secure Chat    Specialty Pharmacy    []      Approval letter scanned into Media Yes

## 2025-07-17 NOTE — TELEPHONE ENCOUNTER
PA for diazoxide (PROGLYCEM) 50 mg SUBMITTED to [employer]    via    [x]CMM-KEY: [id number]    [x]PA sent as URGENT    All office notes, labs and other pertaining documents and studies sent. Clinical questions answered. Awaiting determination from insurance company.     Turnaround time for your insurance to make a decision on your Prior Authorization can take 7-21 business days.

## 2025-07-24 ENCOUNTER — OFFICE VISIT (OUTPATIENT)
Dept: FAMILY MEDICINE CLINIC | Facility: CLINIC | Age: 45
End: 2025-07-24
Payer: COMMERCIAL

## 2025-07-24 ENCOUNTER — OFFICE VISIT (OUTPATIENT)
Dept: GASTROENTEROLOGY | Facility: CLINIC | Age: 45
End: 2025-07-24
Payer: COMMERCIAL

## 2025-07-24 ENCOUNTER — TELEPHONE (OUTPATIENT)
Age: 45
End: 2025-07-24

## 2025-07-24 VITALS
DIASTOLIC BLOOD PRESSURE: 70 MMHG | WEIGHT: 144 LBS | OXYGEN SATURATION: 99 % | TEMPERATURE: 100.3 F | HEART RATE: 88 BPM | RESPIRATION RATE: 17 BRPM | BODY MASS INDEX: 26.5 KG/M2 | SYSTOLIC BLOOD PRESSURE: 120 MMHG | HEIGHT: 62 IN

## 2025-07-24 VITALS
WEIGHT: 144 LBS | BODY MASS INDEX: 26.5 KG/M2 | SYSTOLIC BLOOD PRESSURE: 136 MMHG | DIASTOLIC BLOOD PRESSURE: 87 MMHG | HEIGHT: 62 IN | HEART RATE: 108 BPM

## 2025-07-24 DIAGNOSIS — K59.04 CHRONIC IDIOPATHIC CONSTIPATION: Primary | ICD-10-CM

## 2025-07-24 DIAGNOSIS — M06.00 SERONEGATIVE RHEUMATOID ARTHRITIS (HCC): ICD-10-CM

## 2025-07-24 DIAGNOSIS — R50.9 FUO (FEVER OF UNKNOWN ORIGIN): ICD-10-CM

## 2025-07-24 DIAGNOSIS — R21 RASH: ICD-10-CM

## 2025-07-24 DIAGNOSIS — R11.2 NAUSEA AND VOMITING, UNSPECIFIED VOMITING TYPE: ICD-10-CM

## 2025-07-24 DIAGNOSIS — E16.2 HYPOGLYCEMIA: ICD-10-CM

## 2025-07-24 DIAGNOSIS — R50.9 CHRONIC FEVER: Primary | ICD-10-CM

## 2025-07-24 DIAGNOSIS — K21.9 GASTROESOPHAGEAL REFLUX DISEASE WITHOUT ESOPHAGITIS: ICD-10-CM

## 2025-07-24 PROBLEM — R11.0 NAUSEA: Status: ACTIVE | Noted: 2025-07-24

## 2025-07-24 LAB
SARS-COV-2 AG UPPER RESP QL IA: NEGATIVE
VALID CONTROL: NORMAL

## 2025-07-24 PROCEDURE — 87811 SARS-COV-2 COVID19 W/OPTIC: CPT | Performed by: FAMILY MEDICINE

## 2025-07-24 PROCEDURE — 99214 OFFICE O/P EST MOD 30 MIN: CPT | Performed by: NURSE PRACTITIONER

## 2025-07-24 PROCEDURE — 99214 OFFICE O/P EST MOD 30 MIN: CPT | Performed by: FAMILY MEDICINE

## 2025-07-24 RX ORDER — NYSTATIN 100000 [USP'U]/G
POWDER TOPICAL 2 TIMES DAILY
Qty: 60 G | Refills: 0 | Status: SHIPPED | OUTPATIENT
Start: 2025-07-24

## 2025-07-24 RX ORDER — ACYCLOVIR 400 MG/1
1 TABLET ORAL
Qty: 3 EACH | Refills: 3 | Status: SHIPPED | OUTPATIENT
Start: 2025-07-24

## 2025-07-24 RX ORDER — ONDANSETRON 4 MG/1
4 TABLET, FILM COATED ORAL EVERY 8 HOURS PRN
Qty: 60 TABLET | Refills: 5 | Status: SHIPPED | OUTPATIENT
Start: 2025-07-24

## 2025-07-24 RX ORDER — PANTOPRAZOLE SODIUM 40 MG/1
40 TABLET, DELAYED RELEASE ORAL DAILY
Qty: 30 TABLET | Refills: 5 | Status: SHIPPED | OUTPATIENT
Start: 2025-07-24

## 2025-07-24 NOTE — PROGRESS NOTES
Name: Tram Perez      : 1980      MRN: [id number]  Encounter Provider: FINA Stephenson  Encounter Date: 2025   Encounter department: [facility] GASTROENTEROLOGY [place] [address]  :  Assessment & Plan  Chronic idiopathic constipation  Patient has history of chronic constipation.  Patient was previously on Linzess 72  MCG but this was not controlling her symptoms.  Patient was increased to 145 mcg daily and is currently moving her bowels almost every day.  Patient denies any blood in stool or blood from rectal area.  Patient does report intermittent left lower quadrant abdominal pain for which she is scheduled for CT of abdomen and pelvis which was ordered by her PCP.  Colonoscopy done 2025 showed terminal ileum appeared normal.  Localized mild inflammation in rectum.  Biopsies were taken of ascending, transverse, and descending colon.  The exam was otherwise without abnormality.  Results of biopsy is not available  Orders:    linaCLOtide 145 MCG CAPS; Take 1 capsule (145 mcg total) by mouth daily at least 30 minutes prior to the first meal of the day    Gastroesophageal reflux disease without esophagitis   Patient has history of GERD.  Patient reports that GERD symptoms are currently well-controlled on pantoprazole.EGD done 2025 which showed Vi-en-Y gastric bypass in stomach.  Esophagus gastric pouch and gastrojejunostomy  and Vi limb appear normal.  Biopsies benign  Orders:    pantoprazole (PROTONIX) 40 mg tablet; Take 1 tablet (40 mg total) by mouth daily Take 1/2 hour prior to breakfast    Nausea and vomiting, unspecified vomiting type  Patient has history of gastric bypass approximately 5 years ago.  Patient had recent EGD done 2025 which showed Vi-en-Y gastric bypass in stomach.  Esophagus gastric pouch and gastrojejunostomy  and Vi limb appear normal.  Biopsies benign.  Nausea and vomiting most likely related to diet.  Patient reports history of low  blood  glucose and needs to incorporate sugar into her diet.  Patient is also lactose intolerant but needs to eat a high-fiber diet and she obtains this with yogurt. Due to these underlying medical issues this may be contributing to her nausea and vomiting secondary to gastric bypass surgery.  She does report that Zofran helps to control her nausea and vomiting.    Orders:    ondansetron (ZOFRAN) 4 mg tablet; Take 1 tablet (4 mg total) by mouth every 8 (eight) hours as needed for nausea or vomiting    Follow-up in 6 months    History of Present Illness   HPI  Tram Perez is a 44 y.o. female who presents to office for follow-up.  [facility] and had an E[facility]. Patient has history of chronic constipation and GERD.Patient reports GERD symptoms are currently well-controlled.  Patient denies acid reflux, heartburn, epigastric or upper abdominal pain. Patient has history of chronic constipation. Patient reports she previously tried high-fiber diet and fiber supplements which did not help with her bowel pattern. Patient was previously on Linzess 72  MCG but this was not controlling her symptoms.  Abdomen exam benign no abdominal tenderness or guarding.  Patient was increased to 145 mcg daily and is currently moving her bowels almost every day.  Patient denies any blood in stool or blood from rectal area.  Patient does report intermittent left lower quadrant abdominal pain for which she is scheduled for CT of abdomen and pelvis which was ordered by her PCP.      Patient has history of gastric bypass approximately 5 years ago.  Nausea and vomiting most likely related to diet.  Patient reports history of low blood glucose and needs to incorporate sugar into her diet.  Patient is also lactose intolerant but needs to eat a high-fiber diet and she obtains this with yogurt. Due to these underlying medical issues this may be contributing to her nausea and vomiting  secondary to gastric bypass surgery.  She does report that Zofran helps to control her nausea and vomiting.     EGD done 2/2025 which showed Vi-en-Y gastric bypass in stomach.  Esophagus gastric pouch and gastrojejunostomy  and Vi limb appear normal.  Biopsies benign.Colonoscopy done 5/30/2025 showed terminal ileum appeared normal.  Localized mild inflammation in rectum.  Biopsies were taken of ascending, transverse, and descending colon.  The exam was otherwise without abnormality.  Results of biopsy is not available       Review of Systems   Constitutional:  Negative for chills and fever.   HENT:  Negative for ear pain and sore throat.    Eyes:  Negative for pain and visual disturbance.   Respiratory:  Negative for cough and shortness of breath.    Cardiovascular:  Negative for chest pain and palpitations.   Gastrointestinal:  Positive for abdominal pain, constipation, nausea and vomiting.   Genitourinary:  Negative for dysuria and hematuria.   Musculoskeletal:  Negative for arthralgias and back pain.   Skin:  Negative for color change and rash.   Neurological:  Negative for seizures and syncope.   All other systems reviewed and are negative.    Medical History Reviewed by provider this encounter:  Tobacco  Allergies  Meds  Problems  Med Hx  Surg Hx  Fam Hx     .  Past Medical History   Past Medical History[1]  Past Surgical History[2]  Family History[3]   reports that she quit smoking about 11 years ago. Her smoking use included cigarettes. She has a 5 pack-year smoking history. She has never used smokeless tobacco. She reports that she does not currently use alcohol. She reports that she does not currently use drugs after having used the following drugs: Marijuana.  Current Outpatient Medications   Medication Instructions    acarbose (PRECOSE) 25 mg tablet 3 tabs tid with meals    acarbose (PRECOSE) 100 mg, Oral, 3 times daily with meals    Acetaminophen 500 mg, Every 6 hours PRN    albuterol (ProAir  "HFA) 90 mcg/act inhaler 2 puffs, Inhalation, Every 6 hours PRN    albuterol (PROVENTIL HFA,VENTOLIN HFA) 90 mcg/act inhaler 2 PUFFS UP TO 4 TIMES A DAY AS NEEDED FOR WHEEZING    BD Integra Syringe 25G X 1\" 3 ML MISC     Biotin 10,000 mcg    Blood Glucose Monitoring Suppl (OneTouch Verio Reflect) w/Device KIT Check blood sugars twice daily. Please substitute with appropriate alternative as covered by patient's insurance. Dx: E11.65    Botulinum Toxin Type A 200 units SOLR     Continuous Glucose Sensor (Dexcom G7 Sensor) 1 Device, Does not apply, Every 10 days    Continuous Glucose Sensor (Dexcom G7 Sensor) 1 Device, Does not apply, Every 10 days    cyclobenzaprine (FLEXERIL) 10 mg    diazoxide (PROGLYCEM) 3 mg/kg/day, Oral, Every 8 hours    DULoxetine (CYMBALTA) 60 mg, Oral, Daily    EPINEPHrine (EPIPEN) 0.3 mg, Intramuscular, Once    ergocalciferol (VITAMIN D2) 50,000 Units, Oral, Weekly    estradiol (Vivelle-Dot) 0.075 MG/24HR 1 patch, Transdermal, 2 times weekly    glucose blood (OneTouch Verio) test strip Check blood sugars twice daily. Please substitute with appropriate alternative as covered by patient's insurance. Dx: E11.65    Gvoke HypoPen 1-Pack 1 mg, Subcutaneous, Daily PRN, May repeat in 15 minutes as needed using a new device.    HYDROcodone-acetaminophen (NORCO) 7.5-325 mg per tablet 1 tablet, Every 4 hours PRN    ketorolac (TORADOL) 30 mg/mL injection     linaCLOtide 145 mcg, Oral, Daily, at least 30 minutes prior to the first meal of the day    LORazepam (ATIVAN) 1 mg, Daily PRN    methylphenidate (RITALIN) 10 mg, Daily    methylphenidate (RITALIN) 20 mg, 2 times daily    mirtazapine (REMERON) 15 mg, Daily at bedtime    Multiple Vitamin (MULTIVITAMIN ADULT PO) 1 tablet, Every morning    nystatin (MYCOSTATIN) powder Topical, 2 times daily    ondansetron (ZOFRAN) 4 mg, Oral, Every 8 hours PRN    OneTouch Delica Lancets 33G MISC Check blood sugars twice daily. Please substitute with appropriate " "alternative as covered by patient's insurance. Dx: E11.65    OneTouch Delica Lancets 33G MISC Check blood sugars twice daily. Please substitute with appropriate alternative as covered by patient's insurance. Dx: E11.65    pantoprazole (PROTONIX) 40 mg, Oral, Daily, Take 1/2 hour prior to breakfast    polyethylene glycol (GLYCOLAX) 17 GM/SCOOP powder     pregabalin (LYRICA) 150 mg, Oral, 3 times daily    rizatriptan (MAXALT) 10 mg, Oral, Once as needed    SUMAtriptan Succinate 6 mg, Subcutaneous, As needed    traZODone (DESYREL) 100 mg tablet No dose, route, or frequency recorded.   Allergies[4]   Medications Ordered Prior to Encounter[5]   Social History[6]     Objective   /87 (BP Location: Left arm, Patient Position: Sitting, Cuff Size: Adult)   Pulse (!) 108   Ht 5' 2\" (1.575 m)   Wt 65.3 kg (144 lb)   BMI 26.34 kg/m²      Physical Exam  Vitals and nursing note reviewed.   Constitutional:       General: She is not in acute distress.     Appearance: She is well-developed.   HENT:      Head: Normocephalic and atraumatic.     Eyes:      Conjunctiva/sclera: Conjunctivae normal.       Cardiovascular:      Rate and Rhythm: Normal rate and regular rhythm.      Pulses: Normal pulses.      Heart sounds: Normal heart sounds. No murmur heard.  Pulmonary:      Effort: Pulmonary effort is normal. No respiratory distress.      Breath sounds: Normal breath sounds. No stridor. No wheezing, rhonchi or rales.   Abdominal:      General: Bowel sounds are normal. There is no distension.      Palpations: Abdomen is soft. There is no mass.      Tenderness: There is no abdominal tenderness. There is no guarding or rebound.     Musculoskeletal:         General: No swelling.      Cervical back: Neck supple.      Right lower leg: No edema.      Left lower leg: No edema.     Skin:     General: Skin is warm and dry.      Capillary Refill: Capillary refill takes less than 2 seconds.      Coloration: Skin is not jaundiced or pale. "     Neurological:      Mental Status: She is alert and oriented to person, place, and time.     Psychiatric:         Mood and Affect: Mood normal.                  [1]   Past Medical History:  Diagnosis Date    Abnormal Pap smear of cervix     Acid reflux     Allergic     Allergic rhinitis     Anxiety     Asthma     Cluster headache 2/16/23    Depression     Diabetes mellitus (HCC)     Pre    Diverticulitis     Epileptic seizures (HCC)     Medication related    Fibromyalgia, primary     Gestational diabetes mellitus     Glands swollen 06/24/2025    under chin    Head injury 2/16/23    Fell at work on concrete and now havw concussion with bad headaches.    Headache(784.0)     Migraines    Headache, tension-type     Hypertension     Inflammatory bowel disease     Lateral epicondylitis     Malignant tumor of cervix (HCC)     between 5859-2985    Migraine     Miscarriage     Obstructive sleep apnea     CPAP    Paresthesias     Peptic ulceration     Plantar fasciitis     Reactive hypoglycemia     Rectocele     Seizures (HCC)     Had ghem due to low sugar levels    Tinea corporis     Wears contact lenses     Wears glasses    [2]   Past Surgical History:  Procedure Laterality Date    ADENOIDECTOMY      ARTHROSCOPY ANKLE Right 09/01/2017    Procedure: ANKLE ARTHOSCOPY WITH EXTENSIVE SYNOVECTOMY, OPEN ARTHOTOY LATERAL ANKLE WITH ANKLE STABILIZATION, BROSTOM REPAIR, INTERNAL BRACE AUGMENTATION, PERONEAL TENDON SYNOVECTOMY;  Surgeon: Donnell Hansen DPM;  Location: [facility];  Service: Podiatry    BARIATRIC SURGERY      CERVICAL BIOPSY  W/ LOOP ELECTRODE EXCISION      COLONOSCOPY  02/27/2017    GASTRIC BYPASS  07/2020    HEMORRHOID SURGERY  08/2021    HERNIA REPAIR  2020    HYSTERECTOMY  2015    FL COLONOSCOPY FLX DX W/COLLJ SPEC WHEN PFRMD N/A 02/27/2017    Procedure: COLONOSCOPY;  Surgeon: Armani Lara MD;  Location: [facility];  Service: Gastroenterology    FL CONIZATION CERVIX W/WO D&C RPR KNIFE/LASER      FL  ESOPHAGOGASTRODUODENOSCOPY TRANSORAL DIAGNOSTIC N/A 09/07/2016    Procedure: EGD AND COLONOSCOPY;  Surgeon: Armani Lara MD;  Location: [facility];  Service: Gastroenterology    TONSILLECTOMY      TUBAL LIGATION      UPPER GASTROINTESTINAL ENDOSCOPY  2016   [3]   Family History  Problem Relation Name Age of Onset    Anxiety disorder Mother Uterine     Depression Mother Uterine     Cancer Mother Uterine     Endometrial cancer Mother Uterine         under afe 50    Stroke Mother Uterine     Thyroid disease Mother Uterine     Diabetes type II Mother Uterine     Hypertension Mother Uterine     Thyroid cancer Mother Uterine     Alcohol abuse Mother Uterine     Asthma Mother Uterine     Mental illness Mother Uterine     Substance Abuse Mother Uterine     Hypertension Father Dad     Alcohol abuse Father Dad     Depression Father Dad     Substance Abuse Father Dad     Mental illness Father Dad     Heart attack Father Dad     Asthma Brother Lauri         Epilepsy    Depression Brother Lauri     Mental illness Brother Lauri     Substance Abuse Brother Lauri     Alcohol abuse Brother Lauri     Depression Brother Garett     Asthma Brother Garett     Learning disabilities Brother Garett     Mental illness Brother Gaertt     Substance Abuse Brother Garett     Seizures Brother Garett     Cancer Maternal Grandmother Reproductive 50        lymphatic cancer    Depression Maternal Grandmother Reproductive     Cancer Maternal Grandfather Grandpa     Alcohol abuse Maternal Grandfather Grandpa     Depression Maternal Grandfather Grandpa     Mental illness Maternal Grandfather Grandpa     Breast cancer Paternal Grandmother Carmelita Fung 82    Cancer Paternal Grandmother Carmelita Fnug         Breast    No Known Problems Paternal Grandfather      Depression Daughter Blair     Anxiety disorder Daughter Blair     Substance Abuse Daughter Blair     Asthma Daughter Blair     Allergies Daughter Blair     Depression Daughter Craig     Anxiety disorder  "Daughter Craig     Allergies Daughter Craig     Substance Abuse Daughter Craig     Asthma Son Son     Depression Son Son     Learning disabilities Son Son     Allergies Son Son     Learning disabilities Maternal Aunt Kylie     Learning disabilities Maternal Uncle Derrick     Leukemia Cousin Tatyana torres     Cancer Cousin Tatyana kelby     Learning disabilities Cousin Tatyana kelby     Cancer Cousin Tatyana marcano         lympatic    Learning disabilities Cousin Tatyana marcano     Diabetes Other      Hypertension Other      Neuropathy Other      Thyroid disease Other      Learning disabilities Son Kelby     Learning disabilities Maternal Aunt Kylie     Learning disabilities Maternal Uncle Derrick    [4]   Allergies  Allergen Reactions    Aspirin Anaphylaxis    Bee Venom Swelling and Wheezing    Penicillins Anaphylaxis and Rash    Bee Pollen     Ceclor [Cefaclor] Itching    Dust Mite Extract Other (See Comments)     congestion    Grass Pollen(K-O-R-T-Swt Osito) Other (See Comments)     congestion    Lactose - Food Allergy GI Intolerance    Molds & Smuts Other (See Comments)    Other Other (See Comments)     congestion    Pollen Extract     Amoxicillin Rash    Doxycycline Rash    Erythromycin Rash    Nickel Rash   [5]   Current Outpatient Medications on File Prior to Visit   Medication Sig Dispense Refill    acarbose (PRECOSE) 100 MG tablet Take 1 tablet (100 mg total) by mouth 3 (three) times a day with meals 90 tablet 2    acarbose (PRECOSE) 25 mg tablet 3 tabs tid with meals 90 tablet 3    Acetaminophen 500 MG PACK Take 500 mg by mouth every 6 (six) hours as needed      albuterol (ProAir HFA) 90 mcg/act inhaler Inhale 2 puffs every 6 (six) hours as needed for wheezing 8.5 g 2    albuterol (PROVENTIL HFA,VENTOLIN HFA) 90 mcg/act inhaler 2 PUFFS UP TO 4 TIMES A DAY AS NEEDED FOR WHEEZING 18 g 1    BD Integra Syringe 25G X 1\" 3 ML MISC       Biotin 1000 MCG tablet Take 10,000 mcg by mouth      Blood Glucose Monitoring " Suppl (OneTouch Verio Reflect) w/Device KIT Check blood sugars twice daily. Please substitute with appropriate alternative as covered by patient's insurance. Dx: E11.65 1 kit 0    Botulinum Toxin Type A 200 units SOLR       Continuous Glucose Sensor (Dexcom G7 Sensor) Use 1 Device every 10 days 3 each 3    Continuous Glucose Sensor (Dexcom G7 Sensor) Use 1 Device every 10 days 9 each 1    cyclobenzaprine (FLEXERIL) 10 mg tablet Take 10 mg by mouth      diazoxide (PROGLYCEM) 50 mg/mL suspension Take 1.3 mL (65 mg total) by mouth every 8 (eight) hours 60 mL 2    DULoxetine (CYMBALTA) 60 mg delayed release capsule Take 1 capsule (60 mg total) by mouth daily 30 capsule 2    ergocalciferol (VITAMIN D2) 50,000 units Take 1 capsule (50,000 Units total) by mouth once a week 12 capsule 0    estradiol (Vivelle-Dot) 0.075 MG/24HR Place 1 patch on the skin 2 (two) times a week 8 patch 4    Glucagon (Gvoke HypoPen 1-Pack) 0.5 MG/0.1ML SOAJ Inject 1 mg under the skin daily as needed (blood sugar less than 50) May repeat in 15 minutes as needed using a new device. 0.2 mL 0    glucose blood (OneTouch Verio) test strip Check blood sugars twice daily. Please substitute with appropriate alternative as covered by patient's insurance. Dx: E11.65 200 each 1    HYDROcodone-acetaminophen (NORCO) 7.5-325 mg per tablet Take 1 tablet by mouth every 4 (four) hours as needed for pain      ketorolac (TORADOL) 30 mg/mL injection       LORazepam (ATIVAN) 1 mg tablet Take 1 mg by mouth daily as needed      methylphenidate (RITALIN) 10 mg tablet Take 10 mg by mouth in the morning  0    methylphenidate (RITALIN) 20 MG tablet Take 20 mg by mouth in the morning and 20 mg in the evening. Pt reports taking tid.  0    Multiple Vitamin (MULTIVITAMIN ADULT PO) Take 1 tablet by mouth every morning      nystatin (MYCOSTATIN) powder Apply topically 2 (two) times a day 60 g 0    OneTouch Delica Lancets 33G MISC Check blood sugars twice daily. Please substitute  with appropriate alternative as covered by patient's insurance. Dx: E11.65 200 each 3    OneTouch Delica Lancets 33G MISC Check blood sugars twice daily. Please substitute with appropriate alternative as covered by patient's insurance. Dx: E11.65 200 each 3    polyethylene glycol (GLYCOLAX) 17 GM/SCOOP powder       pregabalin (Lyrica) 150 mg capsule Take 1 capsule (150 mg total) by mouth 3 (three) times a day 90 capsule 1    rizatriptan (MAXALT) 10 MG tablet TAKE 1 TABLET (10 MG TOTAL) BY MOUTH ONCE AS NEEDED FOR MIGRAINE 9 tablet 1    SUMAtriptan Succinate 6 MG/0.5ML SOAJ Inject 0.5 mL (6 mg total) under the skin as needed (headache) 9 Cartridge 1    traZODone (DESYREL) 100 mg tablet       [DISCONTINUED] linaCLOtide 145 MCG CAPS Take 1 capsule (145 mcg total) by mouth daily at least 30 minutes prior to the first meal of the day 30 capsule 5    [DISCONTINUED] ondansetron (ZOFRAN) 4 mg tablet Take 1 tablet (4 mg total) by mouth every 8 (eight) hours as needed for nausea or vomiting 30 tablet 1    [DISCONTINUED] pantoprazole (PROTONIX) 40 mg tablet Take 1 tablet (40 mg total) by mouth daily 30 tablet 0    EPINEPHrine (EPIPEN) 0.3 mg/0.3 mL SOAJ Inject 0.3 mL (0.3 mg total) into a muscle once for 1 dose 0.6 mL 0    mirtazapine (REMERON) 15 mg tablet Take 15 mg by mouth daily at bedtime (Patient not taking: Reported on 2025)      [DISCONTINUED] Linzess 72 MCG CAPS        Current Facility-Administered Medications on File Prior to Visit   Medication Dose Route Frequency Provider Last Rate Last Admin    cyanocobalamin injection 1,000 mcg  1,000 mcg Intramuscular Q30 Days Ronny Brown MD   1,000 mcg at 23 1524   [6]   Social History  Tobacco Use    Smoking status: Former     Current packs/day: 0.00     Average packs/day: 0.5 packs/day for 10.0 years (5.0 ttl pk-yrs)     Types: Cigarettes     Quit date: 2013     Years since quittin.8    Smokeless tobacco: Never   Vaping Use    Vaping status:  Never Used   Substance and Sexual Activity    Alcohol use: Not Currently     Comment: occasionally, maybe 1-2 times a month    Drug use: Not Currently     Types: Marijuana     Comment: not anymore    Sexual activity: Yes     Partners: Female, Male     Birth control/protection: Surgical     Comment: Had hysterectomy

## 2025-07-24 NOTE — ASSESSMENT & PLAN NOTE
Patient has history of GERD.  Patient reports that GERD symptoms are currently well-controlled on pantoprazole.EGD done 2/2025 which showed Vi-en-Y gastric bypass in stomach.  Esophagus gastric pouch and gastrojejunostomy  and Vi limb appear normal.  Biopsies benign  Orders:    pantoprazole (PROTONIX) 40 mg tablet; Take 1 tablet (40 mg total) by mouth daily Take 1/2 hour prior to breakfast

## 2025-07-24 NOTE — ASSESSMENT & PLAN NOTE
Care per endocrinologist  Orders:  •  Continuous Glucose Sensor (Dexcom G7 Sensor); Use 1 Device every 10 days

## 2025-07-24 NOTE — ASSESSMENT & PLAN NOTE
Patient has history of chronic constipation.  Patient was previously on Linzess 72  MCG but this was not controlling her symptoms.  Patient was increased to 145 mcg daily and is currently moving her bowels almost every day.  Patient denies any blood in stool or blood from rectal area.  Patient does report intermittent left lower quadrant abdominal pain for which she is scheduled for CT of abdomen and pelvis which was ordered by her PCP.  Colonoscopy done 5/30/2025 showed terminal ileum appeared normal.  Localized mild inflammation in rectum.  Biopsies were taken of ascending, transverse, and descending colon.  The exam was otherwise without abnormality.  Results of biopsy is not available  Orders:    linaCLOtide 145 MCG CAPS; Take 1 capsule (145 mcg total) by mouth daily at least 30 minutes prior to the first meal of the day

## 2025-07-24 NOTE — TELEPHONE ENCOUNTER
Pt called in to schedule an appointment ,I advise the patient her referral needs to be triage once its triage we will give her call with the next following steps . Please advise

## 2025-07-24 NOTE — PATIENT INSTRUCTIONS
Avoid NSAIDs  So follow antireflux diet and measures-limit caffeine to 1-2 8 ounce cups daily.  Avoid any additional caffeine, carbonated beverages, spicy fatty foods, and red sauce.   Sleep with your head of bed elevated.  Continue Zofran  Continue Protonix 40 mg daily in a.m.  Continue Linzess 145 mcg daily

## 2025-07-24 NOTE — ASSESSMENT & PLAN NOTE
Patient has history of gastric bypass approximately 5 years ago.  Patient had recent EGD done 2/2025 which showed Vi-en-Y gastric bypass in stomach.  Esophagus gastric pouch and gastrojejunostomy  and Vi limb appear normal.  Biopsies benign.  Nausea and vomiting most likely related to diet.  Patient reports history of low blood  glucose and needs to incorporate sugar into her diet.  Patient is also lactose intolerant but needs to eat a high-fiber diet and she obtains this with yogurt. Due to these underlying medical issues this may be contributing to her nausea and vomiting secondary to gastric bypass surgery.  She does report that Zofran helps to control her nausea and vomiting.    Orders:    ondansetron (ZOFRAN) 4 mg tablet; Take 1 tablet (4 mg total) by mouth every 8 (eight) hours as needed for nausea or vomiting

## 2025-07-24 NOTE — TELEPHONE ENCOUNTER
Dr. Lu,    Thank you for referring Tram Perez,  1980, to [facility]. To further review this referral, your patient will need to complete blood cultures x2. Please order this and ensure your patient understands these cultures are drawn from 2 different sites.       Thank you again and have a good day.

## 2025-07-24 NOTE — PROGRESS NOTES
Name: Tram Perez      : 1980      MRN: [id number]  Encounter Provider: Mary Jo Lu MD  Encounter Date: 2025   Encounter department: [facility]  :  Assessment & Plan  Chronic fever  Negative covid today  Ongoing for last 2 years  To talk to her rheumatologist regarding about this as well   Orders:  •  POCT Rapid Covid Ag  •  Leukemia/Lymphoma flow cytometry; Future  •  Ambulatory Referral to Infectious Disease; Future  •  Blood culture; Future    FUO (fever of unknown origin)  Normal exams   Normal labs recently   Seen by rheumatology, ENT and no sources of infection at this time  Would like an input from Infectious disease   Orders:  •  Leukemia/Lymphoma flow cytometry; Future  •  Ambulatory Referral to Infectious Disease; Future  •  Blood culture; Future    Hypoglycemia  Care per endocrinologist  Orders:  •  Continuous Glucose Sensor (Dexcom G7 Sensor); Use 1 Device every 10 days    Rash    Orders:  •  nystatin (MYCOSTATIN) powder; Apply topically 2 (two) times a day    Seronegative rheumatoid arthritis (HCC)  Sees rheumatologist Dr. Amin with [facility]                  History of Present Illness   Fever on and off for 2 1/2 years  Recent work ups are normal   Left ear pain and swollen glands on both side for the last few weeks- seen ENT for this  Feels tired all the time     Fever  This is a new problem. Associated symptoms include coughing, a fever, headaches, nausea and a sore throat. Pertinent negatives include no abdominal pain, chest pain, congestion, rash or vomiting.   Earache   Associated symptoms include coughing, headaches and a sore throat. Pertinent negatives include no abdominal pain, diarrhea, rash or vomiting.   Fever - 9 weeks to 74 years   This is a chronic problem. The current episode started more than 1 year ago. The problem occurs daily. The problem has been gradually worsening. The maximum temperature noted was 103 to 103.9 F. Associated symptoms include  "coughing, ear pain, headaches, muscle aches, nausea, sleepiness, a sore throat and wheezing. Pertinent negatives include no abdominal pain, chest pain, congestion, diarrhea, rash, urinary pain or vomiting.   Risk factors: hx of cancer, immunosuppression and recent sickness    Risk factors: no contaminated food, no contaminated water, no occupational exposure, no recent travel and no sick contacts      Review of Systems   Constitutional:  Positive for fever.   HENT:  Positive for ear pain and sore throat. Negative for congestion.    Respiratory:  Positive for cough and wheezing.    Cardiovascular:  Negative for chest pain.   Gastrointestinal:  Positive for nausea. Negative for abdominal pain, diarrhea and vomiting.   Genitourinary:  Negative for dysuria.   Skin:  Negative for rash.   Neurological:  Positive for headaches.   Hematological: Negative.    Psychiatric/Behavioral: Negative.         Objective   /70 (BP Location: Left arm, Patient Position: Sitting, Cuff Size: Standard)   Pulse 88   Temp 100.3 °F (37.9 °C) (Tympanic)   Resp 17   Ht 5' 2\" (1.575 m)   Wt 65.3 kg (144 lb)   SpO2 99%   BMI 26.34 kg/m²      Physical Exam  Vitals and nursing note reviewed.   HENT:      Nose: Nose normal.      Mouth/Throat:      Mouth: Mucous membranes are moist.     Cardiovascular:      Rate and Rhythm: Normal rate and regular rhythm.      Pulses: Normal pulses.      Heart sounds: Normal heart sounds.   Pulmonary:      Effort: Pulmonary effort is normal.      Breath sounds: Normal breath sounds.     Musculoskeletal:         General: Normal range of motion.     Neurological:      General: No focal deficit present.      Mental Status: She is oriented to person, place, and time.     Psychiatric:         Mood and Affect: Mood normal.         Behavior: Behavior normal.       Administrative Statements   I have spent a total time of 25 minutes in caring for this patient on the day of the visit/encounter including Risks and " benefits of tx options, Instructions for management, Patient and family education, Importance of tx compliance, Counseling / Coordination of care, Documenting in the medical record, and Reviewing/placing orders in the medical record (including tests, medications, and/or procedures).

## 2025-07-28 ENCOUNTER — TELEPHONE (OUTPATIENT)
Age: 45
End: 2025-07-28

## 2025-07-28 ENCOUNTER — APPOINTMENT (OUTPATIENT)
Dept: LAB | Facility: CLINIC | Age: 45
End: 2025-07-28
Attending: NURSE PRACTITIONER
Payer: COMMERCIAL

## 2025-07-28 DIAGNOSIS — R50.9 CHRONIC FEVER: ICD-10-CM

## 2025-07-28 DIAGNOSIS — R50.9 FUO (FEVER OF UNKNOWN ORIGIN): ICD-10-CM

## 2025-07-28 PROBLEM — Z98.84 HISTORY OF BARIATRIC SURGERY: Status: ACTIVE | Noted: 2025-07-28

## 2025-07-29 DIAGNOSIS — R50.9 FUO (FEVER OF UNKNOWN ORIGIN): Primary | ICD-10-CM

## 2025-07-29 DIAGNOSIS — R50.9 CHRONIC FEVER: ICD-10-CM

## 2025-07-31 ENCOUNTER — APPOINTMENT (OUTPATIENT)
Dept: LAB | Facility: HOSPITAL | Age: 45
End: 2025-07-31
Payer: COMMERCIAL

## 2025-07-31 DIAGNOSIS — R50.9 FUO (FEVER OF UNKNOWN ORIGIN): ICD-10-CM

## 2025-07-31 DIAGNOSIS — R51.9 FACIAL PAIN: ICD-10-CM

## 2025-07-31 DIAGNOSIS — R74.01 ELEVATED SGOT: ICD-10-CM

## 2025-07-31 DIAGNOSIS — R50.9 CHRONIC FEVER: ICD-10-CM

## 2025-07-31 DIAGNOSIS — G89.29 CHRONIC NONINTRACTABLE HEADACHE, UNSPECIFIED HEADACHE TYPE: ICD-10-CM

## 2025-07-31 DIAGNOSIS — M54.41 ACUTE BACK PAIN WITH SCIATICA, RIGHT: ICD-10-CM

## 2025-07-31 DIAGNOSIS — R50.9 HYPERTHERMIA-INDUCED DEFECT: ICD-10-CM

## 2025-07-31 DIAGNOSIS — K21.9 GASTROESOPHAGEAL REFLUX DISEASE, UNSPECIFIED WHETHER ESOPHAGITIS PRESENT: ICD-10-CM

## 2025-07-31 DIAGNOSIS — R51.9 CHRONIC NONINTRACTABLE HEADACHE, UNSPECIFIED HEADACHE TYPE: ICD-10-CM

## 2025-07-31 DIAGNOSIS — M79.7 FIBROSITIS: ICD-10-CM

## 2025-07-31 LAB
BACTERIA UR QL AUTO: ABNORMAL /HPF
BILIRUB UR QL STRIP: NEGATIVE
C3 SERPL-MCNC: 93 MG/DL (ref 87–200)
C4 SERPL-MCNC: 24 MG/DL (ref 19–52)
CK SERPL-CCNC: 112 U/L (ref 26–192)
CLARITY UR: CLEAR
COLOR UR: YELLOW
GLUCOSE UR STRIP-MCNC: NEGATIVE MG/DL
HGB UR QL STRIP.AUTO: NEGATIVE
KETONES UR STRIP-MCNC: NEGATIVE MG/DL
LEUKOCYTE ESTERASE UR QL STRIP: NEGATIVE
MUCOUS THREADS UR QL AUTO: ABNORMAL
NITRITE UR QL STRIP: NEGATIVE
NON-SQ EPI CELLS URNS QL MICRO: ABNORMAL /HPF
PH UR STRIP.AUTO: 6 [PH]
PROT UR STRIP-MCNC: NEGATIVE MG/DL
RBC #/AREA URNS AUTO: ABNORMAL /HPF
RHEUMATOID FACT SERPL-ACNC: <10 IU/ML
SP GR UR STRIP.AUTO: 1.02 (ref 1–1.03)
TREPONEMA PALLIDUM IGG+IGM AB [PRESENCE] IN SERUM OR PLASMA BY IMMUNOASSAY: NORMAL
UROBILINOGEN UR QL STRIP.AUTO: 0.2 E.U./DL
WBC #/AREA URNS AUTO: ABNORMAL /HPF

## 2025-07-31 PROCEDURE — 86780 TREPONEMA PALLIDUM: CPT

## 2025-07-31 PROCEDURE — 87040 BLOOD CULTURE FOR BACTERIA: CPT

## 2025-07-31 PROCEDURE — 86160 COMPLEMENT ANTIGEN: CPT

## 2025-07-31 PROCEDURE — 86200 CCP ANTIBODY: CPT

## 2025-07-31 PROCEDURE — 88184 FLOWCYTOMETRY/ TC 1 MARKER: CPT

## 2025-07-31 PROCEDURE — 82550 ASSAY OF CK (CPK): CPT

## 2025-07-31 PROCEDURE — 86581 STRPTCS PNEUM ANTB SEROT IA: CPT

## 2025-07-31 PROCEDURE — 86225 DNA ANTIBODY NATIVE: CPT

## 2025-07-31 PROCEDURE — 86015 ACTIN ANTIBODY EACH: CPT

## 2025-07-31 PROCEDURE — 88185 FLOWCYTOMETRY/TC ADD-ON: CPT | Performed by: FAMILY MEDICINE

## 2025-07-31 PROCEDURE — 86235 NUCLEAR ANTIGEN ANTIBODY: CPT

## 2025-07-31 PROCEDURE — 86431 RHEUMATOID FACTOR QUANT: CPT

## 2025-07-31 PROCEDURE — 86038 ANTINUCLEAR ANTIBODIES: CPT

## 2025-07-31 PROCEDURE — 82595 ASSAY OF CRYOGLOBULIN: CPT

## 2025-07-31 PROCEDURE — 36415 COLL VENOUS BLD VENIPUNCTURE: CPT

## 2025-07-31 PROCEDURE — 86381 MITOCHONDRIAL ANTIBODY EACH: CPT

## 2025-07-31 PROCEDURE — 81001 URINALYSIS AUTO W/SCOPE: CPT

## 2025-08-01 LAB
ACTIN IGG SERPL-ACNC: 2 UNITS (ref 0–19)
MITOCHONDRIA M2 IGG SER-ACNC: <20 UNITS (ref 0–20)

## 2025-08-02 LAB
CCP AB SER IA-ACNC: 1 (ref ?–10)
DSDNA IGG SERPL IA-ACNC: <0.9 IU/ML (ref ?–15)
ENA SS-A AB SER IA-ACNC: <0.5 U/ML (ref ?–10)
ENA SS-B IGG SER IA-ACNC: <0.6 U/ML (ref ?–10)
NUCLEAR IGG SER IA-RTO: <0.09 RATIO (ref ?–1)

## 2025-08-04 LAB — SCAN RESULT: NORMAL

## 2025-08-05 LAB
BACTERIA BLD CULT: NORMAL
BACTERIA BLD CULT: NORMAL
CRYOGLOB SER QL 1D COLD INC: NORMAL

## 2025-08-07 LAB
DEPRECATED S PNEUM14 IGG SER-MCNC: 3.8 UG/ML
DEPRECATED S PNEUM19 IGG SER-MCNC: 1 UG/ML
DEPRECATED S PNEUM23 IGG SER-MCNC: 0.6 UG/ML
DEPRECATED S PNEUM3 IGG SER-MCNC: 0.2 UG/ML
DEPRECATED S PNEUM4 IGG SER-MCNC: 0.4 UG/ML
DEPRECATED S PNEUM8 AB SER-MCNC: 47.2 UG/ML
DEPRECATED S PNEUM9 IGG SER-MCNC: 5.1 UG/ML
FLUBV RNA SPEC QL NAA+PROBE: 0.3 UG/ML
S PNEUM DA 18C IGG SER-MCNC: 0.7 UG/ML
S PNEUM DA 19A IGG SER-MCNC: 1.8 UG/ML
S PNEUM DA 6B IGG SER-MCNC: 1.1 UG/ML
STREP PNEUMO TYPE 1: 9.6 UG/ML
STREP PNEUMO TYPE 51: 3.7 UG/ML
STREP PNEUMO TYPE 68: 1.5 UG/ML

## 2025-08-19 DIAGNOSIS — K91.2 HYPOGLYCEMIA FOLLOWING GASTROINTESTINAL SURGERY: ICD-10-CM

## 2025-08-19 DIAGNOSIS — R21 RASH: ICD-10-CM

## 2025-08-19 RX ORDER — ACARBOSE 100 MG/1
100 TABLET ORAL
Qty: 90 TABLET | Refills: 1 | Status: SHIPPED | OUTPATIENT
Start: 2025-08-19

## 2025-08-21 RX ORDER — NYSTATIN 100000 [USP'U]/G
POWDER TOPICAL 2 TIMES DAILY
Qty: 60 G | Refills: 0 | Status: SHIPPED | OUTPATIENT
Start: 2025-08-21

## 2025-08-27 PROBLEM — R50.9 FUO (FEVER OF UNKNOWN ORIGIN): Status: RESOLVED | Noted: 2024-08-26 | Resolved: 2025-08-27

## (undated) DEVICE — SUT VICRYL 3-0 REEL 54 IN J285G

## (undated) DEVICE — OCCLUSIVE GAUZE STRIP,3% BISMUTH TRIBROMOPHENATE IN PETROLATUM BLEND: Brand: XEROFORM

## (undated) DEVICE — ARTHROSCOPY FLOOR MAT

## (undated) DEVICE — MASTISOL LIQ ADHESIVE 2/3ML

## (undated) DEVICE — UNIVERSAL  MINOR EXTREMITY PK: Brand: CARDINAL HEALTH

## (undated) DEVICE — GLOVE SRG BIOGEL ORTHOPEDIC 7.5

## (undated) DEVICE — SUT VICRYL 3-0 SH 27 IN J416H

## (undated) DEVICE — ACE WRAP 6 IN STERILE

## (undated) DEVICE — CUFF TOURNIQUET 30 X 4 IN QUICK CONNECT DISP 1BLA

## (undated) DEVICE — STRETCH BANDAGE: Brand: CURITY

## (undated) DEVICE — KERLIX BANDAGE ROLL: Brand: KERLIX

## (undated) DEVICE — DRAPE C-ARMOUR

## (undated) DEVICE — PAD CAST 4 IN COTTON NON STERILE

## (undated) DEVICE — 3M™ STERI-STRIP™ REINFORCED ADHESIVE SKIN CLOSURES, R1546, 1/4 IN X 4 IN (6 MM X 100 MM), 10 STRIPS/ENVELOPE: Brand: 3M™ STERI-STRIP™

## (undated) DEVICE — KIT MINI SUTURETAK DISP

## (undated) DEVICE — SYRINGE 30ML LL

## (undated) DEVICE — REM POLYHESIVE ADULT PATIENT RETURN ELECTRODE: Brand: VALLEYLAB

## (undated) DEVICE — GAUZE SPONGES,16 PLY: Brand: CURITY

## (undated) DEVICE — GLOVE INDICATOR PI UNDERGLOVE SZ 7.5 BLUE

## (undated) DEVICE — INTENDED FOR TISSUE SEPARATION, AND OTHER PROCEDURES THAT REQUIRE A SHARP SURGICAL BLADE TO PUNCTURE OR CUT.: Brand: BARD-PARKER ® CARBON RIB-BACK BLADES

## (undated) DEVICE — NEEDLE 25G X 1 1/2

## (undated) DEVICE — PREP PAD BNS: Brand: CONVERTORS

## (undated) DEVICE — DRAPE C-ARM X-RAY

## (undated) DEVICE — SCD SEQUENTIAL COMPRESSION COMFORT SLEEVE MEDIUM KNEE LENGTH: Brand: KENDALL SCD

## (undated) DEVICE — WEBRIL 6 IN UNSTERILE

## (undated) DEVICE — SPLINT ORTHO-GLASS 4IN X 15FT

## (undated) DEVICE — SYRINGE 3ML LL

## (undated) DEVICE — SUT ETHILON 4-0 PS-2 18 IN 1667H

## (undated) DEVICE — 2000CC GUARDIAN II: Brand: GUARDIAN

## (undated) DEVICE — SYRINGE 10ML LL

## (undated) DEVICE — SUT VICRYL 2-0 CT-2 27 IN J269H

## (undated) DEVICE — GUHL ANKLE DISTRACTOR FOOT STRAPS,                                    STERILE, LATEX FREE BOX OF 6

## (undated) DEVICE — NEEDLE 18 G X 1 1/2

## (undated) DEVICE — TUBING ARTHROSCOPIC WAVE  MAIN PUMP

## (undated) DEVICE — ACE WRAP 4 IN STERILE

## (undated) DEVICE — CAST PADDING 4 IN SYNTHETIC NON-STRL

## (undated) DEVICE — ACE WRAP 4 IN UNSTERILE

## (undated) DEVICE — TUBING SUCTION 5MM X 12 FT

## (undated) DEVICE — 3000CC GUARDIAN II: Brand: GUARDIAN

## (undated) DEVICE — CHLORAPREP HI-LITE 26ML ORANGE

## (undated) DEVICE — SUT VICRYL 4-0 SH 27 IN J415H

## (undated) DEVICE — BLADE SHAVER DISSECTOR 3.5MM 13CM COOLCUT

## (undated) DEVICE — PACK UNIVERSAL ARTHRSCOPY PBDS

## (undated) DEVICE — 10FR FRAZIER SUCTION HANDLE: Brand: CARDINAL HEALTH